# Patient Record
Sex: MALE | Race: BLACK OR AFRICAN AMERICAN | Employment: OTHER | URBAN - METROPOLITAN AREA
[De-identification: names, ages, dates, MRNs, and addresses within clinical notes are randomized per-mention and may not be internally consistent; named-entity substitution may affect disease eponyms.]

---

## 2017-01-09 PROBLEM — G62.9 NEUROPATHY: Status: ACTIVE | Noted: 2017-01-09

## 2017-07-25 PROBLEM — G45.1 TIA INVOLVING RIGHT INTERNAL CAROTID ARTERY: Status: ACTIVE | Noted: 2017-07-25

## 2018-07-28 ENCOUNTER — HOSPITAL ENCOUNTER (OUTPATIENT)
Age: 83
Setting detail: OBSERVATION
Discharge: HOME | End: 2018-07-30
Attending: EMERGENCY MEDICINE | Admitting: INTERNAL MEDICINE
Payer: MEDICARE

## 2018-07-28 ENCOUNTER — APPOINTMENT (OUTPATIENT)
Dept: GENERAL RADIOLOGY | Age: 83
End: 2018-07-28
Payer: MEDICARE

## 2018-07-28 DIAGNOSIS — R06.02 SOB (SHORTNESS OF BREATH): Primary | ICD-10-CM

## 2018-07-28 DIAGNOSIS — N17.9 AKI (ACUTE KIDNEY INJURY) (HCC): ICD-10-CM

## 2018-07-28 LAB
ALBUMIN SERPL-MCNC: 3.9 G/DL (ref 3.5–5.2)
ALP BLD-CCNC: 118 U/L (ref 40–129)
ALT SERPL-CCNC: 14 U/L (ref 0–40)
ANION GAP SERPL CALCULATED.3IONS-SCNC: 11 MMOL/L (ref 7–16)
AST SERPL-CCNC: 24 U/L (ref 0–39)
BASOPHILS ABSOLUTE: 0.03 E9/L (ref 0–0.2)
BASOPHILS RELATIVE PERCENT: 0.7 % (ref 0–2)
BILIRUB SERPL-MCNC: 0.3 MG/DL (ref 0–1.2)
BUN BLDV-MCNC: 33 MG/DL (ref 8–23)
CALCIUM SERPL-MCNC: 9.4 MG/DL (ref 8.6–10.2)
CHLORIDE BLD-SCNC: 105 MMOL/L (ref 98–107)
CO2: 22 MMOL/L (ref 22–29)
CREAT SERPL-MCNC: 1.8 MG/DL (ref 0.7–1.2)
EKG ATRIAL RATE: 227 BPM
EKG Q-T INTERVAL: 384 MS
EKG QRS DURATION: 68 MS
EKG QTC CALCULATION (BAZETT): 392 MS
EKG R AXIS: 49 DEGREES
EKG T AXIS: 28 DEGREES
EKG VENTRICULAR RATE: 63 BPM
EOSINOPHILS ABSOLUTE: 0.09 E9/L (ref 0.05–0.5)
EOSINOPHILS RELATIVE PERCENT: 2 % (ref 0–6)
GFR AFRICAN AMERICAN: 44
GFR NON-AFRICAN AMERICAN: 44 ML/MIN/1.73
GLUCOSE BLD-MCNC: 230 MG/DL (ref 74–109)
HCT VFR BLD CALC: 38.7 % (ref 37–54)
HEMOGLOBIN: 12.1 G/DL (ref 12.5–16.5)
IMMATURE GRANULOCYTES #: 0.01 E9/L
IMMATURE GRANULOCYTES %: 0.2 % (ref 0–5)
LYMPHOCYTES ABSOLUTE: 1.02 E9/L (ref 1.5–4)
LYMPHOCYTES RELATIVE PERCENT: 22.2 % (ref 20–42)
MCH RBC QN AUTO: 26.7 PG (ref 26–35)
MCHC RBC AUTO-ENTMCNC: 31.3 % (ref 32–34.5)
MCV RBC AUTO: 85.4 FL (ref 80–99.9)
METER GLUCOSE: 104 MG/DL (ref 70–110)
METER GLUCOSE: 216 MG/DL (ref 70–110)
MONOCYTES ABSOLUTE: 0.39 E9/L (ref 0.1–0.95)
MONOCYTES RELATIVE PERCENT: 8.5 % (ref 2–12)
NEUTROPHILS ABSOLUTE: 3.06 E9/L (ref 1.8–7.3)
NEUTROPHILS RELATIVE PERCENT: 66.4 % (ref 43–80)
PDW BLD-RTO: 15.6 FL (ref 11.5–15)
PLATELET # BLD: 104 E9/L (ref 130–450)
PMV BLD AUTO: 11.5 FL (ref 7–12)
POTASSIUM SERPL-SCNC: 4.8 MMOL/L (ref 3.5–5)
PRO-BNP: 872 PG/ML (ref 0–450)
RBC # BLD: 4.53 E12/L (ref 3.8–5.8)
SODIUM BLD-SCNC: 138 MMOL/L (ref 132–146)
TOTAL PROTEIN: 7.9 G/DL (ref 6.4–8.3)
TROPONIN: <0.01 NG/ML (ref 0–0.03)
WBC # BLD: 4.6 E9/L (ref 4.5–11.5)

## 2018-07-28 PROCEDURE — 36415 COLL VENOUS BLD VENIPUNCTURE: CPT

## 2018-07-28 PROCEDURE — 99285 EMERGENCY DEPT VISIT HI MDM: CPT

## 2018-07-28 PROCEDURE — 83880 ASSAY OF NATRIURETIC PEPTIDE: CPT

## 2018-07-28 PROCEDURE — 80053 COMPREHEN METABOLIC PANEL: CPT

## 2018-07-28 PROCEDURE — 84484 ASSAY OF TROPONIN QUANT: CPT

## 2018-07-28 PROCEDURE — 82962 GLUCOSE BLOOD TEST: CPT

## 2018-07-28 PROCEDURE — G0378 HOSPITAL OBSERVATION PER HR: HCPCS

## 2018-07-28 PROCEDURE — 93005 ELECTROCARDIOGRAM TRACING: CPT | Performed by: PHYSICIAN ASSISTANT

## 2018-07-28 PROCEDURE — 6370000000 HC RX 637 (ALT 250 FOR IP): Performed by: INTERNAL MEDICINE

## 2018-07-28 PROCEDURE — 71045 X-RAY EXAM CHEST 1 VIEW: CPT

## 2018-07-28 PROCEDURE — 85025 COMPLETE CBC W/AUTO DIFF WBC: CPT

## 2018-07-28 RX ORDER — NICOTINE POLACRILEX 4 MG
15 LOZENGE BUCCAL PRN
Status: DISCONTINUED | OUTPATIENT
Start: 2018-07-28 | End: 2018-07-30 | Stop reason: HOSPADM

## 2018-07-28 RX ORDER — METOPROLOL TARTRATE 50 MG/1
50 TABLET, FILM COATED ORAL 2 TIMES DAILY
Status: DISCONTINUED | OUTPATIENT
Start: 2018-07-28 | End: 2018-07-30 | Stop reason: HOSPADM

## 2018-07-28 RX ORDER — DEXTROSE MONOHYDRATE 50 MG/ML
100 INJECTION, SOLUTION INTRAVENOUS PRN
Status: DISCONTINUED | OUTPATIENT
Start: 2018-07-28 | End: 2018-07-30 | Stop reason: HOSPADM

## 2018-07-28 RX ORDER — WARFARIN SODIUM 4 MG/1
4 TABLET ORAL
Status: DISCONTINUED | OUTPATIENT
Start: 2018-07-30 | End: 2018-07-29

## 2018-07-28 RX ORDER — DEXTROSE MONOHYDRATE 25 G/50ML
12.5 INJECTION, SOLUTION INTRAVENOUS PRN
Status: DISCONTINUED | OUTPATIENT
Start: 2018-07-28 | End: 2018-07-30 | Stop reason: HOSPADM

## 2018-07-28 RX ORDER — CLOPIDOGREL BISULFATE 75 MG/1
75 TABLET ORAL DAILY
Status: DISCONTINUED | OUTPATIENT
Start: 2018-07-29 | End: 2018-07-30 | Stop reason: HOSPADM

## 2018-07-28 RX ORDER — WARFARIN SODIUM 2 MG/1
2 TABLET ORAL
Status: DISCONTINUED | OUTPATIENT
Start: 2018-07-28 | End: 2018-07-29

## 2018-07-28 RX ORDER — SIMVASTATIN 20 MG
20 TABLET ORAL NIGHTLY
Status: DISCONTINUED | OUTPATIENT
Start: 2018-07-28 | End: 2018-07-30 | Stop reason: HOSPADM

## 2018-07-28 RX ORDER — FAMOTIDINE 20 MG/1
20 TABLET, FILM COATED ORAL DAILY
Status: DISCONTINUED | OUTPATIENT
Start: 2018-07-29 | End: 2018-07-30 | Stop reason: HOSPADM

## 2018-07-28 RX ORDER — WARFARIN SODIUM 2 MG/1
TABLET ORAL
COMMUNITY
End: 2019-03-01

## 2018-07-28 RX ADMIN — METOPROLOL TARTRATE 50 MG: 50 TABLET ORAL at 22:40

## 2018-07-28 ASSESSMENT — ENCOUNTER SYMPTOMS
BACK PAIN: 0
ABDOMINAL PAIN: 0
COUGH: 0
SHORTNESS OF BREATH: 1
NAUSEA: 0
EYE REDNESS: 0
DIARRHEA: 0
SORE THROAT: 0
WHEEZING: 0
VOMITING: 0
EYE DISCHARGE: 0
SINUS PRESSURE: 0
EYE PAIN: 0

## 2018-07-28 ASSESSMENT — PAIN SCALES - GENERAL: PAINLEVEL_OUTOF10: 0

## 2018-07-28 NOTE — ED PROVIDER NOTES
rebound and no guarding. Musculoskeletal: He exhibits no edema, tenderness or deformity. Neurological: He is alert and oriented to person, place, and time. No cranial nerve deficit. Coordination normal.   Skin: Skin is warm and dry. He is not diaphoretic. Nursing note and vitals reviewed. Procedures    MDM  Number of Diagnoses or Management Options  MISAEL (acute kidney injury) ([facility]):   SOB (shortness of breath):   Diagnosis management comments: Review labs ordered and imaging. Patient has findings consistent with acute kidney injury. Based on previous results as function is worsening though he has chronic mild insufficiency. Patient is satting well vital signs are within normal limits. Though patient's exertion takes less and less effort. Patient has not had a recent cardiac evaluation will advise patient stay in hospital for further workup. 8:50 no change in exam on reevaluation. Patient has no complaints at this time    9:00 Spoke with Dr. Damir Moody. Will admit the patient.     --------------------------------------------- PAST HISTORY ---------------------------------------------  Past Medical History:  has a past medical history of A-fib ([facility] Chronic kidney disease; Colitis, ulcerative chronic ([facility] Diabetes mellitus ([facility] DM (diabetes mellitus) ([facility] Hyperlipidemia; Hyperlipidemia associated with type 2 diabetes mellitus ([facility] Hypertension; Long term (current) use of anticoagulants; Prostate enlargement; Stroke (cerebrum) ([facility] and Unspecified cerebral artery occlusion with cerebral infarction. Past Surgical History:  has a past surgical history that includes ECHO Compl W Dop Color Flow (11/26/2013); ECHO Transesophageal (11/27/2013); colostomy (1978); Cystoscopy (more than 5 yrs); TURP (6/16/14); Colonoscopy; Abdomen surgery; and Endoscopy, colon, diagnostic (12/10/15). Social History:  reports that he has quit smoking.  He has never used smokeless tobacco. He reports that he does not drink alcohol or use drugs. Family History: family history includes COPD in his sister; Coronary Art Dis in his sister; Diabetes in an other family member; Hypotension in his mother. The patients home medications have been reviewed. Allergies: Patient has no known allergies.     -------------------------------------------------- RESULTS -------------------------------------------------    LABS:  Results for orders placed or performed during the hospital encounter of 07/28/18   CBC Auto Differential   Result Value Ref Range    WBC 4.6 4.5 - 11.5 E9/L    RBC 4.53 3.80 - 5.80 E12/L    Hemoglobin 12.1 (L) 12.5 - 16.5 g/dL    Hematocrit 38.7 37.0 - 54.0 %    MCV 85.4 80.0 - 99.9 fL    MCH 26.7 26.0 - 35.0 pg    MCHC 31.3 (L) 32.0 - 34.5 %    RDW 15.6 (H) 11.5 - 15.0 fL    Platelets 444 (L) 374 - 450 E9/L    MPV 11.5 7.0 - 12.0 fL    Neutrophils % 66.4 43.0 - 80.0 %    Immature Granulocytes % 0.2 0.0 - 5.0 %    Lymphocytes % 22.2 20.0 - 42.0 %    Monocytes % 8.5 2.0 - 12.0 %    Eosinophils % 2.0 0.0 - 6.0 %    Basophils % 0.7 0.0 - 2.0 %    Neutrophils # 3.06 1.80 - 7.30 E9/L    Immature Granulocytes # 0.01 E9/L    Lymphocytes # 1.02 (L) 1.50 - 4.00 E9/L    Monocytes # 0.39 0.10 - 0.95 E9/L    Eosinophils # 0.09 0.05 - 0.50 E9/L    Basophils # 0.03 0.00 - 0.20 E9/L   Comprehensive Metabolic Panel   Result Value Ref Range    Sodium 138 132 - 146 mmol/L    Potassium 4.8 3.5 - 5.0 mmol/L    Chloride 105 98 - 107 mmol/L    CO2 22 22 - 29 mmol/L    Anion Gap 11 7 - 16 mmol/L    Glucose 230 (H) 74 - 109 mg/dL    BUN 33 (H) 8 - 23 mg/dL    CREATININE 1.8 (H) 0.7 - 1.2 mg/dL    GFR Non-African American 44 >=60 mL/min/1.73    GFR African American 44     Calcium 9.4 8.6 - 10.2 mg/dL    Total Protein 7.9 6.4 - 8.3 g/dL    Alb 3.9 3.5 - 5.2 g/dL    Total Bilirubin 0.3 0.0 - 1.2 mg/dL    Alkaline Phosphatase 118 40 - 129 U/L    ALT 14 0 - 40 U/L    AST 24 0 - 39 U/L   Brain Natriuretic Peptide   Result Value Ref Range

## 2018-07-28 NOTE — ED NOTES
Pt requesting something to eat, pt states he is a diabetic. Recheck .  Dr. Abdulaziz Jones aware     Jesse Ross, MAITE  07/28/18 0770

## 2018-07-29 LAB
ALBUMIN SERPL-MCNC: 3.6 G/DL (ref 3.5–5.2)
ALP BLD-CCNC: 111 U/L (ref 40–129)
ALT SERPL-CCNC: 11 U/L (ref 0–40)
ANION GAP SERPL CALCULATED.3IONS-SCNC: 12 MMOL/L (ref 7–16)
AST SERPL-CCNC: 24 U/L (ref 0–39)
BILIRUB SERPL-MCNC: 0.3 MG/DL (ref 0–1.2)
BUN BLDV-MCNC: 31 MG/DL (ref 8–23)
CALCIUM SERPL-MCNC: 9.3 MG/DL (ref 8.6–10.2)
CHLORIDE BLD-SCNC: 106 MMOL/L (ref 98–107)
CO2: 23 MMOL/L (ref 22–29)
CREAT SERPL-MCNC: 1.5 MG/DL (ref 0.7–1.2)
GFR AFRICAN AMERICAN: 54
GFR NON-AFRICAN AMERICAN: 54 ML/MIN/1.73
GLUCOSE BLD-MCNC: 189 MG/DL (ref 74–109)
HCT VFR BLD CALC: 35.4 % (ref 37–54)
HEMOGLOBIN: 11.5 G/DL (ref 12.5–16.5)
INR BLD: 4.2
MCH RBC QN AUTO: 26.8 PG (ref 26–35)
MCHC RBC AUTO-ENTMCNC: 32.5 % (ref 32–34.5)
MCV RBC AUTO: 82.5 FL (ref 80–99.9)
METER GLUCOSE: 153 MG/DL (ref 70–110)
METER GLUCOSE: 223 MG/DL (ref 70–110)
METER GLUCOSE: 227 MG/DL (ref 70–110)
PDW BLD-RTO: 15.4 FL (ref 11.5–15)
PLATELET # BLD: 87 E9/L (ref 130–450)
PLATELET CONFIRMATION: NORMAL
PMV BLD AUTO: 10.8 FL (ref 7–12)
POTASSIUM SERPL-SCNC: 4.5 MMOL/L (ref 3.5–5)
PROSTATE SPECIFIC ANTIGEN: 12.38 NG/ML (ref 0–4)
PROTHROMBIN TIME: 46.3 SEC (ref 9.3–12.4)
RBC # BLD: 4.29 E12/L (ref 3.8–5.8)
SODIUM BLD-SCNC: 141 MMOL/L (ref 132–146)
TOTAL PROTEIN: 7.2 G/DL (ref 6.4–8.3)
WBC # BLD: 4.4 E9/L (ref 4.5–11.5)

## 2018-07-29 PROCEDURE — 85610 PROTHROMBIN TIME: CPT

## 2018-07-29 PROCEDURE — 80053 COMPREHEN METABOLIC PANEL: CPT

## 2018-07-29 PROCEDURE — 36415 COLL VENOUS BLD VENIPUNCTURE: CPT

## 2018-07-29 PROCEDURE — 85027 COMPLETE CBC AUTOMATED: CPT

## 2018-07-29 PROCEDURE — G0378 HOSPITAL OBSERVATION PER HR: HCPCS

## 2018-07-29 PROCEDURE — 82962 GLUCOSE BLOOD TEST: CPT

## 2018-07-29 PROCEDURE — 96372 THER/PROPH/DIAG INJ SC/IM: CPT

## 2018-07-29 PROCEDURE — G0103 PSA SCREENING: HCPCS

## 2018-07-29 PROCEDURE — 6370000000 HC RX 637 (ALT 250 FOR IP): Performed by: INTERNAL MEDICINE

## 2018-07-29 RX ADMIN — FAMOTIDINE 20 MG: 20 TABLET, FILM COATED ORAL at 10:16

## 2018-07-29 RX ADMIN — INSULIN HUMAN 35 UNITS: 100 INJECTION, SOLUTION PARENTERAL at 18:08

## 2018-07-29 RX ADMIN — SIMVASTATIN 20 MG: 20 TABLET, FILM COATED ORAL at 19:59

## 2018-07-29 RX ADMIN — METOPROLOL TARTRATE 50 MG: 50 TABLET ORAL at 10:16

## 2018-07-29 RX ADMIN — CLOPIDOGREL 75 MG: 75 TABLET, FILM COATED ORAL at 10:16

## 2018-07-29 RX ADMIN — METOPROLOL TARTRATE 50 MG: 50 TABLET ORAL at 19:59

## 2018-07-29 RX ADMIN — INSULIN HUMAN 25 UNITS: 100 INJECTION, SOLUTION PARENTERAL at 07:21

## 2018-07-29 ASSESSMENT — PAIN SCALES - GENERAL
PAINLEVEL_OUTOF10: 0

## 2018-07-29 NOTE — PROGRESS NOTES
Dr. Aguilera Round,    Please note: Your patient is on a medication that requires a renal dose adjustment. Renal Function Assessment:    Date Body Weight IBW Adj. Body Weight Scr CrCl Dialysis status   7/28/2018 78.2 kg N/a N/a 1.8 32 ml/min n/a       Pharmacy has renally dose-adjusted the following medication(s):    Date Medication Original Dosing Regimen New Dosing Regimen   7/28/2018 Pepcid 20 mg PO twice daily 20 mg PO daily           These changes were made per protocol according to the [facility] Renal Function-Based Dose Adjustments Policy    *Please note this dose may need readjusted if your patient's renal function significantly improves. Please contact pharmacy with any questions regarding these changes.     Vandana Melendez RPh 7/28/2018 10:01 PM

## 2018-07-30 VITALS
HEART RATE: 70 BPM | SYSTOLIC BLOOD PRESSURE: 141 MMHG | DIASTOLIC BLOOD PRESSURE: 76 MMHG | HEIGHT: 69 IN | BODY MASS INDEX: 25.58 KG/M2 | TEMPERATURE: 97.3 F | OXYGEN SATURATION: 98 % | RESPIRATION RATE: 16 BRPM | WEIGHT: 172.7 LBS

## 2018-07-30 LAB
ANION GAP SERPL CALCULATED.3IONS-SCNC: 13 MMOL/L (ref 7–16)
BUN BLDV-MCNC: 32 MG/DL (ref 8–23)
CALCIUM SERPL-MCNC: 9.6 MG/DL (ref 8.6–10.2)
CHLORIDE BLD-SCNC: 105 MMOL/L (ref 98–107)
CO2: 26 MMOL/L (ref 22–29)
CREAT SERPL-MCNC: 1.5 MG/DL (ref 0.7–1.2)
GFR AFRICAN AMERICAN: 54
GFR NON-AFRICAN AMERICAN: 54 ML/MIN/1.73
GLUCOSE BLD-MCNC: 139 MG/DL (ref 74–109)
HCT VFR BLD CALC: 39.5 % (ref 37–54)
HEMOGLOBIN: 12.4 G/DL (ref 12.5–16.5)
MCH RBC QN AUTO: 26.3 PG (ref 26–35)
MCHC RBC AUTO-ENTMCNC: 31.4 % (ref 32–34.5)
MCV RBC AUTO: 83.9 FL (ref 80–99.9)
METER GLUCOSE: 106 MG/DL (ref 70–110)
METER GLUCOSE: 121 MG/DL (ref 70–110)
METER GLUCOSE: 135 MG/DL (ref 70–110)
PDW BLD-RTO: 15.4 FL (ref 11.5–15)
PLATELET # BLD: 107 E9/L (ref 130–450)
PMV BLD AUTO: 11.6 FL (ref 7–12)
POTASSIUM SERPL-SCNC: 4.1 MMOL/L (ref 3.5–5)
RBC # BLD: 4.71 E12/L (ref 3.8–5.8)
SODIUM BLD-SCNC: 144 MMOL/L (ref 132–146)
WBC # BLD: 5.1 E9/L (ref 4.5–11.5)

## 2018-07-30 PROCEDURE — 36415 COLL VENOUS BLD VENIPUNCTURE: CPT

## 2018-07-30 PROCEDURE — G0378 HOSPITAL OBSERVATION PER HR: HCPCS

## 2018-07-30 PROCEDURE — 85027 COMPLETE CBC AUTOMATED: CPT

## 2018-07-30 PROCEDURE — 80048 BASIC METABOLIC PNL TOTAL CA: CPT

## 2018-07-30 PROCEDURE — 82962 GLUCOSE BLOOD TEST: CPT

## 2018-07-30 PROCEDURE — 6370000000 HC RX 637 (ALT 250 FOR IP): Performed by: INTERNAL MEDICINE

## 2018-07-30 RX ADMIN — CLOPIDOGREL 75 MG: 75 TABLET, FILM COATED ORAL at 09:10

## 2018-07-30 RX ADMIN — FAMOTIDINE 20 MG: 20 TABLET, FILM COATED ORAL at 09:09

## 2018-07-30 RX ADMIN — INSULIN HUMAN 25 UNITS: 100 INJECTION, SOLUTION PARENTERAL at 09:06

## 2018-07-30 RX ADMIN — METOPROLOL TARTRATE 50 MG: 50 TABLET ORAL at 09:09

## 2018-07-30 RX ADMIN — INSULIN HUMAN 35 UNITS: 100 INJECTION, SOLUTION PARENTERAL at 18:04

## 2018-07-30 ASSESSMENT — PAIN SCALES - GENERAL: PAINLEVEL_OUTOF10: 0

## 2018-07-30 NOTE — CONSULTS
Consults           Today's Date: 7/30/2018  Patient Name: Juani Leyva  Date of admission: 7/28/2018  3:49 PM  Patient's age: 80 y.o., 1935  Admission Dx: MISAEL (acute kidney injury) ([facility] [N17.9]  Acute kidney injury ([address] [N17.9]    Reason for Consult:  atrial fibrillation,chronic, dizziness/lightheadedness  Requesting Physician: Pancho Rloand MD    CHIEF COMPLAINT: Shortness of breath, lightheadedness    History Obtained From:  patient    HISTORY OF PRESENT ILLNESS:      The patient is a 80 y.o.  male who is admitted to the hospital for weakness and lightheadedness  Mr. Ann-Marie Helms is well known to me for chronic atrial fibrillation but was last seen by me more than 5 years ago  He has been in his usual state of health with no complaints of shortness of breath chest pain or dizziness until last Thursday  He says he was working in his yard and working hard to start his weed floresita. After completing his task in the yard he noted symptoms of shortness of breath and lightheadedness  Symptoms persisted thereafter for the next 24-48 hours and therefore he presented to the hospital  On initial evaluation BUN/creatinine were elevated from his baseline, creatinine serum creatinine was 1.8 and his baseline is 1.4-1.5  Blood sugars were also elevated. Since hospitalization he has felt better, and ambulating without difficulty. No chest pain or shortness of breath  No syncope or near syncope  No paroxysmal muscle dyspnea orthopnea or leg edema  Cardiac workup including echocardiography last year was completely normal      Past Medical History:   has a past medical history of A-fib ([address] Chronic kidney disease; Colitis, ulcerative chronic ([address] Diabetes mellitus ([address] DM (diabetes mellitus) ([address] Hyperlipidemia; Hyperlipidemia associated with type 2 diabetes mellitus ([address] Hypertension;  Long term (current) use of anticoagulants; Prostate enlargement; Stroke (cerebrum) ([address] and Unspecified cerebral artery occlusion with cerebral infarction. Past Surgical History:   has a past surgical history that includes ECHO Compl W Dop Color Flow (11/26/2013); ECHO Transesophageal (11/27/2013); colostomy (1978); Cystoscopy (more than 5 yrs); TURP (6/16/14); Colonoscopy; Abdomen surgery; and Endoscopy, colon, diagnostic (12/10/15). Home Medications:    Prior to Admission medications    Medication Sig Start Date End Date Taking? Authorizing Provider   warfarin (COUMADIN) 2 MG tablet Take 4 mg Mon/Tues/Wed/Thur/Fri  Take 2 mg Sat & Sun   Yes Historical Provider, MD   famotidine (PEPCID) 20 MG tablet Take 1 tablet by mouth 2 times daily 7/26/18  Yes Guillermina Andrews MD   ONE TOUCH ULTRA TEST strip USE ONE STRIP TO CHECK GLUCOSE TWO TIMES DAILY 6/25/18  Yes Guillermina Andrews MD   simvastatin (ZOCOR) 20 MG tablet Take 1 tablet by mouth nightly 6/25/18  Yes Nicholas Beal MD   metoprolol tartrate (LOPRESSOR) 50 MG tablet TAKE 1 TABLET BY MOUTH 2 TIMES DAILY 6/4/18  Yes Guillermina Andrews MD   [facility] LANCETS MICRO THIN 33G MISC TEST TWICE A DAY 5/8/18  Yes Guillermina Andrews MD   clopidogrel (PLAVIX) 75 MG tablet TAKE 1 TABLET BY MOUTH DAILY 4/2/18  Yes Nicholas Beal MD   insulin regular (HUMULIN R) 100 UNIT/ML injection PATIENT INJECTS 25 UNITS AM , 35 UNITS BEFORE DINNER 2/27/18  Yes Nicholas Beal MD   B-D INS SYR ULTRAFINE 1CC/31G 31G X 5/16\" 1 ML MISC USE TO INJECT INSULIN TWICE A DAY 1/22/18  Yes Nicholas Beal MD   Insulin Syringe-Needle U-100 (B-D INS SYR ULTRAFINE 1CC/31G) 31G X 5/16\" 1 ML MISC USE TO INJECT INSULIN TWICE A DAY 4/24/17  Yes Guillermina Andrews MD       Allergies:  Patient has no known allergies. Social History:   reports that he has quit smoking. He has never used smokeless tobacco. He reports that he does not drink alcohol or use drugs. Family History: family history includes COPD in his sister; Coronary Art Dis in his sister; Diabetes in an other family member; Hypotension in his mother.  No h/o sudden cardiac

## 2018-07-30 NOTE — PROGRESS NOTES
Call placed to Dr. Candace Em  With patient concerns. Informed that he just admitted patient and he is to see Dr. Dyana Pereira. Orders received for BMP and CBC and told to contact Dr. Dyana Pereira and change attending.

## 2018-12-19 ENCOUNTER — HOSPITAL ENCOUNTER (OUTPATIENT)
Age: 83
Discharge: HOME | End: 2018-12-21
Payer: MEDICARE

## 2018-12-19 LAB — PROSTATE SPECIFIC ANTIGEN: 14.36 NG/ML (ref 0–4)

## 2018-12-19 PROCEDURE — 84153 ASSAY OF PSA TOTAL: CPT

## 2019-12-23 ENCOUNTER — HOSPITAL ENCOUNTER (OUTPATIENT)
Age: 84
Discharge: HOME | End: 2019-12-25
Payer: MEDICARE

## 2019-12-23 LAB — PROSTATE SPECIFIC ANTIGEN: 22.76 NG/ML (ref 0–4)

## 2019-12-23 PROCEDURE — 84153 ASSAY OF PSA TOTAL: CPT

## 2020-03-25 PROBLEM — N17.9 AKI (ACUTE KIDNEY INJURY) (HCC): Status: RESOLVED | Noted: 2018-07-28 | Resolved: 2020-03-24

## 2020-06-19 ENCOUNTER — HOSPITAL ENCOUNTER (OUTPATIENT)
Age: 85
Discharge: HOME | End: 2020-06-21
Payer: MEDICARE

## 2020-06-19 LAB — PROSTATE SPECIFIC ANTIGEN: 22.32 NG/ML (ref 0–4)

## 2020-06-19 PROCEDURE — 84153 ASSAY OF PSA TOTAL: CPT

## 2020-10-01 ENCOUNTER — HOSPITAL ENCOUNTER (OUTPATIENT)
Age: 85
Setting detail: OBSERVATION
Discharge: HOME | End: 2020-10-02
Attending: EMERGENCY MEDICINE | Admitting: INTERNAL MEDICINE
Payer: MEDICARE

## 2020-10-01 ENCOUNTER — APPOINTMENT (OUTPATIENT)
Dept: GENERAL RADIOLOGY | Age: 85
End: 2020-10-01
Payer: MEDICARE

## 2020-10-01 PROBLEM — K92.2 GI BLEEDING: Status: ACTIVE | Noted: 2020-10-01

## 2020-10-01 LAB
ABO/RH: NORMAL
ALBUMIN SERPL-MCNC: 3.9 G/DL (ref 3.5–5.2)
ALP BLD-CCNC: 134 U/L (ref 40–129)
ALT SERPL-CCNC: 10 U/L (ref 0–40)
ANION GAP SERPL CALCULATED.3IONS-SCNC: 12 MMOL/L (ref 7–16)
ANTIBODY SCREEN: NORMAL
AST SERPL-CCNC: 30 U/L (ref 0–39)
BASOPHILS ABSOLUTE: 0.02 E9/L (ref 0–0.2)
BASOPHILS RELATIVE PERCENT: 0.4 % (ref 0–2)
BILIRUB SERPL-MCNC: 0.4 MG/DL (ref 0–1.2)
BILIRUBIN DIRECT: <0.2 MG/DL (ref 0–0.3)
BILIRUBIN, INDIRECT: ABNORMAL MG/DL (ref 0–1)
BUN BLDV-MCNC: 34 MG/DL (ref 8–23)
CALCIUM SERPL-MCNC: 9.5 MG/DL (ref 8.6–10.2)
CHLORIDE BLD-SCNC: 107 MMOL/L (ref 98–107)
CO2: 20 MMOL/L (ref 22–29)
CREAT SERPL-MCNC: 1.9 MG/DL (ref 0.7–1.2)
EKG ATRIAL RATE: 110 BPM
EKG Q-T INTERVAL: 352 MS
EKG QRS DURATION: 66 MS
EKG QTC CALCULATION (BAZETT): 440 MS
EKG R AXIS: 66 DEGREES
EKG T AXIS: 58 DEGREES
EKG VENTRICULAR RATE: 94 BPM
EOSINOPHILS ABSOLUTE: 0.09 E9/L (ref 0.05–0.5)
EOSINOPHILS RELATIVE PERCENT: 2 % (ref 0–6)
GFR AFRICAN AMERICAN: 41
GFR NON-AFRICAN AMERICAN: 41 ML/MIN/1.73
GLUCOSE BLD-MCNC: 163 MG/DL (ref 74–99)
HCT VFR BLD CALC: 37.9 % (ref 37–54)
HEMOGLOBIN: 11.8 G/DL (ref 12.5–16.5)
IMMATURE GRANULOCYTES #: 0.02 E9/L
IMMATURE GRANULOCYTES %: 0.4 % (ref 0–5)
INR BLD: 3
LIPASE: 71 U/L (ref 13–60)
LYMPHOCYTES ABSOLUTE: 1.11 E9/L (ref 1.5–4)
LYMPHOCYTES RELATIVE PERCENT: 24.4 % (ref 20–42)
MCH RBC QN AUTO: 26.8 PG (ref 26–35)
MCHC RBC AUTO-ENTMCNC: 31.1 % (ref 32–34.5)
MCV RBC AUTO: 85.9 FL (ref 80–99.9)
METER GLUCOSE: 114 MG/DL (ref 74–99)
METER GLUCOSE: 117 MG/DL (ref 74–99)
METER GLUCOSE: 251 MG/DL (ref 74–99)
MONOCYTES ABSOLUTE: 0.33 E9/L (ref 0.1–0.95)
MONOCYTES RELATIVE PERCENT: 7.3 % (ref 2–12)
NEUTROPHILS ABSOLUTE: 2.98 E9/L (ref 1.8–7.3)
NEUTROPHILS RELATIVE PERCENT: 65.5 % (ref 43–80)
PDW BLD-RTO: 15.3 FL (ref 11.5–15)
PLATELET # BLD: 101 E9/L (ref 130–450)
PMV BLD AUTO: 12.1 FL (ref 7–12)
POTASSIUM REFLEX MAGNESIUM: 4.6 MMOL/L (ref 3.5–5)
PROTHROMBIN TIME: 34.5 SEC (ref 9.3–12.4)
RBC # BLD: 4.41 E12/L (ref 3.8–5.8)
SODIUM BLD-SCNC: 139 MMOL/L (ref 132–146)
TOTAL PROTEIN: 7.5 G/DL (ref 6.4–8.3)
WBC # BLD: 4.6 E9/L (ref 4.5–11.5)

## 2020-10-01 PROCEDURE — G0378 HOSPITAL OBSERVATION PER HR: HCPCS

## 2020-10-01 PROCEDURE — 85025 COMPLETE CBC W/AUTO DIFF WBC: CPT

## 2020-10-01 PROCEDURE — 86850 RBC ANTIBODY SCREEN: CPT

## 2020-10-01 PROCEDURE — 93010 ELECTROCARDIOGRAM REPORT: CPT | Performed by: INTERNAL MEDICINE

## 2020-10-01 PROCEDURE — 80076 HEPATIC FUNCTION PANEL: CPT

## 2020-10-01 PROCEDURE — C9113 INJ PANTOPRAZOLE SODIUM, VIA: HCPCS | Performed by: EMERGENCY MEDICINE

## 2020-10-01 PROCEDURE — 2580000003 HC RX 258: Performed by: INTERNAL MEDICINE

## 2020-10-01 PROCEDURE — 85610 PROTHROMBIN TIME: CPT

## 2020-10-01 PROCEDURE — 6360000002 HC RX W HCPCS: Performed by: EMERGENCY MEDICINE

## 2020-10-01 PROCEDURE — 83690 ASSAY OF LIPASE: CPT

## 2020-10-01 PROCEDURE — 99284 EMERGENCY DEPT VISIT MOD MDM: CPT

## 2020-10-01 PROCEDURE — 86901 BLOOD TYPING SEROLOGIC RH(D): CPT

## 2020-10-01 PROCEDURE — 71045 X-RAY EXAM CHEST 1 VIEW: CPT

## 2020-10-01 PROCEDURE — 6370000000 HC RX 637 (ALT 250 FOR IP): Performed by: INTERNAL MEDICINE

## 2020-10-01 PROCEDURE — 80048 BASIC METABOLIC PNL TOTAL CA: CPT

## 2020-10-01 PROCEDURE — 82962 GLUCOSE BLOOD TEST: CPT

## 2020-10-01 PROCEDURE — 86900 BLOOD TYPING SEROLOGIC ABO: CPT

## 2020-10-01 PROCEDURE — 6370000000 HC RX 637 (ALT 250 FOR IP): Performed by: STUDENT IN AN ORGANIZED HEALTH CARE EDUCATION/TRAINING PROGRAM

## 2020-10-01 PROCEDURE — 96374 THER/PROPH/DIAG INJ IV PUSH: CPT

## 2020-10-01 PROCEDURE — 93005 ELECTROCARDIOGRAM TRACING: CPT | Performed by: EMERGENCY MEDICINE

## 2020-10-01 PROCEDURE — 99285 EMERGENCY DEPT VISIT HI MDM: CPT

## 2020-10-01 RX ORDER — DEXTROSE MONOHYDRATE 50 MG/ML
100 INJECTION, SOLUTION INTRAVENOUS PRN
Status: DISCONTINUED | OUTPATIENT
Start: 2020-10-01 | End: 2020-10-02 | Stop reason: HOSPADM

## 2020-10-01 RX ORDER — PANTOPRAZOLE SODIUM 40 MG/10ML
40 INJECTION, POWDER, LYOPHILIZED, FOR SOLUTION INTRAVENOUS ONCE
Status: COMPLETED | OUTPATIENT
Start: 2020-10-01 | End: 2020-10-01

## 2020-10-01 RX ORDER — SODIUM CHLORIDE 0.9 % (FLUSH) 0.9 %
10 SYRINGE (ML) INJECTION PRN
Status: DISCONTINUED | OUTPATIENT
Start: 2020-10-01 | End: 2020-10-02 | Stop reason: HOSPADM

## 2020-10-01 RX ORDER — SODIUM CHLORIDE 9 MG/ML
INJECTION, SOLUTION INTRAVENOUS CONTINUOUS
Status: DISCONTINUED | OUTPATIENT
Start: 2020-10-01 | End: 2020-10-02 | Stop reason: HOSPADM

## 2020-10-01 RX ORDER — NICOTINE POLACRILEX 4 MG
15 LOZENGE BUCCAL PRN
Status: DISCONTINUED | OUTPATIENT
Start: 2020-10-01 | End: 2020-10-02 | Stop reason: HOSPADM

## 2020-10-01 RX ORDER — ACETAMINOPHEN 325 MG/1
650 TABLET ORAL EVERY 4 HOURS PRN
Status: DISCONTINUED | OUTPATIENT
Start: 2020-10-01 | End: 2020-10-02 | Stop reason: HOSPADM

## 2020-10-01 RX ORDER — PANTOPRAZOLE SODIUM 40 MG/1
40 TABLET, DELAYED RELEASE ORAL
Status: DISCONTINUED | OUTPATIENT
Start: 2020-10-01 | End: 2020-10-02 | Stop reason: HOSPADM

## 2020-10-01 RX ORDER — METOPROLOL TARTRATE 50 MG/1
50 TABLET, FILM COATED ORAL 2 TIMES DAILY
Status: DISCONTINUED | OUTPATIENT
Start: 2020-10-01 | End: 2020-10-02 | Stop reason: HOSPADM

## 2020-10-01 RX ORDER — CLONIDINE 0.3 MG/24H
1 PATCH, EXTENDED RELEASE TRANSDERMAL WEEKLY
Status: DISCONTINUED | OUTPATIENT
Start: 2020-10-01 | End: 2020-10-02 | Stop reason: HOSPADM

## 2020-10-01 RX ORDER — CLONIDINE HYDROCHLORIDE 0.1 MG/1
0.1 TABLET ORAL EVERY 4 HOURS PRN
Status: DISCONTINUED | OUTPATIENT
Start: 2020-10-01 | End: 2020-10-02 | Stop reason: HOSPADM

## 2020-10-01 RX ORDER — SODIUM CHLORIDE 0.9 % (FLUSH) 0.9 %
10 SYRINGE (ML) INJECTION EVERY 12 HOURS SCHEDULED
Status: DISCONTINUED | OUTPATIENT
Start: 2020-10-01 | End: 2020-10-02 | Stop reason: HOSPADM

## 2020-10-01 RX ORDER — DEXTROSE MONOHYDRATE 25 G/50ML
12.5 INJECTION, SOLUTION INTRAVENOUS PRN
Status: DISCONTINUED | OUTPATIENT
Start: 2020-10-01 | End: 2020-10-02 | Stop reason: HOSPADM

## 2020-10-01 RX ADMIN — METOPROLOL TARTRATE 50 MG: 50 TABLET, FILM COATED ORAL at 21:33

## 2020-10-01 RX ADMIN — METOPROLOL TARTRATE 50 MG: 50 TABLET, FILM COATED ORAL at 12:46

## 2020-10-01 RX ADMIN — PANTOPRAZOLE SODIUM 40 MG: 40 INJECTION, POWDER, FOR SOLUTION INTRAVENOUS at 08:02

## 2020-10-01 RX ADMIN — INSULIN LISPRO 6 UNITS: 100 INJECTION, SOLUTION INTRAVENOUS; SUBCUTANEOUS at 19:52

## 2020-10-01 RX ADMIN — SODIUM CHLORIDE, PRESERVATIVE FREE 10 ML: 5 INJECTION INTRAVENOUS at 12:48

## 2020-10-01 RX ADMIN — CLONIDINE HYDROCHLORIDE 0.1 MG: 0.1 TABLET ORAL at 17:29

## 2020-10-01 RX ADMIN — SODIUM CHLORIDE: 9 INJECTION, SOLUTION INTRAVENOUS at 17:30

## 2020-10-01 RX ADMIN — PANTOPRAZOLE SODIUM 40 MG: 40 TABLET, DELAYED RELEASE ORAL at 17:29

## 2020-10-01 NOTE — PROGRESS NOTES
Perfect serve message sent to Dr. Demarcus Garnica regarding clarification on Lovenox d/t GI Bleed and if home meds can be ordered.

## 2020-10-01 NOTE — CONSULTS
Take 1 tablet by mouth daily 6/15/20  Yes Nicholas Beal MD   metoprolol tartrate (LOPRESSOR) 50 MG tablet TAKE 1 TABLET BY MOUTH 2 TIMES DAILY 6/15/20  Yes Anjum Rowe MD   warfarin (COUMADIN) 2 MG tablet TAKE 1 TABLET BY MOUTH DAILY 4/27/20  Yes Nicholas Beal MD   simvastatin (ZOCOR) 20 MG tablet TAKE 1 TABLET BY MOUTH EVERY DAY AT NIGHT 2/10/20  Yes Nicholas Beal MD   warfarin (COUMADIN) 4 MG tablet TAKE 1 TABLET BY MOUTH DAILY AS DIRECTED BY PHYSICIAN. 2/10/20  Yes Anjum Rowe MD   Insulin Syringe-Needle U-100 31G X 5/16\" 1 ML MISC 1 each by Other route 2 times daily 6/1/20   Anjum Rowe MD   blood glucose test strips (ASCENSIA AUTODISC VI;ONE TOUCH ULTRA TEST VI) strip 1 each by In Vitro route daily As needed. 6/1/20   Anjum Rowe MD   ONE TOUCH ULTRASOFT LANCETS MISC 1 each by Does not apply route 2 times daily 6/1/20   Anjum Rowe MD   blood glucose test strips (ONE TOUCH ULTRA TEST) strip USE ONE STRIP TO CHECK GLUCOSE TWO TIMES DAILY 5/27/20   Anjum Rowe MD   famotidine (PEPCID) 20 MG tablet TAKE 1 TABLET BY MOUTH TWICE A DAY 12/6/19   Nichoals Beal MD   triamcinolone (KENALOG) 0.1 % cream APPLY TOPICALLY 2 TIMES DAILY.  12/31/18   Anjum Rowe MD   diclofenac sodium 1 % GEL Apply 2 g topically 2 times daily    Historical Provider, MD       No Known Allergies    Family History   Problem Relation Age of Onset    Hypotension Mother     COPD Sister     Coronary Art Dis Sister     Diabetes Other        Social History     Tobacco Use    Smoking status: Former Smoker    Smokeless tobacco: Never Used   Substance Use Topics    Alcohol use: No    Drug use: No         Review of Systems   General ROS: negative for - chills, fatigue or fever  Hematological and Lymphatic ROS: On coumadin and plavix  Respiratory ROS: no cough, shortness of breath, or wheezing  Cardiovascular ROS: no chest pain or dyspnea on exertion  Gastrointestinal ROS: SEE HPI  Genito-Urinary ROS: no dysuria, trouble voiding, or hematuria  Musculoskeletal ROS: negative for - joint swelling or muscle pain      PHYSICAL EXAM:    Vitals:    10/01/20 1015   BP: (!) 191/86   Pulse: 86   Resp: 16   Temp: 97.1 °F (36.2 °C)   SpO2: 98%       General Appearance:  awake, alert, oriented, in no acute distress  Skin:  Skin color, texture, turgor normal. No rashes or lesions. Head/face:  NCAT  Eyes:  No gross abnormalities. and Sclera nonicteric  Lungs:  No acute respiratory distress. Heart:  Heart regular rate and rhythm  Abdomen:  Ostomy present with melenic stool in bag. Soft, nontender, non distended. No guarding. No parastomal hernia. Extremities: Extremities warm to touch, pink, with no edema. LABS:    CBC  Recent Labs     10/01/20  0750   WBC 4.6   HGB 11.8*   HCT 37.9   *     BMP  Recent Labs     10/01/20  0750      K 4.6      CO2 20*   BUN 34*   CREATININE 1.9*   CALCIUM 9.5     Liver Function  Recent Labs     10/01/20  0750   LIPASE 71*   BILITOT 0.4   BILIDIR <0.2   AST 30   ALT 10   ALKPHOS 134*   PROT 7.5   LABALBU 3.9     No results for input(s): LACTATE in the last 72 hours. Recent Labs     10/01/20  0750   INR 3.0       RADIOLOGY    Xr Chest Portable    Result Date: 10/1/2020  EXAMINATION: ONE XRAY VIEW OF THE CHEST 10/1/2020 7:41 am COMPARISON: July 28, 2018 HISTORY: ORDERING SYSTEM PROVIDED HISTORY: CP TECHNOLOGIST PROVIDED HISTORY: Reason for exam:->CP What reading provider will be dictating this exam?->CRC Initial exam FINDINGS: The lungs are without acute focal process. There is no effusion or pneumothorax. The cardiomediastinal silhouette is without acute process. The osseous structures are without acute process. No acute process.          ASSESSMENT:  80 y.o. male with Upper GI Bleed    PLAN:    -Unlikely variceal bleed due to minor blood loss  -Hold home coumadin, warfarin  -CLD  -NPO after midnight  -EGD for 10/2  -PPI  -Carafate after EGD  -Monitor H/H, currently Hgb: 11.8    Electronically signed by Carlos Eduardo Luna DO on 10/1/20 at 2:24 PM EDT

## 2020-10-01 NOTE — ED PROVIDER NOTES
Department of Emergency Medicine   ED  Provider Note  Admit Date/RoomTime: 10/1/2020  7:17 AM  ED Room: 5404/5404-A          History of Present Illness:  10/1/20, Time: 7:23 AM EDT  Chief Complaint   Patient presents with    Other     states woke up in middle of night and mouth was full of blood rinsed mouth and states woke up and began spitting up blood again, denies any pain, +coumadin                 Piper Junior is a 80 y.o. male presenting to the ED for spitting up blood, beginning earlier this morning. The complaint has been intermittent, mild in severity, and worsened by nothing. Patient states that this morning he awoke and noted some blood in his mouth. He did not appear to have any trauma to the mouth. Patient stated that he again felt nausea and again \"spit up some blood. \"  Patient denies having any abdominal pain but does state that recently he has felt bloated after meals, PCP had concerns with diabetes related GI difficulties. Patient states that he has an ostomy but has not noted any blood, no abdominal pain. Patient denies any cough or shortness of breath, no chest pain. There is been no recent fevers. Review of Systems:   Pertinent positives and negatives are stated within HPI, all other systems reviewed and are negative.        --------------------------------------------- PAST HISTORY ---------------------------------------------  Past Medical History:  has a past medical history of A-fib ([facility] [facility] Chronic kidney disease, Chronic renal impairment, stage 3 (moderate) ([facility] [address] [facility] Colitis, ulcerative chronic ([facility] [address] Diabetes mellitus ([facility] [address] [facility] DM (diabetes mellitus) ([facility] [address] Encounter for therapeutic drug monitoring, Hyperlipidemia, Hyperlipidemia associated with type 2 diabetes mellitus ([facility] [address] Hypertension, Long term (current) use of anticoagulants, Prostate enlargement, Stroke (cerebrum) ([facility] [address] and Unspecified cerebral artery occlusion with cerebral infarction.     Past Surgical History:  has a past surgical history that includes ECHO Compl W Dop Color Flow (11/26/2013); ECHO Transesophageal (11/27/2013); colostomy (1978); Cystoscopy (more than 5 yrs); TURP (6/16/14); Colonoscopy; Abdomen surgery; and Endoscopy, colon, diagnostic (12/10/15). Social History:  reports that he has quit smoking. He has never used smokeless tobacco. He reports that he does not drink alcohol or use drugs. Family History: family history includes COPD in his sister; Coronary Art Dis in his sister; Diabetes in an other family member; Hypotension in his mother. . Unless otherwise noted, family history is non contributory    The patients home medications have been reviewed. Allergies: Patient has no known allergies. ---------------------------------------------------PHYSICAL EXAM--------------------------------------    Constitutional/General: Alert and oriented x3  Head: Normocephalic and atraumatic  Eyes: PERRL, EOMI, sclera non icteric  Mouth: Oropharynx clear, handling secretions, no trismus, no asymmetry of the posterior oropharynx or uvular edema  Neck: Supple, full ROM, no stridor, no meningeal signs  Respiratory: Lungs clear to auscultation bilaterally, no wheezes, rales, or rhonchi. Not in respiratory distress  Cardiovascular:  Regular rate. Regular rhythm. No murmurs, no aortic murmurs, no gallops, or rubs. 2+ distal pulses. Equal extremity pulses. Chest: No chest wall tenderness  GI:  Abdomen Soft, Non tender, Non distended. No rebound, guarding, or rigidity. No pulsatile masses. Stool in ostomy is positive for occult blood  Musculoskeletal: Moves all extremities x 4. Warm and well perfused, no clubbing, cyanosis, or edema. Capillary refill <3 seconds  Integument: skin warm and dry. No rashes.    Neurologic: GCS 15, no focal deficits, symmetric strength 5/5 in the upper and lower extremities bilaterally  Psychiatric: Normal Affect          -------------------------------------------------- RESULTS -------------------------------------------------  I have personally reviewed all laboratory and imaging results for this patient. Results are listed below.      LABS: (Lab results interpreted by me)  Results for orders placed or performed during the hospital encounter of 10/01/20   Protime-INR   Result Value Ref Range    Protime 34.5 (H) 9.3 - 12.4 sec    INR 3.0    Basic Metabolic Panel w/ Reflex to MG   Result Value Ref Range    Sodium 139 132 - 146 mmol/L    Potassium reflex Magnesium 4.6 3.5 - 5.0 mmol/L    Chloride 107 98 - 107 mmol/L    CO2 20 (L) 22 - 29 mmol/L    Anion Gap 12 7 - 16 mmol/L    Glucose 163 (H) 74 - 99 mg/dL    BUN 34 (H) 8 - 23 mg/dL    CREATININE 1.9 (H) 0.7 - 1.2 mg/dL    GFR Non-African American 41 >=60 mL/min/1.73    GFR African American 41     Calcium 9.5 8.6 - 10.2 mg/dL   CBC Auto Differential   Result Value Ref Range    WBC 4.6 4.5 - 11.5 E9/L    RBC 4.41 3.80 - 5.80 E12/L    Hemoglobin 11.8 (L) 12.5 - 16.5 g/dL    Hematocrit 37.9 37.0 - 54.0 %    MCV 85.9 80.0 - 99.9 fL    MCH 26.8 26.0 - 35.0 pg    MCHC 31.1 (L) 32.0 - 34.5 %    RDW 15.3 (H) 11.5 - 15.0 fL    Platelets 258 (L) 209 - 450 E9/L    MPV 12.1 (H) 7.0 - 12.0 fL    Neutrophils % 65.5 43.0 - 80.0 %    Immature Granulocytes % 0.4 0.0 - 5.0 %    Lymphocytes % 24.4 20.0 - 42.0 %    Monocytes % 7.3 2.0 - 12.0 %    Eosinophils % 2.0 0.0 - 6.0 %    Basophils % 0.4 0.0 - 2.0 %    Neutrophils Absolute 2.98 1.80 - 7.30 E9/L    Immature Granulocytes # 0.02 E9/L    Lymphocytes Absolute 1.11 (L) 1.50 - 4.00 E9/L    Monocytes Absolute 0.33 0.10 - 0.95 E9/L    Eosinophils Absolute 0.09 0.05 - 0.50 E9/L    Basophils Absolute 0.02 0.00 - 0.20 E9/L   Lipase   Result Value Ref Range    Lipase 71 (H) 13 - 60 U/L   Hepatic Function Panel   Result Value Ref Range    Total Protein 7.5 6.4 - 8.3 g/dL    Alb 3.9 3.5 - 5.2 g/dL    Alkaline Phosphatase 134 (H) 40 - 129 U/L    ALT 10 0 - 40 U/L    AST 30 0 - 39 U/L    Total Bilirubin 0.4 0.0 - 1.2 mg/dL    Bilirubin, Direct <0.2 0.0 - 0.3 mg/dL    Bilirubin, Indirect see below 0.0 - 1.0 mg/dL   POCT Glucose   Result Value Ref Range    Meter Glucose 117 (H) 74 - 99 mg/dL   EKG 12 Lead   Result Value Ref Range    Ventricular Rate 94 BPM    Atrial Rate 110 BPM    QRS Duration 66 ms    Q-T Interval 352 ms    QTc Calculation (Bazett) 440 ms    R Axis 66 degrees    T Axis 58 degrees   TYPE AND SCREEN   Result Value Ref Range    ABO/Rh O POS     Antibody Screen NEG    ,       RADIOLOGY:  Interpreted by Radiologist unless otherwise specified  XR CHEST PORTABLE   Final Result   No acute process. EKG Interpretation  Interpreted by emergency department physician, Dr. Gurmeet Romo    Date of EKG: 10/1/20  Time: 2507    Rhythm: atrial fibrillation - controlled  Rate: 94  Axis: normal  Conduction: normal  ST Segments: no acute change  T Waves: no acute change    Clinical Impression: No findings suggestive of acute ischemia or injury  Comparison to prior EKG: stable as compared to patient's most recent EKG      ------------------------- NURSING NOTES AND VITALS REVIEWED ---------------------------   The nursing notes within the ED encounter and vital signs as below have been reviewed by myself  BP (!) 191/86   Pulse 86   Temp 97.1 °F (36.2 °C) (Temporal)   Resp 16   Ht 5' 9\" (1.753 m)   Wt 157 lb 11.2 oz (71.5 kg)   SpO2 98%   BMI 23.29 kg/m²     Oxygen Saturation Interpretation: Normal    The patients available past medical records and past encounters were reviewed.         ------------------------------ ED COURSE/MEDICAL DECISION MAKING----------------------  Medications   sodium chloride flush 0.9 % injection 10 mL (10 mLs Intravenous Given 10/1/20 1248)   sodium chloride flush 0.9 % injection 10 mL (has no administration in time range)   acetaminophen (TYLENOL) tablet 650 mg (has no administration in time range)   cloNIDine (CATAPRES) 0.3 MG/24HR 1 patch (1 patch Transdermal Patch Applied 10/1/20 5991) metoprolol tartrate (LOPRESSOR) tablet 50 mg (50 mg Oral Given 10/1/20 1246)   insulin lispro (HUMALOG) injection vial 0-12 Units (0 Units Subcutaneous Not Given 10/1/20 1248)   glucose (GLUTOSE) 40 % oral gel 15 g (has no administration in time range)   dextrose 50 % IV solution (has no administration in time range)   glucagon (rDNA) injection 1 mg (has no administration in time range)   dextrose 5 % solution (has no administration in time range)   0.9 % sodium chloride infusion (has no administration in time range)   cloNIDine (CATAPRES) tablet 0.1 mg (has no administration in time range)   pantoprazole (PROTONIX) injection 40 mg (40 mg Intravenous Given 10/1/20 0802)           The cardiac monitor revealed controlled atrial fibrillation with a heart rate in the 90s as interpreted by me. The cardiac monitor was ordered secondary to the patient's chest pain and to monitor for patient for dysrhythmia. CPT [id number]           Medical Decision Making:     I, Dr. Devan Fu, am the primary provider of record    72-year-old male presenting after \"spitting up blood. \"  He had done this twice. From what he describes it appears to be more of a GI source than a hemoptysis and pulmonary source, no recent cough, but has had some GI distress and distention after eating. He looks well, slightly tachycardic but normotensive. He has no abdominal tenderness on exam.  However his stool in his ostomy bag is positive for occult blood. EKG shows no signs of ischemia or injury, unchanged from previous. Metabolic panel is within acceptable limits, no leukocytosis or anemia. Patient's INR is 3.0. Chest x-ray is unremarkable. After discussion with PCP, more comfortable with observation in the hospital as he is having active bleeding with his age and Coumadin use. General surgery will be consulted as well. Patient remained hemodynamically stable during his ED course. Re-Evaluations:     This patient's ED course included: a personal history and physicial examination, re-evaluation prior to disposition, cardiac monitoring and continuous pulse oximetry    This patient has remained hemodynamically stable and been closely monitored during their ED course. Counseling: The emergency provider has spoken with the patient and discussed todays results, in addition to providing specific details for the plan of care and counseling regarding the diagnosis and prognosis. Questions are answered at this time and they are agreeable with the plan.       --------------------------------- IMPRESSION AND DISPOSITION ---------------------------------    IMPRESSION  1. Gastrointestinal hemorrhage, unspecified gastrointestinal hemorrhage type        DISPOSITION  Disposition: Admit to med/surg floor  Patient condition is stable        NOTE: This report was transcribed using voice recognition software.  Every effort was made to ensure accuracy; however, inadvertent computerized transcription errors may be present        Fito Casillas DO  10/01/20 1534

## 2020-10-02 VITALS
HEART RATE: 71 BPM | SYSTOLIC BLOOD PRESSURE: 161 MMHG | RESPIRATION RATE: 16 BRPM | WEIGHT: 157.7 LBS | DIASTOLIC BLOOD PRESSURE: 80 MMHG | OXYGEN SATURATION: 99 % | HEIGHT: 69 IN | BODY MASS INDEX: 23.36 KG/M2 | TEMPERATURE: 95.5 F

## 2020-10-02 LAB
INR BLD: 2.6
METER GLUCOSE: 102 MG/DL (ref 74–99)
METER GLUCOSE: 138 MG/DL (ref 74–99)
PROTHROMBIN TIME: 29.8 SEC (ref 9.3–12.4)

## 2020-10-02 PROCEDURE — 36415 COLL VENOUS BLD VENIPUNCTURE: CPT

## 2020-10-02 PROCEDURE — 6370000000 HC RX 637 (ALT 250 FOR IP): Performed by: STUDENT IN AN ORGANIZED HEALTH CARE EDUCATION/TRAINING PROGRAM

## 2020-10-02 PROCEDURE — 82962 GLUCOSE BLOOD TEST: CPT

## 2020-10-02 PROCEDURE — 6370000000 HC RX 637 (ALT 250 FOR IP): Performed by: INTERNAL MEDICINE

## 2020-10-02 PROCEDURE — 2580000003 HC RX 258: Performed by: INTERNAL MEDICINE

## 2020-10-02 PROCEDURE — 85610 PROTHROMBIN TIME: CPT

## 2020-10-02 PROCEDURE — G0378 HOSPITAL OBSERVATION PER HR: HCPCS

## 2020-10-02 RX ADMIN — METOPROLOL TARTRATE 50 MG: 50 TABLET, FILM COATED ORAL at 08:59

## 2020-10-02 RX ADMIN — PANTOPRAZOLE SODIUM 40 MG: 40 TABLET, DELAYED RELEASE ORAL at 08:59

## 2020-10-02 RX ADMIN — SODIUM CHLORIDE, PRESERVATIVE FREE 10 ML: 5 INJECTION INTRAVENOUS at 08:59

## 2020-10-02 ASSESSMENT — PAIN SCALES - GENERAL: PAINLEVEL_OUTOF10: 0

## 2020-10-02 NOTE — PLAN OF CARE
Problem: Falls - Risk of:  Goal: Will remain free from falls  Description: Will remain free from falls  10/2/2020 0922 by Rocky Bragg  Outcome: Completed  10/2/2020 0914 by Rocky Bragg  Outcome: Met This Shift  Goal: Absence of physical injury  Description: Absence of physical injury  10/2/2020 0788 by Rocky Bragg  Outcome: Completed  10/2/2020 0914 by Rocky Bragg  Outcome: Met This Shift     Problem: Discharge Planning:  Goal: Discharged to appropriate level of care  Description: Discharged to appropriate level of care  10/2/2020 0922 by Rocky Bragg  Outcome: Completed  10/2/2020 0914 by Rocky Bragg  Outcome: Met This Shift     Problem:  Bowel Function - Altered:  Goal: Bowel elimination is within specified parameters  Description: Bowel elimination is within specified parameters  Outcome: Completed     Problem: Fluid Volume - Imbalance:  Goal: Absence of imbalanced fluid volume signs and symptoms  Description: Absence of imbalanced fluid volume signs and symptoms  Outcome: Completed  Goal: Will show no signs and symptoms of excessive bleeding  Description: Will show no signs and symptoms of excessive bleeding  Outcome: Completed     Problem: Nausea/Vomiting:  Goal: Absence of nausea/vomiting  Description: Absence of nausea/vomiting  Outcome: Completed  Goal: Able to drink  Description: Able to drink  10/2/2020 7804 by Rocky Bragg  Outcome: Completed  10/2/2020 0914 by Rocky Bragg  Outcome: Met This Shift  Goal: Able to eat  Description: Able to eat  10/2/2020 3249 by Rocky Bragg  Outcome: Completed  10/2/2020 0914 by Rocky Bragg  Outcome: Met This Shift  Goal: Ability to achieve adequate nutritional intake will improve  Description: Ability to achieve adequate nutritional intake will improve  Outcome: Completed

## 2020-10-02 NOTE — PROGRESS NOTES
GENERAL SURGERY  DAILY PROGRESS NOTE  10/2/2020    Chief Complaint   Patient presents with    Other     states woke up in middle of night and mouth was full of blood rinsed mouth and states woke up and began spitting up blood again, denies any pain, +coumadin        Subjective:  Pt states that he is doing okay. Denies any abdominal pain, nausea, or vomiting. He reports that he had mouth bleeding from his tooth overnight and he attributes his symptoms to that. Objective:  /70   Pulse 77   Temp 97.4 °F (36.3 °C) (Temporal)   Resp 17   Ht 5' 9\" (1.753 m)   Wt 157 lb 11.2 oz (71.5 kg)   SpO2 94%   BMI 23.29 kg/m²     GENERAL:  Laying in bed, awake, alert, cooperative, no apparent distress  HEAD: Normocephalic, atraumatic. Oral cavity with moist mucous membranes, right molar tooth and gum bleeding  EYES: No sclera icterus, pupils equal  LUNGS:  No increased work of breathing  CARDIOVASCULAR:  RR and normotensive  ABDOMEN:  Soft, non-tender, non-distended.  Ostomy with melenic stool in bag  EXTREMITIES: No edema or swelling  SKIN: Warm and dry    Assessment/Plan:  80 y.o. male with oral bleeding and slight anemia, unlikely GI bleeding    - Okay for diet  - Would recommend follow-up with [facility] outpatient  - PPI  - Carafate after EGD  - Monitor H/H, transfuse PRN per primary  - No plans for surgical intervention  - Okay to DC from surgical POV    Electronically signed by La Nielsen MD on 10/2/2020 at 6:58 AM

## 2020-10-02 NOTE — H&P
ileoscopy    TURP  6/16/14    cystoscopy retrogrades       Medications Prior to Admission:    Medications Prior to Admission: insulin regular (HUMULIN R) 100 UNIT/ML injection, PATIENT INJECTS 25 UNITS AM , 35 UNITS BEFORE DINNER  clopidogrel (PLAVIX) 75 MG tablet, Take 1 tablet by mouth daily  metoprolol tartrate (LOPRESSOR) 50 MG tablet, TAKE 1 TABLET BY MOUTH 2 TIMES DAILY  warfarin (COUMADIN) 2 MG tablet, TAKE 1 TABLET BY MOUTH DAILY  simvastatin (ZOCOR) 20 MG tablet, TAKE 1 TABLET BY MOUTH EVERY DAY AT NIGHT  warfarin (COUMADIN) 4 MG tablet, TAKE 1 TABLET BY MOUTH DAILY AS DIRECTED BY PHYSICIAN. Insulin Syringe-Needle U-100 31G X 5/16\" 1 ML MISC, 1 each by Other route 2 times daily  blood glucose test strips (ASCENSIA AUTODISC VI;ONE TOUCH ULTRA TEST VI) strip, 1 each by In Vitro route daily As needed. ONE TOUCH ULTRASOFT LANCETS MISC, 1 each by Does not apply route 2 times daily  blood glucose test strips (ONE TOUCH ULTRA TEST) strip, USE ONE STRIP TO CHECK GLUCOSE TWO TIMES DAILY  famotidine (PEPCID) 20 MG tablet, TAKE 1 TABLET BY MOUTH TWICE A DAY  triamcinolone (KENALOG) 0.1 % cream, APPLY TOPICALLY 2 TIMES DAILY. diclofenac sodium 1 % GEL, Apply 2 g topically 2 times daily    Allergies:  Patient has no known allergies. Social History:   TOBACCO:   reports that he has quit smoking.  He has never used smokeless tobacco.    Family History:       Problem Relation Age of Onset    Hypotension Mother     COPD Sister     Coronary Art Dis Sister     Diabetes Other      REVIEW OF SYSTEMS:  CONSTITUTIONAL:  negative  EYES:  negative  HEENT:  positive for  Oral bleeding  RESPIRATORY:  negative  CARDIOVASCULAR:  negative  GASTROINTESTINAL:  negative  GENITOURINARY:  negative  INTEGUMENT/BREAST:  negative  HEMATOLOGIC/LYMPHATIC:  negative  ALLERGIC/IMMUNOLOGIC:  negative  ENDOCRINE:  negative  MUSCULOSKELETAL:  negative  NEUROLOGICAL:  negative  PHYSICAL EXAM:    Vitals:  /70   Pulse 77   Temp 97.4 °F (36.3 °C) (Temporal)   Resp 17   Ht 5' 9\" (1.753 m)   Wt 157 lb 11.2 oz (71.5 kg)   SpO2 94%   BMI 23.29 kg/m²     CONSTITUTIONAL:  awake, alert, cooperative, no apparent distress, and appears stated age  EYES:  Lids and lashes normal, pupils equal, round and reactive to light, extra ocular muscles intact, sclera clear, conjunctiva normal  ENT:  Normocephalic, without obvious abnormality, atraumatic, sinuses nontender on palpation, external ears without lesions, oral pharynx with moist mucus membranes, tonsils without erythema or exudates, gums normal and good dentition. There is a  bloody spot in the back of his throat  NECK:  Supple, symmetrical, trachea midline, no adenopathy, thyroid symmetric, not enlarged and no tenderness, skin normal  HEMATOLOGIC/LYMPHATICS:  no cervical lymphadenopathy  BACK:  Symmetric, no curvature, spinous processes are non-tender on palpation, paraspinous muscles are non-tender on palpation, no costal vertebral tenderness  LUNGS:  No increased work of breathing, good air exchange, clear to auscultation bilaterally, no crackles or wheezing  CARDIOVASCULAR:  Normal apical impulse, regular rate and rhythm, normal S1 and S2, no S3 or S4, and no murmur noted  ABDOMEN:  Soft, nontender. Normal bs    MUSCULOSKELETAL:  There is no redness, warmth, or swelling of the joints. Full range of motion noted. Motor strength is 5 out of 5 all extremities bilaterally. Tone is normal.  NEUROLOGIC:  Awake, alert, oriented to name, place and time. Cranial nerves II-XII are grossly intact. Motor is 5 out of 5 bilaterally. Cerebellar finger to nose, heel to shin intact. Sensory is intact.   Babinski down going, Romberg negative, and gait is normal.  SKIN:  no bruising or bleeding    DATA:  CBC:   Lab Results   Component Value Date    WBC 4.6 10/01/2020    RBC 4.41 10/01/2020    HGB 11.8 10/01/2020    HCT 37.9 10/01/2020    MCV 85.9 10/01/2020    MCH 26.8 10/01/2020    MCHC 31.1 10/01/2020    RDW 15.3 10/01/2020     10/01/2020    MPV 12.1 10/01/2020     CMP:    Lab Results   Component Value Date     10/01/2020    K 4.6 10/01/2020     10/01/2020    CO2 20 10/01/2020    BUN 34 10/01/2020    CREATININE 1.9 10/01/2020    GFRAA 41 10/01/2020    LABGLOM 41 10/01/2020    GLUCOSE 163 10/01/2020    PROT 7.5 10/01/2020    LABALBU 3.9 10/01/2020    CALCIUM 9.5 10/01/2020    BILITOT 0.4 10/01/2020    ALKPHOS 134 10/01/2020    AST 30 10/01/2020    ALT 10 10/01/2020     LDH:  No results found for: LDH  Warfarin PT/INR:  No components found for: Destiny Rocher  Last 3 Troponin:    Lab Results   Component Value Date    TROPONINI <0.01 07/28/2018    TROPONINI <0.01 07/24/2017    TROPONINI <0.01 04/01/2016     ABG:  No results found for: PH, PCO2, PO2, HCO3, BE, THGB, TCO2, O2SAT  HgBA1c:    Lab Results   Component Value Date    LABA1C 7.2 06/01/2020     TSH:  No results found for: TSH  VITAMIN B12: No components found for: B12  FOLATE:  No results found for: FOLATE  IRON:  No results found for: IRON  Iron Saturation:  No components found for: PERCENTFE  TIBC:  No results found for: TIBC  FERRITIN:  No results found for: FERRITIN  RPR:  No results found for: RPR  LIZETTE:  No results found for: ANATITER, LIZETTE  AMYLASE:  No results found for: AMYLASE  LIPASE:    Lab Results   Component Value Date    LIPASE 71 10/01/2020       IMAGING    Xr Chest Portable    Result Date: 10/1/2020  EXAMINATION: ONE XRAY VIEW OF THE CHEST 10/1/2020 7:41 am COMPARISON: July 28, 2018 HISTORY: ORDERING SYSTEM PROVIDED HISTORY: CP TECHNOLOGIST PROVIDED HISTORY: Reason for exam:->CP What reading provider will be dictating this exam?->CRC Initial exam FINDINGS: The lungs are without acute focal process. There is no effusion or pneumothorax. The cardiomediastinal silhouette is without acute process. The osseous structures are without acute process. No acute process.      ASSESSMENT AND PLAN:    Bloody oral cavity secondary to cut in his mouth. We can discharge him and he can follow-up with dentist.  Smiley Mccurdy the Coumadin until Monday.   Appreciate surgical input

## 2020-10-02 NOTE — PROGRESS NOTES
Patient is complaining that his mouth has been bleeding all day and no one has done anything about it. States he wants his blood levels checked and a dentist. Also refusing EGD tomorrow, stating, \"just let me go so I can go to my dentist tomorrow\". Doctor notified.  Awaiting response

## 2020-10-02 NOTE — PLAN OF CARE
Problem: Falls - Risk of:  Goal: Will remain free from falls  Description: Will remain free from falls  Outcome: Met This Shift  Goal: Absence of physical injury  Description: Absence of physical injury  Outcome: Met This Shift     Problem: Discharge Planning:  Goal: Discharged to appropriate level of care  Description: Discharged to appropriate level of care  Outcome: Met This Shift     Problem: Nausea/Vomiting:  Goal: Able to drink  Description: Able to drink  Outcome: Met This Shift  Goal: Able to eat  Description: Able to eat  Outcome: Met This Shift

## 2021-01-22 ENCOUNTER — HOSPITAL ENCOUNTER (OUTPATIENT)
Age: 86
Discharge: HOME | End: 2021-01-22
Payer: MEDICARE

## 2021-01-22 LAB — PROSTATE SPECIFIC ANTIGEN: 26.51 NG/ML (ref 0–4)

## 2021-01-22 PROCEDURE — 84153 ASSAY OF PSA TOTAL: CPT

## 2021-01-22 PROCEDURE — 36415 COLL VENOUS BLD VENIPUNCTURE: CPT

## 2021-03-04 ENCOUNTER — HOSPITAL ENCOUNTER (INPATIENT)
Age: 86
LOS: 2 days | Discharge: HOME | DRG: 065 | End: 2021-03-08
Attending: EMERGENCY MEDICINE | Admitting: INTERNAL MEDICINE
Payer: MEDICARE

## 2021-03-04 ENCOUNTER — APPOINTMENT (OUTPATIENT)
Dept: GENERAL RADIOLOGY | Age: 86
DRG: 065 | End: 2021-03-04
Payer: MEDICARE

## 2021-03-04 ENCOUNTER — APPOINTMENT (OUTPATIENT)
Dept: CT IMAGING | Age: 86
DRG: 065 | End: 2021-03-04
Payer: MEDICARE

## 2021-03-04 DIAGNOSIS — R29.898 LUE WEAKNESS: Primary | ICD-10-CM

## 2021-03-04 PROBLEM — G45.9 TIA (TRANSIENT ISCHEMIC ATTACK): Status: ACTIVE | Noted: 2021-03-04

## 2021-03-04 LAB
ALBUMIN SERPL-MCNC: 3.9 G/DL (ref 3.5–5.2)
ALP BLD-CCNC: 122 U/L (ref 40–129)
ALT SERPL-CCNC: 13 U/L (ref 0–40)
ANION GAP SERPL CALCULATED.3IONS-SCNC: 8 MMOL/L (ref 7–16)
APTT: 40.2 SEC (ref 24.5–35.1)
AST SERPL-CCNC: 29 U/L (ref 0–39)
BACTERIA: NORMAL /HPF
BASOPHILS ABSOLUTE: 0.03 E9/L (ref 0–0.2)
BASOPHILS RELATIVE PERCENT: 0.5 % (ref 0–2)
BILIRUB SERPL-MCNC: 0.4 MG/DL (ref 0–1.2)
BILIRUBIN URINE: NEGATIVE
BLOOD, URINE: NEGATIVE
BUN BLDV-MCNC: 21 MG/DL (ref 8–23)
CALCIUM SERPL-MCNC: 9.8 MG/DL (ref 8.6–10.2)
CHLORIDE BLD-SCNC: 110 MMOL/L (ref 98–107)
CLARITY: CLEAR
CO2: 23 MMOL/L (ref 22–29)
COLOR: YELLOW
CREAT SERPL-MCNC: 1.5 MG/DL (ref 0.7–1.2)
EKG ATRIAL RATE: 288 BPM
EKG Q-T INTERVAL: 402 MS
EKG QRS DURATION: 68 MS
EKG QTC CALCULATION (BAZETT): 384 MS
EKG R AXIS: 67 DEGREES
EKG T AXIS: 70 DEGREES
EKG VENTRICULAR RATE: 55 BPM
EOSINOPHILS ABSOLUTE: 0.14 E9/L (ref 0.05–0.5)
EOSINOPHILS RELATIVE PERCENT: 2.4 % (ref 0–6)
GFR AFRICAN AMERICAN: 54
GFR NON-AFRICAN AMERICAN: 54 ML/MIN/1.73
GLUCOSE BLD-MCNC: 110 MG/DL (ref 74–99)
GLUCOSE URINE: NEGATIVE MG/DL
HCT VFR BLD CALC: 38.6 % (ref 37–54)
HEMOGLOBIN: 11.6 G/DL (ref 12.5–16.5)
IMMATURE GRANULOCYTES #: 0.01 E9/L
IMMATURE GRANULOCYTES %: 0.2 % (ref 0–5)
INR BLD: 2.7
KETONES, URINE: NEGATIVE MG/DL
LACTIC ACID: 1 MMOL/L (ref 0.5–2.2)
LEUKOCYTE ESTERASE, URINE: ABNORMAL
LIPASE: 40 U/L (ref 13–60)
LYMPHOCYTES ABSOLUTE: 1.33 E9/L (ref 1.5–4)
LYMPHOCYTES RELATIVE PERCENT: 23.1 % (ref 20–42)
MAGNESIUM: 1.7 MG/DL (ref 1.6–2.6)
MCH RBC QN AUTO: 25.8 PG (ref 26–35)
MCHC RBC AUTO-ENTMCNC: 30.1 % (ref 32–34.5)
MCV RBC AUTO: 86 FL (ref 80–99.9)
METER GLUCOSE: 161 MG/DL (ref 74–99)
METER GLUCOSE: 88 MG/DL (ref 74–99)
MONOCYTES ABSOLUTE: 0.41 E9/L (ref 0.1–0.95)
MONOCYTES RELATIVE PERCENT: 7.1 % (ref 2–12)
NEUTROPHILS ABSOLUTE: 3.85 E9/L (ref 1.8–7.3)
NEUTROPHILS RELATIVE PERCENT: 66.7 % (ref 43–80)
NITRITE, URINE: NEGATIVE
PDW BLD-RTO: 14.9 FL (ref 11.5–15)
PH UA: 6 (ref 5–9)
PLATELET # BLD: 146 E9/L (ref 130–450)
PMV BLD AUTO: 10.4 FL (ref 7–12)
POTASSIUM SERPL-SCNC: 4.8 MMOL/L (ref 3.5–5)
PROTEIN UA: NEGATIVE MG/DL
PROTHROMBIN TIME: 30.2 SEC (ref 9.3–12.4)
RBC # BLD: 4.49 E12/L (ref 3.8–5.8)
RBC UA: NORMAL /HPF (ref 0–2)
SODIUM BLD-SCNC: 141 MMOL/L (ref 132–146)
SPECIFIC GRAVITY UA: 1.01 (ref 1–1.03)
TOTAL PROTEIN: 8.3 G/DL (ref 6.4–8.3)
TROPONIN: <0.01 NG/ML (ref 0–0.03)
UROBILINOGEN, URINE: 0.2 E.U./DL
WBC # BLD: 5.8 E9/L (ref 4.5–11.5)
WBC UA: NORMAL /HPF (ref 0–5)

## 2021-03-04 PROCEDURE — 85025 COMPLETE CBC W/AUTO DIFF WBC: CPT

## 2021-03-04 PROCEDURE — 80053 COMPREHEN METABOLIC PANEL: CPT

## 2021-03-04 PROCEDURE — 6360000002 HC RX W HCPCS: Performed by: INTERNAL MEDICINE

## 2021-03-04 PROCEDURE — G0378 HOSPITAL OBSERVATION PER HR: HCPCS

## 2021-03-04 PROCEDURE — 93005 ELECTROCARDIOGRAM TRACING: CPT | Performed by: NURSE PRACTITIONER

## 2021-03-04 PROCEDURE — 96374 THER/PROPH/DIAG INJ IV PUSH: CPT

## 2021-03-04 PROCEDURE — 70450 CT HEAD/BRAIN W/O DYE: CPT

## 2021-03-04 PROCEDURE — 2580000003 HC RX 258: Performed by: INTERNAL MEDICINE

## 2021-03-04 PROCEDURE — 93010 ELECTROCARDIOGRAM REPORT: CPT | Performed by: INTERNAL MEDICINE

## 2021-03-04 PROCEDURE — 81001 URINALYSIS AUTO W/SCOPE: CPT

## 2021-03-04 PROCEDURE — 83605 ASSAY OF LACTIC ACID: CPT

## 2021-03-04 PROCEDURE — 71046 X-RAY EXAM CHEST 2 VIEWS: CPT

## 2021-03-04 PROCEDURE — 85730 THROMBOPLASTIN TIME PARTIAL: CPT

## 2021-03-04 PROCEDURE — 84484 ASSAY OF TROPONIN QUANT: CPT

## 2021-03-04 PROCEDURE — 93005 ELECTROCARDIOGRAM TRACING: CPT | Performed by: EMERGENCY MEDICINE

## 2021-03-04 PROCEDURE — 83735 ASSAY OF MAGNESIUM: CPT

## 2021-03-04 PROCEDURE — 99285 EMERGENCY DEPT VISIT HI MDM: CPT

## 2021-03-04 PROCEDURE — 82962 GLUCOSE BLOOD TEST: CPT

## 2021-03-04 PROCEDURE — 6370000000 HC RX 637 (ALT 250 FOR IP): Performed by: INTERNAL MEDICINE

## 2021-03-04 PROCEDURE — 85610 PROTHROMBIN TIME: CPT

## 2021-03-04 PROCEDURE — 83690 ASSAY OF LIPASE: CPT

## 2021-03-04 RX ORDER — CLOPIDOGREL BISULFATE 75 MG/1
75 TABLET ORAL DAILY
Status: DISCONTINUED | OUTPATIENT
Start: 2021-03-04 | End: 2021-03-08 | Stop reason: HOSPADM

## 2021-03-04 RX ORDER — POLYETHYLENE GLYCOL 3350 17 G/17G
17 POWDER, FOR SOLUTION ORAL DAILY PRN
Status: DISCONTINUED | OUTPATIENT
Start: 2021-03-04 | End: 2021-03-08 | Stop reason: HOSPADM

## 2021-03-04 RX ORDER — DEXTROSE MONOHYDRATE 25 G/50ML
12.5 INJECTION, SOLUTION INTRAVENOUS PRN
Status: DISCONTINUED | OUTPATIENT
Start: 2021-03-04 | End: 2021-03-08 | Stop reason: HOSPADM

## 2021-03-04 RX ORDER — METOPROLOL TARTRATE 50 MG/1
50 TABLET, FILM COATED ORAL 2 TIMES DAILY
Status: DISCONTINUED | OUTPATIENT
Start: 2021-03-04 | End: 2021-03-06

## 2021-03-04 RX ORDER — WARFARIN SODIUM 2 MG/1
2 TABLET ORAL
Status: DISCONTINUED | OUTPATIENT
Start: 2021-03-04 | End: 2021-03-08 | Stop reason: HOSPADM

## 2021-03-04 RX ORDER — DEXTROSE MONOHYDRATE 50 MG/ML
100 INJECTION, SOLUTION INTRAVENOUS PRN
Status: DISCONTINUED | OUTPATIENT
Start: 2021-03-04 | End: 2021-03-08 | Stop reason: HOSPADM

## 2021-03-04 RX ORDER — NICOTINE POLACRILEX 4 MG
15 LOZENGE BUCCAL PRN
Status: DISCONTINUED | OUTPATIENT
Start: 2021-03-04 | End: 2021-03-08 | Stop reason: HOSPADM

## 2021-03-04 RX ORDER — ACETAMINOPHEN 650 MG/1
650 SUPPOSITORY RECTAL EVERY 6 HOURS PRN
Status: DISCONTINUED | OUTPATIENT
Start: 2021-03-04 | End: 2021-03-08 | Stop reason: HOSPADM

## 2021-03-04 RX ORDER — PROMETHAZINE HYDROCHLORIDE 25 MG/1
12.5 TABLET ORAL EVERY 6 HOURS PRN
Status: DISCONTINUED | OUTPATIENT
Start: 2021-03-04 | End: 2021-03-08 | Stop reason: HOSPADM

## 2021-03-04 RX ORDER — WARFARIN SODIUM 4 MG/1
4 TABLET ORAL
Status: DISCONTINUED | OUTPATIENT
Start: 2021-03-06 | End: 2021-03-08 | Stop reason: HOSPADM

## 2021-03-04 RX ORDER — HYDRALAZINE HYDROCHLORIDE 20 MG/ML
10 INJECTION INTRAMUSCULAR; INTRAVENOUS EVERY 4 HOURS PRN
Status: DISCONTINUED | OUTPATIENT
Start: 2021-03-04 | End: 2021-03-08 | Stop reason: HOSPADM

## 2021-03-04 RX ORDER — SODIUM CHLORIDE 0.9 % (FLUSH) 0.9 %
10 SYRINGE (ML) INJECTION EVERY 12 HOURS SCHEDULED
Status: DISCONTINUED | OUTPATIENT
Start: 2021-03-04 | End: 2021-03-08 | Stop reason: HOSPADM

## 2021-03-04 RX ORDER — ACETAMINOPHEN 325 MG/1
650 TABLET ORAL EVERY 6 HOURS PRN
Status: DISCONTINUED | OUTPATIENT
Start: 2021-03-04 | End: 2021-03-08 | Stop reason: HOSPADM

## 2021-03-04 RX ORDER — ONDANSETRON 2 MG/ML
4 INJECTION INTRAMUSCULAR; INTRAVENOUS EVERY 6 HOURS PRN
Status: DISCONTINUED | OUTPATIENT
Start: 2021-03-04 | End: 2021-03-08 | Stop reason: HOSPADM

## 2021-03-04 RX ORDER — SODIUM CHLORIDE 0.9 % (FLUSH) 0.9 %
10 SYRINGE (ML) INJECTION PRN
Status: DISCONTINUED | OUTPATIENT
Start: 2021-03-04 | End: 2021-03-08 | Stop reason: HOSPADM

## 2021-03-04 RX ORDER — ATORVASTATIN CALCIUM 20 MG/1
20 TABLET, FILM COATED ORAL DAILY
Refills: 1 | Status: DISCONTINUED | OUTPATIENT
Start: 2021-03-04 | End: 2021-03-08 | Stop reason: HOSPADM

## 2021-03-04 RX ADMIN — HYDRALAZINE HYDROCHLORIDE 10 MG: 20 INJECTION INTRAMUSCULAR; INTRAVENOUS at 17:09

## 2021-03-04 RX ADMIN — WARFARIN SODIUM 2 MG: 2 TABLET ORAL at 17:09

## 2021-03-04 RX ADMIN — METOPROLOL TARTRATE 50 MG: 50 TABLET, FILM COATED ORAL at 22:16

## 2021-03-04 RX ADMIN — Medication 10 ML: at 22:16

## 2021-03-04 ASSESSMENT — PAIN SCALES - GENERAL: PAINLEVEL_OUTOF10: 0

## 2021-03-04 NOTE — PROGRESS NOTES
Patient alert and aware but forgetful. Wife at bedside assisting with admission questions. Patient oriented to room. Explained meal service and patient is going to order a meal. All questions answered.

## 2021-03-04 NOTE — ED PROVIDER NOTES
Department of Emergency Medicine   ED  Provider Note  Admit Date/RoomTime: 3/4/2021 11:45 AM  ED Room: [facility]          History of Present Illness:  3/4/21, Time: 1:12 PM EST  Chief Complaint   Patient presents with    Extremity Weakness     left arm weakness/numbness since wed, denies pain                Ivan Navarro is a 80 y.o. male presenting to the ED for left upper extremity weakness. Patient states he had a sudden onset of left upper extremity weakness and numbness. Started on Wednesday. Nothing makes it better or worse, no associated pain. He has not had this before. Does have a history of atrial fibrillation, he is on Coumadin, but wife states that he had to hold it for 3 days as he had a tooth extracted. This was about a week and a half ago. He denies any slurred speech, neck pain or stiffness, headache, cough, chest pain, shortness of breath, cough sputum, weakness in the legs, nausea, vomiting, or any other symptoms or complaints. Review of Systems:   Pertinent positives and negatives are stated within HPI, all other systems reviewed and are negative.        --------------------------------------------- PAST HISTORY ---------------------------------------------  Past Medical History:  has a past medical history of A-fib ([facility] [address] Chronic kidney disease, Chronic renal impairment, stage 3 (moderate), Colitis, ulcerative chronic ([address] Diabetes mellitus ([address] DM (diabetes mellitus) ([facility] [address] Encounter for therapeutic drug monitoring, Hyperlipidemia, Hyperlipidemia associated with type 2 diabetes mellitus ([address] Hypertension, Long term (current) use of anticoagulants, Prostate enlargement, Stroke (cerebrum) ([address] and Unspecified cerebral artery occlusion with cerebral infarction. Past Surgical History:  has a past surgical history that includes ECHO Compl W Dop Color Flow (11/26/2013); ECHO Transesophageal (11/27/2013); colostomy (1978); Cystoscopy (more than 5 yrs); TURP (6/16/14);  Colonoscopy; Abdomen surgery; and Endoscopy, colon, diagnostic (12/10/15). Social History:  reports that he has quit smoking. He has never used smokeless tobacco. He reports that he does not drink alcohol or use drugs. Family History: family history includes COPD in his sister; Coronary Art Dis in his sister; Diabetes in an other family member; Hypotension in his mother. . Unless otherwise noted, family history is non contributory    The patients home medications have been reviewed. Allergies: Patient has no known allergies. ---------------------------------------------------PHYSICAL EXAM--------------------------------------    Constitutional/General: Alert and oriented x3  Head: Normocephalic and atraumatic  Eyes: PERRL, EOMI, sclera non icteric  Mouth: Oropharynx clear, handling secretions, no trismus, no asymmetry of the posterior oropharynx or uvular edema  Neck: Supple, full ROM, no stridor, no meningeal signs  Respiratory: Lungs clear to auscultation bilaterally, no wheezes, rales, or rhonchi. Not in respiratory distress  Cardiovascular:  Regular rate. Regular rhythm. 2+ distal pulses. Equal extremity pulses. Chest: No chest wall tenderness  GI:  Abdomen Soft, Non tender, Non distended. No rebound, guarding, or rigidity. No pulsatile masses. Musculoskeletal: Moves all extremities x 4. Warm and well perfused, no clubbing, cyanosis, or edema. Capillary refill <3 seconds  Integument: skin warm and dry. No rashes. Neurologic: GCS 15, no focal deficits, symmetric strength 5/5 in the upper and lower extremities bilaterally. NIH is 2 for LUE weakness and numbness  Psychiatric: Normal Affect          -------------------------------------------------- RESULTS -------------------------------------------------  I have personally reviewed all laboratory and imaging results for this patient. Results are listed below.      LABS: (Lab results interpreted by me)  Results for orders placed or performed during the hospital encounter of 03/04/21   Troponin   Result Value Ref Range    Troponin <0.01 0.00 - 0.03 ng/mL   CBC Auto Differential   Result Value Ref Range    WBC 5.8 4.5 - 11.5 E9/L    RBC 4.49 3.80 - 5.80 E12/L    Hemoglobin 11.6 (L) 12.5 - 16.5 g/dL    Hematocrit 38.6 37.0 - 54.0 %    MCV 86.0 80.0 - 99.9 fL    MCH 25.8 (L) 26.0 - 35.0 pg    MCHC 30.1 (L) 32.0 - 34.5 %    RDW 14.9 11.5 - 15.0 fL    Platelets 393 407 - 713 E9/L    MPV 10.4 7.0 - 12.0 fL    Neutrophils % 66.7 43.0 - 80.0 %    Immature Granulocytes % 0.2 0.0 - 5.0 %    Lymphocytes % 23.1 20.0 - 42.0 %    Monocytes % 7.1 2.0 - 12.0 %    Eosinophils % 2.4 0.0 - 6.0 %    Basophils % 0.5 0.0 - 2.0 %    Neutrophils Absolute 3.85 1.80 - 7.30 E9/L    Immature Granulocytes # 0.01 E9/L    Lymphocytes Absolute 1.33 (L) 1.50 - 4.00 E9/L    Monocytes Absolute 0.41 0.10 - 0.95 E9/L    Eosinophils Absolute 0.14 0.05 - 0.50 E9/L    Basophils Absolute 0.03 0.00 - 0.20 E9/L   Comprehensive Metabolic Panel   Result Value Ref Range    Sodium 141 132 - 146 mmol/L    Potassium 4.8 3.5 - 5.0 mmol/L    Chloride 110 (H) 98 - 107 mmol/L    CO2 23 22 - 29 mmol/L    Anion Gap 8 7 - 16 mmol/L    Glucose 110 (H) 74 - 99 mg/dL    BUN 21 8 - 23 mg/dL    CREATININE 1.5 (H) 0.7 - 1.2 mg/dL    GFR Non-African American 54 >=60 mL/min/1.73    GFR African American 54     Calcium 9.8 8.6 - 10.2 mg/dL    Total Protein 8.3 6.4 - 8.3 g/dL    Albumin 3.9 3.5 - 5.2 g/dL    Total Bilirubin 0.4 0.0 - 1.2 mg/dL    Alkaline Phosphatase 122 40 - 129 U/L    ALT 13 0 - 40 U/L    AST 29 0 - 39 U/L   Magnesium   Result Value Ref Range    Magnesium 1.7 1.6 - 2.6 mg/dL   Lactic Acid, Plasma   Result Value Ref Range    Lactic Acid 1.0 0.5 - 2.2 mmol/L   Lipase   Result Value Ref Range    Lipase 40 13 - 60 U/L   Protime-INR   Result Value Ref Range    Protime 30.2 (H) 9.3 - 12.4 sec    INR 2.7    APTT   Result Value Ref Range    aPTT 40.2 (H) 24.5 - 35.1 sec   EKG 12 Lead   Result Value Ref Range Ventricular Rate 55 BPM    Atrial Rate 288 BPM    QRS Duration 68 ms    Q-T Interval 402 ms    QTc Calculation (Bazett) 384 ms    R Axis 67 degrees    T Axis 70 degrees   ,       RADIOLOGY:  Interpreted by Radiologist unless otherwise specified  CT HEAD WO CONTRAST   Final Result   No acute intracranial abnormality. There is stable atrophy with small vessel ischemic disease. Sinus disease with soft tissue density in the left maxillary sinus and   mucosal thickening in the mastoid air cells. XR CHEST (2 VW)   Final Result   No acute process. EKG Interpretation  Interpreted by emergency department physician, Dr. Trini Chandra     A fib, rate 55, no STEMI        ------------------------- NURSING NOTES AND VITALS REVIEWED ---------------------------   The nursing notes within the ED encounter and vital signs as below have been reviewed by myself  /80   Pulse 87   Temp 96.4 °F (35.8 °C) (Temporal)   Resp 16   Wt 165 lb (74.8 kg)   SpO2 96%   BMI 24.37 kg/m²     Oxygen Saturation Interpretation: Normal    The patients available past medical records and past encounters were reviewed. ------------------------------ ED COURSE/MEDICAL DECISION MAKING----------------------  Medications - No data to display        The cardiac monitor revealed a fib with a heart rate in the 50s as interpreted by me. The cardiac monitor was ordered secondary to the patient's weakness and to monitor the patient for dysrhythmia. CPT [id number]         Medical Decision Making:    Patient presents out of the TPA and thrombectomy window. Labs and imaging reviewed. Reevaluation, she is resting comfortably. Exam unchanged. Discussed with his PCP, patient will be admitted. Counseling: The emergency provider has spoken with the patient and discussed todays results, in addition to providing specific details for the plan of care and counseling regarding the diagnosis and prognosis.   Questions are answered at this time and they are agreeable with the plan.       --------------------------------- IMPRESSION AND DISPOSITION ---------------------------------    IMPRESSION  1. LUE weakness        DISPOSITION  Disposition: Admit to telemetry  Patient condition is stable        NOTE: This report was transcribed using voice recognition software.  Every effort was made to ensure accuracy; however, inadvertent computerized transcription errors may be present        Jose Antonio No MD  03/04/21 [id number]

## 2021-03-05 ENCOUNTER — APPOINTMENT (OUTPATIENT)
Dept: ULTRASOUND IMAGING | Age: 86
DRG: 065 | End: 2021-03-05
Payer: MEDICARE

## 2021-03-05 LAB
ANION GAP SERPL CALCULATED.3IONS-SCNC: 6 MMOL/L (ref 7–16)
BUN BLDV-MCNC: 21 MG/DL (ref 8–23)
CALCIUM SERPL-MCNC: 9.1 MG/DL (ref 8.6–10.2)
CHLORIDE BLD-SCNC: 109 MMOL/L (ref 98–107)
CHOLESTEROL, TOTAL: 141 MG/DL (ref 0–199)
CO2: 24 MMOL/L (ref 22–29)
CREAT SERPL-MCNC: 1.4 MG/DL (ref 0.7–1.2)
EKG ATRIAL RATE: 65 BPM
EKG Q-T INTERVAL: 364 MS
EKG QRS DURATION: 68 MS
EKG QTC CALCULATION (BAZETT): 406 MS
EKG R AXIS: 43 DEGREES
EKG T AXIS: 39 DEGREES
EKG VENTRICULAR RATE: 75 BPM
GFR AFRICAN AMERICAN: 58
GFR NON-AFRICAN AMERICAN: 58 ML/MIN/1.73
GLUCOSE BLD-MCNC: 156 MG/DL (ref 74–99)
HBA1C MFR BLD: 7.6 % (ref 4–5.6)
HDLC SERPL-MCNC: 55 MG/DL
INR BLD: 2.7
LDL CHOLESTEROL CALCULATED: 64 MG/DL (ref 0–99)
METER GLUCOSE: 119 MG/DL (ref 74–99)
METER GLUCOSE: 149 MG/DL (ref 74–99)
METER GLUCOSE: 229 MG/DL (ref 74–99)
METER GLUCOSE: 51 MG/DL (ref 74–99)
METER GLUCOSE: 78 MG/DL (ref 74–99)
POTASSIUM REFLEX MAGNESIUM: 4.2 MMOL/L (ref 3.5–5)
PROTHROMBIN TIME: 30.1 SEC (ref 9.3–12.4)
SODIUM BLD-SCNC: 139 MMOL/L (ref 132–146)
TRIGL SERPL-MCNC: 111 MG/DL (ref 0–149)
VLDLC SERPL CALC-MCNC: 22 MG/DL

## 2021-03-05 PROCEDURE — 6370000000 HC RX 637 (ALT 250 FOR IP): Performed by: INTERNAL MEDICINE

## 2021-03-05 PROCEDURE — 80048 BASIC METABOLIC PNL TOTAL CA: CPT

## 2021-03-05 PROCEDURE — 93880 EXTRACRANIAL BILAT STUDY: CPT

## 2021-03-05 PROCEDURE — 36415 COLL VENOUS BLD VENIPUNCTURE: CPT

## 2021-03-05 PROCEDURE — G0378 HOSPITAL OBSERVATION PER HR: HCPCS

## 2021-03-05 PROCEDURE — 83036 HEMOGLOBIN GLYCOSYLATED A1C: CPT

## 2021-03-05 PROCEDURE — 80061 LIPID PANEL: CPT

## 2021-03-05 PROCEDURE — 85610 PROTHROMBIN TIME: CPT

## 2021-03-05 PROCEDURE — 93010 ELECTROCARDIOGRAM REPORT: CPT | Performed by: INTERNAL MEDICINE

## 2021-03-05 PROCEDURE — 97165 OT EVAL LOW COMPLEX 30 MIN: CPT

## 2021-03-05 PROCEDURE — 2580000003 HC RX 258: Performed by: INTERNAL MEDICINE

## 2021-03-05 PROCEDURE — 99222 1ST HOSP IP/OBS MODERATE 55: CPT | Performed by: PSYCHIATRY & NEUROLOGY

## 2021-03-05 PROCEDURE — 93880 EXTRACRANIAL BILAT STUDY: CPT | Performed by: RADIOLOGY

## 2021-03-05 PROCEDURE — 82962 GLUCOSE BLOOD TEST: CPT

## 2021-03-05 RX ADMIN — WARFARIN SODIUM 2 MG: 2 TABLET ORAL at 18:16

## 2021-03-05 RX ADMIN — Medication 10 ML: at 08:37

## 2021-03-05 RX ADMIN — INSULIN HUMAN 35 UNITS: 100 INJECTION, SOLUTION PARENTERAL at 18:16

## 2021-03-05 RX ADMIN — INSULIN HUMAN 25 UNITS: 100 INJECTION, SOLUTION PARENTERAL at 11:49

## 2021-03-05 RX ADMIN — Medication 10 ML: at 21:45

## 2021-03-05 RX ADMIN — METOPROLOL TARTRATE 50 MG: 50 TABLET, FILM COATED ORAL at 21:45

## 2021-03-05 RX ADMIN — CLOPIDOGREL BISULFATE 75 MG: 75 TABLET ORAL at 08:35

## 2021-03-05 RX ADMIN — ATORVASTATIN CALCIUM 20 MG: 20 TABLET, FILM COATED ORAL at 08:35

## 2021-03-05 RX ADMIN — METOPROLOL TARTRATE 50 MG: 50 TABLET, FILM COATED ORAL at 08:35

## 2021-03-05 ASSESSMENT — PAIN SCALES - GENERAL
PAINLEVEL_OUTOF10: 0
PAINLEVEL_OUTOF10: 0

## 2021-03-05 NOTE — PROGRESS NOTES
Physical Therapy  Name: [facility]  Room: 2327/7762-E  Date: 03/05/21    PT consult received and appreciated. Met with pt in room - pt demonstrated ability to stand and ambulate without assistance or device. Balance, speed, and gait quality all normal. No pausing or unsteadiness present. Pt with no identifiable PT needs at this time will DC from service.      Liborio Mayer, PT, DPT  [id number]

## 2021-03-05 NOTE — CONSULTS
[address] [facility]  Neurology Consult    Date:  3/5/2021  Patient Name:  Chanell Gomez  YOB: 1935  MRN: [id number]     PCP:  Irene Oconnor MD   Referring:  No ref. provider found      Chief Complaint: left arm weakness    History obtained from: patient, spouse    Zeina Camarillo is a 80 y.o. 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 It is uncertain to what degree his left arm is worse than it was previously. The primary concern would be for a lacunar infarct affecting the right internal capsule potentially. Less likely could be focal cervical spine disease. Plan  · MRI/A brain  · CUS  · Continue statin, goal LDL<70  · Continue coumadin and Plavix  · Check P2Y12 for potential non-responder to plavix  · Echo  · Will follow        History of Present Illness:  Chanell Gomez is a 80 y.o. right handed male presenting for evaluation of left arm weakness. Patient noted left arm weakness and numbness since last Wednesday. No involvement of left face or leg. He does report a history of a stroke about 3-4 years ago with similar left arm symptoms. His wife states that to a degree these symptoms were present for the past few years. He was not taking ASA prior to admission. He was on Coumadin for Afib which was stopped for a tooth extraction done on 15-16 of February and resumed it a day or two later. He does take Lipitor at home, but does not recall the dose.  He does hav DM, last A1c    Review of Systems:  No diplopia, vision loss, dysarthria, dysphagia, headache, neck pain    +Freezing with walking on occasion when getting up in the morning      Medical History:   Past Medical History:   Diagnosis Date    A-fib [facility])     Chronic kidney disease     Chronic renal impairment, stage 3 (moderate) 10/10/2016    Colitis, ulcerative chronic ([facility] [address]     Diabetes mellitus ([facility] [address]     DM (diabetes mellitus) ([facility] [address] 11/25/2013    Encounter for therapeutic drug monitoring 4/5/2016    Hyperlipidemia     Hyperlipidemia associated with type 2 diabetes mellitus ([facility] [address] 1/17/2016    Hypertension     Long term (current) use of anticoagulants 4/5/2016    Prostate enlargement     Stroke (cerebrum) ([facility] [address] 2013    Unspecified cerebral artery occlusion with cerebral infarction 1973        Surgical History:   Past Surgical History:   Procedure Laterality Date    ABDOMEN SURGERY      1978    COLONOSCOPY      COLOSTOMY  1978    CYSTOSCOPY  more than 5 yrs    ECHO COMPL W DOP COLOR FLOW  11/26/2013         ECHOCARDIOGRAM TRANSESOPHAGEAL  11/27/2013         ENDOSCOPY, COLON, DIAGNOSTIC  12/10/15    ileoscopy    TURP  6/16/14    cystoscopy retrogrades        Family History:   Family History   Problem Relation Age of Onset    Hypotension Mother     COPD Sister     Coronary Art Dis Sister     Diabetes Other          Social History:  Social History     Tobacco Use    Smoking status: Former Smoker    Smokeless tobacco: Never Used   Substance Use Topics    Alcohol use: No    Drug use: No         Current Medications:      Current Facility-Administered Medications   Medication Dose Route Frequency Provider Last Rate Last Admin    insulin regular (HUMULIN R;NOVOLIN R) injection 35 Units  35 Units Subcutaneous Daily Nicholas Beal MD        insulin regular (HUMULIN R;NOVOLIN R) injection 25 Units  25 Units Subcutaneous QAM AC Nicholas Beal MD        clopidogrel (PLAVIX) tablet 75 mg  75 mg Oral Daily Wilian De Oliveira MD   75 mg at 03/05/21 0835    metoprolol tartrate (LOPRESSOR) tablet 50 mg  50 mg Oral BID Wilian De Oliveira MD   50 mg at 03/05/21 0835    atorvastatin (LIPITOR) tablet 20 mg  20 mg Oral Daily Wilian De Oliveira MD   20 mg at 03/05/21 0835    [START ON 3/6/2021] warfarin (COUMADIN) tablet 4 mg  4 mg Oral Once per day on Tue Sat Nicholas Beal MD        sodium chloride flush 0.9 % injection 10 mL  10 mL Intravenous 2 times per day Wilian De Oliveira MD   10 mL at fields full to confrontation bilaterally. III, IV, VI: Pupils equally round and reactive to light, 3 to 2 mm bilaterally. EOMs: full, no nystagmus. V. Facial sensation intact to light touch bilaterally  VII: Facial movements symmetric and strong  VIII: Hearing intact to voice  IX,X: Palate elevates symmetrically. No dysarthria  XI: Sternocleidomastoid and trapezius 5/5 bilaterally   XII: Tongue is midline    Motor     Right Left   Right Left   Deltoid 5 5  Hip Flexion 5 5   Biceps      5  4+  Knee Extension 5 5   Triceps 5 4+  Knee Flexion 5 5   Handgrip 5 4+  Ankle Dorsiflexion 5 5       Ankle Plantarflexion 5 5     Tone: Normal in all four limbs    Bulk: Normal in all four limbs with no evidence of atrophy    Sensation  · Light Touch: Intact distally in all four limbs  · Pinprick: Intact distally BL UEs  · Vibration: Diminished distally in BL LEs    Reflexes     Right Left   Biceps 2 2   Brachioradialis 2 2   Triceps 2 2   Patellar 1 1   Achilles 0 0   ankle clonus none none     Toes down going bilaterally. Coordination  Rapid alternating movements normal in bilateral upper extremities  Finger to nose testing normal on right, ataxic on left  Heel to shin testing normal bilaterally    Gait  Normal base, stride, and arm swing with casual gait. 2-3 steps to turn.        Labs  Recent labs reviewed    Imaging  CT head reviewed          Electronically signed by: Claude Benavides DO, 3/5/2021 8:58 AM

## 2021-03-05 NOTE — PROGRESS NOTES
OCCUPATIONAL THERAPY INITIAL EVALUATION      Date:3/5/2021  Patient Name: Bart Tolentino  MRN: [id number]  : 1935  Room: [address] [place] [address]-A      Evaluating [address] [place] [address], OTR/L #[id number]    AM-PAC Daily Activity Raw Score:   Recommended Adaptive Equipment: TBD     Diagnosis: LUE weakness [R29.898]  TIA (transient ischemic attack) [G45.9]     Modified [place] Scale (MRS)  Score     Description  0             No symptoms  1             No significant disability despite symptoms  2             Slight disability; able to look after own affairs  3             Moderate disability; able to ambulate without assist/ requires assist with ADLs  4             Moderate/Severe disability;requires assist to ambulate/assist with ADLs  5             Severe disability;bedridden/incontinent   6               Score:   2    Referring physician: Tyree Gutierrez MD    Pertinent Medical History: A-Fib, CKD, colitis, DM, HTN, long term use of anticoagulants, stroke ()    Precautions:  L UE coordination deficits, L UE weakness     Home Living: Pt lives with spouse in   [place] setup: tub/shower with grab bars   Equipment owned: n/a    Prior Level of Function: independent with ADLs , independent with IADLs; ambulated independently w/o AD  Driving: yes    Pain Level: Pt c/o no pain this session    Cognition: A&O: 4/4; Follows 1-2 step directions   Memory:  good    Sequencing:  good    Problem solving:  fair    Judgement/safety:  fair      Functional Assessment:   Initial Eval Status  Date: 3/5/21 Treatment Status  Date: Short Term Goals/LTG  Treatment frequency: 1-2x   Feeding Setup  Reinforced safety d/t L UE coordination deficits   Modified [place]    Grooming Supervision   Modified [place]    UB Dressing Modified [place]   -   LB Dressing Stand by Assist   Modified [place]    Bathing Stand by Assist  Modified [place]    Toileting Supervision   Modified [place]    Bed Mobility  Rolling: Independent Supine to sit: Independent   Sit to supine: Independent      Functional Transfers Independent     Functional Mobility Independent w/o AD     Balance Sitting: Independent  Standing: Independent     Activity Tolerance Good     Visual/  Perceptual Glasses: yes         Fair  Following education, pt demonstrated fair understanding of L UE exercises  Pt will indicate good understanding of L UE coordination and ROM exercises to improve independence w/ ADL's     Hand dominance: R   Strength ROM Additional Info:    RUE  5/5  WFL   good  and wfl FMC/dexterity noted during ADL tasks    Finger opp: wfl   LUE Distal: 4-/5  Proximal: 3+/5  WFL   good  and fair FMC/dexterity noted during ADL tasks    Finger opp: fair-  Finger><nose: fair-     Hearing: WFL  Sensation:  No c/o numbness or tingling   Light touch: intact UE's  Tone: WFL  Edema: none noted                   Comments: Upon arrival patient lying in bed. Pt agreeable to OT session this date. At end of session, patient lying in bed with call light and phone within reach, all lines and tubes intact. Overall patient demonstrated decreased independence and safety during completion of ADL/functional transfer/mobility tasks. Pt would benefit from 1-2 additional skilled OT to increase safety,  L UE coordination and strength and independence with completion of ADL/IADL tasks for functional independence and quality of life. Treatment: OT treatment provided this date includes:  Facilitation of bed mobility, unsupported sitting balance, functional transfers (various surfaces), standing tolerance tasks (while incorporating light functional reaching; impacting ADLs and functional activity) and functional ambulation tasks. Therapist facilitated self-care retraining: LB self-care tasks, feeding task (educated/reinforced safety d/t L UE deficits) and grooming task while educating pt on modified techniques, posture, safety and energy conservation techniques.  Also facilitated thorough education on L UE ROM exercises and coordination ex/activities to improve function - handout provided. Skilled monitoring of pts response to treatment. Eval Complexity: Low    Evaluation Time includes thorough review of current medical information, gathering information on past medical history/social history and prior level of function, completion of standardized testing/informal observation of tasks, assessment of data and education on plan of care and goals. Assessment of current deficits  Functional mobility [x]  ADLs [x] Strength [x]  Cognition []  Functional transfers  [x] IADLs [x] Safety Awareness [x]  Endurance [x]  Fine Motor Coordination [x] Balance [x] Vision/perception [] Sensation []   Gross Motor Coordination [] ROM [x] Delirium []                  Motor Control []    Plan of Care: 1-3 days/week for 1-2 weeks PRN   Instruction/training on adapted ADL techniques and AE recommendations to increase functional independence within precautions  Training on energy conservation strategies/techniques to improve independence/tolerance for self-care routine  Patient/Family education to increase follow through with safety techniques and functional independence  Recommendation of environmental modifications for increased safety with functional transfers/mobility and ADLs  Therapeutic exercise to improve motor endurance, ROM, and functional strength for ADLs/functional transfers  Therapeutic activities to facilitate/challenge dynamic balance, stand tolerance, fine motor dexterity/in-hand manipulation for increased independence with ADLs    Rehab Potential: Good for established goals    Patient / Family Goal: Not stated     Patient and/or family were instructed on diagnosis, prognosis/goals and plan of care. Demonstrated fair+ understanding. [] Malnutrition indicators have been identified and nursing has been notified to ensure a dietitian consult is ordered.        Low Evaluation completed              Time In: 13:10  Time Out: 13:32  Total Time: 22 minutes   Min Units   OT Eval Low 97165 X 1   OT Eval Medium 59599     OT Eval High [id number]     OT Re-Eval [id number]     Therapeutic Ex 63393     Therapeutic Activities 94534     ADL/Self Care 72762     Orthotic Management 20744     Neuro Re-Ed [address], OTR/L #[id number]

## 2021-03-05 NOTE — PROCEDURES
Please complete the MRI checklist so we can schedule pt for MRI once screening is reviewed and cleared, thank you

## 2021-03-05 NOTE — CARE COORDINATION
3/5/21 Transition of Care: patient is alert. He is c/o new onset weakness with feeling like he is \"clumsy:. He said he had tooth extractions and had to be off of his coumadin for 2 days. He is observation status. Echo is pending. U/s Carotids pending. MRI/MRA of head/brain pending. Patient started back on his coumadin. /97. PT/OT pending. Patient resides with his wife. He is ambulatory and independent at home. His pcp is Dr Gerhardt Lawyer and his pharmacy is [facility] outpatient pharmacy or [facility] On [address] [place] [address]. Plan is for patient to discharge back home. Will follow.  Ade Anderson RN Cm

## 2021-03-05 NOTE — H&P
Department of Internal Medicine  Dr. Skylar Pryor History and Physical      CHIEF COMPLAINT:  L ARM WEAKNESS  Reason for Admission:  CVA    HISTORY OF PRESENT ILLNESS:      The patient is a 80 y.o. male with  who presents with the above problems. HE HAS CHRONIC AFIB, ON COUMADIN, BUT RECENTLY STOPPED THE COUMADIN FOR 1-2 DAYS ON FEB 13 FOR DENTAL EXTRACTION. HE NOTICED IN THE PAST WEEK, A WEAKNESS AND CLUMSINESS IN THE LUE. HE ALSO MENTIONS THAT HE FREEZES WHEN HE STANS UP. HE HAS PRIOR H/O CVA, AND TAKES PLAVIX.      Past Medical History:        Diagnosis Date    A-fib [facility])     Chronic kidney disease     Chronic renal impairment, stage 3 (moderate) 10/10/2016    [facility], [facility]     Diabetes mellitus [facility]     DM (diabetes mellitus) ([facility] 11/25/2013    Encounter for therapeutic drug monitoring 4/5/2016    Hyperlipidemia     Hyperlipidemia associated with type 2 diabetes mellitus ([facility] 1/17/2016    Hypertension     Long term (current) use of anticoagulants 4/5/2016    Prostate enlargement     Stroke (cerebrum) ([facility] 2013    Unspecified cerebral artery occlusion with cerebral infarction 1973     Past Surgical History:        Procedure Laterality Date    ABDOMEN SURGERY      1978    COLONOSCOPY      COLOSTOMY  1978    CYSTOSCOPY  more than 5 yrs    ECHO COMPL W DOP COLOR FLOW  11/26/2013         ECHOCARDIOGRAM TRANSESOPHAGEAL  11/27/2013         ENDOSCOPY, COLON, DIAGNOSTIC  12/10/15    ileoscopy    TURP  6/16/14    cystoscopy retrogrades       Medications Prior to Admission:    Medications Prior to Admission: insulin regular (HUMULIN R) 100 UNIT/ML injection, PATIENT INJECTS 25 UNITS AM , 35 UNITS BEFORE DINNER  clopidogrel (PLAVIX) 75 MG tablet, Take 1 tablet by mouth daily  metoprolol tartrate (LOPRESSOR) 50 MG tablet, TAKE 1 TABLET BY MOUTH 2 TIMES DAILY  simvastatin (ZOCOR) 20 MG tablet, TAKE 1 TABLET BY MOUTH EVERY DAY AT NIGHT  Insulin Syringe-Needle U-100 31G X 5/16\" 1 ML MIS, 1 each by Other route 2 times daily  blood glucose test strips (ASCENSIA AUTODISC VI;ONE TOUCH ULTRA TEST VI) strip, 1 each by In Vitro route daily As needed. ONE TOUCH ULTRASOFT LANCETS MISC, 1 each by Does not apply route 2 times daily  blood glucose test strips (ONE TOUCH ULTRA TEST) strip, USE ONE STRIP TO CHECK GLUCOSE TWO TIMES DAILY  warfarin (COUMADIN) 2 MG tablet, TAKE 1 TABLET BY MOUTH DAILY  warfarin (COUMADIN) 4 MG tablet, TAKE 1 TABLET BY MOUTH DAILY AS DIRECTED BY PHYSICIAN. [DISCONTINUED] famotidine (PEPCID) 20 MG tablet, TAKE 1 TABLET BY MOUTH TWICE A DAY    Allergies:  Patient has no known allergies. Social History:   TOBACCO:   reports that he has quit smoking.  He has never used smokeless tobacco.    Family History:       Problem Relation Age of Onset    Hypotension Mother     COPD Sister     Coronary Art Dis Sister     Diabetes Other      REVIEW OF SYSTEMS:  CONSTITUTIONAL:  negative  EYES:  negative  HEENT:  negative  RESPIRATORY:  negative  CARDIOVASCULAR:  negative  GASTROINTESTINAL:  negative  GENITOURINARY:  negative  INTEGUMENT/BREAST:  negative  HEMATOLOGIC/LYMPHATIC:  negative  ALLERGIC/IMMUNOLOGIC:  negative  ENDOCRINE:  negative  MUSCULOSKELETAL:  negative  NEUROLOGICAL:  positive for coordination problems and gait problems  BEHAVIOR/PSYCH:  negative  PHYSICAL EXAM:    Vitals:  /80   Pulse 90   Temp 96.9 °F (36.1 °C) (Temporal)   Resp 16   Ht 5' 10\" (1.778 m)   Wt 164 lb 14.5 oz (74.8 kg)   SpO2 99%   BMI 23.66 kg/m²     CONSTITUTIONAL:  awake, alert, cooperative, no apparent distress, and appears stated age  EYES:  Lids and lashes normal, pupils equal, round and reactive to light, extra ocular muscles intact, sclera clear, conjunctiva normal  ENT:  Normocephalic, without obvious abnormality, atraumatic, sinuses nontender on palpation, external ears without lesions, oral pharynx with moist mucus membranes, tonsils without erythema or exudates, gums normal and good dentition. NECK:  Supple, symmetrical, trachea midline, no adenopathy, thyroid symmetric, not enlarged and no tenderness, skin normal  HEMATOLOGIC/LYMPHATICS:  no cervical lymphadenopathy  BACK:  Symmetric, no curvature, spinous processes are non-tender on palpation, paraspinous muscles are non-tender on palpation, no costal vertebral tenderness  LUNGS:  No increased work of breathing, good air exchange, clear to auscultation bilaterally, no crackles or wheezing  CARDIOVASCULAR:  IRREG  ABDOMEN:  Soft, nontender. Normal bs    MUSCULOSKELETAL:  There is no redness, warmth, or swelling of the joints. Full range of motion noted. Motor strength is 5 out of 5 all extremities bilaterally. Tone is normal.  NEUROLOGIC:  Awake, alert, oriented to name, place and time. Cranial nerves II-XII are grossly intact. Motor is 5 out of 5 bilaterally. Cerebellar finger to nose, heel to shin intact. Sensory is intact.   Babinski down going, Romberg negative, and gait is normal.  SKIN:  no bruising or bleeding    DATA:  CBC:   Lab Results   Component Value Date    WBC 5.8 03/04/2021    RBC 4.49 03/04/2021    HGB 11.6 03/04/2021    HCT 38.6 03/04/2021    MCV 86.0 03/04/2021    MCH 25.8 03/04/2021    MCHC 30.1 03/04/2021    RDW 14.9 03/04/2021     03/04/2021    MPV 10.4 03/04/2021     CMP:    Lab Results   Component Value Date     03/05/2021    K 4.2 03/05/2021     03/05/2021    CO2 24 03/05/2021    BUN 21 03/05/2021    CREATININE 1.4 03/05/2021    GFRAA 58 03/05/2021    LABGLOM 58 03/05/2021    GLUCOSE 156 03/05/2021    PROT 8.3 03/04/2021    LABALBU 3.9 03/04/2021    CALCIUM 9.1 03/05/2021    BILITOT 0.4 03/04/2021    ALKPHOS 122 03/04/2021    AST 29 03/04/2021    ALT 13 03/04/2021     LDH:  No results found for: LDH  Warfarin PT/INR:  No components found for: Lazaro Beech  Last 3 Troponin:    Lab Results   Component Value Date    TROPONINI <0.01 03/04/2021    TROPONINI <0.01 07/28/2018    TROPONINI <0.01 07/24/2017     ABG:  No results found for: PH, PCO2, PO2, HCO3, BE, THGB, TCO2, O2SAT  HgBA1c:    Lab Results   Component Value Date    LABA1C 7.6 03/05/2021     TSH:  No results found for: TSH  VITAMIN B12: No components found for: B12  FOLATE:  No results found for: FOLATE  IRON:  No results found for: IRON  Iron Saturation:  No components found for: PERCENTFE  TIBC:  No results found for: TIBC  FERRITIN:  No results found for: FERRITIN  RPR:  No results found for: RPR  LIZETTE:  No results found for: ANATITER, LIZETTE  AMYLASE:  No results found for: AMYLASE  LIPASE:    Lab Results   Component Value Date    LIPASE 40 03/04/2021       IMAGING    Xr Chest (2 Vw)    Result Date: 3/4/2021  EXAMINATION: TWO XRAY VIEWS OF THE CHEST 3/4/2021 12:36 pm COMPARISON: Comparison is dated October 1, 2020 HISTORY: ORDERING SYSTEM PROVIDED HISTORY: cp TECHNOLOGIST PROVIDED HISTORY: Reason for exam:->cp What reading provider will be dictating this exam?->CRC FINDINGS: The lungs are without acute focal process. There is no effusion or pneumothorax. The cardiomediastinal silhouette is without acute process. The osseous structures are without acute process. No acute process. Ct Head Wo Contrast    Result Date: 3/4/2021  EXAMINATION: CT OF THE HEAD WITHOUT CONTRAST  3/4/2021 12:44 pm TECHNIQUE: CT of the head was performed without the administration of intravenous contrast. Dose modulation, iterative reconstruction, and/or weight based adjustment of the mA/kV was utilized to reduce the radiation dose to as low as reasonably achievable. COMPARISON: 07/24/2017 HISTORY: ORDERING SYSTEM PROVIDED HISTORY: weakness TECHNOLOGIST PROVIDED HISTORY: Has a \"code stroke\" or \"stroke alert\" been called? ->No Reason for exam:->weakness Decision Support Exception->Emergency Medical Condition (MA) What reading provider will be dictating this exam?->CRC FINDINGS: BRAIN/VENTRICLES: There is no acute intracranial hemorrhage, mass effect or midline shift.  No abnormal extra-axial fluid collection. The gray-white differentiation is maintained without evidence of an acute infarct. There is no evidence of hydrocephalus. ORBITS: The visualized portion of the orbits demonstrate no acute abnormality. SINUSES: The visualized paranasal sinuses and mastoid air cells demonstrate no acute abnormality. SOFT TISSUES/SKULL:  No acute abnormality of the visualized skull or soft tissues. No acute intracranial abnormality. There is stable atrophy with small vessel ischemic disease. Sinus disease with soft tissue density in the left maxillary sinus and mucosal thickening in the mastoid air cells. ASSESSMENT AND PLAN:    CVA  NEURO CONSULT  STATIN/COUMADIN/PLAVIX  PT/OT  ? MRI      I can be reached though [employer] or [facility] [place] at [phone]

## 2021-03-05 NOTE — PLAN OF CARE
Problem: Falls - Risk of:  Goal: Will remain free from falls  Description: Will remain free from falls  3/5/2021 0224 by Genesis Krause RN  Outcome: Met This Shift     Problem: Falls - Risk of:  Goal: Absence of physical injury  Description: Absence of physical injury  3/5/2021 0224 by Genesis Krause RN  Outcome: Met This Shift     Problem: Safety:  Goal: Free from accidental physical injury  Description: Free from accidental physical injury  3/5/2021 0224 by Genesis Krause RN  Outcome: Met This Shift     Problem: Safety:  Goal: Free from intentional harm  Description: Free from intentional harm  3/5/2021 0224 by Genesis Krause RN  Outcome: Met This Shift

## 2021-03-06 ENCOUNTER — APPOINTMENT (OUTPATIENT)
Dept: MRI IMAGING | Age: 86
DRG: 065 | End: 2021-03-06
Payer: MEDICARE

## 2021-03-06 PROBLEM — I63.9 ACUTE CVA (CEREBROVASCULAR ACCIDENT) (HCC): Status: ACTIVE | Noted: 2021-03-06

## 2021-03-06 LAB
INR BLD: 2.5
METER GLUCOSE: 107 MG/DL (ref 74–99)
METER GLUCOSE: 148 MG/DL (ref 74–99)
METER GLUCOSE: 151 MG/DL (ref 74–99)
METER GLUCOSE: 243 MG/DL (ref 74–99)
METER GLUCOSE: 253 MG/DL (ref 74–99)
METER GLUCOSE: 53 MG/DL (ref 74–99)
METER GLUCOSE: 58 MG/DL (ref 74–99)
PROTHROMBIN TIME: 27.3 SEC (ref 9.3–12.4)

## 2021-03-06 PROCEDURE — 96376 TX/PRO/DX INJ SAME DRUG ADON: CPT

## 2021-03-06 PROCEDURE — 96375 TX/PRO/DX INJ NEW DRUG ADDON: CPT

## 2021-03-06 PROCEDURE — 6370000000 HC RX 637 (ALT 250 FOR IP): Performed by: INTERNAL MEDICINE

## 2021-03-06 PROCEDURE — 2140000000 HC CCU INTERMEDIATE R&B

## 2021-03-06 PROCEDURE — 70544 MR ANGIOGRAPHY HEAD W/O DYE: CPT

## 2021-03-06 PROCEDURE — 82962 GLUCOSE BLOOD TEST: CPT

## 2021-03-06 PROCEDURE — 36415 COLL VENOUS BLD VENIPUNCTURE: CPT

## 2021-03-06 PROCEDURE — 70551 MRI BRAIN STEM W/O DYE: CPT

## 2021-03-06 PROCEDURE — 85610 PROTHROMBIN TIME: CPT

## 2021-03-06 PROCEDURE — 2580000003 HC RX 258: Performed by: INTERNAL MEDICINE

## 2021-03-06 PROCEDURE — 99233 SBSQ HOSP IP/OBS HIGH 50: CPT | Performed by: CLINICAL NURSE SPECIALIST

## 2021-03-06 RX ADMIN — DEXTROSE MONOHYDRATE 12.5 G: 25 INJECTION, SOLUTION INTRAVENOUS at 05:32

## 2021-03-06 RX ADMIN — METOPROLOL TARTRATE 25 MG: 25 TABLET, FILM COATED ORAL at 13:21

## 2021-03-06 RX ADMIN — CLOPIDOGREL BISULFATE 75 MG: 75 TABLET ORAL at 10:16

## 2021-03-06 RX ADMIN — ATORVASTATIN CALCIUM 20 MG: 20 TABLET, FILM COATED ORAL at 10:16

## 2021-03-06 RX ADMIN — DEXTROSE MONOHYDRATE 12.5 G: 25 INJECTION, SOLUTION INTRAVENOUS at 00:46

## 2021-03-06 RX ADMIN — INSULIN HUMAN 15 UNITS: 100 INJECTION, SOLUTION PARENTERAL at 10:15

## 2021-03-06 RX ADMIN — WARFARIN SODIUM 4 MG: 4 TABLET ORAL at 19:08

## 2021-03-06 RX ADMIN — Medication 10 ML: at 11:49

## 2021-03-06 RX ADMIN — Medication 10 ML: at 22:18

## 2021-03-06 RX ADMIN — INSULIN HUMAN 20 UNITS: 100 INJECTION, SOLUTION PARENTERAL at 19:08

## 2021-03-06 NOTE — PROGRESS NOTES
Attending notified that patient's heart rate has been as low as 29. Also notified that his blood glucose has been low.

## 2021-03-06 NOTE — PROGRESS NOTES
Migdalia Block is a 80 y.o. right handed male     Patient was admitted with left arm clumsiness which he feels began last week sometime   He does report a previous stroke and his wife felt his complaints were 14 years old  He does have a hx of afib and INR was therapeutic on admission    MRI was obtained which did demonstrate a showering of acute infarctions in his posterior right frontal lobe     In reviewing the notes, EP is consulted for bradycardia in the 20s as well    He denies any changes to his exam -- no complaints     No chest pain or palpitations  No SOB  No vertigo, lightheadedness or loss of consciousness  No incontinence of bowels or bladder  No itching or bruising appreciated    ROS otherwise negative     Allergies as of 03/04/2021    (No Known Allergies)       Objective:     BP (!) 173/111   Pulse 85   Temp 96.9 °F (36.1 °C) (Temporal)   Resp 18   Ht 5' 10\" (1.778 m)   Wt 164 lb 14.5 oz (74.8 kg)   SpO2 99%   BMI 23.66 kg/m²      General appearance: alert, appears stated age and cooperative  Head: Normocephalic, without obvious abnormality, atraumatic  Extremities: no cyanosis or edema  Pulses: 2+ and symmetric  Skin: no rashes or lesions    Mental Status: Alert, oriented, thought content appropriate    Speech: clear  Language: appropriate    Cranial Nerves:  I: smell    II: visual acuity     II: visual fields Full   II: pupils RAHUL   III,VII: ptosis None   III,IV,VI: extraocular muscles  EOMI without nystagmus    V: mastication Normal   V: facial light touch sensation  Normal   V,VII: corneal reflex  Present   VII: facial muscle function - upper     VII: facial muscle function - lower Normal   VIII: hearing Normal   IX: soft palate elevation  Normal   IX,X: gag reflex    XI: trapezius strength  5/5   XI: sternocleidomastoid strength 5/5   XI: neck extension strength  5/5   XII: tongue strength  Normal     Motor:  5/5 throughout  Normal bulk and tone    Sensory:  Normal to LT and PP  Vibration normal in the ankles     Coordination:   FN, FFM and ELIECER decreased left   Gait:  Normal     DTR:   No reflexes    No Babinski  No Neumann's     Laboratory/Radiology:     CBC with Differential:    Lab Results   Component Value Date    WBC 5.8 03/04/2021    RBC 4.49 03/04/2021    HGB 11.6 03/04/2021    HCT 38.6 03/04/2021     03/04/2021    MCV 86.0 03/04/2021    MCH 25.8 03/04/2021    MCHC 30.1 03/04/2021    RDW 14.9 03/04/2021    SEGSPCT 69 11/23/2013    BANDSPCT 1 04/01/2016    LYMPHOPCT 23.1 03/04/2021    MONOPCT 7.1 03/04/2021    BASOPCT 0.5 03/04/2021    MONOSABS 0.41 03/04/2021    LYMPHSABS 1.33 03/04/2021    EOSABS 0.14 03/04/2021    BASOSABS 0.03 03/04/2021     CMP:    Lab Results   Component Value Date     03/05/2021    K 4.2 03/05/2021     03/05/2021    CO2 24 03/05/2021    BUN 21 03/05/2021    CREATININE 1.4 03/05/2021    GFRAA 58 03/05/2021    LABGLOM 58 03/05/2021    GLUCOSE 156 03/05/2021    PROT 8.3 03/04/2021    LABALBU 3.9 03/04/2021    CALCIUM 9.1 03/05/2021    BILITOT 0.4 03/04/2021    ALKPHOS 122 03/04/2021    AST 29 03/04/2021    ALT 13 03/04/2021     HgBA1c:    Lab Results   Component Value Date    LABA1C 7.6 03/05/2021     FLP:    Lab Results   Component Value Date    TRIG 111 03/05/2021    HDL 55 03/05/2021    LDLCALC 64 03/05/2021    LABVLDL 22 03/05/2021       MRI Brain and MRA Head report:   Small acute to subacute infarction in the posterior right frontal lobe. Small old infarctions in the bilateral frontal parietal lobes, the right  occipital lobe, and the bilateral cerebellar hemispheres. Moderate parenchymal volume loss. Moderate chronic microvascular disease. Unremarkable noncontrast MRA of the head. Results were sent to radiology results communication. I personally reviewed the patient's lab and imaging studies at this time.     Assessment:     Right posterior frontal lobe infarct most likely a watershed infarction -- possibly hypoperfusion from now known bradycardia   He was therepeutic on warfarin with his PAF   Still possibly a Plavix failure however unknown at this time and unlikely contributory     Plan:      Will await EP    Echo with bubble pending    Wife at bedside -- all questions answered     Marin Sinha  12:39 PM  3/6/2021
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
## 2021-03-07 LAB
INR BLD: 2.8
METER GLUCOSE: 105 MG/DL (ref 74–99)
METER GLUCOSE: 177 MG/DL (ref 74–99)
METER GLUCOSE: 187 MG/DL (ref 74–99)
METER GLUCOSE: 204 MG/DL (ref 74–99)
METER GLUCOSE: 64 MG/DL (ref 74–99)
METER GLUCOSE: 74 MG/DL (ref 74–99)
PROTHROMBIN TIME: 31.2 SEC (ref 9.3–12.4)

## 2021-03-07 PROCEDURE — 2140000000 HC CCU INTERMEDIATE R&B

## 2021-03-07 PROCEDURE — 99232 SBSQ HOSP IP/OBS MODERATE 35: CPT | Performed by: CLINICAL NURSE SPECIALIST

## 2021-03-07 PROCEDURE — 6370000000 HC RX 637 (ALT 250 FOR IP): Performed by: INTERNAL MEDICINE

## 2021-03-07 PROCEDURE — 85610 PROTHROMBIN TIME: CPT

## 2021-03-07 PROCEDURE — 82962 GLUCOSE BLOOD TEST: CPT

## 2021-03-07 PROCEDURE — 2580000003 HC RX 258: Performed by: INTERNAL MEDICINE

## 2021-03-07 PROCEDURE — 36415 COLL VENOUS BLD VENIPUNCTURE: CPT

## 2021-03-07 RX ADMIN — CLOPIDOGREL BISULFATE 75 MG: 75 TABLET ORAL at 09:54

## 2021-03-07 RX ADMIN — ATORVASTATIN CALCIUM 20 MG: 20 TABLET, FILM COATED ORAL at 09:54

## 2021-03-07 RX ADMIN — Medication 10 ML: at 09:54

## 2021-03-07 RX ADMIN — INSULIN HUMAN 15 UNITS: 100 INJECTION, SOLUTION PARENTERAL at 17:12

## 2021-03-07 RX ADMIN — METOPROLOL TARTRATE 25 MG: 25 TABLET, FILM COATED ORAL at 09:54

## 2021-03-07 RX ADMIN — WARFARIN SODIUM 2 MG: 2 TABLET ORAL at 17:12

## 2021-03-07 RX ADMIN — INSULIN HUMAN 20 UNITS: 100 INJECTION, SOLUTION PARENTERAL at 11:56

## 2021-03-07 RX ADMIN — Medication 10 ML: at 21:10

## 2021-03-07 ASSESSMENT — PAIN SCALES - GENERAL: PAINLEVEL_OUTOF10: 0

## 2021-03-07 NOTE — PLAN OF CARE
Problem: Falls - Risk of:  Goal: Will remain free from falls  Description: Will remain free from falls  Outcome: Met This Shift     Problem: Safety:  Goal: Free from accidental physical injury  Description: Free from accidental physical injury  Outcome: Met This Shift

## 2021-03-07 NOTE — PROGRESS NOTES
Dr Simeon Lange that pt would like to see what his BG is before taking his insulin--also ntfd that in his not it says to d/c metoprolol although it is ordered      Per Dr Ni Banegas give metoprolol since Dr Manish Erickson lowered the dose
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
## 2021-03-07 NOTE — PROGRESS NOTES
Kandace Liu is a 80 y.o. right handed male     Patient was admitted with left arm clumsiness which he feels began last week sometime   He does report a previous stroke and his wife felt his complaints were 14 years old  He does have a hx of afib and INR was therapeutic on admission    MRI was obtained which did demonstrate a showering of acute infarctions in his posterior right frontal lobe     In reviewing the notes, EP is consulted for bradycardia in the 20s -- meds adjusted -- eval planned for today he states    No palpitations or complaints     He denies any changes to his exam -- walking in [place] with wife     No chest pain or palpitations  No SOB  No vertigo, lightheadedness or loss of consciousness  No incontinence of bowels or bladder  No itching or bruising appreciated    ROS otherwise negative     Allergies as of 03/04/2021    (No Known Allergies)       Objective:     /73   Pulse 71   Temp 97.5 °F (36.4 °C) (Oral)   Resp 18   Ht 5' 10\" (1.778 m)   Wt 164 lb 14.5 oz (74.8 kg)   SpO2 100%   BMI 23.66 kg/m²      General appearance: alert, appears stated age and cooperative  Head: Normocephalic, without obvious abnormality, atraumatic  Extremities: no cyanosis or edema  Pulses: 2+ and symmetric  Skin: no rashes or lesions    Mental Status: Alert, oriented, thought content appropriate    Speech: clear  Language: appropriate    Cranial Nerves:  I: smell    II: visual acuity     II: visual fields Full   II: pupils RAHUL   III,VII: ptosis None   III,IV,VI: extraocular muscles  EOMI without nystagmus    V: mastication Normal   V: facial light touch sensation  Normal   V,VII: corneal reflex  Present   VII: facial muscle function - upper     VII: facial muscle function - lower Normal   VIII: hearing Normal   IX: soft palate elevation  Normal   IX,X: gag reflex    XI: trapezius strength  5/5   XI: sternocleidomastoid strength 5/5   XI: neck extension strength  5/5   XII: tongue strength  Normal Motor:  5/5 throughout  Normal bulk and tone    Sensory:  Normal to LT  Vibration normal in the ankles     Coordination:   FN, FFM and ELIECER decreased left     Gait:  Normal     DTR:   No reflexes    No Babinski  No Neumann's     Laboratory/Radiology:     CBC with Differential:    Lab Results   Component Value Date    WBC 5.8 03/04/2021    RBC 4.49 03/04/2021    HGB 11.6 03/04/2021    HCT 38.6 03/04/2021     03/04/2021    MCV 86.0 03/04/2021    MCH 25.8 03/04/2021    MCHC 30.1 03/04/2021    RDW 14.9 03/04/2021    SEGSPCT 69 11/23/2013    BANDSPCT 1 04/01/2016    LYMPHOPCT 23.1 03/04/2021    MONOPCT 7.1 03/04/2021    BASOPCT 0.5 03/04/2021    MONOSABS 0.41 03/04/2021    LYMPHSABS 1.33 03/04/2021    EOSABS 0.14 03/04/2021    BASOSABS 0.03 03/04/2021     CMP:    Lab Results   Component Value Date     03/05/2021    K 4.2 03/05/2021     03/05/2021    CO2 24 03/05/2021    BUN 21 03/05/2021    CREATININE 1.4 03/05/2021    GFRAA 58 03/05/2021    LABGLOM 58 03/05/2021    GLUCOSE 156 03/05/2021    PROT 8.3 03/04/2021    LABALBU 3.9 03/04/2021    CALCIUM 9.1 03/05/2021    BILITOT 0.4 03/04/2021    ALKPHOS 122 03/04/2021    AST 29 03/04/2021    ALT 13 03/04/2021     HgBA1c:    Lab Results   Component Value Date    LABA1C 7.6 03/05/2021     FLP:    Lab Results   Component Value Date    TRIG 111 03/05/2021    HDL 55 03/05/2021    LDLCALC 64 03/05/2021    LABVLDL 22 03/05/2021       MRI Brain and MRA Head report:   Small acute to subacute infarction in the posterior right frontal lobe. Small old infarctions in the bilateral frontal parietal lobes, the right  occipital lobe, and the bilateral cerebellar hemispheres. Moderate parenchymal volume loss. Moderate chronic microvascular disease. Unremarkable noncontrast MRA of the head. Results were sent to radiology results communication. I personally reviewed the patient's lab and imaging studies at this time.     Assessment:     Right posterior frontal lobe infarct most likely a watershed infarction -- possibly hypoperfusion from now known bradycardia   He was therepeutic on warfarin with his PAF   Still possibly a Plavix failure however unknown at this time and unlikely contributory     Plan:     await EP    Labs investigating Plavix failure pending     Echo with bubble pending    Wife walking with patient     Romie Vera  11:21 AM  3/7/2021

## 2021-03-08 VITALS
TEMPERATURE: 97.8 F | OXYGEN SATURATION: 100 % | DIASTOLIC BLOOD PRESSURE: 82 MMHG | HEART RATE: 74 BPM | HEIGHT: 70 IN | RESPIRATION RATE: 16 BRPM | SYSTOLIC BLOOD PRESSURE: 132 MMHG | WEIGHT: 164.9 LBS | BODY MASS INDEX: 23.61 KG/M2

## 2021-03-08 LAB
INR BLD: 3.2
LV EF: 63 %
LVEF MODALITY: NORMAL
METER GLUCOSE: 141 MG/DL (ref 74–99)
METER GLUCOSE: 156 MG/DL (ref 74–99)
METER GLUCOSE: 82 MG/DL (ref 74–99)
PROTHROMBIN TIME: 35.5 SEC (ref 9.3–12.4)

## 2021-03-08 PROCEDURE — 6370000000 HC RX 637 (ALT 250 FOR IP): Performed by: INTERNAL MEDICINE

## 2021-03-08 PROCEDURE — 85610 PROTHROMBIN TIME: CPT

## 2021-03-08 PROCEDURE — 82962 GLUCOSE BLOOD TEST: CPT

## 2021-03-08 PROCEDURE — 36415 COLL VENOUS BLD VENIPUNCTURE: CPT

## 2021-03-08 PROCEDURE — 2580000003 HC RX 258: Performed by: INTERNAL MEDICINE

## 2021-03-08 PROCEDURE — 93306 TTE W/DOPPLER COMPLETE: CPT

## 2021-03-08 PROCEDURE — 99232 SBSQ HOSP IP/OBS MODERATE 35: CPT | Performed by: NURSE PRACTITIONER

## 2021-03-08 RX ADMIN — Medication 10 ML: at 10:00

## 2021-03-08 RX ADMIN — METOPROLOL TARTRATE 25 MG: 25 TABLET, FILM COATED ORAL at 10:00

## 2021-03-08 RX ADMIN — METOPROLOL TARTRATE 25 MG: 25 TABLET, FILM COATED ORAL at 18:06

## 2021-03-08 RX ADMIN — WARFARIN SODIUM 2 MG: 2 TABLET ORAL at 18:06

## 2021-03-08 RX ADMIN — CLOPIDOGREL BISULFATE 75 MG: 75 TABLET ORAL at 10:00

## 2021-03-08 RX ADMIN — INSULIN HUMAN 15 UNITS: 100 INJECTION, SOLUTION PARENTERAL at 16:43

## 2021-03-08 RX ADMIN — INSULIN HUMAN 20 UNITS: 100 INJECTION, SOLUTION PARENTERAL at 07:26

## 2021-03-08 RX ADMIN — ATORVASTATIN CALCIUM 20 MG: 20 TABLET, FILM COATED ORAL at 10:00

## 2021-03-08 ASSESSMENT — PAIN SCALES - GENERAL: PAINLEVEL_OUTOF10: 0
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
## 2021-03-08 NOTE — PROGRESS NOTES
Send out department in lab notified of order, they will contact  to see if time is available today. Lab called back and said they no longer offer this specific send out. Will notify neurology. [phone] Cazares with neurology notified via perfect serve.

## 2021-03-08 NOTE — CARE COORDINATION
3/8/21 Update CM Note: Patient for possible discharge per neurology after echo results completed. He is alert. Oriented, and appropriate today. He is following commands and has no complaints. His plan is to return home with his wife who is at the bedside currently. Will await pcp for plan to discharge home.  Joshua Martin RN CM

## 2021-03-08 NOTE — PROGRESS NOTES
All discharge information reviewed with patient and patient's wife at this time including changes in medications and important follow up visits. Patient has no further questions at this time.

## 2021-03-08 NOTE — PROGRESS NOTES
Patient okay for discharge per Dr. Jenifer Mosher, perfect serve message sent to Dr. Jenelle Raymundo to update.

## 2021-03-08 NOTE — PROGRESS NOTES
Rodrigo Arnold is a 80 y.o. right handed male     Neurology is following for stroke    Patient was admitted with left arm clumsiness which he feels began last week sometime   He does report a previous stroke and his wife felt his complaints were 14 years old   He does have a hx of afib and INR was therapeutic on admission   MRI Brain demonstrated a showering of acute infarctions in his posterior right frontal lobe    Echo completed--- report to follow    Patient is currently eating--- fussed that I am coming in while he is eating    No complaints voiced  No chest pain or palpitations  No SOB  No vertigo, lightheadedness or loss of consciousness  No incontinence of bowels or bladder  No itching or bruising appreciated    ROS otherwise negative     Patient's wife is present at bedside    Patient is hypertensive otherwise vital stable    Allergies as of 03/04/2021    (No Known Allergies)       Objective:     BP (!) 143/92   Pulse 86   Temp 97.8 °F (36.6 °C) (Infrared)   Resp 16   Ht 5' 10\" (1.778 m)   Wt 164 lb 14.5 oz (74.8 kg)   SpO2 100%   BMI 23.66 kg/m²      General appearance: alert, appears stated age and cooperative  Head: Normocephalic, without obvious abnormality, atraumatic  Extremities: no cyanosis or edema  Pulses: 2+ and symmetric  Skin: no rashes or lesions    Mental Status: Alert, orientedx4, thought content appropriate; follows commands well    Speech: clear  Language: appropriate    Cranial Nerves:  I: smell    II: visual acuity     II: visual fields Full to confrontation   II: pupils PERRL   III,VII: ptosis None   III,IV,VI: extraocular muscles  EOMI without nystagmus    V: mastication Normal   V: facial light touch sensation  Normal   V,VII: corneal reflex     VII: facial muscle function - upper  normal   VII: facial muscle function - lower Normal   VIII: hearing Normal   IX: soft palate elevation  Normal   IX,X: gag reflex    XI: trapezius strength  5/5   XI: sternocleidomastoid strength 5/5   XI: neck extension strength  5/5   XII: tongue strength  Normal     Motor:  Strong bilateral   5/5 to right; LUE and LLE +4/5  Normal bulk and tone    Sensory:  Normal to LT  Vibration normal in the ankles     Coordination:   FN, FFM and ELIECER  impaired to left; normal to right  HKS normal to right; impaired to left    Gait:  Not tested    DTR:   No reflexes    No Babinski  No Neumann's     Laboratory/Radiology:     CBC with Differential:    Lab Results   Component Value Date    WBC 5.8 03/04/2021    RBC 4.49 03/04/2021    HGB 11.6 03/04/2021    HCT 38.6 03/04/2021     03/04/2021    MCV 86.0 03/04/2021    MCH 25.8 03/04/2021    MCHC 30.1 03/04/2021    RDW 14.9 03/04/2021    LYMPHOPCT 23.1 03/04/2021    MONOPCT 7.1 03/04/2021    BASOPCT 0.5 03/04/2021    MONOSABS 0.41 03/04/2021    LYMPHSABS 1.33 03/04/2021    EOSABS 0.14 03/04/2021    BASOSABS 0.03 03/04/2021     CMP:    Lab Results   Component Value Date     03/05/2021    K 4.2 03/05/2021     03/05/2021    CO2 24 03/05/2021    BUN 21 03/05/2021    CREATININE 1.4 03/05/2021    GFRAA 58 03/05/2021    LABGLOM 58 03/05/2021    GLUCOSE 156 03/05/2021    PROT 8.3 03/04/2021    LABALBU 3.9 03/04/2021    CALCIUM 9.1 03/05/2021    BILITOT 0.4 03/04/2021    ALKPHOS 122 03/04/2021    AST 29 03/04/2021    ALT 13 03/04/2021     HgBA1c:    Lab Results   Component Value Date    LABA1C 7.6 03/05/2021     FLP:    Lab Results   Component Value Date    TRIG 111 03/05/2021    HDL 55 03/05/2021    LDLCALC 64 03/05/2021    LABVLDL 22 03/05/2021     MRI BRAIN WO CONTRAST   Final Result   Small acute to subacute infarction in the posterior right frontal lobe. Small old infarctions in the bilateral frontal parietal lobes, the right   occipital lobe, and the bilateral cerebellar hemispheres. Moderate parenchymal volume loss. Moderate chronic microvascular disease. Unremarkable noncontrast MRA of the head.       Results were sent to radiology completed; report pending    History of THADDEUS Oconnor   Continue Coumadin--- therapeutic on arrival    Plan:     Continue supportive care  Await EP  Continue Coumadin, Plavix and statin  A1c 7.6, LDL 64  Labs investigating Plavix failure are no longer done here  Up with assistance  SCDs   Stroke education    Okay to discharge from neuro standpoint once medically cleared if echo is unrevealing. Neuro will sign off. Patient can follow-up in stroke clinic in 1 to 2 weeks.       Bella BARKLEY NP-C   12:08 PM  3/8/2021

## 2021-03-08 NOTE — DISCHARGE SUMMARY
Physician Discharge Summary     Patient ID:  Elpidio Prescott  [id number]  9 y.o.  1935    Admit date: 3/4/2021    Discharge date and time: 3/8/2021  Admitting Physician: Constantine Christine MD     Discharge Physician: Constantine Christine      Admission Diagnoses: LUE weakness [R29.898]  TIA (transient ischemic attack) [G45.9]  Acute CVA (cerebrovascular accident) [facility]) [I63.9]    Discharge Diagnoses:   cva  Bradycardia  Labile dm      Admission Condition: poor    Discharged Condition: good    Indication for Admission: LUE weakness [R29.898]  TIA (transient ischemic attack) [G45.9]  Acute CVA (cerebrovascular accident) [facility]) [I63.9]    Hospital Course: pt worked up for lue weakness. Pos mri. Tele showed slow hr into hr of 29. Low bs in am without sxs.      Consults: cardiology and neurology    Significant Diagnostic Studies: mri, echo,     Lab Results   Component Value Date     03/05/2021    K 4.2 03/05/2021     (H) 03/05/2021    CO2 24 03/05/2021    BUN 21 03/05/2021    CREATININE 1.4 (H) 03/05/2021    GLUCOSE 156 (H) 03/05/2021    CALCIUM 9.1 03/05/2021    PROT 8.3 03/04/2021    LABALBU 3.9 03/04/2021    BILITOT 0.4 03/04/2021    ALKPHOS 122 03/04/2021    AST 29 03/04/2021    ALT 13 03/04/2021    LABGLOM 58 03/05/2021    GFRAA 58 03/05/2021          Lab Results   Component Value Date    WBC 5.8 03/04/2021    HGB 11.6 (L) 03/04/2021    HCT 38.6 03/04/2021    MCV 86.0 03/04/2021     03/04/2021        Echo Complete    Result Date: 3/8/2021  Transthoracic Echocardiography Report (TTE)  Demographics   Patient Name          Taylor Nino Gender                 Male   Medical Record Number [id number]     Room Number            8211   Account #             [de-identified]    Procedure Date         03/08/2021   Corporate ID                       Ordering Physician   Accession Number      [id number]   Referring Physician   Date of Birth         1935   Sonographer   Age                   80 year(s)   Interpreting Physician Peri Cardona MD                                      Any Other  Procedure Type of Study   TTE procedure:Echo Complete W/Doppler & Color Flow. Procedure Date Date: 03/08/2021 Start: 08:47 AM Study Location: Portable Technical Quality: Adequate visualization Indications:CVA. Patient Status: Pending Discharge Contrast Medium: Bubble Study. Amount - 20 ml Contrast Comments: done by the rn. Height: 70 inches Weight: 164 pounds BSA: 1.92 m^2 BMI: 23.53 kg/m^2 Rhythm: Within normal limits HR: 86 bpm BP: 150/85 mmHg Allergies   - No allergy information. Findings   Left Ventricle  Normal left ventricular size and systolic function. Ejection fraction is visually estimated at 60-65%. Indeterminate diastolic function. No regional wall motion abnormalities seen. Mild left ventricular concentric hypertrophy noted. Right Ventricle  Normal right ventricular size and function. Left Atrium  The left atrium is severely dilated. BRIDGETT 48 ml/m2. The interatrial septum appears intact. Agitated saline injected for shunt evaluation. No evidence of atrial level shunt. Right Atrium  Mildly enlarged right atrium. Mitral Valve  Structurally normal mitral valve. No evidence of mitral valve stenosis. Mild mitral regurgitation. Tricuspid Valve  The tricuspid valve appears structurally normal.  Physiologic and/or trace tricuspid regurgitation. RVSP is 30 mmHg. Aortic Valve  Aortic valve leaflet tips are calcified. The aortic valve is trileaflet. No hemodynamically significant aortic stenosis is present. Mild aortic valve regurgitation. Pulmonic Valve  Pulmonic valve is structurally normal.  No evidence of pulmonic valve stenosis. No evidence of any pulmonic regurgitation. Pericardial Effusion  No evidence of pericardial effusion. Aorta  Aortic root dimension within normal limits. Miscellaneous  Normal Inferior Vena Cava diameter and respiratory variation.    Conclusions   Summary  Appears to be in atrial fibrillation with frequent PVC or aberrant  complexes. Normal left ventricular size and systolic function. Ejection fraction is visually estimated at 60-65%. Indeterminate diastolic function. No regional wall motion abnormalities seen. Mild left ventricular concentric hypertrophy noted. Normal right ventricular size and function. The left atrium is severely dilated. Agitated saline injected for shunt evaluation. No evidence of atrial level shunt. Mildly enlarged right atrium. Mild mitral regurgitation. Mild aortic valve regurgitation. Signature   ----------------------------------------------------------------  Electronically signed by Tayler Peraza MD(Interpreting  physician) on 03/08/2021 05:51 PM  ----------------------------------------------------------------  M-Mode/2D Measurements & Calculations   LV Diastolic    LV Systolic Dimension: 3.4  AV Cusp Separation: 1.4 cmLA  Dimension: 3.9  cm                          Dimension: 4.2 cmAO Root  cm              LV Volume Diastolic: 03.9   Dimension: 3.4 cm  LV FS:12.8 %    ml  LV PW           LV Volume Systolic: 49 ml  Diastolic: 1.3  LV EDV/LV EDV Index: 66.2  cm              ml/34 ml/m^2LV ESV/LV ESV   RV Diastolic Dimension: 1.8 cm  LV PW Systolic: Index: 49 UZ/30KV/ m^2  1. 6 cm          EF Calculated: 26 %         Ascending Aorta: 2.5 cm  Septum          LV Mass Index: 94 l/min*m^2 LA volume/Index: 64.1 ml  Diastolic: 1.3  LV Length: 7.9 cm           /48.15ml/m^2  cm                                          RA Area: 17.7 cm^2  Septum          LVOT: 2.1 cm  Systolic: 1.4  cm  CO: 1.48 l/min  CI: 2.08  l/m*m^2  LV Mass: 179.73  g  Doppler Measurements & Calculations   MV Peak E-Wave: 0.87 AV Peak Velocity: 1.32 LVOT Peak Velocity: 0.65 m/s  m/s                  m/s                    LVOT Mean Velocity: 0.42 m/s  MV Peak A-Wave: 0.22 AV Peak Gradient: 6.92 LVOT Peak Gradient: 1.7  m/s                  mmHg                   mmHgLVOT Mean Gradient: 0.8 mass effect or midline shift. No abnormal extra-axial fluid collection. The gray-white differentiation is maintained without evidence of an acute infarct. There is no evidence of hydrocephalus. ORBITS: The visualized portion of the orbits demonstrate no acute abnormality. SINUSES: The visualized paranasal sinuses and mastoid air cells demonstrate no acute abnormality. SOFT TISSUES/SKULL:  No acute abnormality of the visualized skull or soft tissues. No acute intracranial abnormality. There is stable atrophy with small vessel ischemic disease. Sinus disease with soft tissue density in the left maxillary sinus and mucosal thickening in the mastoid air cells. Mra Head Wo Contrast    Result Date: 3/6/2021  EXAMINATION: MRA OF THE HEAD WITHOUT CONTRAST; MRI OF THE BRAIN WITHOUT CONTRAST 3/6/2021 8:35 am TECHNIQUE: MRA of the head was performed utilizing time-of-flight imaging with MIP images. No intravenous contrast was administered.; Multiplanar multisequence MRI of the brain was performed without the administration of intravenous contrast. COMPARISON: None HISTORY: ORDERING SYSTEM PROVIDED HISTORY: left arm weakness, history of stroke TECHNOLOGIST PROVIDED HISTORY: Reason for exam:->left arm weakness, history of stroke What reading provider will be dictating this exam?->[facility] FINDINGS: MRA head: ANTERIOR CIRCULATION: No significant stenosis of the intracranial internal carotid, anterior cerebral, or middle cerebral arteries. POSTERIOR CIRCULATION: There is fetal origin of the bilateral PCAs, normal variants. No significant stenosis of the vertebral, basilar, or posterior cerebral arteries. No evidence of intracranial aneurysm. MRI brain: INTRACRANIAL STRUCTURES/VENTRICLES: There is small acute to subacute infarction in the posterior right frontal lobe. There are small old infarctions in the bilateral frontal parietal lobes, the right occipital lobe, and the bilateral cerebellar hemispheres.  There is moderate parenchymal volume loss. There is T2/FLAIR hyperintensity in the periventricular and subcortical white matter, likely related to moderate chronic microvascular disease. No mass effect or midline shift. No evidence of an acute intracranial hemorrhage. No evidence of hydrocephalus. The sellar/suprasellar regions appear unremarkable. The normal signal voids within the major intracranial vessels appear maintained. ORBITS: The visualized portion of the orbits demonstrate no acute abnormality. SINUSES: There is moderate mucosal thickening in the maxillary sinuses there there is scattered mild mucosal thickening in the remainder of the paranasal sinuses. The bilateral mastoid air cells are well aerated. BONES/SOFT TISSUES: The bone marrow signal intensity appears normal. The soft tissues demonstrate no acute abnormality. Small acute to subacute infarction in the posterior right frontal lobe. Small old infarctions in the bilateral frontal parietal lobes, the right occipital lobe, and the bilateral cerebellar hemispheres. Moderate parenchymal volume loss. Moderate chronic microvascular disease. Unremarkable noncontrast MRA of the head. Results were sent to radiology results communication. Mri Brain Wo Contrast    Result Date: 3/6/2021  EXAMINATION: MRA OF THE HEAD WITHOUT CONTRAST; MRI OF THE BRAIN WITHOUT CONTRAST 3/6/2021 8:35 am TECHNIQUE: MRA of the head was performed utilizing time-of-flight imaging with MIP images.  No intravenous contrast was administered.; Multiplanar multisequence MRI of the brain was performed without the administration of intravenous contrast. COMPARISON: None HISTORY: ORDERING SYSTEM PROVIDED HISTORY: left arm weakness, history of stroke TECHNOLOGIST PROVIDED HISTORY: Reason for exam:->left arm weakness, history of stroke What reading provider will be dictating this exam?->[facility] FINDINGS: MRA head: ANTERIOR CIRCULATION: No significant stenosis of the intracranial internal carotid, anterior cerebral, or middle cerebral arteries. POSTERIOR CIRCULATION: There is fetal origin of the bilateral PCAs, normal variants. No significant stenosis of the vertebral, basilar, or posterior cerebral arteries. No evidence of intracranial aneurysm. MRI brain: INTRACRANIAL STRUCTURES/VENTRICLES: There is small acute to subacute infarction in the posterior right frontal lobe. There are small old infarctions in the bilateral frontal parietal lobes, the right occipital lobe, and the bilateral cerebellar hemispheres. There is moderate parenchymal volume loss. There is T2/FLAIR hyperintensity in the periventricular and subcortical white matter, likely related to moderate chronic microvascular disease. No mass effect or midline shift. No evidence of an acute intracranial hemorrhage. No evidence of hydrocephalus. The sellar/suprasellar regions appear unremarkable. The normal signal voids within the major intracranial vessels appear maintained. ORBITS: The visualized portion of the orbits demonstrate no acute abnormality. SINUSES: There is moderate mucosal thickening in the maxillary sinuses there there is scattered mild mucosal thickening in the remainder of the paranasal sinuses. The bilateral mastoid air cells are well aerated. BONES/SOFT TISSUES: The bone marrow signal intensity appears normal. The soft tissues demonstrate no acute abnormality. Small acute to subacute infarction in the posterior right frontal lobe. Small old infarctions in the bilateral frontal parietal lobes, the right occipital lobe, and the bilateral cerebellar hemispheres. Moderate parenchymal volume loss. Moderate chronic microvascular disease. Unremarkable noncontrast MRA of the head. Results were sent to radiology results communication.      Us Carotid Artery Bilateral    Result Date: 3/5/2021  Patient MRN:  [id number] : 1935 Age: 80 years Gender: Male Order Date:  3/5/2021 2:10 PM EXAM: US CAROTID ARTERY BILATERAL NUMBER OF IMAGES:  48 INDICATION:  R ICA stenosis R ICA stenosis What reading provider will be dictating this exam?->[facility] COMPARISON: None FINDINGS: Velocities are within normal limits ICA\CCA ratios are  within normal limits The right vertebral artery doppler images demonstrate  antegrade flow. The left vertebral artery doppler images demonstrate  antegrade flow. Grey scale images demonstrate mild plaque identified in the right and left carotid arteries. Atherosclerotic disease. No hemodynamically significant stenosis is identified Estimated stenosis by NASCET criteria in the proximal right carotid artery is between 0% and 49%. Estimated stenosis by NASCET criteria in the proximal left carotid artery is between 0% and 49%. Outstanding Order Results     No orders found from 2/3/2021 to 3/5/2021.           Treatments: therapies: PT, OT and lopressor, insulin doses decreased    Discharge Exam:  /82   Pulse 74   Temp 97.8 °F (36.6 °C) (Infrared)   Resp 16   Ht 5' 10\" (1.778 m)   Wt 164 lb 14.5 oz (74.8 kg)   SpO2 100%   BMI 23.66 kg/m²     General Appearance:    Alert, cooperative, no distress, appears stated age   Head:    Normocephalic, without obvious abnormality, atraumatic   Eyes:    PERRL, conjunctiva/corneas clear, EOM's intact, fundi     benign, both eyes        Ears:    Normal TM's and external ear canals, both ears   Nose:   Nares normal, septum midline, mucosa normal, no drainage    or sinus tenderness   Throat:   Lips, mucosa, and tongue normal; teeth and gums normal   Neck:   Supple, symmetrical, trachea midline, no adenopathy;        thyroid:  No enlargement/tenderness/nodules; no carotid    bruit or JVD   Back:     Symmetric, no curvature, ROM normal, no CVA tenderness   Lungs:     Clear to auscultation bilaterally, respirations unlabored   Chest wall:    No tenderness or deformity   Heart:    Regular rate and rhythm, S1 and S2 normal, no murmur, rub   or gallop   Abdomen:     Soft, non-tender, bowel sounds active all four quadrants,     no masses, no organomegaly   Genitalia:    Normal male without lesion, discharge or tenderness   Rectal:    Normal tone, normal prostate, no masses or tenderness;    guaiac negative stool   Extremities:   Extremities normal, atraumatic, no cyanosis or edema   Pulses:   2+ and symmetric all extremities   Skin:   Skin color, texture, turgor normal, no rashes or lesions   Lymph nodes:   Cervical, supraclavicular, and axillary nodes normal   Neurologic:   CNII-XII intact. Normal strength, sensation and reflexes       throughout       Disposition: home    Patient Instructions:      Medication List      CHANGE how you take these medications    * insulin regular 100 UNIT/ML injection  Commonly known as: HumuLIN R  Inject 20 Units into the skin every morning (before breakfast)  Start taking on: March 9, 2021  What changed:   · how much to take  · how to take this  · when to take this  · additional instructions     * insulin regular 100 UNIT/ML injection  Commonly known as: HUMULIN R;NOVOLIN R  Inject 15 Units into the skin Daily with supper  Start taking on: March 9, 2021  What changed: You were already taking a medication with the same name, and this prescription was added. Make sure you understand how and when to take each. metoprolol tartrate 25 MG tablet  Commonly known as: LOPRESSOR  Take 1 tablet by mouth 2 times daily (with meals)  Start taking on: March 9, 2021  What changed:   · medication strength  · how much to take  · how to take this  · when to take this  · additional instructions         * This list has 2 medication(s) that are the same as other medications prescribed for you. Read the directions carefully, and ask your doctor or other care provider to review them with you.             CONTINUE taking these medications    clopidogrel 75 MG tablet  Commonly known as: PLAVIX  Take 1 tablet by mouth daily     Insulin Syringe-Needle U-100 31G X 5/16\" 1 ML patient, discussing with patient and coordinating with nurses and staff    Signed:  Tyree Gutierrez  3/8/2021  6:18 PM

## 2021-03-08 NOTE — CONSULTS
Today's Date: 3/8/2021  Patient Name: Michelle Arrington  Date of admission: 3/4/2021 11:45 AM  Patient's age: 80 y.o., 1935  Admission Dx: LUE weakness [R29.898]  TIA (transient ischemic attack) [G45.9]  Acute CVA (cerebrovascular accident) ([address] [I63.9]    Reason for Consult: Bradycardia  Requesting Physician: Elle Smith MD    CHIEF COMPLAINT: Numbness of the left arm    History Obtained From:  patient    HISTORY OF PRESENT ILLNESS:      The patient is a 80 y.o.  male who is admitted to the hospital for numbness in the left arm. The patient has had symptoms of numbness as well as weakness in the left arm for at least 2 years but symptoms worsened before hospitalization which brought him here. After hospitalization he was noted to be in atrial fibrillation with occasionally a slow ventricular response and therefore I was asked to see him. The patient denies any symptoms related to bradycardia. No syncope or near syncope  He has had no other symptoms i.e. chest discomfort, symptoms of paroxysmal nocturnal dyspnea, orthopnea or leg edema  No complaints of palpitations or sensation of rapid heartbeat    He is known to me and was last seen during his hospitalization in 2018. He has had a history of atrial flutter for which he underwent RF ablation by me more than 10 years ago. Subsequently he developed persistent atrial fibrillation  for which he was placed on dofetilide therapy which had to be discontinued due to worsening renal insufficiency  He has been in permanent atrial fibrillation now for more than 4 years.   As mentioned above he has had no recent cardiac symptoms  Past Medical History:   has a past medical history of A-fib ([address] Chronic kidney disease, Chronic renal impairment, stage 3 (moderate), Colitis, ulcerative chronic ([address] Diabetes mellitus ([address] DM (diabetes mellitus) ([address] Encounter for therapeutic drug monitoring, Hyperlipidemia, Hyperlipidemia associated with type 2 diabetes mellitus ([facility] Hypertension, Long term (current) use of anticoagulants, Prostate enlargement, Stroke (cerebrum) ([facility] and Unspecified cerebral artery occlusion with cerebral infarction. Past Surgical History:   has a past surgical history that includes ECHO Compl W Dop Color Flow (11/26/2013); ECHO Transesophageal (11/27/2013); colostomy (1978); Cystoscopy (more than 5 yrs); TURP (6/16/14); Colonoscopy; Abdomen surgery; and Endoscopy, colon, diagnostic (12/10/15). Home Medications:    Prior to Admission medications    Medication Sig Start Date End Date Taking? Authorizing Provider   insulin regular (HUMULIN R) 100 UNIT/ML injection PATIENT INJECTS 25 UNITS AM , 35 UNITS BEFORE DINNER 6/15/20  Yes Giuliano Armas MD   clopidogrel (PLAVIX) 75 MG tablet Take 1 tablet by mouth daily 6/15/20  Yes Nicholas Beal MD   metoprolol tartrate (LOPRESSOR) 50 MG tablet TAKE 1 TABLET BY MOUTH 2 TIMES DAILY 6/15/20  Yes Nicholas Beal MD   simvastatin (ZOCOR) 20 MG tablet TAKE 1 TABLET BY MOUTH EVERY DAY AT NIGHT 2/10/20  Yes Giuliano Armas MD   Insulin Syringe-Needle U-100 31G X 5/16\" 1 ML MISC 1 each by Other route 2 times daily 6/1/20   Giuliano Armas MD   blood glucose test strips (ASCENSIA AUTODISC VI;ONE TOUCH ULTRA TEST VI) strip 1 each by In Vitro route daily As needed. 6/1/20   Giuliano Armas MD   ONE TOUCH ULTRASOFT LANCETS MISC 1 each by Does not apply route 2 times daily 6/1/20   Giuliano Armas MD   blood glucose test strips (ONE TOUCH ULTRA TEST) strip USE ONE STRIP TO CHECK GLUCOSE TWO TIMES DAILY 5/27/20   Giuliano Armas MD   warfarin (COUMADIN) 2 MG tablet TAKE 1 TABLET BY MOUTH DAILY 4/27/20   Giuliano Armas MD   warfarin (COUMADIN) 4 MG tablet TAKE 1 TABLET BY MOUTH DAILY AS DIRECTED BY PHYSICIAN. 2/10/20   Giuliano Armas MD       Allergies:  Patient has no known allergies. Social History:   reports that he has quit smoking.  He has never used smokeless tobacco. He reports that he does of accessory muscles  · Resp Auscultation: Good respiratory effort. No for increased work of breathing. On auscultation: clear to auscultation bilaterally  Cardiovascular:  · The apical impulse is not displaced  · Heart tones are normal. irregular S1 and S2.  · Jugular venous pulsation Normal  · The carotid upstroke is normal in amplitude and contour without delay or bruit  · Peripheral pulses are symmetrical and full  Abdomen:  · No masses or tenderness  · Bowel sounds present  Extremities:  ·  No Cyanosis or Clubbing  ·  Lower extremity edema: No  · Skin: Warm and dry  Neurological:  · Alert and oriented. · Moves all extremities well  · No abnormalities of mood, affect, memory, mentation, or behavior are noted    DATA:    Diagnostics:    EKG: atrial fibrillation, rate 60-70/min at rest,   ECHO: reviewed. Final report awaited. However on my review  Normal LV function  Severely enlarged atria  No major valvular heart disease  Ejection fraction: 55%  Stress Test: not obtained. Cardiac Angiography: not obtained. MRA/MRI head  Impression   Small acute to subacute infarction in the posterior right frontal lobe.       Small old infarctions in the bilateral frontal parietal lobes, the right   occipital lobe, and the bilateral cerebellar hemispheres.       Moderate parenchymal volume loss.       Moderate chronic microvascular disease.       Unremarkable noncontrast MRA of the head.       Results were sent to radiology results communication. Labs:   CBC: No results for input(s): WBC, HGB, HCT, PLT in the last 72 hours. BMP: No results for input(s): NA, K, CO2, BUN, CREATININE, LABGLOM, GLUCOSE in the last 72 hours. BNP: No results for input(s): BNP in the last 72 hours. PT/INR:   Recent Labs     03/07/21  0740 03/08/21  0659   PROTIME 31.2* 35.5*   INR 2.8 3.2     APTT:No results for input(s): APTT in the last 72 hours.   CARDIAC ENZYMES:No results for input(s): CKTOTAL, CKMB, CKMBINDEX, TROPONINI in the last 72 hours. FASTING LIPID PANEL:  Lab Results   Component Value Date    HDL 55 03/05/2021    LDLCALC 64 03/05/2021    TRIG 111 03/05/2021     LIVER PROFILE:No results for input(s): AST, ALT, LABALBU in the last 72 hours. IMPRESSION:        Patient Active Problem List   Diagnosis    CVA (cerebral vascular accident) (HCC)--multiple events in the past    DM (diabetes mellitus) ([facility]    A-fib ---chronic    [facility]    Essential hypertension    Hyperlipidemia associated with type 2 diabetes mellitus ([facility]    Primary osteoarthritis involving multiple joints    Long term current use of anticoagulant therapy    Chronic renal impairment, stage 3 (moderate)   Patient well known to me for a history of atrial flutter, post ablation for atrial flutter more than 10 years ago  Recurrent atrial fibrillation thereafter  Initially controlled with oral dofetilide therapy  However I discontinued dofetilide due to renal insufficiency  Now chronic/permanent atrial fibrillation    This hospitalization was precipitated by new symptoms of weakness and numbness in his left upper extremity. MRA and MRI of the head noted  Echo results reviewed    Currently permanent atrial fibrillation with a controlled ventricular rate at rest  Heart rate increases with activity. The patient has been on 50 mg of metoprolol tartrate twice a day    RECOMMENDATIONS:    Reduce metoprolol tartrate to 25 mg twice a day with meals  Continue systemic anticoagulation  No indication for pacemaker implant at present  Okay for discharge home to be followed up in my office as outpatient if needed      Discussed with patient and spouse and Nurse.     Min Correia MD,FACC

## 2021-04-08 ENCOUNTER — OFFICE VISIT (OUTPATIENT)
Dept: NEUROLOGY | Age: 86
End: 2021-04-08
Payer: MEDICARE

## 2021-04-08 VITALS
TEMPERATURE: 97.2 F | BODY MASS INDEX: 25.62 KG/M2 | HEART RATE: 94 BPM | OXYGEN SATURATION: 99 % | WEIGHT: 173 LBS | HEIGHT: 69 IN | DIASTOLIC BLOOD PRESSURE: 93 MMHG | SYSTOLIC BLOOD PRESSURE: 154 MMHG

## 2021-04-08 DIAGNOSIS — I48.91 ATRIAL FIBRILLATION, UNSPECIFIED TYPE (HCC): ICD-10-CM

## 2021-04-08 DIAGNOSIS — I63.9 ACUTE CVA (CEREBROVASCULAR ACCIDENT) (HCC): Primary | ICD-10-CM

## 2021-04-08 PROCEDURE — 99214 OFFICE O/P EST MOD 30 MIN: CPT | Performed by: NURSE PRACTITIONER

## 2021-04-08 NOTE — PROGRESS NOTES
[address] [place] [address]. Adalgisa Garay M.D., F.A.C.P. Jeremiah Alvarez, DNP, APRN, CNS  J Luis Wilks. Mina Lu, MSN, APRN-FNP-C  Vikash Elkins MSN, APRN, FNP-C  Chip Dar CUELLO PA-C  [address] MSN, APRN, FNP-C  [address], [address]  Phone: [phone]  Fax: [phone]       Merari Hankins is a 80 y.o. right handed male     Patient follows up today for recent stroke admission. Patient presents with his wife provides little to no additional information. Patient is a good historian. Past Medical History:     Past Medical History:   Diagnosis Date    A-fib [facility])     Chronic kidney disease     Chronic renal impairment, stage 3 (moderate) 10/10/2016    Colitis, ulcerative chronic (HCC)     Diabetes mellitus [facility]     DM (diabetes mellitus) ([facility] 11/25/2013    Encounter for therapeutic drug monitoring 4/5/2016    Hyperlipidemia     Hyperlipidemia associated with type 2 diabetes mellitus ([facility] 1/17/2016    Hypertension     Long term (current) use of anticoagulants 4/5/2016    Prostate enlargement     Stroke (cerebrum) ([facility] 2013    Unspecified cerebral artery occlusion with cerebral infarction 1973       Past Surgical History:       Past Surgical History:   Procedure Laterality Date    ABDOMEN SURGERY      1978    COLONOSCOPY      COLOSTOMY  1978    CYSTOSCOPY  more than 5 yrs    ECHO COMPL W DOP COLOR FLOW  11/26/2013         ECHOCARDIOGRAM TRANSESOPHAGEAL  11/27/2013         ENDOSCOPY, COLON, DIAGNOSTIC  12/10/15    ileoscopy    TURP  6/16/14    cystoscopy retrogrades       Allergies:       Patient has no known allergies. Medications:     Prior to Admission medications    Medication Sig Start Date End Date Taking?  Authorizing Provider   metoprolol tartrate (LOPRESSOR) 25 MG tablet Take 1 tablet by mouth 2 times daily (with meals) 3/9/21  Yes Christine Garcia MD   insulin regular (HUMULIN R) 100 UNIT/ML injection Inject 20 Units into the skin every morning (before breakfast) 3/9/21  Yes Chico Polo MD   insulin regular (HUMULIN R;NOVOLIN R) 100 UNIT/ML injection Inject 15 Units into the skin Daily with supper 3/9/21  Yes Chico Polo MD   clopidogrel (PLAVIX) 75 MG tablet Take 1 tablet by mouth daily 6/15/20  Yes Chico Polo MD   Insulin Syringe-Needle U-100 31G X 5/16\" 1 ML MISC 1 each by Other route 2 times daily 6/1/20  Yes Nicholas Beal MD   blood glucose test strips (ASCENSIA AUTODISC VI;ONE TOUCH ULTRA TEST VI) strip 1 each by In Vitro route daily As needed. 6/1/20  Yes Chico Polo MD   ONE TOUCH ULTRASOFT LANCETS MISC 1 each by Does not apply route 2 times daily 6/1/20  Yes Nicholas Beal MD   blood glucose test strips (ONE TOUCH ULTRA TEST) strip USE ONE STRIP TO CHECK GLUCOSE TWO TIMES DAILY 5/27/20  Yes Chico Polo MD   warfarin (COUMADIN) 2 MG tablet TAKE 1 TABLET BY MOUTH DAILY 4/27/20  Yes Nicholas Beal MD   simvastatin (ZOCOR) 20 MG tablet TAKE 1 TABLET BY MOUTH EVERY DAY AT NIGHT 2/10/20  Yes Nicholas Beal MD   warfarin (COUMADIN) 4 MG tablet TAKE 1 TABLET BY MOUTH DAILY AS DIRECTED BY PHYSICIAN. 2/10/20  Yes Chico Polo MD       Social History:       Patient reports that he has quit smoking; unable to state how long ago. He has never used smokeless tobacco. He reports that he does not drink alcohol or use drugs. Patient is  and has no children; 3 stepchildren.     Review of Systems:     (+) Left hand numbness and weakness with clumsiness  No chest pain or palpitations  No SOB  No vertigo, lightheadedness or loss of consciousness  No falls, tripping or stumbling  No incontinence of bowels or bladder  No itching or bruising appreciated  No numbness, tingling or focal arm/leg weakness    ROS is otherwise negative    Family History:     Family History   Problem Relation Age of Onset    Hypotension Mother     COPD Sister     Coronary Art Dis Sister     Diabetes Other         History of Present Illness:     Patient was admitted for left arm clumsiness on 3/4 which he believes began about a week prior to arrival.  Patient had a history of A. fib and INR was therapeutic on admission. MRI brain demonstrated showing of acute infarcts in his posterior right frontal lobe. Complete echo was completed which was consistent with A. fib and PVCs; no A. fib. Carotid ultrasound showed atherosclerotic disease; 0 to 49% stenosis. Patient was discharged home on 3/8. Since discharge patient has done well and feels his left hand clumsiness has not worsened, weakness has improved and numbness is intermittent. Patient did not require PT/OT on discharge. Patient remains on Coumadin, Plavix and statin. Patient takes Coumadin  2 mg Tuesday and Saturday and 4 mg on M, W, Th, F and  Sunday. Patient follows with cardiology and metoprolol was decreased down to 25 mg. Patient follows up with Dr. Jazmin Adler every month who manages his chronic conditions. Patient's last A1c 7.6, LDL 65. Patient denies any new stroke symptoms, headache, dizziness, speech or swallowing difficulty. Patient also suffers from hypertension, hyperlipidemia, diabetes mellitus, prostate enlargement, chronic renal impairment stage III, and prior stroke in 1973 and 2013. Patient's surgery history includes TURP, colonoscopy, abdominal surgery 1978, colostomy and LARRY. Patient reports he sleeps 4 to 5 hours a night. Patient reports 3 meals a day and 2-3 bottles of water daily. Patient denies caffeine use. Patient reports a mild stress level. Patient goes for walks every other day.     Objective:       BP (!) 154/93 (Site: Right Upper Arm)   Pulse 94   Temp 97.2 °F (36.2 °C)   Ht 5' 9\" (1.753 m)   Wt 173 lb (78.5 kg)   SpO2 99%   BMI 25.55 kg/m²     General appearance: alert, appears stated age, cooperative and in no distress  Head: normocephalic, without obvious abnormality, atraumatic  Eyes: conjunctivae/corneas clear; no drainage  Neck: supple, symmetrical, trachea midline   Back: symmetric, no curvature.  ROM normal.   Lungs: clear to auscultation bilaterally  Heart: regular rate and rhythm  Abdomen: soft, non-tender; bowel sounds normal  Extremities: normal, atraumatic, no cyanosis or edema  Pulses: 2+ and symmetric  Skin:  color, texture, turgor normal--no rashes or lesions      Mental Status: alert and oriented x 4, follows all commands well    Appropriate attention/concentration  Intact fundus of knowledge  Memories intact    Speech: no dysarthria  Language: no aphasias---reading, writing, repetition, and object identification intact    Cranial Nerves:  I: smell intact   II: visual acuity     II: visual fields Full to finger counting   II: pupils PERRL   III,VII: ptosis None   III,IV,VI: extraocular muscles  EOMI without nystagmus   V: mastication Normal   V: facial light touch sensation  Normal   V,VII: corneal reflex     VII: facial muscle function - upper  Normal   VII: facial muscle function - lower Normal   VIII: hearing Normal   IX: soft palate elevation  Normal   IX,X: gag reflex    XI: trapezius strength  5/5   XI: sternocleidomastoid strength 5/5   XI: neck extension strength  5/5   XII: tongue strength  Normal     Motor:  Bilateral strong and   5/5 to right;  LUE and LLE +4/5  Normal bulk and tone  No drift   No abnormal movements    Sensory:  LT and PP normal  Vibration normal    Coordination:   FN, FFM and ELIECER  impaired to left; normal to right  HKS normal to right; impaired to left    Gait:  Normal  Walks well on toes, heels, and tandem    DTR:   Right Brachioradialis reflex 1+  Left Brachioradialis reflex 1+  Right Biceps reflex 1+  Left Biceps reflex 1+  Right Triceps reflex 1+  Left Triceps reflex 1+  Right Quadriceps reflex 0  Left Quadriceps reflex 0  Right Achilles reflex 0  Left Achilles reflex 0    No Neumann's    No other pathological reflexes    Laboratory/Radiology:  ry/Radiology:     CBC:   Lab Results   Component Value Date    WBC 5.8 03/04/2021    RBC 4.49 03/04/2021    HGB 11.6 03/04/2021    HCT 38.6 03/04/2021    MCV 86.0 03/04/2021    MCH 25.8 03/04/2021    MCHC 30.1 03/04/2021    RDW 14.9 03/04/2021     03/04/2021    MPV 10.4 03/04/2021     CMP:    Lab Results   Component Value Date     03/05/2021    K 4.2 03/05/2021     03/05/2021    CO2 24 03/05/2021    BUN 21 03/05/2021    CREATININE 1.4 03/05/2021    GFRAA 58 03/05/2021    LABGLOM 58 03/05/2021    GLUCOSE 156 03/05/2021    PROT 8.3 03/04/2021    LABALBU 3.9 03/04/2021    CALCIUM 9.1 03/05/2021    BILITOT 0.4 03/04/2021    ALKPHOS 122 03/04/2021    AST 29 03/04/2021    ALT 13 03/04/2021     U/A:    Lab Results   Component Value Date    COLORU Yellow 03/04/2021    PROTEINU Negative 03/04/2021    PHUR 6.0 03/04/2021    WBCUA 1-3 03/04/2021    RBCUA NONE 03/04/2021    BACTERIA NONE SEEN 03/04/2021    CLARITYU Clear 03/04/2021    SPECGRAV 1.015 03/04/2021    LEUKOCYTESUR TRACE 03/04/2021    UROBILINOGEN 0.2 03/04/2021    BILIRUBINUR Negative 03/04/2021    BLOODU Negative 03/04/2021    GLUCOSEU Negative 03/04/2021     HgBA1c:    Lab Results   Component Value Date    LABA1C 7.6 03/05/2021     FLP:    Lab Results   Component Value Date    TRIG 111 03/05/2021    HDL 55 03/05/2021    LDLCALC 64 03/05/2021    LABVLDL 22 03/05/2021      CT head without contrast   No acute intracranial abnormality.       There is stable atrophy with small vessel ischemic disease.       Sinus disease with soft tissue density in the left maxillary sinus and   mucosal thickening in the mastoid air cells.      MRI BRAIN WO CONTRAST   Final Result   Small acute to subacute infarction in the posterior right frontal lobe.       Small old infarctions in the bilateral frontal parietal lobes, the right   occipital lobe, and the bilateral cerebellar hemispheres.       Moderate parenchymal volume loss.       Moderate chronic microvascular disease.       Unremarkable noncontrast MRA of the head.       Results were sent to radiology results communication.           MRA HEAD WO CONTRAST   Final Result   Small acute to subacute infarction in the posterior right frontal lobe.       Small old infarctions in the bilateral frontal parietal lobes, the right   occipital lobe, and the bilateral cerebellar hemispheres.       Moderate parenchymal volume loss.       Moderate chronic microvascular disease.       Unremarkable noncontrast MRA of the head.       Results were sent to radiology results communication.         Complete echo 3/8/2021:  Appears to be in atrial fibrillation with frequent PVC or aberrant complexes. Normal left ventricular size and systolic function. Ejection fraction is visually estimated at 60-65%. Indeterminate diastolic function. No regional wall motion abnormalities seen. Mild left ventricular concentric hypertrophy noted. Normal right ventricular size and function. The left atrium is severely dilated. Agitated saline injected for shunt evaluation. No evidence of atrial level shunt. Mildly enlarged right atrium. Mild mitral regurgitation. Mild aortic valve regurgitation. All labs and images were personally reviewed at the time of this visit    Assessment:     Right posterior frontal lobe infarct most likely a watershed infarction              From possible hypoperfusion from known bradycardia              He was therepeutic on warfarin with his PAF   During admission: A1c 7.6, LDL 64              Possibly a Plavix failure however unlikely contributory              Echo with bubble consistent with A. fib with frequent PVCs. He continues to follow with cardiology.     History of A.  Fib              Continue Coumadin--- therapeutic on    Cardiology follow-up per their recommendations    Plan:     Continue Coumadin, Plavix and statin  Stroke factor modifications  Call with any issues  Return office in 3 months        Hawa Arellano, FILIPPO NPEliseC  1:23 PM  4/8/2021    I spent 35 minutes with this patient obtaining the HPI and discussing the exam with greater than 50% of the time providing counseling and education on medications and other treatment plans. All questions were answered prior to leaving my office.

## 2021-07-20 ENCOUNTER — OFFICE VISIT (OUTPATIENT)
Dept: NEUROLOGY | Age: 86
End: 2021-07-20
Payer: MEDICARE

## 2021-07-20 VITALS
OXYGEN SATURATION: 100 % | TEMPERATURE: 97.9 F | SYSTOLIC BLOOD PRESSURE: 146 MMHG | BODY MASS INDEX: 23.11 KG/M2 | DIASTOLIC BLOOD PRESSURE: 88 MMHG | HEART RATE: 67 BPM | WEIGHT: 156 LBS | HEIGHT: 69 IN

## 2021-07-20 DIAGNOSIS — I63.9 ACUTE CVA (CEREBROVASCULAR ACCIDENT) (HCC): ICD-10-CM

## 2021-07-20 DIAGNOSIS — I48.91 ATRIAL FIBRILLATION, UNSPECIFIED TYPE (HCC): Primary | ICD-10-CM

## 2021-07-20 PROCEDURE — 99213 OFFICE O/P EST LOW 20 MIN: CPT | Performed by: NURSE PRACTITIONER

## 2021-07-20 NOTE — PROGRESS NOTES
[address] [facility]. Yared Nolasco M.D., F.A.CAlbinP. Jessica Villalobos, KETURAH, APRN, CNS  Marissa Almanza. Uriel Stevens, MSN, APRN-FNP-C  Edda Patterson MSN, APRN, FNP-C  SERG Akbar MSN, APRN, FNP-C  [address], [facility]  [place], [address] [address]  Phone: [phone]  Fax: [phone]       Mraija Gray is a 80 y.o. right handed male     Patient follows up today for recent stroke admission. Patient presents with his wife provides little to no additional information. Patient is a good historian. Patient was admitted for left arm clumsiness on 3/4 which he believes began about a week prior to arrival.  Patient had a history of A. fib and INR was therapeutic on admission. MRI brain demonstrated showing of acute infarcts in his posterior right frontal lobe. Complete echo was completed which was consistent with A. fib and PVCs; no A. fib. Carotid ultrasound showed atherosclerotic disease; 0 to 49% stenosis. Patient was discharged home on 3/8. Since discharge patient has done well and feels his left hand clumsiness has not worsened, weakness has improved and numbness is intermittent. Patient did not require PT/OT on discharge. Patient remains on Coumadin, Plavix and statin. Patient takes Coumadin  2 mg Tuesday and Saturday and 4 mg on M, W, Th, F and Sunday. Patient follows with cardiology. Patient went to PCP yesterday and had blood work completed; metoprolol was previously decreased down to 25 mg now back up to 50 mg as of yesterday. Patient follows up with Dr. Amanda Walton regularly who manages his chronic conditions. Patient's last A1c 7.6, LDL 65. Patient denies any new stroke symptoms, headache, dizziness, speech or swallowing difficulty. Patient also suffers from hypertension, hyperlipidemia, diabetes mellitus, prostate enlargement, chronic renal impairment stage III, and prior stroke in 1973 and 2013.   Patient's surgery history includes TURP, colonoscopy, abdominal surgery 1978, colostomy and LARRY. Patient reports he sleeps 4 to 5 hours a night. Patient reports 3 meals a day and 2-3 bottles of water daily. Patient denies caffeine use. Patient reports a mild stress level. Patient goes for walks every other day. Patient reports that he has quit smoking; unable to state how long ago. He has never used smokeless tobacco. He reports that he does not drink alcohol or use drugs. Patient is  and has no children; 3 stepchildren. Objective:       BP (!) 146/88 (Site: Right Upper Arm)   Pulse 67   Temp 97.9 °F (36.6 °C)   Ht 5' 9\" (1.753 m)   Wt 156 lb (70.8 kg)   SpO2 100%   BMI 23.04 kg/m²     General appearance: alert, appears stated age, cooperative and in no distress  Head: normocephalic, without obvious abnormality, atraumatic  Eyes: conjunctivae/corneas clear; no drainage  Neck:  supple, symmetrical, trachea midline   Back: symmetric, no curvature.  ROM normal.   Lungs: clear to auscultation bilaterally  Heart: regular rate and rhythm  Abdomen: soft, non-tender; bowel sounds normal  Extremities: normal, atraumatic, no cyanosis or edema  Pulses: 2+ and symmetric  Skin:  color, texture, turgor normal--no rashes or lesions      Mental Status: alert and oriented x 4, follows all commands well    Appropriate attention/concentration  Intact fundus of knowledge  Memories intact    Speech: no dysarthria  Language: no aphasias---reading, writing, repetition, and object identification intact    Cranial Nerves:  I: smell intact   II: visual acuity     II: visual fields Full to finger counting   II: pupils PERRL   III,VII: ptosis None   III,IV,VI: extraocular muscles  EOMI without nystagmus   V: mastication Normal   V: facial light touch sensation  Normal   V,VII: corneal reflex     VII: facial muscle function - upper  Normal   VII: facial muscle function - lower Normal   VIII: hearing Normal   IX: soft palate elevation  Normal   IX,X: gag reflex    XI: trapezius strength  5/5   XI: sternocleidomastoid strength 5/5   XI: neck extension strength  5/5   XII: tongue strength  Normal     Motor:  Bilateral strong and   5/5 to right;  LUE and LLE +4/5  Normal bulk and tone  No drift   No abnormal movements    Sensory:  LT and PP normal  Vibration normal    Coordination:   FN normal to right; mildly dysmetric to left  FFM and ELIECER intact  HKS normal to right; impaired to left    Gait:  Normal  Walks well on toes, heels, and tandem    DTR:   Right Brachioradialis reflex 1+  Left Brachioradialis reflex 1+  Right Biceps reflex 1+  Left Biceps reflex 1+  Right Triceps reflex 1+  Left Triceps reflex 1+  Right Quadriceps reflex 0  Left Quadriceps reflex 0  Right Achilles reflex 0  Left Achilles reflex 0    No Neumann's    No other pathological reflexes    Laboratory/Radiology:  ry/Radiology:     CBC:   Lab Results   Component Value Date    WBC 5.8 03/04/2021    RBC 4.49 03/04/2021    HGB 11.6 03/04/2021    HCT 38.6 03/04/2021    MCV 86.0 03/04/2021    MCH 25.8 03/04/2021    MCHC 30.1 03/04/2021    RDW 14.9 03/04/2021     03/04/2021    MPV 10.4 03/04/2021     CMP:    Lab Results   Component Value Date     03/05/2021    K 4.2 03/05/2021     03/05/2021    CO2 24 03/05/2021    BUN 21 03/05/2021    CREATININE 1.4 03/05/2021    GFRAA 58 03/05/2021    LABGLOM 58 03/05/2021    GLUCOSE 156 03/05/2021    PROT 8.3 03/04/2021    LABALBU 3.9 03/04/2021    CALCIUM 9.1 03/05/2021    BILITOT 0.4 03/04/2021    ALKPHOS 122 03/04/2021    AST 29 03/04/2021    ALT 13 03/04/2021     U/A:    Lab Results   Component Value Date    COLORU Yellow 03/04/2021    PROTEINU Negative 03/04/2021    PHUR 6.0 03/04/2021    WBCUA 1-3 03/04/2021    RBCUA NONE 03/04/2021    BACTERIA NONE SEEN 03/04/2021    CLARITYU Clear 03/04/2021    SPECGRAV 1.015 03/04/2021    LEUKOCYTESUR TRACE 03/04/2021    UROBILINOGEN 0.2 03/04/2021    BILIRUBINUR Negative 03/04/2021    BLOODU Negative 03/04/2021    GLUCOSEU Negative 03/04/2021     HgBA1c:    Lab Results   Component Value Date    LABA1C 7.6 03/05/2021     FLP:    Lab Results   Component Value Date    TRIG 111 03/05/2021    HDL 55 03/05/2021    LDLCALC 64 03/05/2021    LABVLDL 22 03/05/2021      CT head without contrast   No acute intracranial abnormality.       There is stable atrophy with small vessel ischemic disease.       Sinus disease with soft tissue density in the left maxillary sinus and   mucosal thickening in the mastoid air cells.      MRI BRAIN WO CONTRAST   Final Result   Small acute to subacute infarction in the posterior right frontal lobe.       Small old infarctions in the bilateral frontal parietal lobes, the right   occipital lobe, and the bilateral cerebellar hemispheres.       Moderate parenchymal volume loss.       Moderate chronic microvascular disease.       Unremarkable noncontrast MRA of the head.       Results were sent to radiology results communication.           MRA HEAD WO CONTRAST   Final Result   Small acute to subacute infarction in the posterior right frontal lobe.       Small old infarctions in the bilateral frontal parietal lobes, the right   occipital lobe, and the bilateral cerebellar hemispheres.       Moderate parenchymal volume loss.       Moderate chronic microvascular disease.       Unremarkable noncontrast MRA of the head.       Results were sent to radiology results communication.         Complete echo 3/8/2021:  Appears to be in atrial fibrillation with frequent PVC or aberrant complexes. Normal left ventricular size and systolic function. Ejection fraction is visually estimated at 60-65%. Indeterminate diastolic function. No regional wall motion abnormalities seen. Mild left ventricular concentric hypertrophy noted. Normal right ventricular size and function. The left atrium is severely dilated. Agitated saline injected for shunt evaluation. No evidence of atrial level shunt.   Mildly enlarged right atrium. Mild mitral regurgitation. Mild aortic valve regurgitation. All labs and images were personally reviewed at the time of this visit    Assessment:     Acute/subacute right posterior frontal lobe infarct most likely a watershed infarction              From possible hypoperfusion from known bradycardia   MRA head was unrevealing and carotid ultrasound showed 0 to 49% stenosis bilaterally              He was therepeutic on warfarin for his PAF   During admission: A1c 7.6, LDL 64              Possibly a Plavix failure however unlikely contributory              Echo with bubble consistent with A. fib with frequent PVCs. He continues to follow with cardiology.     History of A. Fib              Continue Coumadin---  currently managed by patient's PCP   Cardiology follow-up per their recommendations    Plan:     Obtain most recent labs from patient's PCP  Continue Coumadin, Plavix and statin  Stroke factor modifications  Call with any issues  Return office in 6 months   If stable then can see as needed      FILIPPO Chen - RONNY-LAZARA  12:37 PM  7/20/2021    I spent 25 minutes with this patient obtaining the HPI and discussing the exam with greater than 50% of the time providing counseling and education on medications and other treatment plans. All questions were answered prior to leaving my office.

## 2021-07-20 NOTE — PATIENT INSTRUCTIONS
and trans fat. · Decide with your doctor whether you will also take medicines to help lower your risk. For example, you and your doctor may decide you will take a medicine that prevents blood clots. What are the symptoms of a stroke? Symptoms of a stroke happen quickly. A stroke may cause:  · Sudden numbness, tingling, weakness, or loss of movement in your face, arm, or leg, especially on only one side of your body. · Sudden vision changes. · Sudden trouble speaking. · Sudden confusion or trouble understanding simple statements. · Sudden problems with walking or balance. · A sudden, severe headache that is different from past headaches. FAST is a simple way to remember the main symptoms of stroke. Recognizing these symptoms helps you know when to call for medical help. FAST stands for:  · F ace drooping. · A rm weakness. · S peech difficulty. · T 911  savanna to call . It's important to call for medical help if you have stroke symptoms. Quick treatment may save your life. And it may reduce the damage in your brain so that you have fewer problems after the stroke. Follow-up care is a key part of your treatment and safety. Be sure to make and go to all appointments, and call your doctor if you are having problems. It's also a good idea to know your test results and keep a list of the medicines you take. Where can you learn more? Go to [email or web address].[email or web address].[email or web address] org and sign in to your [facility] account. Enter G757 in the [facility] box to learn more about \"Learning About How to Prevent a Stroke. \"     If you do not have an account, please click on the \"Sign Up Now\" link. Current as of: November 4, 2020               Content Version: 12.9  © 4824-2747 Healthwise, Incorporated. Care instructions adapted under license by [facility] ([facility]).  If you have questions about a medical condition or this instruction, always ask your healthcare professional. [address] any warranty or liability for your use of this information.

## 2021-10-05 ENCOUNTER — HOSPITAL ENCOUNTER (OUTPATIENT)
Dept: GENERAL RADIOLOGY | Age: 86
Discharge: HOME | End: 2021-10-07
Payer: MEDICARE

## 2021-10-05 ENCOUNTER — HOSPITAL ENCOUNTER (OUTPATIENT)
Age: 86
Discharge: HOME | End: 2021-10-07
Payer: MEDICARE

## 2021-10-05 DIAGNOSIS — R06.02 SHORTNESS OF BREATH: ICD-10-CM

## 2021-10-05 PROCEDURE — 71046 X-RAY EXAM CHEST 2 VIEWS: CPT

## 2021-11-07 ENCOUNTER — APPOINTMENT (OUTPATIENT)
Dept: GENERAL RADIOLOGY | Age: 86
End: 2021-11-07
Payer: MEDICARE

## 2021-11-07 ENCOUNTER — HOSPITAL ENCOUNTER (OUTPATIENT)
Age: 86
Setting detail: OBSERVATION
Discharge: HOME | End: 2021-11-09
Attending: STUDENT IN AN ORGANIZED HEALTH CARE EDUCATION/TRAINING PROGRAM | Admitting: INTERNAL MEDICINE
Payer: MEDICARE

## 2021-11-07 DIAGNOSIS — R07.9 CHEST PAIN, UNSPECIFIED TYPE: Primary | ICD-10-CM

## 2021-11-07 DIAGNOSIS — I48.91 ATRIAL FIBRILLATION, UNSPECIFIED TYPE (HCC): ICD-10-CM

## 2021-11-07 LAB
ANION GAP SERPL CALCULATED.3IONS-SCNC: 13 MMOL/L (ref 7–16)
BUN BLDV-MCNC: 31 MG/DL (ref 6–23)
CALCIUM SERPL-MCNC: 9.2 MG/DL (ref 8.6–10.2)
CHLORIDE BLD-SCNC: 110 MMOL/L (ref 98–107)
CO2: 17 MMOL/L (ref 22–29)
CREAT SERPL-MCNC: 1.9 MG/DL (ref 0.7–1.2)
GFR AFRICAN AMERICAN: 41
GFR NON-AFRICAN AMERICAN: 41 ML/MIN/1.73
GLUCOSE BLD-MCNC: 118 MG/DL (ref 74–99)
HCT VFR BLD CALC: 33.7 % (ref 37–54)
HEMOGLOBIN: 10.7 G/DL (ref 12.5–16.5)
INR BLD: 2.8
MCH RBC QN AUTO: 26.9 PG (ref 26–35)
MCHC RBC AUTO-ENTMCNC: 31.8 % (ref 32–34.5)
MCV RBC AUTO: 84.7 FL (ref 80–99.9)
METER GLUCOSE: 172 MG/DL (ref 74–99)
PDW BLD-RTO: 15.7 FL (ref 11.5–15)
PLATELET # BLD: 97 E9/L (ref 130–450)
PLATELET CONFIRMATION: NORMAL
PMV BLD AUTO: 11.9 FL (ref 7–12)
POTASSIUM SERPL-SCNC: 4.4 MMOL/L (ref 3.5–5)
PROTHROMBIN TIME: 30.8 SEC (ref 9.3–12.4)
RBC # BLD: 3.98 E12/L (ref 3.8–5.8)
SODIUM BLD-SCNC: 140 MMOL/L (ref 132–146)
TROPONIN, HIGH SENSITIVITY: 23 NG/L (ref 0–11)
TROPONIN, HIGH SENSITIVITY: 24 NG/L (ref 0–11)
WBC # BLD: 5.8 E9/L (ref 4.5–11.5)

## 2021-11-07 PROCEDURE — 84484 ASSAY OF TROPONIN QUANT: CPT

## 2021-11-07 PROCEDURE — 93005 ELECTROCARDIOGRAM TRACING: CPT | Performed by: PHYSICIAN ASSISTANT

## 2021-11-07 PROCEDURE — 82962 GLUCOSE BLOOD TEST: CPT

## 2021-11-07 PROCEDURE — 36415 COLL VENOUS BLD VENIPUNCTURE: CPT

## 2021-11-07 PROCEDURE — 99283 EMERGENCY DEPT VISIT LOW MDM: CPT

## 2021-11-07 PROCEDURE — 85610 PROTHROMBIN TIME: CPT

## 2021-11-07 PROCEDURE — 80048 BASIC METABOLIC PNL TOTAL CA: CPT

## 2021-11-07 PROCEDURE — 85027 COMPLETE CBC AUTOMATED: CPT

## 2021-11-07 PROCEDURE — 2580000003 HC RX 258: Performed by: INTERNAL MEDICINE

## 2021-11-07 PROCEDURE — G0378 HOSPITAL OBSERVATION PER HR: HCPCS

## 2021-11-07 PROCEDURE — 6370000000 HC RX 637 (ALT 250 FOR IP): Performed by: INTERNAL MEDICINE

## 2021-11-07 PROCEDURE — 71046 X-RAY EXAM CHEST 2 VIEWS: CPT

## 2021-11-07 RX ORDER — WARFARIN SODIUM 4 MG/1
4 TABLET ORAL
Status: DISCONTINUED | OUTPATIENT
Start: 2021-11-09 | End: 2021-11-08

## 2021-11-07 RX ORDER — SODIUM CHLORIDE 9 MG/ML
25 INJECTION, SOLUTION INTRAVENOUS PRN
Status: DISCONTINUED | OUTPATIENT
Start: 2021-11-07 | End: 2021-11-09 | Stop reason: HOSPADM

## 2021-11-07 RX ORDER — DEXTROSE MONOHYDRATE 50 MG/ML
100 INJECTION, SOLUTION INTRAVENOUS PRN
Status: DISCONTINUED | OUTPATIENT
Start: 2021-11-07 | End: 2021-11-09 | Stop reason: HOSPADM

## 2021-11-07 RX ORDER — ACETAMINOPHEN 325 MG/1
650 TABLET ORAL EVERY 6 HOURS PRN
Status: DISCONTINUED | OUTPATIENT
Start: 2021-11-07 | End: 2021-11-09 | Stop reason: HOSPADM

## 2021-11-07 RX ORDER — ATORVASTATIN CALCIUM 10 MG/1
10 TABLET, FILM COATED ORAL DAILY
Status: DISCONTINUED | OUTPATIENT
Start: 2021-11-07 | End: 2021-11-09 | Stop reason: HOSPADM

## 2021-11-07 RX ORDER — POLYETHYLENE GLYCOL 3350 17 G/17G
17 POWDER, FOR SOLUTION ORAL DAILY PRN
Status: DISCONTINUED | OUTPATIENT
Start: 2021-11-07 | End: 2021-11-09 | Stop reason: HOSPADM

## 2021-11-07 RX ORDER — SODIUM CHLORIDE 0.9 % (FLUSH) 0.9 %
5-40 SYRINGE (ML) INJECTION EVERY 12 HOURS SCHEDULED
Status: DISCONTINUED | OUTPATIENT
Start: 2021-11-07 | End: 2021-11-09 | Stop reason: HOSPADM

## 2021-11-07 RX ORDER — WARFARIN SODIUM 2 MG/1
2 TABLET ORAL
Status: DISCONTINUED | OUTPATIENT
Start: 2021-11-08 | End: 2021-11-08

## 2021-11-07 RX ORDER — NICOTINE POLACRILEX 4 MG
15 LOZENGE BUCCAL PRN
Status: DISCONTINUED | OUTPATIENT
Start: 2021-11-07 | End: 2021-11-09 | Stop reason: HOSPADM

## 2021-11-07 RX ORDER — ACETAMINOPHEN 650 MG/1
650 SUPPOSITORY RECTAL EVERY 6 HOURS PRN
Status: DISCONTINUED | OUTPATIENT
Start: 2021-11-07 | End: 2021-11-09 | Stop reason: HOSPADM

## 2021-11-07 RX ORDER — ONDANSETRON 4 MG/1
4 TABLET, ORALLY DISINTEGRATING ORAL EVERY 8 HOURS PRN
Status: DISCONTINUED | OUTPATIENT
Start: 2021-11-07 | End: 2021-11-09 | Stop reason: HOSPADM

## 2021-11-07 RX ORDER — SODIUM CHLORIDE 0.9 % (FLUSH) 0.9 %
5-40 SYRINGE (ML) INJECTION PRN
Status: DISCONTINUED | OUTPATIENT
Start: 2021-11-07 | End: 2021-11-09 | Stop reason: HOSPADM

## 2021-11-07 RX ORDER — CLOPIDOGREL BISULFATE 75 MG/1
75 TABLET ORAL DAILY
Status: DISCONTINUED | OUTPATIENT
Start: 2021-11-07 | End: 2021-11-09 | Stop reason: HOSPADM

## 2021-11-07 RX ORDER — DEXTROSE MONOHYDRATE 25 G/50ML
12.5 INJECTION, SOLUTION INTRAVENOUS PRN
Status: DISCONTINUED | OUTPATIENT
Start: 2021-11-07 | End: 2021-11-09 | Stop reason: HOSPADM

## 2021-11-07 RX ORDER — ONDANSETRON 2 MG/ML
4 INJECTION INTRAMUSCULAR; INTRAVENOUS EVERY 6 HOURS PRN
Status: DISCONTINUED | OUTPATIENT
Start: 2021-11-07 | End: 2021-11-09 | Stop reason: HOSPADM

## 2021-11-07 RX ADMIN — Medication 10 ML: at 20:36

## 2021-11-07 RX ADMIN — INSULIN LISPRO 1 UNITS: 100 INJECTION, SOLUTION INTRAVENOUS; SUBCUTANEOUS at 20:37

## 2021-11-07 RX ADMIN — METOPROLOL TARTRATE 12.5 MG: 25 TABLET, FILM COATED ORAL at 20:36

## 2021-11-07 ASSESSMENT — PAIN DESCRIPTION - ORIENTATION: ORIENTATION: RIGHT;LEFT;UPPER

## 2021-11-07 ASSESSMENT — PAIN DESCRIPTION - LOCATION: LOCATION: CHEST

## 2021-11-07 ASSESSMENT — PAIN SCALES - GENERAL
PAINLEVEL_OUTOF10: 0
PAINLEVEL_OUTOF10: 9

## 2021-11-07 ASSESSMENT — PAIN DESCRIPTION - DESCRIPTORS: DESCRIPTORS: DISCOMFORT

## 2021-11-07 ASSESSMENT — PAIN DESCRIPTION - PAIN TYPE: TYPE: ACUTE PAIN

## 2021-11-07 ASSESSMENT — PAIN DESCRIPTION - FREQUENCY: FREQUENCY: INTERMITTENT

## 2021-11-07 ASSESSMENT — PAIN DESCRIPTION - ONSET: ONSET: ON-GOING

## 2021-11-07 NOTE — ED NOTES
Checked in Waiting room, bathroom, and outside, but patient is unable to be found will check once more in awhile.      Xuan Rothman RN  11/07/21 3954

## 2021-11-07 NOTE — ED NOTES
Called patient multiple times to room. Unable to locate.  Will check again     Mesfin Calvo  11/07/21 8158

## 2021-11-07 NOTE — ED NOTES
Consultation - [facility]  Silvestre Amos 59 y o  female MRN: [id number]  Unit/Bed#: [facility] 68 2 -01 Encounter: [id number]        ASSESSMENT/PLAN:   71-year-old female with acute onset of abdominal pain, nausea and vomiting 2 days ago found to have a ill-defined pancreatic lesion on CT    1  Abdominal pain  2  Nausea and vomiting   3  Abnormal CT findings    -she had acute onset of nausea, vomiting and abdominal pain 2 days ago  She denies any sick contacts or new foods  She continues to have pain and vomiting today although she notes the pain medication is helping    -her lipase was normal   -she was found to have an ill-defined 2 7 x 2 2 cm lesion within the head of the pancreas on CT abdomen with contrast   -continue supportive care with IVF and pain control as well as antiemetics as per the primary care team   -Recommend MRI with contrast to further evaluate the pancreatic lesion seen on CT    -Her symptoms may be secondary to the CT findings, or may be an acute gastroenteritis  Another possibility is gastritis or PUD, though she does not have any signs of overt GI bleeding or melena  Consider an EGD if her symptoms do not resolve and MRI does not reveal a cause    -Ok to continue clear liquid diet as tolerated  Inpatient consult to gastroenterology  Consult performed by: Florinda Dia  Consult ordered by: Slick Mcintyre          Reason for Consult / Principal Problem: Abdominal pain    HPI: Silvestre Amos is a 59y o  year old female presents to the hospital for acute onset of abdominal pain, nausea vomiting 2 days ago  She states that prior to this she was in her usual state of health and was not having any symptoms  She states the pain is primarily across her upper abdomen, worse in the left upper quadrant and radiates around to her back  The she states she has not had any p o  Intake for 2 days  She has continued to have vomiting today    She denies any hematemesis Department of Emergency Medicine  FIRST PROVIDER TRIAGE NOTE             Independent MLP         11/7/21  12:41 PM EST    Date of Encounter: 11/7/21   MRN: [id number]      HPI: Nan Ball is a 80 y.o. male who presents to the ED for Shortness of Breath (weak, nausea, diarrhea, chestpains by shoulder)  . Sob, cp, diarrhea    ROS: Negative for fever, vomiting or urinary complaints. PE: Gen Appearance/Constitutional: alert  CV: regular rate     Initial Plan of Care: All treatment areas with department are currently occupied. Plan to order/Initiate the following while awaiting opening in ED: labs, EKG and imaging studies. Labs:  No results found for this visit on 11/07/21. Imaging: All Radiology results interpreted by Radiologist unless otherwise noted.   XR CHEST (2 VW)    (Results Pending)     ED Course    Medications - No data to display     -------------------------  Patient brought to treatment area for further evaluation  Electronically signed by PARUL David   DD: 11/7/21       Silvestre Turcios [place]  11/07/21 4968 or coffee-ground emesis  She denies any other GI complaints including fevers, chills, diarrhea, change in bowel habits, constipation, difficulty swallowing, early satiety, change in appetite, unintended weight loss or jaundice  She has been on a keto diet with the intent to lose weight  She has a history of back pain and migraines, she does take NSAIDs as needed but feels this is often on a daily basis  Review of Systems: as per HPI  Review of Systems   Constitutional: Negative for activity change, appetite change, chills, fatigue, fever and unexpected weight change  HENT: Negative for mouth sores, sore throat and trouble swallowing  Respiratory: Negative for shortness of breath  Cardiovascular: Negative for chest pain  Gastrointestinal: Positive for abdominal pain, nausea and vomiting  Negative for abdominal distention, blood in stool, constipation and diarrhea  Skin: Negative for color change, pallor, rash and wound  Neurological: Negative for tremors and syncope  All other systems reviewed and are negative        Historical Information   Past Medical History:   Diagnosis Date    Arthritis     GERD (gastroesophageal reflux disease)     Headache, migraine     Low back pain      Past Surgical History:   Procedure Laterality Date     SECTION      EYE SURGERY       Social History   History   Alcohol Use No     History   Drug Use No     History   Smoking Status    Never Smoker   Smokeless Tobacco    Never Used     Family History   Problem Relation Age of Onset    Hypertension Mother     Diabetes Father        Meds/Allergies     Prescriptions Prior to Admission   Medication    ibuprofen (MOTRIN) 600 mg tablet    naproxen sodium (ANAPROX) 550 mg tablet    rizatriptan (MAXALT) 10 MG tablet     Current Facility-Administered Medications   Medication Dose Route Frequency    acetaminophen (TYLENOL) tablet 650 mg  650 mg Oral Q4H PRN    enoxaparin (LOVENOX) subcutaneous injection 40 mg  40 mg Subcutaneous Daily    metoclopramide (REGLAN) injection 10 mg  10 mg Intravenous Q6H PRN    morphine injection 2 mg  2 mg Intravenous Q4H PRN    pantoprazole (PROTONIX) injection 40 mg  40 mg Intravenous Q12H [address]    sodium chloride 0 9 % infusion  125 mL/hr Intravenous Continuous       Allergies   Allergen Reactions    Erythromycin        Objective     Blood pressure 134/76, pulse 80, temperature 98 3 °F (36 8 °C), temperature source Tympanic, resp  rate 16, weight 76 7 kg (169 lb), SpO2 94 %  Intake/Output Summary (Last 24 hours) at 11/26/18 1527  Last data filed at 11/26/18 1057   Gross per 24 hour   Intake             1000 ml   Output                0 ml   Net             1000 ml       PHYSICAL EXAM     Physical Exam   Constitutional: She is oriented to person, place, and time  She appears well-developed and well-nourished  No distress  Appears tired, uncomfortable   HENT:   Head: Normocephalic and atraumatic  Eyes: Right eye exhibits no discharge  Left eye exhibits no discharge  No scleral icterus  Neck: Neck supple  No tracheal deviation present  Cardiovascular: Normal rate, regular rhythm, normal heart sounds and intact distal pulses  Exam reveals no gallop and no friction rub  No murmur heard  Pulmonary/Chest: Effort normal and breath sounds normal  No respiratory distress  She has no wheezes  She has no rales  She exhibits no tenderness  Abdominal: Soft  Bowel sounds are normal  She exhibits no distension and no mass  There is tenderness (LUQ only)  There is no rebound and no guarding  Neurological: She is alert and oriented to person, place, and time  Skin: Skin is warm and dry  Psychiatric: She has a normal mood and affect         Lab Results:   CBC: Lab Results   Component Value Date    WBC 13 65 (H) 11/26/2018    HGB 13 2 11/26/2018    HCT 41 6 11/26/2018    MCV 94 11/26/2018     11/26/2018    MCH 29 8 11/26/2018    MCHC 31 7 11/26/2018    RDW 13 5 11/26/2018    MPV 9 1 11/26/2018    NRBC 0 11/26/2018   ,   CMP: Lab Results   Component Value Date    K 3 6 11/26/2018     11/26/2018    CO2 26 11/26/2018    BUN 15 11/26/2018    CREATININE 0 69 11/26/2018    CALCIUM 9 1 11/26/2018    AST 27 11/26/2018    ALT 64 11/26/2018    ALKPHOS 72 11/26/2018    EGFR 92 11/26/2018   ,   Lipase:   Lab Results   Component Value Date    LIPASE 339 11/26/2018   ,  PT/INR:   Lab Results   Component Value Date    INR 1 00 11/26/2018   ,   Imaging Studies: I have personally reviewed pertinent reports  CT ABDOMEN AND PELVIS WITH IV CONTRAST     INDICATION:   abd pain      COMPARISON:  None      TECHNIQUE:  CT examination of the abdomen and pelvis was performed  Axial, sagittal, and coronal 2D reformatted images were created from the source data and submitted for interpretation      Radiation dose length product (DLP) for this visit:  473 mGy-cm   This examination, like all CT scans performed in the [facility], was performed utilizing techniques to minimize radiation dose exposure, including the use of iterative   reconstruction and automated exposure control      IV Contrast:  100 mL of iohexol (OMNIPAQUE)  Enteric Contrast:  Enteric contrast was not administered      FINDINGS:     ABDOMEN     LOWER CHEST:  No clinically significant abnormality identified in the visualized lower chest      LIVER/BILIARY TREE:  Unremarkable      GALLBLADDER:  No calcified gallstones  No pericholecystic inflammatory change      SPLEEN:  Unremarkable      PANCREAS:  There is suggestion of an ill-defined 2 7 x 2 2 cm lesion within the head of the pancreas  Given normal pancreatic enzymes, consider MRI follow-up to exclude a mass lesion      ADRENAL GLANDS:  Unremarkable      KIDNEYS/URETERS:  Unremarkable   No hydronephrosis      STOMACH AND BOWEL:  Unremarkable      APPENDIX:  No findings to suggest appendicitis      ABDOMINOPELVIC CAVITY:  No ascites or free intraperitoneal air  No lymphadenopathy      VESSELS:  Unremarkable for patient's age      PELVIS     REPRODUCTIVE ORGANS:  Leiomyomatous uterus is identified      URINARY BLADDER:  Unremarkable      ABDOMINAL WALL/INGUINAL REGIONS:  Unremarkable      OSSEOUS STRUCTURES:  No acute fracture or destructive osseous lesion      IMPRESSION:     Questionable ill-defined lesion within the pancreatic head  Given presence of normal pancreatic enzymes, consider MRI follow-up to exclude a mass lesion              Patient was seen and examined by Dr aNtaliia Lorenz  All ashraf medical decisions were made by Dr Nataliia Lorenz  Thank you for allowing us to participate in the care of this present patient  We will follow-up with you closely

## 2021-11-07 NOTE — ED NOTES
Bed:  HA  Expected date:   Expected time:   Means of arrival:   Comments:  triage     Jeaneth Lugo RN  11/07/21 8319

## 2021-11-07 NOTE — ED PROVIDER NOTES
HPI  59-year-old male, history of A. fib anticoagulated with warfarin, CKD, diabetes, HTN, HLD. Presented to the ER on foot for feelings of chest discomfort located in the upper left chest/shoulder. Patient states these episodes have been present for several months now. He does not recall any possible precipitating factors prior to these episodes beginning. He notes the discomfort begins in his left upper chest and radiates to bilateral shoulders. The discomfort lasts for approximately 2 to 3 minutes. It is induced with exertion and worsened by exertion. Rest relieves the pain. Denies any numbness, tingling of the extremities, denies any shortness of breath, lightheadedness, dizziness, changes in vision.  --------------------------------------------- PAST HISTORY ---------------------------------------------  Past Medical History:  has a past medical history of A-fib ([address] Chronic kidney disease, Chronic renal impairment, stage 3 (moderate) ([address] Colitis, ulcerative chronic ([address] Diabetes mellitus ([address] DM (diabetes mellitus) ([address] Encounter for therapeutic drug monitoring, Hyperlipidemia, Hyperlipidemia associated with type 2 diabetes mellitus ([address] Hypertension, Long term (current) use of anticoagulants, Prostate enlargement, Stroke (cerebrum) ([address] and Unspecified cerebral artery occlusion with cerebral infarction. Past Surgical History:  has a past surgical history that includes ECHO Compl W Dop Color Flow (11/26/2013); ECHO Transesophageal (11/27/2013); colostomy (1978); Cystoscopy (more than 5 yrs); TURP (6/16/14); Colonoscopy; Abdomen surgery; and Endoscopy, colon, diagnostic (12/10/15). Social History:  reports that he has quit smoking. He has never used smokeless tobacco. He reports that he does not drink alcohol and does not use drugs.     Family History: family history includes COPD in his sister; Coronary Art Dis in his sister; Diabetes in an other family member; Hypotension in his mother. The patients home medications have been reviewed. Allergies: Patient has no known allergies. -------------------------------------------------- RESULTS -------------------------------------------------    Lab  Results for orders placed or performed during the hospital encounter of 21/82/01   Basic metabolic panel   Result Value Ref Range    Sodium 140 132 - 146 mmol/L    Potassium 4.4 3.5 - 5.0 mmol/L    Chloride 110 (H) 98 - 107 mmol/L    CO2 17 (L) 22 - 29 mmol/L    Anion Gap 13 7 - 16 mmol/L    Glucose 118 (H) 74 - 99 mg/dL    BUN 31 (H) 6 - 23 mg/dL    CREATININE 1.9 (H) 0.7 - 1.2 mg/dL    GFR Non-African American 41 >=60 mL/min/1.73    GFR African American 41     Calcium 9.2 8.6 - 10.2 mg/dL   CBC   Result Value Ref Range    WBC 5.8 4.5 - 11.5 E9/L    RBC 3.98 3.80 - 5.80 E12/L    Hemoglobin 10.7 (L) 12.5 - 16.5 g/dL    Hematocrit 33.7 (L) 37.0 - 54.0 %    MCV 84.7 80.0 - 99.9 fL    MCH 26.9 26.0 - 35.0 pg    MCHC 31.8 (L) 32.0 - 34.5 %    RDW 15.7 (H) 11.5 - 15.0 fL    Platelets 97 (L) 226 - 450 E9/L    MPV 11.9 7.0 - 12.0 fL   Troponin I   Result Value Ref Range    Troponin, High Sensitivity 24 (H) 0 - 11 ng/L   Platelet Confirmation   Result Value Ref Range    Platelet Confirmation CONFIRMED    Troponin   Result Value Ref Range    Troponin, High Sensitivity 23 (H) 0 - 11 ng/L   PROTIME-INR   Result Value Ref Range    Protime 30.8 (H) 9.3 - 12.4 sec    INR 2.8        Radiology  XR CHEST (2 VW)   Final Result   No acute process. EKG:  This EKG is signed and interpreted by emergency room physician. Rate: 80  Rhythm: Atrial fibrillation  Interpretation: no acute changes  Comparison: changes compared to previous EKG      ------------------------- NURSING NOTES AND VITALS REVIEWED ---------------------------  Date / Time Roomed:  11/7/2021  2:37 PM  ED Bed Assignment:  Margaret Michel    The nursing notes within the ED encounter and vital signs as below have been reviewed.    Patient Vitals for the past 24 hrs:   BP Temp Temp src Pulse Resp SpO2 Weight   11/07/21 1240 -- 98.1 °F (36.7 °C) Oral -- -- -- --   11/07/21 1238 103/69 -- -- 70 18 100 % 150 lb (68 kg)   11/07/21 1220 -- -- -- 84 -- 95 % --       Physical Exam  Constitutional:       Appearance: He is well-developed. HENT:      Head: Normocephalic. Right Ear: External ear normal.      Left Ear: External ear normal.   Eyes:      Conjunctiva/sclera: Conjunctivae normal.      Pupils: Pupils are equal, round, and reactive to light. Cardiovascular:      Rate and Rhythm: Normal rate and regular rhythm. Heart sounds: Normal heart sounds. No murmur heard. Pulmonary:      Effort: Pulmonary effort is normal. No respiratory distress. Breath sounds: Normal breath sounds. No wheezing or rales. Abdominal:      General: There is no distension. Palpations: Abdomen is soft. Tenderness: There is no abdominal tenderness. Musculoskeletal:         General: Normal range of motion. Cervical back: Normal range of motion. Skin:     General: Skin is warm. Findings: No erythema. Neurological:      Mental Status: He is alert and oriented to person, place, and time. Psychiatric:         Behavior: Behavior normal.       ------------------------------------------ PROGRESS NOTES ------------------------------------------  MDM  63-year-old male with several cardiac risk factors. Presenting for typical anginal-like symptoms including chest discomfort, feelings of fluttering is in the left upper chest radiating to the bilateral shoulders. Symptoms beginning with exertion and lasting approximately 2 to 3 minutes and disappearing with rest.  Troponin cycled and returned 24, 23. Based on history and exam findings, symptoms was likely consistent with cardiac ischemia. Plan to admit patient to telemetry for further management evaluation. Re-evaluation(s):  Time: 1400.   Patients symptoms are improving  Repeat physical examination is not changed    Time: 2671. Patients symptoms show no change  Repeat physical examination is not changed    I have spoken with the patient and discussed todays results, in addition to providing specific details for the plan of care and counseling regarding the diagnosis and prognosis. Their questions are answered at this time and they are agreeable with the plan.      --------------------------------- ADDITIONAL PROVIDER NOTES ---------------------------------  Consultations:  Spoke with Dr. Diana Mcfarland,  They will admit this patient. This patient's ED course included: a personal history and physicial examination, re-evaluation prior to disposition and multiple bedside re-evaluations    This patient has remained hemodynamically stable and improved during their ED course. Clinical Impression  1. Chest pain, unspecified type    2. Atrial fibrillation, unspecified type ([facility] [address]          Disposition  Patient's disposition: Admit to telemetry  Patient's condition is good.         Trung Arriola MD  Resident  11/07/21 6439

## 2021-11-07 NOTE — ED PROVIDER NOTES
Department of Emergency Medicine   ED  Provider Note  Admit Date/RoomTime: 11/7/2021  2:37 PM  ED Room: [place] [facility]          History of Present Illness:  11/7/21, Time: 3:50 PM EST  Chief Complaint   Patient presents with    Shortness of Breath     weak, nausea, diarrhea, chestpains by shoulder     Yessy Watts is a 80 y.o. male presenting to the ED for Shortness of breath. Patient endorses shortness of breath with exertion over the past several weeks. Nothing makes it better or worse besides rest.  He also endorses a \"funny feeling\" across his chest.  Is largely unable to describe this but states his pain is otherwise from his left shoulder his right shoulder. Is only with exertion. Denies any associated vomiting with it but is nauseous. Resolves within about a minute of resting. Of note, the patient states that will precipitate him to come in is that he became quite short of breath this morning when just getting ready and getting out of bed. Denies any history of stents in the past.  He is on Coumadin for atrial fibrillation does not have any other risk factors for superimposed PE. Has been otherwise compliant with his Coumadin. Symptoms are moderate in intensity. Review of Systems:  Review of systems obtained and negative unless stated otherwise above in the HPI.    --------------------------------------------- PAST HISTORY ---------------------------------------------  Past Medical History:  has a past medical history of A-fib ([facility] Chronic kidney disease, Chronic renal impairment, stage 3 (moderate) ([facility] [address] Colitis, ulcerative chronic ([facility] [address] Diabetes mellitus ([facility] [address] DM (diabetes mellitus) ([facility] [address] Encounter for therapeutic drug monitoring, Hyperlipidemia, Hyperlipidemia associated with type 2 diabetes mellitus ([facility] [address] Hypertension, Long term (current) use of anticoagulants, Prostate enlargement, Stroke (cerebrum) ([facility] [address] and Unspecified cerebral artery occlusion with cerebral infarction.     Past Surgical History:  has a past surgical history that includes ECHO Compl W Dop Color Flow (11/26/2013); ECHO Transesophageal (11/27/2013); colostomy (1978); Cystoscopy (more than 5 yrs); TURP (6/16/14); Colonoscopy; Abdomen surgery; and Endoscopy, colon, diagnostic (12/10/15). Social History:  reports that he has quit smoking. He has never used smokeless tobacco. He reports that he does not drink alcohol and does not use drugs. Family History: family history includes COPD in his sister; Coronary Art Dis in his sister; Diabetes in an other family member; Hypotension in his mother. . Unless otherwise noted, family history is non contributory    The patients home medications have been reviewed. Allergies: Patient has no known allergies. I have reviewed the past medical history, past surgical history, social history, and family history    ---------------------------------------------------PHYSICAL EXAM--------------------------------------    Constitutional: Appears in no distress  Head: Normocephalic, atraumatic  Eyes: Non-icteric slcera, no conjunctival injection  ENT: Moist mucous membranes,  Neck: Trachea midline, no JVD  Respiratory: Nonlabored respirations. Lungs clear to auscultation bilaterally, no wheezes, rales, or rhonchi. Cardiovascular: Irregularly irregular rhythm, no S3 or S4 no rub or murmur  Gastrointestinal: Abdomen Soft, Non tender, Non distended. No rebound tenderness, guarding, or rigidity. Extremities: No lower extremity edema  Genitourinary: No CVA tenderness, no suprapubic tenderness  Musculoskeletal: Moves all extremities, no deformity  Skin: Pink, warm, dry without rash. Neurologic: Alert, symmetric facies, no aphasia    -------------------------------------------------- RESULTS -------------------------------------------------  I have personally reviewed all laboratory and imaging results for this patient. Results are listed below.      LABS: (Lab results interpreted by me)  Results for orders placed or performed during the hospital encounter of 21/28/67   Basic metabolic panel   Result Value Ref Range    Sodium 140 132 - 146 mmol/L    Potassium 4.4 3.5 - 5.0 mmol/L    Chloride 110 (H) 98 - 107 mmol/L    CO2 17 (L) 22 - 29 mmol/L    Anion Gap 13 7 - 16 mmol/L    Glucose 118 (H) 74 - 99 mg/dL    BUN 31 (H) 6 - 23 mg/dL    CREATININE 1.9 (H) 0.7 - 1.2 mg/dL    GFR Non-African American 41 >=60 mL/min/1.73    GFR African American 41     Calcium 9.2 8.6 - 10.2 mg/dL   CBC   Result Value Ref Range    WBC 5.8 4.5 - 11.5 E9/L    RBC 3.98 3.80 - 5.80 E12/L    Hemoglobin 10.7 (L) 12.5 - 16.5 g/dL    Hematocrit 33.7 (L) 37.0 - 54.0 %    MCV 84.7 80.0 - 99.9 fL    MCH 26.9 26.0 - 35.0 pg    MCHC 31.8 (L) 32.0 - 34.5 %    RDW 15.7 (H) 11.5 - 15.0 fL    Platelets 97 (L) 607 - 450 E9/L    MPV 11.9 7.0 - 12.0 fL   Troponin I   Result Value Ref Range    Troponin, High Sensitivity 24 (H) 0 - 11 ng/L   Platelet Confirmation   Result Value Ref Range    Platelet Confirmation CONFIRMED    Troponin   Result Value Ref Range    Troponin, High Sensitivity 23 (H) 0 - 11 ng/L   PROTIME-INR   Result Value Ref Range    Protime 30.8 (H) 9.3 - 12.4 sec    INR 2.8    ,       RADIOLOGY:  Interpreted by Radiologist unless otherwise specified  XR CHEST (2 VW)   Final Result   No acute process. ------------------------- NURSING NOTES AND VITALS REVIEWED ---------------------------   The nursing notes within the ED encounter and vital signs as below have been reviewed by myself  /69   Pulse 70   Temp 98.1 °F (36.7 °C) (Oral)   Resp 18   Wt 150 lb (68 kg)   SpO2 100%   BMI 22.15 kg/m²     Oxygen Saturation Interpretation: Normal    The patients available past medical records and past encounters were reviewed. ------------------------------ ED COURSE/MEDICAL DECISION MAKING----------------------  Medications - No data to display     Re-Evaluations:        This patient's ED course included:a personal history and physicial examination, re-evaluation prior to disposition and cardiac monitoring    This patient has remained hemodynamically stable during their ED course. Consultations:  Internal medicine    Medical Decision Making:   Patient presents emergency department with shortness of breath. Vital signs are reviewed and interpreted by myself as within normal acceptable limits. History and physical examination findings consistent more than likely with possible ACS or atypical chest pain. I am concerned given the patient symptoms exertion improved with rest he would benefit from a stress test as an inpatient. Date of EKG: November 7, 2021  Time: 12:47 PM    Rhythm: Irregularly irregular  Rate: 80 beats per  Axis: Normal  Conduction: Normal  ST Segments: Normal  T Waves: Normal    Clinical Impression: Atrial fibrillation normal rate no ischemia or infarct  Comparison to prior EKG: None    Labs: Reviewed remarkable for elevated BUN/creatinine at the patient's normal baseline. Troponin is elevated at 20 4 repeat troponin is 23 CBC is reviewed and unremarkable for any acute concerning abnormality but chronic baseline normocytic anemia on chart review. INR is therapeutic. Will admit the patient for stress testing as he is having symptomatology with exertion. Counseling: The emergency provider has spoken with the patient and discussed todays results, in addition to providing specific details for the plan of care and counseling regarding the diagnosis and prognosis. Questions are answered at this time and they are agreeable with the plan.       --------------------------------- IMPRESSION AND DISPOSITION ---------------------------------    IMPRESSION  1. Chest pain, unspecified type    2.  Atrial fibrillation, unspecified type ([facility] [address]        DISPOSITION  Disposition: Admit to telemetry  Patient condition is stable    Dr. Rob WOODRUFF Medicine, am the primary provider of record    Estephania Carcamo DO  Emergency Medicine    NOTE: This report was transcribed using voice recognition software.  Every effort was made to ensure accuracy; however, inadvertent computerized transcription errors may be present         David Jason DO  11/07/21 2199

## 2021-11-08 ENCOUNTER — APPOINTMENT (OUTPATIENT)
Dept: NON INVASIVE DIAGNOSTICS | Age: 86
End: 2021-11-08
Payer: MEDICARE

## 2021-11-08 ENCOUNTER — APPOINTMENT (OUTPATIENT)
Dept: NUCLEAR MEDICINE | Age: 86
End: 2021-11-08
Payer: MEDICARE

## 2021-11-08 LAB
ANION GAP SERPL CALCULATED.3IONS-SCNC: 12 MMOL/L (ref 7–16)
BUN BLDV-MCNC: 29 MG/DL (ref 6–23)
CALCIUM SERPL-MCNC: 9.1 MG/DL (ref 8.6–10.2)
CHLORIDE BLD-SCNC: 114 MMOL/L (ref 98–107)
CO2: 14 MMOL/L (ref 22–29)
CREAT SERPL-MCNC: 1.7 MG/DL (ref 0.7–1.2)
GFR AFRICAN AMERICAN: 46
GFR NON-AFRICAN AMERICAN: 46 ML/MIN/1.73
GLUCOSE BLD-MCNC: 93 MG/DL (ref 74–99)
INR BLD: 2.9
LV EF: 75 %
LVEF MODALITY: NORMAL
METER GLUCOSE: 102 MG/DL (ref 74–99)
METER GLUCOSE: 185 MG/DL (ref 74–99)
POTASSIUM SERPL-SCNC: 4.3 MMOL/L (ref 3.5–5)
PROTHROMBIN TIME: 31.9 SEC (ref 9.3–12.4)
SODIUM BLD-SCNC: 140 MMOL/L (ref 132–146)

## 2021-11-08 PROCEDURE — APPSS60 APP SPLIT SHARED TIME 46-60 MINUTES: Performed by: PHYSICIAN ASSISTANT

## 2021-11-08 PROCEDURE — 85610 PROTHROMBIN TIME: CPT

## 2021-11-08 PROCEDURE — 80048 BASIC METABOLIC PNL TOTAL CA: CPT

## 2021-11-08 PROCEDURE — 6370000000 HC RX 637 (ALT 250 FOR IP): Performed by: PHYSICIAN ASSISTANT

## 2021-11-08 PROCEDURE — A9500 TC99M SESTAMIBI: HCPCS | Performed by: RADIOLOGY

## 2021-11-08 PROCEDURE — 99204 OFFICE O/P NEW MOD 45 MIN: CPT | Performed by: INTERNAL MEDICINE

## 2021-11-08 PROCEDURE — 6360000002 HC RX W HCPCS: Performed by: PHYSICIAN ASSISTANT

## 2021-11-08 PROCEDURE — 2580000003 HC RX 258: Performed by: INTERNAL MEDICINE

## 2021-11-08 PROCEDURE — G0378 HOSPITAL OBSERVATION PER HR: HCPCS

## 2021-11-08 PROCEDURE — 78452 HT MUSCLE IMAGE SPECT MULT: CPT | Performed by: INTERNAL MEDICINE

## 2021-11-08 PROCEDURE — 93018 CV STRESS TEST I&R ONLY: CPT | Performed by: INTERNAL MEDICINE

## 2021-11-08 PROCEDURE — 3430000000 HC RX DIAGNOSTIC RADIOPHARMACEUTICAL: Performed by: RADIOLOGY

## 2021-11-08 PROCEDURE — 82962 GLUCOSE BLOOD TEST: CPT

## 2021-11-08 PROCEDURE — 6370000000 HC RX 637 (ALT 250 FOR IP): Performed by: INTERNAL MEDICINE

## 2021-11-08 PROCEDURE — 78452 HT MUSCLE IMAGE SPECT MULT: CPT

## 2021-11-08 PROCEDURE — 93017 CV STRESS TEST TRACING ONLY: CPT

## 2021-11-08 PROCEDURE — 36415 COLL VENOUS BLD VENIPUNCTURE: CPT

## 2021-11-08 PROCEDURE — 93016 CV STRESS TEST SUPVJ ONLY: CPT | Performed by: INTERNAL MEDICINE

## 2021-11-08 RX ORDER — WARFARIN SODIUM 2 MG/1
2 TABLET ORAL
Status: COMPLETED | OUTPATIENT
Start: 2021-11-08 | End: 2021-11-08

## 2021-11-08 RX ORDER — FAMOTIDINE 20 MG/1
20 TABLET, FILM COATED ORAL 2 TIMES DAILY
COMMUNITY
End: 2022-06-14

## 2021-11-08 RX ORDER — ISOSORBIDE MONONITRATE 30 MG/1
30 TABLET, EXTENDED RELEASE ORAL DAILY
Status: DISCONTINUED | OUTPATIENT
Start: 2021-11-08 | End: 2021-11-09 | Stop reason: HOSPADM

## 2021-11-08 RX ORDER — WARFARIN SODIUM 2 MG/1
2 TABLET ORAL
Status: DISCONTINUED | OUTPATIENT
Start: 2021-11-10 | End: 2021-11-09 | Stop reason: HOSPADM

## 2021-11-08 RX ORDER — WARFARIN SODIUM 4 MG/1
4 TABLET ORAL
Status: DISCONTINUED | OUTPATIENT
Start: 2021-11-09 | End: 2021-11-09 | Stop reason: HOSPADM

## 2021-11-08 RX ADMIN — INSULIN LISPRO 1 UNITS: 100 INJECTION, SOLUTION INTRAVENOUS; SUBCUTANEOUS at 21:00

## 2021-11-08 RX ADMIN — Medication 35 MILLICURIE: at 15:28

## 2021-11-08 RX ADMIN — Medication 10 ML: at 08:04

## 2021-11-08 RX ADMIN — Medication 10.4 MILLICURIE: at 14:07

## 2021-11-08 RX ADMIN — Medication 10 ML: at 21:00

## 2021-11-08 RX ADMIN — ATORVASTATIN CALCIUM 10 MG: 10 TABLET, FILM COATED ORAL at 16:38

## 2021-11-08 RX ADMIN — METOPROLOL TARTRATE 12.5 MG: 25 TABLET, FILM COATED ORAL at 16:37

## 2021-11-08 RX ADMIN — ISOSORBIDE MONONITRATE 30 MG: 30 TABLET ORAL at 16:37

## 2021-11-08 RX ADMIN — METOPROLOL TARTRATE 12.5 MG: 25 TABLET, FILM COATED ORAL at 08:03

## 2021-11-08 RX ADMIN — WARFARIN SODIUM 2 MG: 2 TABLET ORAL at 21:43

## 2021-11-08 RX ADMIN — CLOPIDOGREL BISULFATE 75 MG: 75 TABLET ORAL at 08:01

## 2021-11-08 RX ADMIN — REGADENOSON 0.4 MG: 0.08 INJECTION, SOLUTION INTRAVENOUS at 14:59

## 2021-11-08 ASSESSMENT — PAIN SCALES - GENERAL
PAINLEVEL_OUTOF10: 0

## 2021-11-08 NOTE — H&P
Department of Internal Medicine  Dr. Poncho Casillas History and Physical      CHIEF COMPLAINT:  CP    Reason for Admission:  CP    HISTORY OF PRESENT ILLNESS:      The patient is a 80 y.o. male with  who presents with the above problems. He has a history of permanent atrial fibrillation. He is a diabetic. He tells me that for the past couple weeks he has had some chest discomfort, worse when he exerts himself. He also gets short of breath when this discomfort comes on. But it only lasts a couple of minutes he does not get nauseated. It does not radiate but it goes across the whole chest.  He does not get sweaty.     Past Medical History:        Diagnosis Date    A-fib [facility])     Chronic kidney disease     Chronic renal impairment, stage 3 (moderate) ([facility] [address] 10/10/2016    Colitis, ulcerative chronic ([address]     Diabetes mellitus ([address]     DM (diabetes mellitus) ([facility] [address] 11/25/2013    Encounter for therapeutic drug monitoring 4/5/2016    Hyperlipidemia     Hyperlipidemia associated with type 2 diabetes mellitus ([address] 1/17/2016    Hypertension     Long term (current) use of anticoagulants 4/5/2016    Prostate enlargement     Stroke (cerebrum) ([address] 2013    Unspecified cerebral artery occlusion with cerebral infarction 1973     Past Surgical History:        Procedure Laterality Date    ABDOMEN SURGERY      1978    COLONOSCOPY      COLOSTOMY  1978    CYSTOSCOPY  more than 5 yrs    ECHO COMPL W DOP COLOR FLOW  11/26/2013         ECHOCARDIOGRAM TRANSESOPHAGEAL  11/27/2013         ENDOSCOPY, COLON, DIAGNOSTIC  12/10/15    ileoscopy    TURP  6/16/14    cystoscopy retrogrades       Medications Prior to Admission:    Medications Prior to Admission: insulin 70-30 (HUMULIN 70/30) (70-30) 100 UNIT per ML injection vial, Inject into the skin 2 times daily 20 units in the AM and 15 units in the evening  famotidine (PEPCID) 20 MG tablet, Take 20 mg by mouth 2 times daily  metoprolol tartrate (LOPRESSOR) 25 MG tablet, Take 1 tablet by mouth 2 times daily (with meals) (Patient taking differently: Take 12.5 mg by mouth 2 times daily (with meals) )  [DISCONTINUED] insulin regular (HUMULIN R) 100 UNIT/ML injection, Inject 20 Units into the skin every morning (before breakfast)  [DISCONTINUED] insulin regular (HUMULIN R;NOVOLIN R) 100 UNIT/ML injection, Inject 15 Units into the skin Daily with supper  clopidogrel (PLAVIX) 75 MG tablet, Take 1 tablet by mouth daily  Insulin Syringe-Needle U-100 31G X 5/16\" 1 ML MISC, 1 each by Other route 2 times daily  blood glucose test strips (ASCENSIA AUTODISC VI;ONE TOUCH ULTRA TEST VI) strip, 1 each by In Vitro route daily As needed. ONE TOUCH ULTRASOFT LANCETS MISC, 1 each by Does not apply route 2 times daily  blood glucose test strips (ONE TOUCH ULTRA TEST) strip, USE ONE STRIP TO CHECK GLUCOSE TWO TIMES DAILY  warfarin (COUMADIN) 2 MG tablet, TAKE 1 TABLET BY MOUTH DAILY  simvastatin (ZOCOR) 20 MG tablet, TAKE 1 TABLET BY MOUTH EVERY DAY AT NIGHT  warfarin (COUMADIN) 4 MG tablet, TAKE 1 TABLET BY MOUTH DAILY AS DIRECTED BY PHYSICIAN. Allergies:  Patient has no known allergies. Social History:   TOBACCO:   reports that he has quit smoking.  He has never used smokeless tobacco.    Family History:       Problem Relation Age of Onset    Hypotension Mother     COPD Sister     Coronary Art Dis Sister     Diabetes Other      REVIEW OF SYSTEMS:  CONSTITUTIONAL:  positive for  fatigue, malaise and anorexia  EYES:  negative  HEENT:  negative  RESPIRATORY:  positive for  dyspnea and chest pain  CARDIOVASCULAR:  positive for  chest pain, dyspnea  GASTROINTESTINAL:  negative  GENITOURINARY:  negative  INTEGUMENT/BREAST:  negative  HEMATOLOGIC/LYMPHATIC:  negative  ALLERGIC/IMMUNOLOGIC:  negative  ENDOCRINE:  negative  PHYSICAL EXAM:    Vitals:  /67   Pulse 78   Temp 97 °F (36.1 °C) (Temporal)   Resp 16   Ht 5' 9\" (1.753 m)   Wt 150 lb (68 kg)   SpO2 99%   BMI 22.15 kg/m² CONSTITUTIONAL:  awake, alert, cooperative, no apparent distress, and appears stated age  EYES:  Lids and lashes normal, pupils equal, round and reactive to light, extra ocular muscles intact, sclera clear, conjunctiva normal  ENT:  Normocephalic, without obvious abnormality, atraumatic, sinuses nontender on palpation, external ears without lesions, oral pharynx with moist mucus membranes, tonsils without erythema or exudates, gums normal and good dentition.   NECK:  Supple, symmetrical, trachea midline, no adenopathy, thyroid symmetric, not enlarged and no tenderness, skin normal  HEMATOLOGIC/LYMPHATICS:  no cervical lymphadenopathy  BACK:  Symmetric, no curvature, spinous processes are non-tender on palpation, paraspinous muscles are non-tender on palpation, no costal vertebral tenderness  LUNGS:  No increased work of breathing, good air exchange, clear to auscultation bilaterally, no crackles or wheezing  CARDIOVASCULAR:  Irregularly irregular  Neuro: No focal motor deficits  Skin: No bruising  EXT: No clubbing edema or cyanosis  DATA:  CBC:   Lab Results   Component Value Date    WBC 5.8 11/07/2021    RBC 3.98 11/07/2021    HGB 10.7 11/07/2021    HCT 33.7 11/07/2021    MCV 84.7 11/07/2021    MCH 26.9 11/07/2021    MCHC 31.8 11/07/2021    RDW 15.7 11/07/2021    PLT 97 11/07/2021    MPV 11.9 11/07/2021     CMP:    Lab Results   Component Value Date     11/08/2021    K 4.3 11/08/2021    K 4.2 03/05/2021     11/08/2021    CO2 14 11/08/2021    BUN 29 11/08/2021    CREATININE 1.7 11/08/2021    GFRAA 46 11/08/2021    LABGLOM 46 11/08/2021    GLUCOSE 93 11/08/2021    PROT 8.3 03/04/2021    LABALBU 3.9 03/04/2021    CALCIUM 9.1 11/08/2021    BILITOT 0.4 03/04/2021    ALKPHOS 122 03/04/2021    AST 29 03/04/2021    ALT 13 03/04/2021     LDH:  No results found for: LDH  Warfarin PT/INR:  No components found for: PTPATWAR, PTINRWAR  Last 3 Troponin:    Lab Results   Component Value Date    TROPONINI <0.01 03/04/2021    TROPONINI <0.01 07/28/2018    TROPONINI <0.01 07/24/2017     ABG:  No results found for: PH, PCO2, PO2, HCO3, BE, THGB, TCO2, O2SAT  HgBA1c:    Lab Results   Component Value Date    LABA1C 7.6 03/05/2021     TSH:  No results found for: TSH  VITAMIN B12: No components found for: B12  FOLATE:  No results found for: FOLATE  IRON:  No results found for: IRON  Iron Saturation:  No components found for: PERCENTFE  TIBC:  No results found for: TIBC  FERRITIN:  No results found for: FERRITIN  RPR:  No results found for: RPR  LIZETTE:  No results found for: ANATITER, LIZETTE  AMYLASE:  No results found for: AMYLASE  LIPASE:    Lab Results   Component Value Date    LIPASE 40 03/04/2021       IMAGING    XR CHEST (2 VW)    Result Date: 11/7/2021  EXAMINATION: TWO XRAY VIEWS OF THE CHEST 11/7/2021 2:24 pm COMPARISON: None. HISTORY: ORDERING SYSTEM PROVIDED HISTORY: cough TECHNOLOGIST PROVIDED HISTORY: Reason for exam:->cough What reading provider will be dictating this exam?->CRC FINDINGS: The lungs are without acute focal process. There is no effusion or pneumothorax. The cardiomediastinal silhouette is without acute process. The osseous structures are without acute process. No acute process.      ASSESSMENT AND PLAN:    Chest pain  Permanent atrial fibrillation  DM-IR with good A1c control  CRI  Elevated troponin    I can be reached though [employer] or [facility] at [phone]

## 2021-11-08 NOTE — PATIENT CARE CONFERENCE
P Quality Flow/Interdisciplinary Rounds Progress Note        Quality Flow Rounds held on November 8, 2021    Disciplines Attending:  Bedside Nurse, ,  and Nursing Unit 3 Lisa Suh was admitted on 11/7/2021  2:37 PM    Anticipated Discharge Date:  Expected Discharge Date: 11/09/21    Disposition:    Clark Score:  Clark Scale Score: 23    Readmission Risk              Risk of Unplanned Readmission:  0           Discussed patient goal for the day, patient clinical progression, and barriers to discharge.   The following Goal(s) of the Day/Commitment(s) have been identified:  Labs - Report Results      Lesly Goff RN  November 8, 2021

## 2021-11-08 NOTE — PROCEDURES
Lexiscan stress report    Indication: Chest pain. They were infused with Lexiscan 0.4 mg bolus followed by Cardiolite bolus. Resting ECG: Atrial fibrillation, 75 bpm.  Normal axis. Nonspecific ST-T waves. Stress ECG: No ischemic changes. Assessment:  Nondiagnostic stress ECG for ischemia. Perfusion imaging pending.

## 2021-11-08 NOTE — CONSULTS
Inpatient [address] [place] [address] Cardiology Consultation      Reason for Consult: Chest pain    Consulting Physician: Dr. Janina Boykin    Requesting Physician: Dr. Mic Blanca    Date of Consultation: 11/8/2021    HISTORY OF PRESENT ILLNESS:   Patient is an 80year old AAM who is not previously known to [address] [place] [address] Cardiology. Patient is being seen in consultation this hospital admission by Dr. Janina Boykin for evaluation and recommendations regarding chest discomfort. Patient has a known past medical history of hypertension, insulin requiring diabetes mellitus type II, hyperlipidemia, permanent atrial fibrillation on chronic Coumadin therapy status-post RF ablation in 2000, former tobacco abuse, former alcohol abuse, mild valvular heart disease, right MCA stroke in 2013 and TIA in 2017 with no residual deficits, severe gastritis, anemia of chronic disease, benign prostatic hypertrophy s/p TURP and chronic kidney disease. Patient presented to [facility] on November 7, 2021 with complaints of chest discomfort and shortness of breath. Patient states since spring 2021, he has been noticing episodes of chest discomfort and shortness of breath with exertion. These episodes occur at least once per day. He has become concerned as a result, presenting to the ED yesterday for further evaluation. He states yesterday's episode occurred around 10:30 AM while he was walking in a store. He noticed sudden onset shortness of breath, as well as chest discomfort. The chest discomfort was across his entire anterior chest, and he described it as a pressure-like sensation. He additionally noted of nausea, and mild dizziness with the episode. The episode lasted a few minutes in total duration, and resolved after he sat down to rest.  He denies any complaints of emesis, diaphoresis, palpitations, near-syncope or syncope. He denies paroxysmal nocturnal dyspnea, orthopnea or peripheral edema.   He denies any recurrence of the chest discomfort or shortness of breath as noted above. He is currently asymptomatic from a cardiac perspective. He does admit to the chest discomfort and shortness of breath being related to exertion. He admits to medication compliance. Family and social history as noted below. Labs and diagnostic testing as noted below. Please note: past medical records were reviewed per electronic medical record (EMR) - see detailed reports under Past Medical/ Surgical History. PAST MEDICAL HISTORY:    1. April 22, 2002. A 2-D echocardiogram (as interpreted by Dr. Alyse Alford) was a technically adequate study. There was a normal size left ventricle with an overall left ventricular ejection fraction of 50%. No segmental wall motion abnormalities were noted. No valvular disease was noted, except for aortic sclerosis in the presence of trace aortic regurgitation. Trace mitral and tricuspid regurgitation were noted. Mild enlargement of the left atrium and right atrium were noted. No pericardial effusion  2. December 4, 2007. A 2-D echocardiogram (as interpreted by Dr. Alyse Alford). The left ventricular systolic function was low normal.  There was moderate mitral regurgitation. There was mild tricuspid regurgitation. There was moderate aortic valve sclerosis. No hemodynamically significant valvular aortic stenosis. There was mild aortic regurgitation. The pericardium appeared normal.  3. Hypertension. 4. Diabetes mellitus type II, insulin requiring. 5. Hyperlipidemia, on statin therapy. 6. Permanent atrial fibrillation. 7. March 28, 2000. A 4-catheter electrophysiology study (Dr. Alyse Alford). Normal cardiac intervals. Normal arterial ventricular and ventriculoatrial conduction. No evidence of accessory pathway,concealed or otherwise. There was easily inducible arterial ventricular jerri re-entrant tachycardia. Atrial flutter was also induced with typical morphology.   Negative flutter waves in II, III, and aVF with upright flutter waves in V1.  8. February 28, regurgitation. Mild aortic valve regurgitation. PAST SURGICAL HISTORY:    Past Surgical History:   Procedure Laterality Date    ABDOMEN SURGERY      1978    COLONOSCOPY      COLOSTOMY  1978    CYSTOSCOPY  more than 5 yrs    ECHO COMPL W DOP COLOR FLOW  11/26/2013         ECHOCARDIOGRAM TRANSESOPHAGEAL  11/27/2013         ENDOSCOPY, COLON, DIAGNOSTIC  12/10/15    ileoscopy    TURP  6/16/14    cystoscopy retrogrades       HOME MEDICATIONS:  Prior to Admission medications    Medication Sig Start Date End Date Taking? Authorizing Provider   insulin 70-30 (HUMULIN 70/30) (70-30) 100 UNIT per ML injection vial Inject into the skin 2 times daily 20 units in the AM and 15 units in the evening   Yes Historical Provider, MD   famotidine (PEPCID) 20 MG tablet Take 20 mg by mouth 2 times daily   Yes Historical Provider, MD   metoprolol tartrate (LOPRESSOR) 25 MG tablet Take 1 tablet by mouth 2 times daily (with meals)  Patient taking differently: Take 12.5 mg by mouth 2 times daily (with meals)  3/9/21   Lanette Kennedy MD   clopidogrel (PLAVIX) 75 MG tablet Take 1 tablet by mouth daily 6/15/20   Mason Parish MD   Insulin Syringe-Needle U-100 31G X 5/16\" 1 ML MISC 1 each by Other route 2 times daily 6/1/20   Mason Parish MD   blood glucose test strips (ASCENSIA AUTODISC VI;ONE TOUCH ULTRA TEST VI) strip 1 each by In Vitro route daily As needed. 6/1/20   Mason Parish MD   ONE TOUCH ULTRASOFT LANCETS MISC 1 each by Does not apply route 2 times daily 6/1/20   Mason Parish MD   blood glucose test strips (ONE TOUCH ULTRA TEST) strip USE ONE STRIP TO CHECK GLUCOSE TWO TIMES DAILY 5/27/20   Mason Parish MD   warfarin (COUMADIN) 2 MG tablet TAKE 1 TABLET BY MOUTH DAILY 4/27/20   Mason Parish MD   simvastatin (ZOCOR) 20 MG tablet TAKE 1 TABLET BY MOUTH EVERY DAY AT NIGHT 2/10/20   Mason Parish MD   warfarin (COUMADIN) 4 MG tablet TAKE 1 TABLET BY MOUTH DAILY AS DIRECTED BY PHYSICIAN.  2/10/20   Mason Parish MD CURRENT MEDICATIONS:      Current Facility-Administered Medications:     clopidogrel (PLAVIX) tablet 75 mg, 75 mg, Oral, Daily, Felipa Rainey MD    metoprolol tartrate (LOPRESSOR) tablet 12.5 mg, 12.5 mg, Oral, BID ZAK, Nicholas Beal MD, 12.5 mg at 11/07/21 2036    atorvastatin (LIPITOR) tablet 10 mg, 10 mg, Oral, Daily, Felipa Rainey MD    warfarin (COUMADIN) tablet 2 mg, 2 mg, Oral, Once per day on Sun Mon Wed Fri Sat, Felipa Rainey MD    [START ON 11/9/2021] warfarin (COUMADIN) tablet 4 mg, 4 mg, Oral, Once per day on Tue Thu, Nicholas Beal MD    sodium chloride flush 0.9 % injection 5-40 mL, 5-40 mL, IntraVENous, 2 times per day, Felipa Rainey MD, 10 mL at 11/07/21 2036    sodium chloride flush 0.9 % injection 5-40 mL, 5-40 mL, IntraVENous, PRN, [facility] CEL[facility] Beal MD    0.9 % sodium chloride infusion, 25 mL, IntraVENous, PRN, Felipa Rainey MD    ondansetron (ZOFRAN-ODT) disintegrating tablet 4 mg, 4 mg, Oral, Q8H PRN **OR** ondansetron (ZOFRAN) injection 4 mg, 4 mg, IntraVENous, Q6H PRN, Felipa Rainey MD    polyethylene glycol (GLYCOLAX) packet 17 g, 17 g, Oral, Daily PRN, Felipa Rainey MD    acetaminophen (TYLENOL) tablet 650 mg, 650 mg, Oral, Q6H PRN **OR** acetaminophen (TYLENOL) suppository 650 mg, 650 mg, Rectal, Q6H PRN, Felipa Rainey MD    insulin lispro (HUMALOG) injection vial 0-6 Units, 0-6 Units, SubCUTAneous, TID ZAK, Nicholas Beal MD    insulin lispro (HUMALOG) injection vial 0-3 Units, 0-3 Units, SubCUTAneous, Nightly, Felipa Rainey MD, 1 Units at 11/07/21 2037    glucose (GLUTOSE) 40 % oral gel 15 g, 15 g, Oral, PRN, Vasquez Beal MD    dextrose 50 % IV solution, 12.5 g, IntraVENous, PRN, Felipa Rainey MD    glucagon (rDNA) injection 1 mg, 1 mg, IntraMUSCular, PRN, Vasquez Beal MD    dextrose 5 % solution, 100 mL/hr, IntraVENous, PRN, Felipa Rainey MD    influenza A&B vaccine (FLUAD QUADRIVALENT) injection 0.5 mL, 0.5 mL, IntraMUSCular, Prior to discharge, Lesly See Kati Zuleta MD      ALLERGIES:  Patient has no known allergies. SOCIAL HISTORY:   Patient was a former smoker in the [employer], socially. Former alcohol abuse. No current alcohol or tobacco abuse. Denies illicit drug use. FAMILY HISTORY:   Denies family history of CV disease. REVIEW OF SYSTEMS:     · Constitutional: Denies fevers, chills, night sweats, and generalized fatigue. Denies significant weight loss or weight gain. · HEENT: Denies headaches, nose bleeds, rhinorrhea, sore throat. Denies blurred vision. Denies dysphagia, odynophagia. · Musculoskeletal: Denies falls, pain to BLE with ambulation. Denies muscle weakness. · Neurological: +dizziness. Denies numbness and tingling. Denies focal neurological deficits. · Cardiovascular: +chest pressure/heaviness. Denies palpitations, diaphoresis. Denies near syncope, syncope. Denies PND, orthopnea, peripheral edema. · Respiratory: +shortness of breath with exertion. Denies SOB at rest. Denies cough, hemoptysis. · Gastrointestinal: +nausea. Denies abdominal pain,vomiting, diarrhea and constipation, black/bloody, and tarry stools. · Genitourinary: Denies dysuria and hematuria. · Hematologic: Denies excessive bruising or bleeding. · Endocrine: Denies excessive thirst. Denies intolerance to hot and cold. PHYSICAL EXAM:   BP (!) 140/78   Pulse 70   Temp 97 °F (36.1 °C) (Temporal)   Resp 16   Ht 5' 9\" (1.753 m)   Wt 150 lb (68 kg)   SpO2 99%   BMI 22.15 kg/m²   CONST:  Well developed, well nourished AAM who appears stated age. Awake, alert, cooperative, no apparent distress. HEENT:   Head- Normocephalic, atraumatic. Eyes- Conjunctivae pink, anicteric. Neck-  No stridor, trachea midline, no apparent jugular venous distention. CHEST: Chest symmetrical and non-tender to palpation. No accessory muscle use or intercostal retractions. RESPIRATORY: Lung sounds - clear throughout fields. CARDIOVASCULAR:     No noted carotid bruit.   Heart Ausculation- Irregularly irregular rhythm with normal rate, no apparent murmur. PV: No lower extremity edema. Pedal pulses palpable, no clubbing or cyanosis. ABDOMEN: Soft, non-tender to light palpation. Bowel sounds present. MS: Good muscle strength and tone. No atrophy or abnormal movements. SKIN: Warm and dry. NEURO / PSYCH: Oriented to person, place and time. Speech clear and appropriate. Follows all commands. Pleasant affect. DATA:    Telemetry: Atrial fibrillation with HR in the 70s    Diagnostic:  All diagnostic testing and lab work thus far this admission reviewed in detail. CXR 11/7/2021:  Impression:  No acute process. No intake or output data in the 24 hours ending 11/08/21 0747    Labs:   CBC:   Recent Labs     11/07/21  1252   WBC 5.8   HGB 10.7*   HCT 33.7*   PLT 97*     BMP:   Recent Labs     11/07/21  1252 11/08/21  0617    140   K 4.4 4.3   CO2 17* 14*   BUN 31* 29*   CREATININE 1.9* 1.7*   LABGLOM 41 46   CALCIUM 9.2 9.1     HgA1c:   Lab Results   Component Value Date    LABA1C 7.6 (H) 03/05/2021     PT/INR:   Recent Labs     11/07/21  1612 11/08/21  0617   PROTIME 30.8* 31.9*   INR 2.8 2.9     FASTING LIPID PANEL:  Lab Results   Component Value Date    CHOL 141 03/05/2021    HDL 55 03/05/2021    LDLCALC 64 03/05/2021    TRIG 111 03/05/2021      Ref Range & Units 11/07/21 1252   Troponin, High Sensitivity 0 - 11 ng/L 24 High       Ref Range & Units 11/07/21 1524    Troponin, High Sensitivity 0 - 11 ng/L 23 High         ASSESSMENT:  1. Chest pain, exertional.  With associated shortness of breath and nausea. Likely stable angina. hS-troponin 24 --> 23. Pattern flat and not consistent with ACS -- most likely in setting of CKD and anemia. No acute ST-T wave changes. Currently chest pain free. 2. Permanent atrial fibrillation, on chronic Coumadin therapy. ? RVR with exertion, as his beta-blocker has been decreased multiple times over the past year due to bradycardia. Status post RF ablation in 2000. Prior Tikosyn use, discontinued in 2009 due to to renal insufficiency. Follows with Dr. Elo Ryan. Afib with HR in the 60s-70s at rest today. 3. Mild AI, mild MR, mild LVH, LVEF 60-65% on TTE March 2021. 4. Hypertension, uncontrolled at times. 5. Hyperlipidemia, on statin therapy. 6. Insulin requiring diabetes mellitus type II.  7. Chronic kidney disease, currently at baseline. 8. Anemia of chronic disease. 9. History of right MCA CVA in 2013 / TIA in 2017, with no residual deficits. 10. Thrombocytopenia. 6. Former alcohol abuse. 15. Former tobacco abuse. RECOMMENDATIONS:  1. Lexiscan stress test in the setting of chest discomfort and shortness of breath on exertion to rule out ischemic heart disease. 2. Recent echocardiogram reviewed. Does not appear hypervolemic on exam.  No need for repeat at this time. 3. Continue statin, beta-blocker therapy. Continue Plavix therapy. Not on ASA to avoid triple therapy. 4. Will hold Coumadin for possible need for cardiac catheterization pending results of stress testing. 5. Daily INR. 6. Add Imdur 30 mg daily. 7. Continue other cardiac medications the same. The above case and recommendations have been discussed and made in collaboration with Dr. David Spencer. Further recommendations to follow as per him. Mavis Wright, [address] [place] [address] Cardiology    Electronically signed by Zain Berg PA-C on 11/8/2021 at 7:47 AM     Patient chart reviewed with Mavis Wright PA-C. Patient seen and examined independently in the presence of his wife. Assessment and plan were made in collaboration with Mavis Wright PA-C.    80-year-old -American male who is seen in consultation due to dyspnea on exertion.   He has history of hypertension, diabetes, hyperlipidemia, permanent atrial fibrillation has been on Coumadin, status post ablation and off Tikosyn due to renal insufficiency, chronic kidney disease, anemia, bradycardia, status post CVA and TIA. Patient is followed up by Dr. Deonte Rangel. He has been complaining of dyspnea on exertion over the past several months. He denies chest discomfort. His physical exam is pertinent for:  Elderly male in no acute distress. No carotid bruit and no jugular venous distention. Irregular heart but not tachycardic with no murmur, rub or gallop. Clear lungs. No lower extremity edema with decreased pedal pulses. EKG revealed atrial fibrillation with controlled ventricular response at 80 bpm with early transition and probable left ventricular hypertrophy. High sensitivity troponin XX 4 and 23. Hematocrit 33.7, hemoglobin 10.7, platelets 97, creatinine 1.7 and INR 2.9.    Echocardiogram done in March of this year:  Mild mitral and aortic regurgitation. Fraction 60 to 65%. [place] done today:  No evidence of ischemia. Absence of wall motion abnormalities. Ejection fraction 75%. Assessment:  - Dyspnea on exertion. Could be due to chronic anemia and diastolic dysfunction due to atrial fibrillation and/or atrial fibrillation with rapid ventricular response on exertion. Troponin pattern flat and not consistent with ACS -- most likely in setting of CKD and anemia. No acute ST-T wave changes. - Permanent atrial fibrillation, on chronic Coumadin therapy. ? RVR with exertion, as his beta-blocker has been decreased multiple times over the past year due to bradycardia. Status post RF ablation in 2000. Prior Tikosyn use, discontinued in 2009 due to to renal insufficiency. Follows with Dr. Deonte Rangel. Afib with HR in the 60s-70s at rest today. - Mild AI, mild MR, mild LVH, LVEF 60-65% on TTE March 2021.  - Hypertension, uncontrolled at times. - Hyperlipidemia, on statin therapy.   - Insulin requiring diabetes mellitus type II.  - Chronic kidney disease, currently at baseline.  - Anemia of chronic disease.  - History of right MCA CVA in 2013 / TIA in 2017, with no residual deficits. - Thrombocytopenia. Plan:  -May resume Coumadin.  -Imdur 30 mg daily.  -Continue other cardiac medications. -May discharge home from the cardiac standpoint of view.  -Consider 48 hours Holter as outpatient to assess the patient average heart rate and ventricular response.  -May follow-up with Dr. Ty Flowers. As outpatient. Thank you for allowing me to share in the care of patient.   Kayli Whitley MD

## 2021-11-08 NOTE — CARE COORDINATION
Transition of care: Cardio consulted. Met with pt in room. Pt stated he lives alone in a 1 story home. He is  to Alee but they live at separate residences. [employer]lee provides good support per pt. Independent with ADLs and drives. Pt does own colostomy care and orders his own supplies. Unsure of name of dme supplier. DME- cane, standard walker and a glucometer. Discharge plan is to return home when medically ready. Voiced no needs at present. PCP is Dr Samantha Garnica. Tofil and pharmacy is [facility] order and [facility] on [address].  Sw/cm will follow

## 2021-11-08 NOTE — PROGRESS NOTES
Patient takes care of colostomy himself and has his own supplies. Dr. Rob Boyd consulted via perfect serve for chest pain.

## 2021-11-09 VITALS
WEIGHT: 150 LBS | OXYGEN SATURATION: 100 % | TEMPERATURE: 97.6 F | BODY MASS INDEX: 22.22 KG/M2 | HEIGHT: 69 IN | HEART RATE: 96 BPM | SYSTOLIC BLOOD PRESSURE: 120 MMHG | DIASTOLIC BLOOD PRESSURE: 70 MMHG | RESPIRATION RATE: 16 BRPM

## 2021-11-09 LAB
ANION GAP SERPL CALCULATED.3IONS-SCNC: 13 MMOL/L (ref 7–16)
BUN BLDV-MCNC: 33 MG/DL (ref 6–23)
CALCIUM SERPL-MCNC: 9.4 MG/DL (ref 8.6–10.2)
CHLORIDE BLD-SCNC: 110 MMOL/L (ref 98–107)
CO2: 18 MMOL/L (ref 22–29)
CREAT SERPL-MCNC: 1.9 MG/DL (ref 0.7–1.2)
GFR AFRICAN AMERICAN: 41
GFR NON-AFRICAN AMERICAN: 41 ML/MIN/1.73
GLUCOSE BLD-MCNC: 160 MG/DL (ref 74–99)
INR BLD: 2.9
POTASSIUM SERPL-SCNC: 4.6 MMOL/L (ref 3.5–5)
PROTHROMBIN TIME: 31.3 SEC (ref 9.3–12.4)
SODIUM BLD-SCNC: 141 MMOL/L (ref 132–146)

## 2021-11-09 PROCEDURE — 36415 COLL VENOUS BLD VENIPUNCTURE: CPT

## 2021-11-09 PROCEDURE — 80048 BASIC METABOLIC PNL TOTAL CA: CPT

## 2021-11-09 PROCEDURE — 85610 PROTHROMBIN TIME: CPT

## 2021-11-09 PROCEDURE — G0378 HOSPITAL OBSERVATION PER HR: HCPCS

## 2021-11-09 RX ORDER — ISOSORBIDE MONONITRATE 30 MG/1
30 TABLET, EXTENDED RELEASE ORAL DAILY
Qty: 30 TABLET | Refills: 3 | Status: SHIPPED | OUTPATIENT
Start: 2021-11-09 | End: 2022-06-14

## 2021-11-09 NOTE — DISCHARGE SUMMARY
Physician Discharge Summary     Patient ID:  Cem Boss  [id number]  80 y.o.  1935    Admit date: 11/7/2021    Discharge date and time: 11/9/2021  Admitting Physician: Briana Cazares MD     Discharge Physician: Briana Cazares MD      Admission Diagnoses: Chest pain [R07.9]  Atrial fibrillation, unspecified type [facility]) [I48.91]  Chest pain, unspecified type [R07.9]    Discharge Diagnoses:   ATYPICAL CP  ANEMIA  DM-IR    Admission Condition: poor    Discharged Condition: good    Indication for Admission: Chest pain [R07.9]  Atrial fibrillation, unspecified type ([facility] [address] [I48.91]  Chest pain, unspecified type [R07.9]    Hospital Course: PT EVALUATED BY CARDIOL    Consults: cardiology    Significant Diagnostic Studies: STRESS TEST NEG    Lab Results   Component Value Date     11/08/2021    K 4.3 11/08/2021     (H) 11/08/2021    CO2 14 (L) 11/08/2021    BUN 29 (H) 11/08/2021    CREATININE 1.7 (H) 11/08/2021    GLUCOSE 93 11/08/2021    CALCIUM 9.1 11/08/2021    PROT 8.3 03/04/2021    LABALBU 3.9 03/04/2021    BILITOT 0.4 03/04/2021    ALKPHOS 122 03/04/2021    AST 29 03/04/2021    ALT 13 03/04/2021    LABGLOM 46 11/08/2021    GFRAA 46 11/08/2021          Lab Results   Component Value Date    WBC 5.8 11/07/2021    HGB 10.7 (L) 11/07/2021    HCT 33.7 (L) 11/07/2021    MCV 84.7 11/07/2021    PLT 97 (L) 11/07/2021        XR CHEST (2 VW)    Result Date: 11/7/2021  EXAMINATION: TWO XRAY VIEWS OF THE CHEST 11/7/2021 2:24 pm COMPARISON: None. HISTORY: ORDERING SYSTEM PROVIDED HISTORY: cough TECHNOLOGIST PROVIDED HISTORY: Reason for exam:->cough What reading provider will be dictating this exam?->CRC FINDINGS: The lungs are without acute focal process. There is no effusion or pneumothorax. The cardiomediastinal silhouette is without acute process. The osseous structures are without acute process. No acute process.      NM Cardiac Stress Test Nuclear Imaging    Result Date: 11/8/2021  Indication:  Chest pain, shortness of breath Clinical History:   Patient has unknownof coronary artery disease. IMAGING: Myocardial perfusion imaging was performed at rest 30-35 minutes following the intravenous injection of 10.4 mCi of (Tc-Sestamibi) followed by 10 ml of Normal Saline. At peak exercise, the patient was injected intravenously with 33mCi of (Tc-Sestamibi) followed by 10 ml of Normal Saline. Gated post-stress tomographic imaging was performed 20-25 minutes after stress. FINDINGS: The overall quality of the study was good. Left ventricular cavity size was noted to be normal. Rotational analog analysis demonstrated inferior diaphragmatic attenuation artifact. The gated SPECT stress imaging in the short, vertical long, and horizontal long axis demonstrated A very milddefect was present in the basilar and proximal inferior wall(s) that was small to mediumsized by quantification. The resting images demonstrate no changes. Gated SPECT left ventricular ejection fraction was calculated to be 75%, with normal Myocardial wall motion. Mild inferior attenuation artifact. Mild proximal fixed inferior defect with no wall motion abnormalities and normal LV systolic function consistent with attenuation artifact. No reversible defect. No ischemia. Normal LV systolic function. No wall motion abnormalities. Low risk myocardial perfusion scan.        Outstanding Order Results     Date and Time Order Name Status Description    11/7/2021 12:47 PM EKG 12 Lead Preliminary           Treatments: IMDUR    Discharge Exam:  SEE PN    Disposition: home    Patient Instructions:      Medication List      START taking these medications    influenza A&B vaccine 0.5 ML Prsy injection  Commonly known as: FLUAD QUADRIVALENT  Inject 0.5 mLs into the muscle once for 1 dose     isosorbide mononitrate 30 MG extended release tablet  Commonly known as: IMDUR  Take 1 tablet by mouth daily        CONTINUE taking these medications    clopidogrel 75 MG tablet  Commonly known as: PLAVIX  Take 1 tablet by mouth daily     famotidine 20 MG tablet  Commonly known as: PEPCID     HumuLIN 70/30 (70-30) 100 UNIT per ML injection vial  Generic drug: insulin 70-30     Insulin Syringe-Needle U-100 31G X 5/16\" 1 ML Misc  1 each by Other route 2 times daily     metoprolol tartrate 25 MG tablet  Commonly known as: LOPRESSOR  Take 0.5 tablets by mouth 2 times daily (with meals)     * ONE TOUCH ULTRA TEST strip  Generic drug: blood glucose test strips  USE ONE STRIP TO CHECK GLUCOSE TWO TIMES DAILY     * blood glucose test strips strip  Commonly known as: ASCENSIA AUTODISC VI;ONE TOUCH ULTRA TEST VI  1 each by In Vitro route daily As needed. ONE TOUCH ULTRASOFT LANCETS Misc  1 each by Does not apply route 2 times daily     simvastatin 20 MG tablet  Commonly known as: ZOCOR  TAKE 1 TABLET BY MOUTH EVERY DAY AT NIGHT     * warfarin 4 MG tablet  Commonly known as: COUMADIN  Take as directed. If you are unsure how to take this medication, talk to your nurse or doctor. Original instructions: TAKE 1 TABLET BY MOUTH DAILY AS DIRECTED BY PHYSICIAN. * warfarin 2 MG tablet  Commonly known as: COUMADIN  Take as directed. If you are unsure how to take this medication, talk to your nurse or doctor. Original instructions: TAKE 1 TABLET BY MOUTH DAILY         * This list has 4 medication(s) that are the same as other medications prescribed for you. Read the directions carefully, and ask your doctor or other care provider to review them with you.                Where to Get Your Medications      These medications were sent to [address] [place] [address] [phone] - F [phone]  [address] [facility] [address]    Phone: [phone]   · influenza A&B vaccine 0.5 ML Prsy injection  · isosorbide mononitrate 30 MG extended release tablet  · metoprolol tartrate 25 MG tablet        Activity: activity as tolerated  Diet: {diet: ADULT DIET; Regular; 5 carb choices (75 gm/meal)  Wound Care: none needed    Follow-up with DR in 1 week  WILL CONSIDER [facility]EMIA W/U/EGD/ REFERRAL TO [facility] FOR EPO.   Greater than 35 minutes spent on discharge preparations, examining patient, discussing with patient and coordinating with nurses and staff    Signed:  Emma Parks MD  11/9/2021  5:38 AM
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
## 2021-11-09 NOTE — PROGRESS NOTES
Monitor dcd cleaned and placed in slot in nurses station. patient wanted to go home right away this am.he stated he will get flu shot in drs office and will take all his meds when he gets home. discharge instuctions and meds reviewed with patient and wife.

## 2021-11-10 ENCOUNTER — HOSPITAL ENCOUNTER (OUTPATIENT)
Dept: CT IMAGING | Age: 86
Discharge: HOME | End: 2021-11-12
Payer: MEDICARE

## 2021-11-10 DIAGNOSIS — I63.9 CEREBRAL INFARCTION, UNSPECIFIED MECHANISM (HCC): ICD-10-CM

## 2021-11-10 LAB
EKG ATRIAL RATE: 394 BPM
EKG Q-T INTERVAL: 354 MS
EKG QRS DURATION: 64 MS
EKG QTC CALCULATION (BAZETT): 408 MS
EKG R AXIS: 74 DEGREES
EKG T AXIS: 68 DEGREES
EKG VENTRICULAR RATE: 80 BPM

## 2021-11-10 PROCEDURE — 93010 ELECTROCARDIOGRAM REPORT: CPT | Performed by: INTERNAL MEDICINE

## 2021-11-10 PROCEDURE — 70450 CT HEAD/BRAIN W/O DYE: CPT

## 2021-11-12 ENCOUNTER — TELEPHONE (OUTPATIENT)
Dept: CARDIOLOGY CLINIC | Age: 86
End: 2021-11-12

## 2021-11-15 NOTE — TELEPHONE ENCOUNTER
Follow-up in 6 to 12 months. Call for earlier visit if symptoms. If symptoms are significant enough please go to the emergency room.

## 2021-12-17 ENCOUNTER — HOSPITAL ENCOUNTER (OUTPATIENT)
Dept: INFUSION THERAPY | Age: 86
Discharge: HOME | End: 2021-12-17
Payer: MEDICARE

## 2021-12-17 ENCOUNTER — OFFICE VISIT (OUTPATIENT)
Dept: ONCOLOGY | Age: 86
End: 2021-12-17
Payer: MEDICARE

## 2021-12-17 VITALS
OXYGEN SATURATION: 100 % | DIASTOLIC BLOOD PRESSURE: 63 MMHG | TEMPERATURE: 97.3 F | HEART RATE: 79 BPM | HEIGHT: 69 IN | BODY MASS INDEX: 22.81 KG/M2 | WEIGHT: 154 LBS | RESPIRATION RATE: 16 BRPM | SYSTOLIC BLOOD PRESSURE: 122 MMHG

## 2021-12-17 DIAGNOSIS — D64.9 ANEMIA, UNSPECIFIED TYPE: Primary | ICD-10-CM

## 2021-12-17 DIAGNOSIS — R53.82 CHRONIC FATIGUE, UNSPECIFIED: ICD-10-CM

## 2021-12-17 DIAGNOSIS — D64.9 ANEMIA, UNSPECIFIED TYPE: ICD-10-CM

## 2021-12-17 LAB
BASOPHILS ABSOLUTE: 0.05 E9/L (ref 0–0.2)
BASOPHILS RELATIVE PERCENT: 0.7 % (ref 0–2)
EOSINOPHILS ABSOLUTE: 0.18 E9/L (ref 0.05–0.5)
EOSINOPHILS RELATIVE PERCENT: 2.7 % (ref 0–6)
FERRITIN: 499 NG/ML
FOLATE: 16.9 NG/ML (ref 4.8–24.2)
HCT VFR BLD CALC: 39 % (ref 37–54)
HEMOGLOBIN: 12 G/DL (ref 12.5–16.5)
IMMATURE GRANULOCYTES #: 0.02 E9/L
IMMATURE GRANULOCYTES %: 0.3 % (ref 0–5)
IMMATURE RETIC FRACT: 8.6 % (ref 2.3–13.4)
IRON SATURATION: 45 % (ref 20–55)
IRON: 132 MCG/DL (ref 59–158)
LYMPHOCYTES ABSOLUTE: 1.62 E9/L (ref 1.5–4)
LYMPHOCYTES RELATIVE PERCENT: 23.9 % (ref 20–42)
MCH RBC QN AUTO: 26.9 PG (ref 26–35)
MCHC RBC AUTO-ENTMCNC: 30.8 % (ref 32–34.5)
MCV RBC AUTO: 87.4 FL (ref 80–99.9)
MONOCYTES ABSOLUTE: 0.47 E9/L (ref 0.1–0.95)
MONOCYTES RELATIVE PERCENT: 6.9 % (ref 2–12)
NEUTROPHILS ABSOLUTE: 4.43 E9/L (ref 1.8–7.3)
NEUTROPHILS RELATIVE PERCENT: 65.5 % (ref 43–80)
PDW BLD-RTO: 15.5 FL (ref 11.5–15)
PLATELET # BLD: 130 E9/L (ref 130–450)
PMV BLD AUTO: 11.6 FL (ref 7–12)
RBC # BLD: 4.46 E12/L (ref 3.8–5.8)
RETIC HGB EQUIVALENT: 30.2 PG (ref 28.2–36.6)
RETICULOCYTE ABSOLUTE COUNT: 0.07 E12/L
RETICULOCYTE COUNT PCT: 1.5 % (ref 0.4–1.9)
TOTAL IRON BINDING CAPACITY: 296 MCG/DL (ref 250–450)
VITAMIN B-12: 573 PG/ML (ref 211–946)
WBC # BLD: 6.8 E9/L (ref 4.5–11.5)

## 2021-12-17 PROCEDURE — 82274 ASSAY TEST FOR BLOOD FECAL: CPT | Performed by: INTERNAL MEDICINE

## 2021-12-17 PROCEDURE — 99214 OFFICE O/P EST MOD 30 MIN: CPT | Performed by: INTERNAL MEDICINE

## 2021-12-17 PROCEDURE — 36415 COLL VENOUS BLD VENIPUNCTURE: CPT

## 2021-12-17 PROCEDURE — 99205 OFFICE O/P NEW HI 60 MIN: CPT | Performed by: INTERNAL MEDICINE
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
## 2021-12-17 NOTE — PROGRESS NOTES
Alee Shade  1935 80 y.o. Referring Physician: Dr Heidi Singh    PCP: Juventino Oswald MD    Vitals:    12/17/21 1429   BP: 122/63   Pulse: 79   Resp: 16   Temp: 97.3 °F (36.3 °C)   SpO2: 100%        Wt Readings from Last 3 Encounters:   12/17/21 154 lb (69.9 kg)   11/07/21 150 lb (68 kg)   07/20/21 156 lb (70.8 kg)        Body mass index is 22.74 kg/m². Chief Complaint:   Chief Complaint   Patient presents with    New Patient                 Current Outpatient Medications:     metoprolol tartrate (LOPRESSOR) 25 MG tablet, Take 0.5 tablets by mouth 2 times daily (with meals), Disp: 60 tablet, Rfl: 3    isosorbide mononitrate (IMDUR) 30 MG extended release tablet, Take 1 tablet by mouth daily, Disp: 30 tablet, Rfl: 3    insulin 70-30 (HUMULIN 70/30) (70-30) 100 UNIT per ML injection vial, Inject into the skin 2 times daily 20 units in the AM and 15 units in the evening, Disp: , Rfl:     famotidine (PEPCID) 20 MG tablet, Take 20 mg by mouth 2 times daily, Disp: , Rfl:     clopidogrel (PLAVIX) 75 MG tablet, Take 1 tablet by mouth daily, Disp: 90 tablet, Rfl: 1    Insulin Syringe-Needle U-100 31G X 5/16\" 1 ML MISC, 1 each by Other route 2 times daily, Disp: 200 each, Rfl: 1    blood glucose test strips (ASCENSIA AUTODISC VI;ONE TOUCH ULTRA TEST VI) strip, 1 each by In Vitro route daily As needed. , Disp: 100 each, Rfl: 0    ONE TOUCH ULTRASOFT LANCETS MISC, 1 each by Does not apply route 2 times daily, Disp: 100 each, Rfl: 0    blood glucose test strips (ONE TOUCH ULTRA TEST) strip, USE ONE STRIP TO CHECK GLUCOSE TWO TIMES DAILY, Disp: 200 strip, Rfl: 11    warfarin (COUMADIN) 2 MG tablet, TAKE 1 TABLET BY MOUTH DAILY, Disp: 90 tablet, Rfl: 1    simvastatin (ZOCOR) 20 MG tablet, TAKE 1 TABLET BY MOUTH EVERY DAY AT NIGHT, Disp: 90 tablet, Rfl: 1    warfarin (COUMADIN) 4 MG tablet, TAKE 1 TABLET BY MOUTH DAILY AS DIRECTED BY PHYSICIAN., Disp: 90 tablet, Rfl: 1       Past Medical History: Diagnosis Date    [facility]-fib [facility])     Chronic kidney disease     [facility] 10/10/2016    Colitis, ulcerative chronic ([facility]     Diabetes mellitus ([facility]     DM (diabetes mellitus) ([facility] 11/25/2013    Encounter for therapeutic drug monitoring 4/5/2016    Hyperlipidemia     Hyperlipidemia associated with type 2 diabetes mellitus ([facility] 1/17/2016    Hypertension     Long term (current) use of anticoagulants 4/5/2016    Prostate enlargement     Stroke (cerebrum) ([facility] 2013    Unspecified cerebral artery occlusion with cerebral infarction 1973       Past Surgical History:   Procedure Laterality Date    ABDOMEN SURGERY      1978    COLONOSCOPY      COLOSTOMY  1978    CYSTOSCOPY  more than 5 yrs    ECHO COMPL W DOP COLOR FLOW  11/26/2013         ECHOCARDIOGRAM TRANSESOPHAGEAL  11/27/2013         ENDOSCOPY, COLON, DIAGNOSTIC  12/10/15    ileoscopy    TURP  6/16/14    cystoscopy retrogrades       Family History   Problem Relation Age of Onset    Hypotension Mother     COPD Sister     Coronary Art Dis Sister     Diabetes Other        Social History     Socioeconomic History    Marital status:      Spouse name: Not on file    Number of children: Not on file    Years of education: Not on file    Highest education level: Not on file   Occupational History    Not on file   Tobacco Use    Smoking status: Former Smoker    Smokeless tobacco: Never Used   Substance and Sexual Activity    Alcohol use: No    Drug use: No    Sexual activity: Not on file   Other Topics Concern    Not on file   Social History Narrative    Not on file     Social Determinants of Health     Financial Resource Strain:     Difficulty of Paying Living Expenses: Not on file   Food Insecurity:     Worried About [address] in the Last Year: Not on file    Yeny of Food in the Last Year: Not on file   Transportation Needs:     Lack of Transportation (Medical):  Not on file    Lack of Transportation (Non-Medical): Not on file   Physical Activity:     Days of Exercise per Week: Not on file    Minutes of Exercise per Session: Not on file   Stress:     Feeling of Stress : Not on file   Social Connections:     Frequency of Communication with Friends and Family: Not on file    Frequency of Social Gatherings with Friends and Family: Not on file    Attends Religion Services: Not on file    Active Member of [address] [place] [address] or Organizations: Not on file    Attends Club or Organization Meetings: Not on file    Marital Status: Not on file   Intimate Partner Violence:     Fear of Current or Ex-Partner: Not on file    Emotionally Abused: Not on file    Physically Abused: Not on file    Sexually Abused: Not on file   Housing Stability:     Unable to Pay for Housing in the Last Year: Not on file    Number of [place] in the Last Year: Not on file    Unstable Housing in the Last Year: Not on file           Occupation: Retired-[employer]  Retired:  YES: Patient is retired from [employer]. REVIEW OF SYSTEMS: <<For Level 5, 10 or more systems>>     Pacemaker/Defibulator/ICD:  No    Mediport: No           FALLS RISK SCREENING ASSESSMENT    Instructions:  Assess the patient and [place] the appropriate indicators that are present for fall risk identification. Total the numbers circled and assign a fall risk score from Table 2.  Reassess patient at a minimum every 12 weeks or with status change. Assessment   Date  12/17/2021     1. Mental Ability: confusion/cognitively impaired No - 0       2. Elimination Issues: incontinence, frequency No - 0       3. Ambulatory: use of assistive devices (walker, cane, off-loading devices), attached to equipment (IV pole, oxygen) Yes - 2     4. Sensory Limitations: dizziness, vertigo, impaired vision Yes - 3       5. Age 72 years or greater - 1       10.   Medication: diuretics, strong analgesics, hypnotics, sedatives, antihypertensive agents   No - 0   7.  Falls:  recent history of falls within the last 3 months (not to include slipping or tripping)   No - 0   TOTAL 6    If score of 4 or greater was education given? Yes       TABLE 2   Risk Score Risk Level Plan of Care   0-3 Little or  No Risk 1. Provide assistance as indicated for ambulation activities  2. Reorient confused/cognitively impaired patient  3. Call-light/bell within patient's reach  4. Chair/bed in low position, stretcher/bed with siderails up except when performing patient care activities  5. Educate patient/family/caregiver on falls prevention  6.  Reassess in 12 weeks or with any noted change in patient condition which places them at a risk for a fall   4-6 Moderate Risk 1. Provide assistance as indicated for ambulation activities  2. Reorient confused/cognitively impaired patient  3. Call-light/bell within patient's reach  4. Chair/bed in low position, stretcher/bed with siderails up except when performing patient care activities  5. Educate patient/family/caregiver on falls prevention  6. Falls risk precaution (Yellow sticker Level II) placed on patient chart   7 or   Higher High Risk 1. Place patient in easily observable treatment room  2. Patient attended at all times by family member or staff  3. Provide assistance as indicated for ambulation activities  4. Reorient confused/cognitively impaired patient  5. Call-light/bell within patient's reach  6. Chair/bed in low position, stretcher/bed with siderails up except when performing patient care activities  7. Educate patient/family/caregiver on falls prevention  8.   Falls risk precaution (Yellow sticker Level III) placed on patient chart                     Jeremias Dean RN

## 2021-12-18 LAB — DAT POLYSPECIFIC: NORMAL

## 2021-12-20 LAB
ALBUMIN SERPL-MCNC: 3.8 G/DL (ref 3.5–4.7)
ALPHA-1-GLOBULIN: 0.3 G/DL (ref 0.2–0.4)
ALPHA-2-GLOBULIN: 0.9 G/DL (ref 0.5–1)
ANTI-NUCLEAR ANTIBODY (ANA): NEGATIVE
BETA GLOBULIN: 1.1 G/DL (ref 0.8–1.3)
ELECTROPHORESIS: ABNORMAL
GAMMA GLOBULIN: 2.1 G/DL (ref 0.7–1.6)
HAV IGM SER IA-ACNC: NORMAL
HEPATITIS B CORE IGM ANTIBODY: NORMAL
HEPATITIS B SURFACE ANTIGEN INTERPRETATION: NORMAL
HEPATITIS C ANTIBODY INTERPRETATION: NORMAL
HIV-1 AND HIV-2 ANTIBODIES: NORMAL
PATHOLOGIST REVIEW: NORMAL
TOTAL PROTEIN: 8.2 G/DL (ref 6.4–8.3)

## 2021-12-21 LAB — ERYTHROPOIETIN: 8 MU/ML (ref 4–27)

## 2021-12-22 LAB
COPPER: 151.1 UG/DL (ref 70–140)
Lab: NORMAL
REPORT: NORMAL
THIS TEST SENT TO: NORMAL
ZINC: 92.2 UG/DL (ref 60–120)

## 2022-01-18 ENCOUNTER — OFFICE VISIT (OUTPATIENT)
Dept: NEUROLOGY | Age: 87
End: 2022-01-18
Payer: MEDICARE

## 2022-01-18 VITALS
HEIGHT: 69 IN | OXYGEN SATURATION: 100 % | DIASTOLIC BLOOD PRESSURE: 73 MMHG | BODY MASS INDEX: 22.81 KG/M2 | HEART RATE: 77 BPM | WEIGHT: 154 LBS | RESPIRATION RATE: 20 BRPM | TEMPERATURE: 96.8 F | SYSTOLIC BLOOD PRESSURE: 115 MMHG

## 2022-01-18 DIAGNOSIS — I63.9 ACUTE CVA (CEREBROVASCULAR ACCIDENT) (HCC): Primary | ICD-10-CM

## 2022-01-18 DIAGNOSIS — I48.91 ATRIAL FIBRILLATION, UNSPECIFIED TYPE (HCC): ICD-10-CM

## 2022-01-18 PROCEDURE — 99213 OFFICE O/P EST LOW 20 MIN: CPT | Performed by: NURSE PRACTITIONER

## 2022-01-18 RX ORDER — LIDOCAINE AND PRILOCAINE 25; 25 MG/G; MG/G
CREAM TOPICAL
COMMUNITY
Start: 2021-12-27 | End: 2022-06-14

## 2022-01-18 RX ORDER — FERROUS SULFATE TAB EC 324 MG (65 MG FE EQUIVALENT) 324 (65 FE) MG
TABLET DELAYED RESPONSE ORAL
COMMUNITY
Start: 2022-01-11 | End: 2022-06-14

## 2022-01-18 RX ORDER — GABAPENTIN 100 MG/1
CAPSULE ORAL
COMMUNITY
Start: 2021-12-27 | End: 2022-06-14

## 2022-01-18 RX ORDER — KETOCONAZOLE 20 MG/G
CREAM TOPICAL
COMMUNITY
Start: 2020-12-07 | End: 2022-06-14

## 2022-01-18 RX ORDER — INSULIN HUMAN 100 [IU]/ML
INJECTION, SOLUTION PARENTERAL
COMMUNITY
Start: 2021-11-17 | End: 2022-06-14

## 2022-01-18 NOTE — PATIENT INSTRUCTIONS
Patient Education        Learning About FAST: Stroke Warning Signs  What is FAST? FAST is a simple way to remember the main symptoms of stroke. Recognizing these symptoms helps you know when to call for medical help. FAST stands for:  · F ace drooping. · A rm weakness. · S peech difficulty. · T savanna to call 911. Other stroke symptoms can include sudden confusion, a severe headache, and problems with vision or balance. What happens when you have a stroke? A stroke occurs when a blood vessel to the brain bursts or is blocked by a blood clot. Within minutes, the nerve cells in that part of the brain die. As a result, the part of the body controlled by those cells cannot work properly. The effects of a stroke may range from mild to severe. They may get better, or they may last the rest of your life. A stroke can affect vision, speech, behavior, thought processes, and your ability to move. It can cause symptoms that may include:  · Sudden numbness, tingling, weakness, or loss of movement in your face, arm, or leg, especially on only one side of your body. · Sudden vision changes. · Sudden trouble speaking. · Sudden confusion or trouble understanding simple statements. · Sudden problems with walking or balance. · A sudden, severe headache that is different from past headaches. Why is it important to get help FAST? Quick treatment may save your life. And it may reduce the damage in your brain so that you have fewer problems after the stroke. When you know stroke symptoms, you will know when it's important to call for medical help. Where can you learn more? Go to https://[email or web address]. org and sign in to your [employer] account. Enter U777 in the [employer] box to learn more about \"Learning About FAST: Stroke Warning Signs. \"     If you do not have an account, please click on the \"Sign Up Now\" link.   Current as of: July 6, 2021               Content Version: 13.1  © 6980-0239 Healthwise, Incorporated. Care instructions adapted under license by [facility] ([facility]). If you have questions about a medical condition or this instruction, always ask your healthcare professional. [address] any warranty or liability for your use of this information.

## 2022-01-18 NOTE — PROGRESS NOTES
[address] [facility]. Tyler Moscoso M.D., F.A.C.P. Ajay Khan, KETURAH, APRN, CNS  [facility]. Xiao Dee, MSN, APRN-FNP-C  Rosy Moulton MSN, APRN, FNP-C  Marisela CUELLO PA-C  [address] MSN, APRN, FNP-C  [address] [facility]  [place], [address] [address]  Phone: [phone]  Fax: [phone]       Niall Davalos is a 80 y.o. right handed male     Patient follows up today for recent stroke admission. Patient presents with his wife provides little to no additional information. Patient is a good historian. Patient was admitted for left arm clumsiness on 3/4 which he believes began about a week prior to arrival.  Patient had a history of A. fib and INR was therapeutic on admission. MRI brain demonstrated showing of acute infarcts in his posterior right frontal lobe. Complete echo was completed which was consistent with A. fib and PVCs; no A. fib. Carotid ultrasound showed atherosclerotic disease; 0 to 49% stenosis. Patient was discharged home on 3/8. Since discharge patient has done well and feels his left hand clumsiness has not worsened, weakness has improved and numbness is intermittent. Patient did not require PT/OT on discharge. Patient remains on Coumadin, Plavix and statin. Patient takes Coumadin  2 mg Tuesday and Saturday and 4 mg on M, W, Th, F and Sunday. Patient follows with cardiology. Patient went to PCP yesterday and had blood work completed; metoprolol was previously decreased down to 25 mg now back up to 50 mg as of yesterday. Patient follows up with Dr. Verena Cuello regularly who manages his chronic conditions. Patient's last A1c 7.6, LDL 65. Patient denies any new stroke symptoms, headache, dizziness, speech or swallowing difficulty. Since his last visit he has been hospitalized with chest pain and shortness of breath. At present time patient states the mornings are harder but by evening the symptoms have improved.   Patient also found to be anemic and is undergoing tests with specialist to evaluate. Patient has had no recurrent neurologic symptoms. Patient says his left arm is sometimes clumsy and he has headaches every now and then they last less than an hour each. Neither of these are an issue for him at present time. Patient also suffers from hypertension, hyperlipidemia, diabetes mellitus, prostate enlargement, chronic renal impairment stage III, and prior stroke in  and . Patient's surgery history includes TURP, colonoscopy, abdominal surgery , colostomy and LARRY. Patient reports he sleeps 4 to 5 hours a night. Patient reports 3 meals a day and 2-3 bottles of water daily. Patient denies caffeine use. Patient reports a mild stress level. Patient goes for walks every other day. Patient reports that he has quit smoking; unable to state how long ago. He has never used smokeless tobacco. He reports that he does not drink alcohol or use drugs. Patient is  and has no children; 3 stepchildren. Objective:       /73   Pulse 77   Temp 96.8 °F (36 °C)   Resp 20   Ht 5' 9\" (1.753 m)   Wt 154 lb (69.9 kg)   SpO2 100%   BMI 22.74 kg/m²     General appearance: alert, appears stated age, cooperative and in no distress  Head: normocephalic, without obvious abnormality, atraumatic  Eyes: conjunctivae/corneas clear; no drainage  Neck:  supple, symmetrical, trachea midline   Back: symmetric, no curvature.  ROM normal.   Lungs: clear to auscultation bilaterally  Heart: regular rate and rhythm  Abdomen: soft, non-tender; bowel sounds normal  Extremities: normal, atraumatic, no cyanosis or edema  Pulses: 2+ and symmetric  Skin:  color, texture, turgor normal--no rashes or lesions      Mental Status: alert and oriented x 4, follows all commands well; more conversant today than normal    Able to state age, season, county, address,   Able to state current president's name and provide 5 past presidents    Appropriate attention/concentration  Intact fundus of knowledge  Memories intact    Speech: no dysarthria  Language: no aphasias---reading, writing, repetition, and object identification intact    Cranial Nerves:  I: smell intact   II: visual acuity     II: visual fields Full to finger counting   II: pupils PERRL   III,VII: ptosis None   III,IV,VI: extraocular muscles  EOMI without nystagmus   V: mastication Normal   V: facial light touch sensation  Normal   V,VII: corneal reflex     VII: facial muscle function - upper  Normal   VII: facial muscle function - lower Normal   VIII: hearing Normal   IX: soft palate elevation  Normal   IX,X: gag reflex    XI: trapezius strength  5/5   XI: sternocleidomastoid strength 5/5   XI: neck extension strength  5/5   XII: tongue strength  Normal     Motor:  Bilateral strong and   5/5 to right;  LUE and LLE +4/5  Normal bulk and tone  No drift   No abnormal movements    Sensory:  LT and PP normal  Vibration normal    Coordination:   FN normal to right; dysmetric to left  FFM and ELIECER intact  HKS normal     Gait:  Normal  Walks well on toes, heels, and tandem    DTR:   Right Brachioradialis reflex 1+  Left Brachioradialis reflex 1+  Right Biceps reflex 1+  Left Biceps reflex 1+  Right Triceps reflex 1+  Left Triceps reflex 1+  Right Quadriceps reflex 0  Left Quadriceps reflex 0  Right Achilles reflex 0  Left Achilles reflex 0    No Neumann's    No other pathological reflexes    Laboratory/Radiology:  ry/Radiology:     CBC:   Lab Results   Component Value Date    WBC 6.8 12/17/2021    RBC 4.46 12/17/2021    HGB 12.0 12/17/2021    HCT 39.0 12/17/2021    MCV 87.4 12/17/2021    MCH 26.9 12/17/2021    MCHC 30.8 12/17/2021    RDW 15.5 12/17/2021     12/17/2021    MPV 11.6 12/17/2021     CMP:    Lab Results   Component Value Date     11/09/2021    K 4.6 11/09/2021    K 4.2 03/05/2021     11/09/2021    CO2 18 11/09/2021    BUN 33 11/09/2021    CREATININE 1.9 11/09/2021    GFRAA 41 11/09/2021    LABGLOM 41 11/09/2021    GLUCOSE 160 11/09/2021    PROT 8.2 12/17/2021    LABALBU 3.8 12/17/2021    CALCIUM 9.4 11/09/2021    BILITOT 0.4 03/04/2021    ALKPHOS 122 03/04/2021    AST 29 03/04/2021    ALT 13 03/04/2021     U/A:    Lab Results   Component Value Date    COLORU Yellow 03/04/2021    PROTEINU Negative 03/04/2021    PHUR 6.0 03/04/2021    WBCUA 1-3 03/04/2021    RBCUA NONE 03/04/2021    BACTERIA NONE SEEN 03/04/2021    CLARITYU Clear 03/04/2021    SPECGRAV 1.015 03/04/2021    LEUKOCYTESUR TRACE 03/04/2021    UROBILINOGEN 0.2 03/04/2021    BILIRUBINUR Negative 03/04/2021    BLOODU Negative 03/04/2021    GLUCOSEU Negative 03/04/2021     HgBA1c:    Lab Results   Component Value Date    LABA1C 7.6 03/05/2021     FLP:    Lab Results   Component Value Date    TRIG 111 03/05/2021    HDL 55 03/05/2021    LDLCALC 64 03/05/2021    LABVLDL 22 03/05/2021      CT head without contrast   No acute intracranial abnormality.       There is stable atrophy with small vessel ischemic disease.       Sinus disease with soft tissue density in the left maxillary sinus and   mucosal thickening in the mastoid air cells.      MRI BRAIN WO CONTRAST   Final Result   Small acute to subacute infarction in the posterior right frontal lobe.       Small old infarctions in the bilateral frontal parietal lobes, the right   occipital lobe, and the bilateral cerebellar hemispheres.       Moderate parenchymal volume loss.       Moderate chronic microvascular disease.       Unremarkable noncontrast MRA of the head.       Results were sent to radiology results communication.           MRA HEAD WO CONTRAST   Final Result   Small acute to subacute infarction in the posterior right frontal lobe.       Small old infarctions in the bilateral frontal parietal lobes, the right   occipital lobe, and the bilateral cerebellar hemispheres.       Moderate parenchymal volume loss.       Moderate chronic microvascular disease.     Unremarkable noncontrast MRA of the head.       Results were sent to radiology results communication.         Complete echo 3/8/2021:  Appears to be in atrial fibrillation with frequent PVC or aberrant complexes. Normal left ventricular size and systolic function. Ejection fraction is visually estimated at 60-65%. Indeterminate diastolic function. No regional wall motion abnormalities seen. Mild left ventricular concentric hypertrophy noted. Normal right ventricular size and function. The left atrium is severely dilated. Agitated saline injected for shunt evaluation. No evidence of atrial level shunt. Mildly enlarged right atrium. Mild mitral regurgitation. Mild aortic valve regurgitation. All labs and images were personally reviewed at the time of this visit    Assessment:     Acute/subacute right posterior frontal lobe infarct most likely a watershed infarction              From possible hypoperfusion from known bradycardia   MRA head was unrevealing and carotid ultrasound showed 0 to 49% stenosis bilaterally              He was therepeutic on warfarin for his PAF   During admission: A1c 7.6, LDL 64              Possibly a Plavix failure however unlikely contributory              Echo with bubble consistent with A. fib with frequent PVCs. He continues to follow with cardiology. Stroke risk factors reviewed today during visit. All questions and concerns addressed.     History of A. Fib              Continue Coumadin---  currently managed by patient's PCP   Cardiology follow-up per their recommendations    Plan:     Continue Coumadin, Plavix and statin  Stroke factor modifications  Call with any issues  Return office FILIPPO Concepcion  11:29 AM  1/18/2022    I spent 25 minutes with this patient obtaining the HPI and discussing the exam with greater than 50% of the time providing counseling and education on medications and other treatment plans.  All questions were answered prior to leaving my office.

## 2022-01-21 ENCOUNTER — OFFICE VISIT (OUTPATIENT)
Dept: ONCOLOGY | Age: 87
End: 2022-01-21
Payer: MEDICARE

## 2022-01-21 ENCOUNTER — HOSPITAL ENCOUNTER (OUTPATIENT)
Dept: INFUSION THERAPY | Age: 87
Discharge: HOME | End: 2022-01-21

## 2022-01-21 VITALS
SYSTOLIC BLOOD PRESSURE: 129 MMHG | TEMPERATURE: 97.2 F | BODY MASS INDEX: 22.51 KG/M2 | DIASTOLIC BLOOD PRESSURE: 76 MMHG | OXYGEN SATURATION: 98 % | HEIGHT: 69 IN | RESPIRATION RATE: 18 BRPM | HEART RATE: 62 BPM | WEIGHT: 152 LBS

## 2022-01-21 DIAGNOSIS — D64.9 ANEMIA, UNSPECIFIED TYPE: Primary | ICD-10-CM

## 2022-01-21 DIAGNOSIS — D49.0 IPMN (INTRADUCTAL PAPILLARY MUCINOUS NEOPLASM): Primary | ICD-10-CM

## 2022-01-21 DIAGNOSIS — D64.9 ANEMIA, UNSPECIFIED TYPE: ICD-10-CM

## 2022-01-21 LAB
CONTROL: PRESENT
HEMOCCULT STL QL: NEGATIVE

## 2022-01-21 PROCEDURE — 99214 OFFICE O/P EST MOD 30 MIN: CPT | Performed by: INTERNAL MEDICINE

## 2022-01-21 PROCEDURE — 99213 OFFICE O/P EST LOW 20 MIN: CPT
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
## 2022-01-28 ENCOUNTER — OFFICE VISIT (OUTPATIENT)
Dept: HEMATOLOGY | Age: 87
End: 2022-01-28
Payer: MEDICARE

## 2022-01-28 VITALS
TEMPERATURE: 97.3 F | BODY MASS INDEX: 22.66 KG/M2 | WEIGHT: 153 LBS | HEIGHT: 69 IN | SYSTOLIC BLOOD PRESSURE: 119 MMHG | HEART RATE: 88 BPM | RESPIRATION RATE: 18 BRPM | DIASTOLIC BLOOD PRESSURE: 55 MMHG

## 2022-01-28 DIAGNOSIS — D49.0 IPMN (INTRADUCTAL PAPILLARY MUCINOUS NEOPLASM): Primary | ICD-10-CM

## 2022-01-28 PROCEDURE — 99204 OFFICE O/P NEW MOD 45 MIN: CPT | Performed by: TRANSPLANT SURGERY

## 2022-01-28 NOTE — PROGRESS NOTES
Hepatobiliary and Pancreatic Surgery Attending History and Physical    Patient's Name/Date of Birth: Christen Mercedes /1935 (31 y.o.)    Date: January 28, 2022     CC: pancreatic body cysts    HPI:  Patient is a very pleasant 80year old male recently has had multiple ailments. He is having shortness of breath and dizziness that is worse in the morning and can last for up to 30 minutes or even hours. 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 He also has a pouch for UC. He recently had a FIT test that was negative. He also has new mid thoracic back pain and his blood sugars are between 150-160. 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         Past Medical History:   Diagnosis Date    A-fib [facility])     Chronic kidney disease     Chronic renal impairment, stage 3 (moderate) ([facility] 10/10/2016    Colitis, ulcerative chronic ([address]     Diabetes mellitus ([address]     DM (diabetes mellitus) ([facility] [address] 11/25/2013    Encounter for therapeutic drug monitoring 4/5/2016    Hyperlipidemia     Hyperlipidemia associated with type 2 diabetes mellitus ([address] 1/17/2016    Hypertension     Long term (current) use of anticoagulants 4/5/2016    Prostate enlargement     Stroke (cerebrum) ([facility] [address] 2013    Unspecified cerebral artery occlusion with cerebral infarction 1973       Past Surgical History:   Procedure Laterality Date    ABDOMEN SURGERY      1978    COLONOSCOPY      COLOSTOMY  1978    CYSTOSCOPY  more than 5 yrs    ECHO COMPL W DOP COLOR FLOW  11/26/2013         ECHOCARDIOGRAM TRANSESOPHAGEAL  11/27/2013         ENDOSCOPY, COLON, DIAGNOSTIC  12/10/15    ileoscopy    TURP  6/16/14    cystoscopy retrogrades       Current Outpatient Medications   Medication Sig Dispense Refill    ferrous sulfate 324 (65 Fe) MG EC tablet take 1 tablet by mouth once daily      gabapentin (NEURONTIN) 100 MG capsule take 1 capsule by mouth every 12 hours      HUMULIN R 100 UNIT/ML injection       lidocaine-prilocaine (EMLA) 2.5-2.5 % cream apply at bedtime for NERVE PAIN      ketoconazole (NIZORAL) 2 % cream Apply topically      metoprolol tartrate (LOPRESSOR) 25 MG tablet Take 0.5 tablets by mouth 2 times daily (with meals) 60 tablet 3    isosorbide mononitrate (IMDUR) 30 MG extended release tablet Take 1 tablet by mouth daily 30 tablet 3    insulin 70-30 (HUMULIN 70/30) (70-30) 100 UNIT per ML injection vial Inject into the skin 2 times daily 20 units in the AM and 15 units in the evening      famotidine (PEPCID) 20 MG tablet Take 20 mg by mouth 2 times daily      clopidogrel (PLAVIX) 75 MG tablet Take 1 tablet by mouth daily 90 tablet 1    Insulin Syringe-Needle U-100 31G X 5/16\" 1 ML MISC 1 each by Other route 2 times daily 200 each 1    blood glucose test strips (ASCENSIA AUTODISC VI;ONE TOUCH ULTRA TEST VI) strip 1 each by In Vitro route daily As needed. 100 each 0    ONE TOUCH ULTRASOFT LANCETS MISC 1 each by Does not apply route 2 times daily 100 each 0    blood glucose test strips (ONE TOUCH ULTRA TEST) strip USE ONE STRIP TO CHECK GLUCOSE TWO TIMES DAILY 200 strip 11    warfarin (COUMADIN) 2 MG tablet TAKE 1 TABLET BY MOUTH DAILY 90 tablet 1    simvastatin (ZOCOR) 20 MG tablet TAKE 1 TABLET BY MOUTH EVERY DAY AT NIGHT 90 tablet 1    warfarin (COUMADIN) 4 MG tablet TAKE 1 TABLET BY MOUTH DAILY AS DIRECTED BY PHYSICIAN. 90 tablet 1     No current facility-administered medications for this visit.        No Known Allergies    Family History   Problem Relation Age of Onset    Hypotension Mother     COPD Sister     Coronary Art Dis Sister     Diabetes Other        Social History     Socioeconomic History    Marital status:      Spouse name: Not on file    Number of children: Not on file    Years of education: Not on file    Highest education level: Not on file   Occupational History    Not on file   Tobacco Use    Smoking status: Former Smoker    Smokeless tobacco: Never Used   Vaping Use    Vaping Use: Never used   Substance and Sexual Activity    Alcohol use: No    Drug use: No    Sexual activity: Not on file   Other Topics Concern    Not on file   Social History Narrative    Not on file     Social Determinants of Health     Financial Resource Strain:     Difficulty of Paying Living Expenses: Not on file   Food Insecurity:     Worried About Running Out of Food in the Last Year: Not on file    Yeny of Food in the Last Year: Not on file   Transportation Needs:     Lack of Transportation (Medical): Not on file    Lack of Transportation (Non-Medical): Not on file   Physical Activity:     Days of Exercise per Week: Not on file    Minutes of Exercise per Session: Not on file   Stress:     Feeling of Stress : Not on file   Social Connections:     Frequency of Communication with Friends and Family: Not on file    Frequency of Social Gatherings with Friends and Family: Not on file    Attends Scientology Services: Not on file    Active Member of Clubs or Organizations: Not on file    Attends Club or Organization Meetings: Not on file    Marital Status: Not on file   Intimate Partner Violence:     Fear of Current or Ex-Partner: Not on file    Emotionally Abused: Not on file    Physically Abused: Not on file    Sexually Abused: Not on file   Housing Stability:     Unable to Pay for Housing in the Last Year: Not on file    Number of [place] in the Last Year: Not on file    Unstable Housing in the Last Year: Not on file       ROS:   Review of Systems   Constitutional: Positive for fatigue. Negative for chills, diaphoresis and fever. HENT: Negative for congestion, ear discharge, ear pain, hearing loss, nosebleeds and tinnitus. Eyes: Negative for photophobia, pain and discharge. Respiratory: Negative for shortness of breath. Cardiovascular: Negative for palpitations and leg swelling.    Gastrointestinal: Negative for abdominal pain, blood in stool, constipation, diarrhea, nausea and vomiting. Endocrine: Negative for polydipsia. Genitourinary: Negative for frequency, hematuria and urgency. Musculoskeletal: Positive for arthralgias and gait problem. Negative for back pain and neck pain. Skin: Negative for rash. Allergic/Immunologic: Negative for environmental allergies. Neurological: Positive for weakness and light-headedness. Negative for tremors and seizures. Psychiatric/Behavioral: Negative for hallucinations and suicidal ideas. The patient is not nervous/anxious. Physical Exam:  BP (!) 119/55   Pulse 88   Temp 97.3 °F (36.3 °C) (Temporal)   Resp 18   Ht 5' 9\" (1.753 m)   Wt 153 lb (69.4 kg)   BMI 22.59 kg/m²     PSYCH: mood and affect normal, alert and oriented x 3: No apparent distress, comfortable  EYES: Sclera white, pupils equal round and reactive to light  ENMT:  Hearing normal, trachea midline, ears externally intact  LYMPH: no obvious lympadenopathy in neck. RESP: Respiratory effort was normal with no retractions or use of accessory muscles. CV:  No pedal edema  GI/ Abdomen: Soft, nondistended, nontender, no guarding, no peritoneal signs  MSK: no clubbing/ no cyanosis       Assessment/Plan:  Multiple BD-IPMN's with pancreatic duct dilation  - I reviewed their images prior to our office visit and we also reviewed them together today from 2016 where these were present.  - given his new onset thoracic back pain and elevated blood sugars will plan for a MRI to evaluate growth and worrisome features    45 Minutes of which greater than 50% was spent counseling or coordinating his care. Thank you for the consultation allowing me to take part in [facility]. [facility] care. Please send a copy of my note to Dr. Eula Álvarez. Emigdio West M.D.  1/28/2022  12:59 PM

## 2022-01-30 ASSESSMENT — ENCOUNTER SYMPTOMS
SHORTNESS OF BREATH: 0
VOMITING: 0
EYE PAIN: 0
BLOOD IN STOOL: 0
ABDOMINAL PAIN: 0
PHOTOPHOBIA: 0
CONSTIPATION: 0
BACK PAIN: 0
NAUSEA: 0
EYE DISCHARGE: 0
DIARRHEA: 0

## 2022-01-31 ENCOUNTER — TELEPHONE (OUTPATIENT)
Dept: HEMATOLOGY | Age: 87
End: 2022-01-31

## 2022-01-31 DIAGNOSIS — D49.0 IPMN (INTRADUCTAL PAPILLARY MUCINOUS NEOPLASM): Primary | ICD-10-CM

## 2022-01-31 NOTE — TELEPHONE ENCOUNTER
I called [facility] and spoke to nurse reviewer Mary Whiteside and he gave me Rosaline Braswell for cpt code 37013 with [facility] [place]#[id number] approval dates 1/31/22-3/31/22. I called and scheduled patient for  His MRI on 2/18/22 at [facility] arrival time 8:00am.  I called Gemini James wife and gave her the MRI appt information, instructions of NPO for 8 hours prior to MRI, no metal or jewelry. I did let her know he needs labs done prior to MRI and she agreed to bring him on 2/16/22 for labs. I made patient a follow up on 2/25/22 at 1:15pm at [facility] clinic at [facility]. She verbalized understanding and confirmed these appts.     Electronically signed by Rah Wilkins RN on 1/31/2022 at 2:41 PM

## 2022-02-16 ENCOUNTER — HOSPITAL ENCOUNTER (OUTPATIENT)
Age: 87
Discharge: HOME | End: 2022-02-16
Payer: MEDICARE

## 2022-02-16 DIAGNOSIS — D49.0 IPMN (INTRADUCTAL PAPILLARY MUCINOUS NEOPLASM): ICD-10-CM

## 2022-02-16 DIAGNOSIS — D64.9 ANEMIA, UNSPECIFIED TYPE: ICD-10-CM

## 2022-02-16 LAB
ANION GAP SERPL CALCULATED.3IONS-SCNC: 15 MMOL/L (ref 7–16)
ANISOCYTOSIS: ABNORMAL
BASOPHILS ABSOLUTE: 0 E9/L (ref 0–0.2)
BASOPHILS RELATIVE PERCENT: 0.5 % (ref 0–2)
BUN BLDV-MCNC: 42 MG/DL (ref 6–23)
CALCIUM SERPL-MCNC: 9.9 MG/DL (ref 8.6–10.2)
CHLORIDE BLD-SCNC: 113 MMOL/L (ref 98–107)
CO2: 16 MMOL/L (ref 22–29)
CREAT SERPL-MCNC: 2.2 MG/DL (ref 0.7–1.2)
EOSINOPHILS ABSOLUTE: 0.1 E9/L (ref 0.05–0.5)
EOSINOPHILS RELATIVE PERCENT: 1.7 % (ref 0–6)
GFR AFRICAN AMERICAN: 34
GFR NON-AFRICAN AMERICAN: 34 ML/MIN/1.73
GLUCOSE BLD-MCNC: 90 MG/DL (ref 74–99)
HCT VFR BLD CALC: 34.1 % (ref 37–54)
HEMOGLOBIN: 10.3 G/DL (ref 12.5–16.5)
LYMPHOCYTES ABSOLUTE: 0.62 E9/L (ref 1.5–4)
LYMPHOCYTES RELATIVE PERCENT: 11.3 % (ref 20–42)
MCH RBC QN AUTO: 26.9 PG (ref 26–35)
MCHC RBC AUTO-ENTMCNC: 30.2 % (ref 32–34.5)
MCV RBC AUTO: 89 FL (ref 80–99.9)
METAMYELOCYTES RELATIVE PERCENT: 0.9 % (ref 0–1)
MONOCYTES ABSOLUTE: 0.22 E9/L (ref 0.1–0.95)
MONOCYTES RELATIVE PERCENT: 4.4 % (ref 2–12)
NEUTROPHILS ABSOLUTE: 4.65 E9/L (ref 1.8–7.3)
NEUTROPHILS RELATIVE PERCENT: 81.7 % (ref 43–80)
OVALOCYTES: ABNORMAL
PDW BLD-RTO: 15.1 FL (ref 11.5–15)
PLATELET # BLD: 88 E9/L (ref 130–450)
PLATELET CONFIRMATION: NORMAL
PMV BLD AUTO: 10.7 FL (ref 7–12)
POIKILOCYTES: ABNORMAL
POLYCHROMASIA: ABNORMAL
POTASSIUM SERPL-SCNC: 4.3 MMOL/L (ref 3.5–5)
RBC # BLD: 3.83 E12/L (ref 3.8–5.8)
SODIUM BLD-SCNC: 144 MMOL/L (ref 132–146)
TARGET CELLS: ABNORMAL
WBC # BLD: 5.6 E9/L (ref 4.5–11.5)

## 2022-02-16 PROCEDURE — 88184 FLOWCYTOMETRY/ TC 1 MARKER: CPT

## 2022-02-16 PROCEDURE — 80048 BASIC METABOLIC PNL TOTAL CA: CPT

## 2022-02-16 PROCEDURE — 88185 FLOWCYTOMETRY/TC ADD-ON: CPT

## 2022-02-16 PROCEDURE — 85025 COMPLETE CBC W/AUTO DIFF WBC: CPT

## 2022-02-16 PROCEDURE — 36415 COLL VENOUS BLD VENIPUNCTURE: CPT

## 2022-02-18 ENCOUNTER — HOSPITAL ENCOUNTER (OUTPATIENT)
Dept: MRI IMAGING | Age: 87
Discharge: HOME | End: 2022-02-20
Payer: MEDICARE

## 2022-02-18 DIAGNOSIS — D49.0 IPMN (INTRADUCTAL PAPILLARY MUCINOUS NEOPLASM): ICD-10-CM

## 2022-02-18 LAB
Lab: NORMAL
REPORT: NORMAL
THIS TEST SENT TO: NORMAL

## 2022-02-18 PROCEDURE — 6360000004 HC RX CONTRAST MEDICATION: Performed by: RADIOLOGY

## 2022-02-18 PROCEDURE — A9579 GAD-BASE MR CONTRAST NOS,1ML: HCPCS | Performed by: RADIOLOGY

## 2022-02-18 PROCEDURE — 74183 MRI ABD W/O CNTR FLWD CNTR: CPT

## 2022-02-18 RX ADMIN — GADOTERIDOL 14 ML: 279.3 INJECTION, SOLUTION INTRAVENOUS at 09:06

## 2022-02-25 ENCOUNTER — OFFICE VISIT (OUTPATIENT)
Dept: HEMATOLOGY | Age: 87
End: 2022-02-25
Payer: MEDICARE

## 2022-02-25 VITALS
TEMPERATURE: 97.2 F | BODY MASS INDEX: 22.22 KG/M2 | WEIGHT: 150 LBS | SYSTOLIC BLOOD PRESSURE: 115 MMHG | HEIGHT: 69 IN | HEART RATE: 77 BPM | RESPIRATION RATE: 16 BRPM | DIASTOLIC BLOOD PRESSURE: 71 MMHG

## 2022-02-25 DIAGNOSIS — K86.89 DILATION OF PANCREATIC DUCT: Primary | ICD-10-CM

## 2022-02-25 DIAGNOSIS — D49.0 IPMN (INTRADUCTAL PAPILLARY MUCINOUS NEOPLASM): ICD-10-CM

## 2022-02-25 PROCEDURE — 99212 OFFICE O/P EST SF 10 MIN: CPT | Performed by: TRANSPLANT SURGERY

## 2022-02-25 PROCEDURE — 99213 OFFICE O/P EST LOW 20 MIN: CPT | Performed by: TRANSPLANT SURGERY

## 2022-02-25 ASSESSMENT — ENCOUNTER SYMPTOMS
DIARRHEA: 0
ABDOMINAL PAIN: 0
BLOOD IN STOOL: 0
SHORTNESS OF BREATH: 0
PHOTOPHOBIA: 0
EYE DISCHARGE: 0
BACK PAIN: 0
EYE PAIN: 0
VOMITING: 0
NAUSEA: 0
CONSTIPATION: 0

## 2022-02-25 NOTE — PROGRESS NOTES
Hepatobiliary and Pancreatic Surgery Attending History and Physical    Patient's Name/Date of Birth: Daysi Number /1935 (39 y.o.)    Date: February 25, 2022     CC: pancreatic body cysts    HPI:  Patient is a very pleasant 80year old male recently has had multiple ailments. He is having shortness of breath and dizziness that is worse in the morning and can last for up to 30 minutes or even hours. 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 He also has a pouch for UC. He recently had a FIT test that was negative. He also has new mid thoracic back pain and his blood sugars are between 150-160. 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 He recently underwent a MRI and is seeing me in follow up.         Past Medical History:   Diagnosis Date    A-fib [facility])     Chronic kidney disease     Chronic renal impairment, stage 3 (moderate) ([facility] [facility] 10/10/2016    Colitis, ulcerative chronic ([facility] [address] [facility]     Diabetes mellitus ([facility] [facility]     DM (diabetes mellitus) ([facility] [address] 11/25/2013    Encounter for therapeutic drug monitoring 4/5/2016    Hyperlipidemia     Hyperlipidemia associated with type 2 diabetes mellitus ([address] 1/17/2016    Hypertension     Long term (current) use of anticoagulants 4/5/2016    Prostate enlargement     Stroke (cerebrum) ([facility] [address] 2013    Unspecified cerebral artery occlusion with cerebral infarction 1973       Past Surgical History:   Procedure Laterality Date    ABDOMEN SURGERY      1978    COLONOSCOPY      COLOSTOMY  1978    CYSTOSCOPY  more than 5 yrs    ECHO COMPL W DOP COLOR FLOW  11/26/2013         ECHOCARDIOGRAM TRANSESOPHAGEAL  11/27/2013         ENDOSCOPY, COLON, DIAGNOSTIC  12/10/15    ileoscopy    TURP  6/16/14    cystoscopy retrogrades       Current Outpatient Medications   Medication Sig Dispense Refill    ferrous sulfate 324 (65 Fe) MG EC tablet take 1 tablet by mouth once daily      gabapentin (NEURONTIN) 100 MG capsule take 1 capsule by mouth every 12 hours (Patient not taking: Reported on 1/28/2022)      HUMULIN R 100 UNIT/ML injection  (Patient not taking: Reported on 1/28/2022)      lidocaine-prilocaine (EMLA) 2.5-2.5 % cream apply at bedtime for NERVE PAIN      ketoconazole (NIZORAL) 2 % cream Apply topically (Patient not taking: Reported on 1/28/2022)      metoprolol tartrate (LOPRESSOR) 25 MG tablet Take 0.5 tablets by mouth 2 times daily (with meals) 60 tablet 3    isosorbide mononitrate (IMDUR) 30 MG extended release tablet Take 1 tablet by mouth daily 30 tablet 3    insulin 70-30 (HUMULIN 70/30) (70-30) 100 UNIT per ML injection vial Inject into the skin 2 times daily 20 units in the AM and 15 units in the evening      famotidine (PEPCID) 20 MG tablet Take 20 mg by mouth 2 times daily (Patient not taking: Reported on 1/28/2022)      clopidogrel (PLAVIX) 75 MG tablet Take 1 tablet by mouth daily 90 tablet 1    Insulin Syringe-Needle U-100 31G X 5/16\" 1 ML MISC 1 each by Other route 2 times daily 200 each 1    blood glucose test strips (ASCENSIA AUTODISC VI;ONE TOUCH ULTRA TEST VI) strip 1 each by In Vitro route daily As needed. 100 each 0    ONE TOUCH ULTRASOFT LANCETS MISC 1 each by Does not apply route 2 times daily 100 each 0    blood glucose test strips (ONE TOUCH ULTRA TEST) strip USE ONE STRIP TO CHECK GLUCOSE TWO TIMES DAILY 200 strip 11    warfarin (COUMADIN) 2 MG tablet TAKE 1 TABLET BY MOUTH DAILY 90 tablet 1    simvastatin (ZOCOR) 20 MG tablet TAKE 1 TABLET BY MOUTH EVERY DAY AT NIGHT 90 tablet 1    warfarin (COUMADIN) 4 MG tablet TAKE 1 TABLET BY MOUTH DAILY AS DIRECTED BY PHYSICIAN. 90 tablet 1     No current facility-administered medications for this visit.        No Known Allergies    Family History   Problem Relation Age of Onset    Hypotension Mother    [facility] COPD Sister     Coronary Art Dis Sister     Diabetes Other        Social History     Socioeconomic History    Marital status:      Spouse name: Not on file    Number of children: Not on file    Years of education: Not on file    Highest education level: Not on file   Occupational History    Not on file   Tobacco Use    Smoking status: Former Smoker    Smokeless tobacco: Never Used   Vaping Use    Vaping Use: Never used   Substance and Sexual Activity    Alcohol use: No    Drug use: No    Sexual activity: Not on file   Other Topics Concern    Not on file   Social History Narrative    Not on file     Social Determinants of Health     Financial Resource Strain:     Difficulty of Paying Living Expenses: Not on file   Food Insecurity:     Worried About [address] in the Last Year: Not on file    [address] in the Last Year: Not on file   Transportation Needs:     Lack of Transportation (Medical): Not on file    Lack of Transportation (Non-Medical): Not on file   Physical Activity:     Days of Exercise per Week: Not on file    Minutes of Exercise per Session: Not on file   Stress:     Feeling of Stress : Not on file   Social Connections:     Frequency of Communication with Friends and Family: Not on file    Frequency of Social Gatherings with Friends and Family: Not on file    Attends Zoroastrian Services: Not on file    Active Member of Clubs or Organizations: Not on file    Attends Club or Organization Meetings: Not on file    Marital Status: Not on file   Intimate Partner Violence:     Fear of Current or Ex-Partner: Not on file    Emotionally Abused: Not on file    Physically Abused: Not on file    Sexually Abused: Not on file   Housing Stability:     Unable to Pay for Housing in the Last Year: Not on file    Number of [place] in the Last Year: Not on file    Unstable Housing in the Last Year: Not on file       ROS:   Review of Systems   Constitutional: Positive for fatigue. Negative for chills, diaphoresis and fever. HENT: Negative for congestion, ear discharge, ear pain, hearing loss, nosebleeds and tinnitus.     Eyes: Negative for photophobia, pain and discharge. Respiratory: Negative for shortness of breath. Cardiovascular: Negative for palpitations and leg swelling. Gastrointestinal: Negative for abdominal pain, blood in stool, constipation, diarrhea, nausea and vomiting. Endocrine: Negative for polydipsia. Genitourinary: Negative for frequency, hematuria and urgency. Musculoskeletal: Positive for arthralgias and gait problem. Negative for back pain and neck pain. Skin: Negative for rash. Allergic/Immunologic: Negative for environmental allergies. Neurological: Positive for weakness and light-headedness. Negative for tremors and seizures. Psychiatric/Behavioral: Negative for hallucinations and suicidal ideas. The patient is not nervous/anxious. Physical Exam:  /71   Pulse 77   Temp 97.2 °F (36.2 °C) (Temporal)   Resp 16   Ht 5' 9\" (1.753 m)   Wt 150 lb (68 kg)   BMI 22.15 kg/m²     PSYCH: mood and affect normal, alert and oriented x 3: No apparent distress, comfortable  EYES: Sclera white, pupils equal round and reactive to light  ENMT:  Hearing normal, trachea midline, ears externally intact  LYMPH: no obvious lympadenopathy in neck. RESP: Respiratory effort was normal with no retractions or use of accessory muscles.   CV:  No pedal edema  GI/ Abdomen: Soft, nondistended, nontender, no guarding, no peritoneal signs  MSK: no clubbing/ no cyanosis       Assessment/Plan:  Multiple BD-IPMN's with most concerning one at 1.5cm in the mid-pancreatic body causing pancreatic duct dilation   - I reviewed their images prior to our office visit and we also reviewed them together today   - we discussed that he has multiple cysts, however the most concerning one is his mid-body pancreatic cyst causing pancreatic duct dilation  - may need creon pending EUS results as he is noticing more liquid in his ostomy.  - EUS with Dr. Chris Urena - (open access due to travel issues)  - hold coumadin 5 days before EUS    20 Minutes of which greater than 50% was spent counseling or coordinating his care. Thank you for the consultation allowing me to take part in Mr. [facility] care. Please send a copy of my note to Dr. Silvia Kimble. Emigdio Stewart M.D.  2/25/2022  12:57 PM

## 2022-03-01 ENCOUNTER — TELEPHONE (OUTPATIENT)
Dept: SURGERY | Age: 87
End: 2022-03-01

## 2022-03-01 NOTE — TELEPHONE ENCOUNTER
Per the order of Dr. Lynsey Ocampo, patient has been scheduled for EUS with biopsy on 3.11.2022 at [address] [facility]. Patients contact provided with procedure information over the phone and informed that written information will be mailed to patients home as well. Patient to follow up with Dr. Marah Brasher office for results. .  Patient instructed to please contact our office with any questions. Procedure scheduled through [facility]. Dr. Lynsey Ocampo to enter orders.     Electronically signed by Isis Robbins on 3/1/22 at 3:55 PM EST

## 2022-03-03 ENCOUNTER — TELEPHONE (OUTPATIENT)
Dept: HEMATOLOGY | Age: 87
End: 2022-03-03

## 2022-03-03 NOTE — TELEPHONE ENCOUNTER
I called patients wife and made patient his EUS follow up appt for 3/17/22 at 2:45pm at [facility] clinic at [facility]. She confirmed this appt.     Electronically signed by Ramos Bautista RN on 3/3/2022 at 9:05 AM

## 2022-03-11 ENCOUNTER — HOSPITAL ENCOUNTER (OUTPATIENT)
Age: 87
Setting detail: OUTPATIENT SURGERY
Discharge: HOME | End: 2022-03-11
Attending: SURGERY | Admitting: SURGERY
Payer: MEDICARE

## 2022-03-11 ENCOUNTER — ANESTHESIA (OUTPATIENT)
Dept: ENDOSCOPY | Age: 87
End: 2022-03-11
Payer: MEDICARE

## 2022-03-11 ENCOUNTER — ANESTHESIA EVENT (OUTPATIENT)
Dept: ENDOSCOPY | Age: 87
End: 2022-03-11
Payer: MEDICARE

## 2022-03-11 VITALS
RESPIRATION RATE: 15 BRPM | OXYGEN SATURATION: 100 % | TEMPERATURE: 97 F | SYSTOLIC BLOOD PRESSURE: 116 MMHG | HEART RATE: 69 BPM | DIASTOLIC BLOOD PRESSURE: 76 MMHG | BODY MASS INDEX: 22.22 KG/M2 | WEIGHT: 150 LBS | HEIGHT: 69 IN

## 2022-03-11 VITALS
SYSTOLIC BLOOD PRESSURE: 123 MMHG | DIASTOLIC BLOOD PRESSURE: 78 MMHG | RESPIRATION RATE: 26 BRPM | OXYGEN SATURATION: 100 %

## 2022-03-11 DIAGNOSIS — Z01.812 PRE-OPERATIVE LABORATORY EXAMINATION: Primary | ICD-10-CM

## 2022-03-11 LAB — METER GLUCOSE: 147 MG/DL (ref 74–99)

## 2022-03-11 PROCEDURE — 2500000003 HC RX 250 WO HCPCS: Performed by: NURSE ANESTHETIST, CERTIFIED REGISTERED

## 2022-03-11 PROCEDURE — 43259 EGD US EXAM DUODENUM/JEJUNUM: CPT | Performed by: SURGERY

## 2022-03-11 PROCEDURE — 3609018500 HC EGD US SCOPE W/ADJACENT STRUCTURES: Performed by: SURGERY

## 2022-03-11 PROCEDURE — 3700000000 HC ANESTHESIA ATTENDED CARE: Performed by: SURGERY

## 2022-03-11 PROCEDURE — 82962 GLUCOSE BLOOD TEST: CPT

## 2022-03-11 PROCEDURE — 7100000011 HC PHASE II RECOVERY - ADDTL 15 MIN: Performed by: SURGERY

## 2022-03-11 PROCEDURE — 6360000002 HC RX W HCPCS: Performed by: NURSE ANESTHETIST, CERTIFIED REGISTERED

## 2022-03-11 PROCEDURE — 7100000010 HC PHASE II RECOVERY - FIRST 15 MIN: Performed by: SURGERY

## 2022-03-11 PROCEDURE — 2709999900 HC NON-CHARGEABLE SUPPLY: Performed by: SURGERY

## 2022-03-11 RX ORDER — LIDOCAINE HYDROCHLORIDE 20 MG/ML
INJECTION, SOLUTION INFILTRATION; PERINEURAL PRN
Status: DISCONTINUED | OUTPATIENT
Start: 2022-03-11 | End: 2022-03-11 | Stop reason: SDUPTHER

## 2022-03-11 RX ORDER — PROPOFOL 10 MG/ML
INJECTION, EMULSION INTRAVENOUS PRN
Status: DISCONTINUED | OUTPATIENT
Start: 2022-03-11 | End: 2022-03-11 | Stop reason: SDUPTHER

## 2022-03-11 RX ORDER — SODIUM CHLORIDE 0.9 % (FLUSH) 0.9 %
5-40 SYRINGE (ML) INJECTION EVERY 12 HOURS SCHEDULED
Status: DISCONTINUED | OUTPATIENT
Start: 2022-03-11 | End: 2022-03-11 | Stop reason: HOSPADM

## 2022-03-11 RX ORDER — SODIUM CHLORIDE 9 MG/ML
25 INJECTION, SOLUTION INTRAVENOUS PRN
Status: DISCONTINUED | OUTPATIENT
Start: 2022-03-11 | End: 2022-03-11 | Stop reason: HOSPADM

## 2022-03-11 RX ORDER — SODIUM CHLORIDE 0.9 % (FLUSH) 0.9 %
5-40 SYRINGE (ML) INJECTION PRN
Status: DISCONTINUED | OUTPATIENT
Start: 2022-03-11 | End: 2022-03-11 | Stop reason: HOSPADM

## 2022-03-11 RX ORDER — ONDANSETRON 2 MG/ML
INJECTION INTRAMUSCULAR; INTRAVENOUS PRN
Status: DISCONTINUED | OUTPATIENT
Start: 2022-03-11 | End: 2022-03-11 | Stop reason: SDUPTHER

## 2022-03-11 RX ADMIN — LIDOCAINE HYDROCHLORIDE 50 MG: 20 INJECTION, SOLUTION INFILTRATION; PERINEURAL at 09:23

## 2022-03-11 RX ADMIN — PROPOFOL 75 MG: 10 INJECTION, EMULSION INTRAVENOUS at 09:23

## 2022-03-11 RX ADMIN — ONDANSETRON HYDROCHLORIDE 4 MG: 2 SOLUTION INTRAMUSCULAR; INTRAVENOUS at 09:23

## 2022-03-11 ASSESSMENT — PULMONARY FUNCTION TESTS
PIF_VALUE: 1
PIF_VALUE: 0
PIF_VALUE: 1

## 2022-03-11 ASSESSMENT — PAIN - FUNCTIONAL ASSESSMENT: PAIN_FUNCTIONAL_ASSESSMENT: 0-10

## 2022-03-11 ASSESSMENT — PAIN SCALES - GENERAL
PAINLEVEL_OUTOF10: 0
PAINLEVEL_OUTOF10: 0
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
## 2022-03-11 NOTE — PROGRESS NOTES
Pt brought to Secondary recovery from ENDO. Bed in lowest position and locked, side rails up, and call light given to pt.

## 2022-03-11 NOTE — OP NOTE
Endoscopic Ultrasound Procedure Note    Date of Procedure: 3/11/2022    Pre-procedure Diagnosis: Pancreatic cyst    Post-procedure Diagnosis: Pancreatic cyst    Physician: Jose Luque MD    Assistant: None    Estimated Blood Loss: None    Anesthesia: LMAC     Complications: None    Indications and History:  The patient is a 80 y.o. male. The risks, benefits, complications, treatment options and expected outcomes were discussed with the patient. The possibilities of reaction to medication, pulmonary aspiration, perforation of the gastrointestinal tract, bleeding requiring transfusion or operation, respiratory failure requiring placement on a ventilator and failure to diagnose a condition were discussed with the patient who freely signed the consent. Description of Procedure: The patient was taken to the endoscopy suite, identified as Sammy Mallory and the procedure verified as Endoscopic Ultrasound (EUS). A Time Out was held and the above information confirmed. The patient was positioned in the left lateral position with an oral bite block and anesthesia was provided for sedation and comfort. The  echoendoscope was passed to the second portion of the duodenum. EGD/EUS findings:   Esophagus: normal   Stomach: normal   Duodenum: normal   Pancreas: Multiple cystic lesions throughout the pancreas. The largest 1 measures 18 mm in the pancreatic tail. There are also cysts lying along and parallel to the pancreatic duct in the pancreatic body, the pancreatic neck, the pancreatic head. No solid masses throughout the pancreas. There is no associated dilation of the main pancreatic duct which measures about 3 mm throughout. Bile Duct: normal   Gallbladder: normal      Specimens:  1. None    The Patient was taken to the Endoscopy Recovery area in satisfactory condition.       Electronically signed by Jose Luque MD on 3/11/2022 at 9:39 AM
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
## 2022-03-11 NOTE — ANESTHESIA POSTPROCEDURE EVALUATION
Department of Anesthesiology  Postprocedure Note    Patient: Earline Fallon  MRN: [id number]  YOB: 1935  Date of evaluation: 3/11/2022  Time:  11:23 AM     Procedure Summary     Date: 03/11/22 Room / Location: [facility] 03 / [facility]    Anesthesia Start: 4887 Anesthesia Stop: 6851    Procedure: EGD ESOPHAGOGASTRODUODENOSCOPY ULTRASOUND (N/A ) Diagnosis: (DILATED PANCREATIC DUCT)    Surgeons: Gonzalez Aguirre MD Responsible Provider: Cheyenne Vega MD    Anesthesia Type: MAC ASA Status: 3          Anesthesia Type: MAC    Radha Phase I: Radha Score: 10    Radha Phase II: Radha Score: 10    Last vitals: Reviewed and per EMR flowsheets.        Anesthesia Post Evaluation    Patient location during evaluation: PACU  Patient participation: complete - patient participated  Level of consciousness: awake  Pain score: 0  Airway patency: patent  Nausea & Vomiting: no nausea  Complications: no  Cardiovascular status: blood pressure returned to baseline  Respiratory status: acceptable  Hydration status: euvolemic

## 2022-03-14 ENCOUNTER — HOSPITAL ENCOUNTER (OUTPATIENT)
Age: 87
Discharge: HOME | End: 2022-03-14
Payer: MEDICARE

## 2022-03-14 LAB — PROSTATE SPECIFIC ANTIGEN: 32.75 NG/ML (ref 0–4)

## 2022-03-14 PROCEDURE — 84153 ASSAY OF PSA TOTAL: CPT

## 2022-03-14 PROCEDURE — 36415 COLL VENOUS BLD VENIPUNCTURE: CPT

## 2022-03-17 ENCOUNTER — OFFICE VISIT (OUTPATIENT)
Dept: HEMATOLOGY | Age: 87
End: 2022-03-17
Payer: MEDICARE

## 2022-03-17 VITALS
SYSTOLIC BLOOD PRESSURE: 122 MMHG | DIASTOLIC BLOOD PRESSURE: 63 MMHG | BODY MASS INDEX: 23.4 KG/M2 | TEMPERATURE: 97.5 F | HEIGHT: 69 IN | WEIGHT: 158 LBS | HEART RATE: 74 BPM | OXYGEN SATURATION: 99 % | RESPIRATION RATE: 16 BRPM

## 2022-03-17 DIAGNOSIS — D49.0 IPMN (INTRADUCTAL PAPILLARY MUCINOUS NEOPLASM): Primary | ICD-10-CM

## 2022-03-17 PROCEDURE — 99213 OFFICE O/P EST LOW 20 MIN: CPT | Performed by: TRANSPLANT SURGERY

## 2022-03-17 PROCEDURE — 99212 OFFICE O/P EST SF 10 MIN: CPT | Performed by: TRANSPLANT SURGERY

## 2022-03-17 NOTE — PROGRESS NOTES
Hepatobiliary and Pancreatic Surgery Attending History and Physical    Patient's Name/Date of Birth: Kiel Luther /1935 (24 y.o.)    Date: March 17, 2022     CC: pancreatic body cysts    HPI:  Patient is a very pleasant 80year old male who has a pouch for UC. He recently had a FIT test that was negative. He also has new mid thoracic back pain and his blood sugars are between 150-160. 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 He recently underwent a MRI and is seeing me in follow up where the cysts were much bigger. He states that his stools are more formed in his ileostomy. He is eating normal.  He underwent an EUS with Dr. [employer] 3/22 and is seeing me in follow up. His pancreatic EUS showed the following:  Multiple cystic lesions throughout the pancreas. The largest 1 measures 18 mm in the pancreatic tail. There are also cysts lying along and parallel to the pancreatic duct in the pancreatic body, the pancreatic neck, the pancreatic head. No solid masses throughout the pancreas. There is no associated dilation of the main pancreatic duct which measures about 3 mm throughout. Physical Exam:  /63   Pulse 74   Temp 97.5 °F (36.4 °C) (Temporal)   Resp 16   Ht 5' 9\" (1.753 m)   Wt 158 lb (71.7 kg)   SpO2 99%   BMI 23.33 kg/m²     PSYCH: mood and affect normal, alert and oriented x 3: No apparent distress, comfortable  EYES: Sclera white, pupils equal round and reactive to light  ENMT:  Hearing normal, trachea midline, ears externally intact  LYMPH: no obvious lympadenopathy in neck. RESP: Respiratory effort was normal with no retractions or use of accessory muscles.   CV:  No pedal edema  GI/ Abdomen: Soft, nondistended, nontender, no guarding, no peritoneal signs  MSK: no clubbing/ no cyanosis       Assessment/Plan:  Multiple BD-IPMN's with most concerning one at 1.8cm in the mid-pancreatic body  - we discussed his EUS results and need for long term follow up  - will plan for a MRI in 1 year  - follow up with Dr. Aguilar President for his anemia  - will see him back after his MRI in 1 year for comparison    20 Minutes of which greater than 50% was spent counseling or coordinating his care. Thank you for the consultation allowing me to take part in [facility]. [facility] care. Please send a copy of my note to Dr. Esther Albright. Emigdio Caballero M.D.  3/17/2022  3:21 PM

## 2022-03-21 ENCOUNTER — TELEPHONE (OUTPATIENT)
Dept: ONCOLOGY | Age: 87
End: 2022-03-21

## 2022-03-21 NOTE — TELEPHONE ENCOUNTER
Patient is scheduled for 04/26/22, 4 month follow up appointment with Dr. Arlyn Mejia. Patient called and cancelled this appointment, stating he doesn't need to come to our office. Patient stated he is not going to reschedule, doesn't see why he has to come back. Encouraged patient to please call our office to reschedule if he changes his mind.

## 2022-06-13 ENCOUNTER — APPOINTMENT (OUTPATIENT)
Dept: GENERAL RADIOLOGY | Age: 87
End: 2022-06-13
Payer: MEDICARE

## 2022-06-13 LAB
ALBUMIN SERPL-MCNC: 3.9 G/DL (ref 3.5–5.2)
ALP BLD-CCNC: 111 U/L (ref 40–129)
ALT SERPL-CCNC: 13 U/L (ref 0–40)
ANION GAP SERPL CALCULATED.3IONS-SCNC: 10 MMOL/L (ref 7–16)
APTT: 35.5 SEC (ref 24.5–35.1)
AST SERPL-CCNC: 33 U/L (ref 0–39)
BASOPHILS ABSOLUTE: 0.02 E9/L (ref 0–0.2)
BASOPHILS RELATIVE PERCENT: 0.5 % (ref 0–2)
BILIRUB SERPL-MCNC: 0.3 MG/DL (ref 0–1.2)
BUN BLDV-MCNC: 27 MG/DL (ref 6–23)
CALCIUM SERPL-MCNC: 9 MG/DL (ref 8.6–10.2)
CHLORIDE BLD-SCNC: 106 MMOL/L (ref 98–107)
CO2: 23 MMOL/L (ref 22–29)
CREAT SERPL-MCNC: 1.7 MG/DL (ref 0.7–1.2)
EOSINOPHILS ABSOLUTE: 0.08 E9/L (ref 0.05–0.5)
EOSINOPHILS RELATIVE PERCENT: 1.9 % (ref 0–6)
GFR AFRICAN AMERICAN: 46
GFR NON-AFRICAN AMERICAN: 46 ML/MIN/1.73
GLUCOSE BLD-MCNC: 127 MG/DL (ref 74–99)
HCT VFR BLD CALC: 34.4 % (ref 37–54)
HEMOGLOBIN: 10.4 G/DL (ref 12.5–16.5)
IMMATURE GRANULOCYTES #: 0.01 E9/L
IMMATURE GRANULOCYTES %: 0.2 % (ref 0–5)
INR BLD: 1.6
LYMPHOCYTES ABSOLUTE: 1.04 E9/L (ref 1.5–4)
LYMPHOCYTES RELATIVE PERCENT: 24.3 % (ref 20–42)
MCH RBC QN AUTO: 26.1 PG (ref 26–35)
MCHC RBC AUTO-ENTMCNC: 30.2 % (ref 32–34.5)
MCV RBC AUTO: 86.2 FL (ref 80–99.9)
MONOCYTES ABSOLUTE: 0.44 E9/L (ref 0.1–0.95)
MONOCYTES RELATIVE PERCENT: 10.3 % (ref 2–12)
NEUTROPHILS ABSOLUTE: 2.69 E9/L (ref 1.8–7.3)
NEUTROPHILS RELATIVE PERCENT: 62.8 % (ref 43–80)
PDW BLD-RTO: 14.8 FL (ref 11.5–15)
PLATELET # BLD: 91 E9/L (ref 130–450)
PLATELET CONFIRMATION: NORMAL
PMV BLD AUTO: 10.8 FL (ref 7–12)
POTASSIUM SERPL-SCNC: 4.3 MMOL/L (ref 3.5–5)
PRO-BNP: 1962 PG/ML (ref 0–450)
PROTHROMBIN TIME: 17.5 SEC (ref 9.3–12.4)
RBC # BLD: 3.99 E12/L (ref 3.8–5.8)
SARS-COV-2, NAAT: NOT DETECTED
SODIUM BLD-SCNC: 139 MMOL/L (ref 132–146)
TOTAL PROTEIN: 7.4 G/DL (ref 6.4–8.3)
TROPONIN, HIGH SENSITIVITY: 23 NG/L (ref 0–11)
WBC # BLD: 4.3 E9/L (ref 4.5–11.5)

## 2022-06-13 PROCEDURE — 96374 THER/PROPH/DIAG INJ IV PUSH: CPT

## 2022-06-13 PROCEDURE — 83880 ASSAY OF NATRIURETIC PEPTIDE: CPT

## 2022-06-13 PROCEDURE — 85610 PROTHROMBIN TIME: CPT

## 2022-06-13 PROCEDURE — 87635 SARS-COV-2 COVID-19 AMP PRB: CPT

## 2022-06-13 PROCEDURE — 80053 COMPREHEN METABOLIC PANEL: CPT

## 2022-06-13 PROCEDURE — 85025 COMPLETE CBC W/AUTO DIFF WBC: CPT

## 2022-06-13 PROCEDURE — 93005 ELECTROCARDIOGRAM TRACING: CPT | Performed by: PHYSICIAN ASSISTANT

## 2022-06-13 PROCEDURE — 85730 THROMBOPLASTIN TIME PARTIAL: CPT

## 2022-06-13 PROCEDURE — 71046 X-RAY EXAM CHEST 2 VIEWS: CPT

## 2022-06-13 PROCEDURE — 99285 EMERGENCY DEPT VISIT HI MDM: CPT

## 2022-06-13 PROCEDURE — 84484 ASSAY OF TROPONIN QUANT: CPT

## 2022-06-13 NOTE — ED NOTES
FIRST PROVIDER CONTACT ASSESSMENT NOTE           Department of Emergency Medicine                 First Provider Note            22  5:23 PM EDT    Date of Encounter: No admission date for patient encounter. Patient Name: Marija Gray  : 1935  MRN: [id number]    Chief Complaint: Shortness of Breath (SOB on ACEVEDO. Hx of Afib and currently on Coumadin)      History of Present Illness:   Marija Gray is a 80 y.o. male who presents to the ED for shortness of breath. Denies chest pain. Focused Physical Exam:  VS:    ED Triage Vitals   BP Temp Temp src Heart Rate Resp SpO2 Height Weight   22 1721 22 1616 -- 22 1616 22 1721 22 1616 -- 22 1721   (!) 146/78 98.5 °F (36.9 °C)  91 18 98 %  158 lb (71.7 kg)        Physical Ex: Constitutional: Alert and non-toxic. Medical History:  has a past medical history of A-fib ([address] Chronic kidney disease, Chronic renal impairment, stage 3 (moderate) ([address] Colitis, ulcerative chronic ([address] Diabetes mellitus ([address] DM (diabetes mellitus) ([address] Encounter for therapeutic drug monitoring, Hyperlipidemia, Hyperlipidemia associated with type 2 diabetes mellitus ([address] Hypertension, Long term (current) use of anticoagulants, Prostate enlargement, Stroke (cerebrum) ([address] and Unspecified cerebral artery occlusion with cerebral infarction. Surgical History:  has a past surgical history that includes ECHO Compl W Dop Color Flow (2013); ECHO Transesophageal (2013); colostomy (); Cystoscopy (more than 5 yrs); TURP (14); Colonoscopy; Abdomen surgery; Endoscopy, colon, diagnostic (12/10/15); and Upper gastrointestinal endoscopy (N/A, 3/11/2022). Social History:  reports that he has quit smoking. He has never used smokeless tobacco. He reports that he does not drink alcohol and does not use drugs.   Family History: family history includes COPD in his sister; Coronary Art Dis in his sister; Diabetes in an other family

## 2022-06-14 ENCOUNTER — HOSPITAL ENCOUNTER (EMERGENCY)
Age: 87
Discharge: HOME | End: 2022-06-14
Attending: EMERGENCY MEDICINE
Payer: MEDICARE

## 2022-06-14 ENCOUNTER — APPOINTMENT (OUTPATIENT)
Dept: CT IMAGING | Age: 87
End: 2022-06-14
Payer: MEDICARE

## 2022-06-14 VITALS
SYSTOLIC BLOOD PRESSURE: 139 MMHG | HEART RATE: 87 BPM | OXYGEN SATURATION: 99 % | BODY MASS INDEX: 23.33 KG/M2 | TEMPERATURE: 98.1 F | WEIGHT: 158 LBS | RESPIRATION RATE: 18 BRPM | DIASTOLIC BLOOD PRESSURE: 75 MMHG

## 2022-06-14 DIAGNOSIS — R06.02 SHORTNESS OF BREATH: Primary | ICD-10-CM

## 2022-06-14 LAB
EKG ATRIAL RATE: 241 BPM
EKG Q-T INTERVAL: 362 MS
EKG QRS DURATION: 70 MS
EKG QTC CALCULATION (BAZETT): 412 MS
EKG R AXIS: 73 DEGREES
EKG T AXIS: 71 DEGREES
EKG VENTRICULAR RATE: 78 BPM
METER GLUCOSE: 130 MG/DL (ref 74–99)
TROPONIN, HIGH SENSITIVITY: 26 NG/L (ref 0–11)

## 2022-06-14 PROCEDURE — 6360000002 HC RX W HCPCS: Performed by: INTERNAL MEDICINE

## 2022-06-14 PROCEDURE — 82962 GLUCOSE BLOOD TEST: CPT

## 2022-06-14 PROCEDURE — 6360000004 HC RX CONTRAST MEDICATION: Performed by: STUDENT IN AN ORGANIZED HEALTH CARE EDUCATION/TRAINING PROGRAM

## 2022-06-14 PROCEDURE — 71275 CT ANGIOGRAPHY CHEST: CPT

## 2022-06-14 PROCEDURE — 2580000003 HC RX 258: Performed by: STUDENT IN AN ORGANIZED HEALTH CARE EDUCATION/TRAINING PROGRAM

## 2022-06-14 PROCEDURE — 84484 ASSAY OF TROPONIN QUANT: CPT

## 2022-06-14 RX ORDER — SIMVASTATIN 20 MG
20 TABLET ORAL NIGHTLY
COMMUNITY

## 2022-06-14 RX ORDER — DIPHENOXYLATE HYDROCHLORIDE AND ATROPINE SULFATE 2.5; .025 MG/1; MG/1
1 TABLET ORAL 2 TIMES DAILY
COMMUNITY

## 2022-06-14 RX ORDER — WARFARIN SODIUM 4 MG/1
4 TABLET ORAL
COMMUNITY

## 2022-06-14 RX ORDER — SODIUM CHLORIDE 0.9 % (FLUSH) 0.9 %
10 SYRINGE (ML) INJECTION ONCE
Status: COMPLETED | OUTPATIENT
Start: 2022-06-14 | End: 2022-06-14

## 2022-06-14 RX ORDER — WARFARIN SODIUM 2 MG/1
2 TABLET ORAL
COMMUNITY
End: 2022-06-14

## 2022-06-14 RX ORDER — WARFARIN SODIUM 2 MG/1
2 TABLET ORAL
COMMUNITY

## 2022-06-14 RX ORDER — SODIUM BICARBONATE 650 MG/1
650 TABLET ORAL 2 TIMES DAILY
COMMUNITY

## 2022-06-14 RX ORDER — FUROSEMIDE 10 MG/ML
40 INJECTION INTRAMUSCULAR; INTRAVENOUS ONCE
Status: COMPLETED | OUTPATIENT
Start: 2022-06-14 | End: 2022-06-14

## 2022-06-14 RX ADMIN — IOPAMIDOL 60 ML: 755 INJECTION, SOLUTION INTRAVENOUS at 11:34

## 2022-06-14 RX ADMIN — FUROSEMIDE 40 MG: 10 INJECTION, SOLUTION INTRAMUSCULAR; INTRAVENOUS at 12:22

## 2022-06-14 RX ADMIN — Medication 10 ML: at 11:34

## 2022-06-14 NOTE — ED NOTES
Patient sitting in waiting area, vitals retaken, patient family member updated on room status and another warm blanket provided to patient no s/sx of distress, zero complaints of pain or discomfort at this time. Will continue to monitor.      Jayesh Perez LPN  36/18/07 [id number]

## 2022-06-14 NOTE — ED NOTES
Patient and wife brought back into triage to be updated on labs and imaging results. Patient does have elevated proBNP has been complaining of worsening shortness of breath. Patient is on Coumadin, he is not on any diuretics, I did make him aware that we are still waiting up bed assignment, apology provided for long wait there are encouraged to continue to stay. Patient at this time is not hypoxic, pulse ox 98% on room air. Patient agreeable to wait and apology once again provided for very long wait.      Carlos Suh, FILIPPO - CNP  06/14/22 0015

## 2022-06-14 NOTE — ED PROVIDER NOTES
ATTENDING PROVIDER ATTESTATION:     Christoph Choi presented to the emergency department for evaluation of Shortness of Breath (SOB on ACEVEDO. Hx of Afib and currently on Coumadin)   and was initially evaluated by the Medical Resident. See Original ED Note for H&P and ED course above. I have reviewed and discussed the case, including pertinent history (medical, surgical, family and social) and exam findings with the Medical Resident assigned to Christoph Choi. I have personally performed and/or participated in the history, exam, medical decision making, and procedures and agree with all pertinent clinical information and any additional changes or corrections are noted below in my assessment and plan. I have discussed this patient in detail with the resident, and provided the instruction and education,       I have reviewed my findings and recommendations with the assigned Medical Resident, Christoph Choi and members of family present at the time of disposition. I have performed a history and physical examination of this patient and directly supervised the resident caring for this patient    I directly supervised any procedures performed by the resident and was present for the procedure including all critical portions of the procedure          Department of Emergency Medicine   ED  Provider Note  Admit Date/RoomTime: 6/14/2022  9:43 AM  ED Room: 23/23          History of Present Illness:  6/14/22, Time: 12:11 PM EDT  Chief Complaint   Patient presents with    Shortness of Breath     SOB on ACEVEDO. Hx of Afib and currently on Coumadin                Christoph Choi is a 80 y.o. male presenting to the ED for shortness of breath, beginning yesterday. The complaint has been intermittent, moderate in severity, and worsened by activity. Patient reports that he thinks he overdid it on Sunday. He reports that he was very busy and he went to the hardware store, went to pay bills, and did a bunch of activity.   He says that \"his age Moody Lexy thinks he did too much. He never had chest pain. He said he just felt short of breath. He reports this is happened before. He denies fever or chills. He denies abdominal pain. No cough. He denies orthopnea. No leg swelling. He has a history of A. fib. He also reports he is retaining some fluid in the past.  He is anticoagulated with warfarin. He denies any chest pain. No exertional chest pain. He said he did not feel short of breath while he was doing his activity but felt tired afterwards. Review of Systems:   A complete review of systems was performed and pertinent positives and negatives are stated within HPI, all other systems reviewed and are negative.        --------------------------------------------- PAST HISTORY ---------------------------------------------  Past Medical History:  has a past medical history of A-fib ([facility] [address] Chronic kidney disease, Chronic renal impairment, stage 3 (moderate) ([address] Colitis, ulcerative chronic ([facility] [address] Diabetes mellitus ([address] DM (diabetes mellitus) ([facility] [address] Encounter for therapeutic drug monitoring, Hyperlipidemia, Hyperlipidemia associated with type 2 diabetes mellitus ([facility] [address] Hypertension, Long term (current) use of anticoagulants, Prostate enlargement, Stroke (cerebrum) ([address] and Unspecified cerebral artery occlusion with cerebral infarction. Past Surgical History:  has a past surgical history that includes ECHO Compl W Dop Color Flow (11/26/2013); ECHO Transesophageal (11/27/2013); colostomy (1978); Cystoscopy (more than 5 yrs); TURP (6/16/14); Colonoscopy; Abdomen surgery; Endoscopy, colon, diagnostic (12/10/15); and Upper gastrointestinal endoscopy (N/A, 3/11/2022). Social History:  reports that he has quit smoking. He has never used smokeless tobacco. He reports that he does not drink alcohol and does not use drugs.     Family History: family history includes COPD in his sister; Coronary Art Dis in his sister; Diabetes in an other family member; Hypotension in his mother. . Unless otherwise noted, family history is non contributory    The patients home medications have been reviewed. Allergies: Patient has no known allergies. I have reviewed the past medical history, past surgical history, social history, and family history    ---------------------------------------------------PHYSICAL EXAM--------------------------------------    Constitutional/General: Alert and oriented x3  Head: Normocephalic and atraumatic  Eyes: PERRL, EOMI, sclera non icteric  ENT: Oropharynx clear, handling secretions  Neck: Supple, full ROM, no stridor, no meningeal signs  Respiratory: Lungs with slightly decreased breath sounds bilaterally, otherwise clear. Not in respiratory distress  Cardiovascular:  Irregularly irregular. No murmurs, no gallops, no rubs. 2+ distal pulses. Equal extremity pulses. Gastrointestinal:  Abdomen Soft, Non tender, Non distended. No rebound, guarding, or rigidity. No pulsatile masses. Musculoskeletal: Moves all extremities x 4. Warm and well perfused, no clubbing, no cyanosis, no edema. Capillary refill <3 seconds  Skin: skin warm and dry. No rashes. Neurologic: GCS 15, no focal deficits, symmetric strength 5/5 in the upper and lower extremities bilaterally  Psychiatric: Normal Affect          -------------------------------------------------- RESULTS -------------------------------------------------  I have personally reviewed all laboratory and imaging results for this patient. Results are listed below.      LABS: (Lab results interpreted by me)  Results for orders placed or performed during the hospital encounter of 06/14/22   COVID-19, Rapid    Specimen: Nasopharyngeal Swab   Result Value Ref Range    SARS-CoV-2, NAAT Not Detected Not Detected   CBC with Auto Differential   Result Value Ref Range    WBC 4.3 (L) 4.5 - 11.5 E9/L    RBC 3.99 3.80 - 5.80 E12/L    Hemoglobin 10.4 (L) 12.5 - 16.5 g/dL    Hematocrit 34.4 (L) 37.0 - 54.0 % MCV 86.2 80.0 - 99.9 fL    MCH 26.1 26.0 - 35.0 pg    MCHC 30.2 (L) 32.0 - 34.5 %    RDW 14.8 11.5 - 15.0 fL    Platelets 91 (L) 183 - 450 E9/L    MPV 10.8 7.0 - 12.0 fL    Neutrophils % 62.8 43.0 - 80.0 %    Immature Granulocytes % 0.2 0.0 - 5.0 %    Lymphocytes % 24.3 20.0 - 42.0 %    Monocytes % 10.3 2.0 - 12.0 %    Eosinophils % 1.9 0.0 - 6.0 %    Basophils % 0.5 0.0 - 2.0 %    Neutrophils Absolute 2.69 1.80 - 7.30 E9/L    Immature Granulocytes # 0.01 E9/L    Lymphocytes Absolute 1.04 (L) 1.50 - 4.00 E9/L    Monocytes Absolute 0.44 0.10 - 0.95 E9/L    Eosinophils Absolute 0.08 0.05 - 0.50 E9/L    Basophils Absolute 0.02 0.00 - 0.20 E9/L   Comprehensive Metabolic Panel   Result Value Ref Range    Sodium 139 132 - 146 mmol/L    Potassium 4.3 3.5 - 5.0 mmol/L    Chloride 106 98 - 107 mmol/L    CO2 23 22 - 29 mmol/L    Anion Gap 10 7 - 16 mmol/L    Glucose 127 (H) 74 - 99 mg/dL    BUN 27 (H) 6 - 23 mg/dL    CREATININE 1.7 (H) 0.7 - 1.2 mg/dL    GFR Non-African American 46 >=60 mL/min/1.73    GFR African American 46     Calcium 9.0 8.6 - 10.2 mg/dL    Total Protein 7.4 6.4 - 8.3 g/dL    Albumin 3.9 3.5 - 5.2 g/dL    Total Bilirubin 0.3 0.0 - 1.2 mg/dL    Alkaline Phosphatase 111 40 - 129 U/L    ALT 13 0 - 40 U/L    AST 33 0 - 39 U/L   Brain Natriuretic Peptide   Result Value Ref Range    Pro-BNP 1,962 (H) 0 - 450 pg/mL   Troponin   Result Value Ref Range    Troponin, High Sensitivity 23 (H) 0 - 11 ng/L   Protime-INR   Result Value Ref Range    Protime 17.5 (H) 9.3 - 12.4 sec    INR 1.6    APTT   Result Value Ref Range    aPTT 35.5 (H) 24.5 - 35.1 sec   Platelet Confirmation   Result Value Ref Range    Platelet Confirmation CONFIRMED    Troponin   Result Value Ref Range    Troponin, High Sensitivity 26 (H) 0 - 11 ng/L   POCT Glucose   Result Value Ref Range    Meter Glucose 130 (H) 74 - 99 mg/dL   EKG 12 Lead   Result Value Ref Range    Ventricular Rate 78 BPM    Atrial Rate 241 BPM    QRS Duration 70 ms    Q-T Interval 362 ms    QTc Calculation (Bazett) 412 ms    R Axis 73 degrees    T Axis 71 degrees   ,       RADIOLOGY:  Interpreted by Radiologist unless otherwise specified  CTA PULMONARY W CONTRAST   Final Result   No evidence of pulmonary embolism or acute pulmonary abnormality. RECOMMENDATIONS:   Unavailable         XR CHEST (2 VW)   Final Result   No acute cardiopulmonary pathology. Stable chest series. EKG Interpretation  Interpreted by emergency department physician, Dr. Melchor Segal     Date of EK22  Time: 1736    Rhythm: atrial fibrillation - controlled  Rate: normal  Axis: normal  Conduction: normal  ST Segments: no acute change  T Waves: no acute change    Clinical Impression: atrial fibrillation with rapid ventricular response  Comparison to prior EKG: stable as compared to patient's most recent EKG      ------------------------- NURSING NOTES AND VITALS REVIEWED ---------------------------   The nursing notes within the ED encounter and vital signs as below have been reviewed by myself  BP (!) 141/76   Pulse 90   Temp 98.2 °F (36.8 °C) (Oral)   Resp 20   Wt 158 lb (71.7 kg)   SpO2 99%   BMI 23.33 kg/m²     Oxygen Saturation Interpretation: Normal    The patients available past medical records and past encounters were reviewed. ------------------------------ ED COURSE/MEDICAL DECISION MAKING----------------------  Medications   furosemide (LASIX) injection 40 mg (has no administration in time range)   sodium chloride flush 0.9 % injection 10 mL (10 mLs IntraVENous Given 22 1134)   iopamidol (ISOVUE-370) 76 % injection 60 mL (60 mLs IntraVENous Given 22 1134)           The cardiac monitor revealed atrial fibrillation with a heart rate in the 90s as interpreted by me. The cardiac monitor was ordered secondary to the patient's shortness of breath and to monitor the patient for dysrhythmia.    CPT [id number]       I, Dr. Melchor Segal, am the primary provider of record    Medical Decision Making:   Patient reports that every single symptom has resolved. He feels 100% better. He is not short of breath at all. He has ambulated here without any difficulty. He denies chest pain and says he never had chest pain. Troponin with nonsignificant delta. Does have slightly elevated BNP but may be secondary to his renal disease although may have some component of heart failure although his lungs are essentially clear. Will give 1 dose of lasix. Not hypoxemic, not tachypneic, breathing normally. Eating and drinking here. He says he feels fine. CTA negative for PE. No chest pain. No indication for admission. Chronic afib as well. Oxygen Saturation Interpretation: 99 % on RA. Re-Evaluations:  ED Course as of 06/14/22 1220   Tue Jun 14, 2022   9450 EKG 12 Lead [MM]      ED Course User Index  [MM] Marcello Swartz MD       Improving, he feels completely fine      This patient's ED course included: a personal history and physicial examination, re-evaluation prior to disposition, multiple bedside re-evaluations, IV medications, cardiac monitoring, continuous pulse oximetry and complex medical decision making and emergency management    This patient has remained hemodynamically stable during their ED course. Counseling: The emergency provider has spoken with the patient and discussed todays results, in addition to providing specific details for the plan of care and counseling regarding the diagnosis and prognosis. Questions are answered at this time and they are agreeable with the plan.       --------------------------------- IMPRESSION AND DISPOSITION ---------------------------------    IMPRESSION  1. Shortness of breath        DISPOSITION  Disposition: Discharge to home  Patient condition is good        NOTE: This report was transcribed using voice recognition software.  Every effort was made to ensure accuracy; however, inadvertent computerized transcription errors may be present       Karo Leija MD  06/14/22 3526

## 2022-06-14 NOTE — ED PROVIDER NOTES
Chief Complaint   Patient presents with    Shortness of Breath     SOB on ACEVEDO. Hx of Afib and currently on Coumadin       HPI  Be Andrews is a 80 y.o. male with a PMHx significant for A. fib (on Coumadin), HTN, HLD, diabetes who presents with shortness of breath. He was shopping at [facility] [employer] yesterday with subsequent shortness of breath that did not improve with rest.  He then went to the ED after an hour of having shortness of breath. He denies any other symptoms including fever, cough/colds, chest pain, palpitations, orthopnea, leg edema. He reports that while he was waiting for a bed at the ER, his symptoms have resolved upon initial interview. No other complaints. Tobacco Hx:   reports that he has quit smoking. He has never used smokeless tobacco.    Alcohol Hx:   reports no history of alcohol use. Illicit Drug Hx:   reports no history of drug use. The history is provided by the patient. Last Tetanus (if applicable): n/a    Review of Systems:   A complete review of systems was performed and pertinent positives and negatives are stated within the HPI, all other systems reviewed and are negative.     Physical Exam:  GEN: Cooperative, well-developed, well-nourished adult in NAD; pt appears stated age  Head: Normocephalic and atraumatic; no tenderness to palpation anywhere  Eyes: PERRL, EOMI, conjunctiva clear, cornea without gross abnormality, no visual deficits noted b/l  Ears: External ear normal-appearing and non-tender b/l; no hearing deficit noted  Nose: Nares patent and free of debris; septum midline; mucosa appears normal; no drainage noted  Throat: Oral mucosa moist and pink with no lesions noted; dentition wnl; no posterior pharyngeal erythema or edema; tonsils are not swollen and there is no exudate noted; no post-nasal drip  Neck: Supple and symmetrical with midline trachea; no cervical or supraclavicular lymphadenopathy noted; thyroid not palpated, but no tenderness or masses noted in the region; normal ROM with no meningeal signs  Lungs: LCTAB with no wheezes, rhonchi or rales noted; no respiratory distress, breathing unlabored   CV: RRR, no murmurs, gallops or rubs noted on auscultation, normal S1/S2; UE and LE distal pulses intact b/l +2/4 with no cyanosis noted; no LE edema noted b/l; capillary refill < 2 seconds  ABD: Soft, non-tender, non-distended, no organomegaly, hernias or masses noted  /Rectal: no suprapubic tenderness; remainder of exam deferred  MSK: UEs and LEs without notable trauma, ROM restriction or tenderness to palpation b/l; normal strength throughout  Skin: Skin warm and dry without notable rash, erythema, bruising or lesion; normal turgor  Neuro: A&O x3; CN II-XII appear grossly intact; no focal deficits appreciated; pt thoughtful in discussion and able to answer all questions without issue  Psych: normal attention with no obvious AVH, normal mood, normal affect, no SI/HI; patient with appropriate judgement    ---------------------------------- PAST HISTORY ---------------------------------------------  Past Medical History:  has a past medical history of A-fib ([facility] [address] Chronic kidney disease, Chronic renal impairment, stage 3 (moderate) ([address] Colitis, ulcerative chronic ([address] Diabetes mellitus ([address] DM (diabetes mellitus) ([address] Encounter for therapeutic drug monitoring, Hyperlipidemia, Hyperlipidemia associated with type 2 diabetes mellitus ([address] Hypertension, Long term (current) use of anticoagulants, Prostate enlargement, Stroke (cerebrum) ([address] and Unspecified cerebral artery occlusion with cerebral infarction. Past Surgical History:  has a past surgical history that includes ECHO Compl W Dop Color Flow (11/26/2013); ECHO Transesophageal (11/27/2013); colostomy (1978); Cystoscopy (more than 5 yrs); TURP (6/16/14); Colonoscopy; Abdomen surgery; Endoscopy, colon, diagnostic (12/10/15); and Upper gastrointestinal endoscopy (N/A, 3/11/2022).     Social History:  reports that he has quit smoking. He has never used smokeless tobacco. He reports that he does not drink alcohol and does not use drugs. Family History: family history includes COPD in his sister; Coronary Art Dis in his sister; Diabetes in an other family member; Hypotension in his mother. Home Meds: Not in a hospital admission. The patients home medications have been reviewed. Allergies: Patient has no known allergies. ------------------------- NURSING NOTES AND VITALS REVIEWED ---------------------------  Date / Time Roomed:  6/14/2022  9:43 AM  ED Bed Assignment:  23/23    The nursing notes within the ED encounter and vital signs as below have been reviewed. BP (!) 141/76   Pulse 90   Temp 98.2 °F (36.8 °C) (Oral)   Resp 20   Wt 158 lb (71.7 kg)   SpO2 99%   BMI 23.33 kg/m²   -------------------------------------------------- RESULTS / INTERVENTIONS -------------------------------------------------  All laboratory and radiology tests have been reviewed by this physician.     LABS:  Results for orders placed or performed during the hospital encounter of 06/14/22   COVID-19, Rapid    Specimen: Nasopharyngeal Swab   Result Value Ref Range    SARS-CoV-2, NAAT Not Detected Not Detected   CBC with Auto Differential   Result Value Ref Range    WBC 4.3 (L) 4.5 - 11.5 E9/L    RBC 3.99 3.80 - 5.80 E12/L    Hemoglobin 10.4 (L) 12.5 - 16.5 g/dL    Hematocrit 34.4 (L) 37.0 - 54.0 %    MCV 86.2 80.0 - 99.9 fL    MCH 26.1 26.0 - 35.0 pg    MCHC 30.2 (L) 32.0 - 34.5 %    RDW 14.8 11.5 - 15.0 fL    Platelets 91 (L) 097 - 450 E9/L    MPV 10.8 7.0 - 12.0 fL    Neutrophils % 62.8 43.0 - 80.0 %    Immature Granulocytes % 0.2 0.0 - 5.0 %    Lymphocytes % 24.3 20.0 - 42.0 %    Monocytes % 10.3 2.0 - 12.0 %    Eosinophils % 1.9 0.0 - 6.0 %    Basophils % 0.5 0.0 - 2.0 %    Neutrophils Absolute 2.69 1.80 - 7.30 E9/L    Immature Granulocytes # 0.01 E9/L    Lymphocytes Absolute 1.04 (L) 1.50 - 4.00 E9/L    Monocytes Absolute 0. 44 0.10 - 0.95 E9/L    Eosinophils Absolute 0.08 0.05 - 0.50 E9/L    Basophils Absolute 0.02 0.00 - 0.20 E9/L   Comprehensive Metabolic Panel   Result Value Ref Range    Sodium 139 132 - 146 mmol/L    Potassium 4.3 3.5 - 5.0 mmol/L    Chloride 106 98 - 107 mmol/L    CO2 23 22 - 29 mmol/L    Anion Gap 10 7 - 16 mmol/L    Glucose 127 (H) 74 - 99 mg/dL    BUN 27 (H) 6 - 23 mg/dL    CREATININE 1.7 (H) 0.7 - 1.2 mg/dL    GFR Non-African American 46 >=60 mL/min/1.73    GFR African American 46     Calcium 9.0 8.6 - 10.2 mg/dL    Total Protein 7.4 6.4 - 8.3 g/dL    Albumin 3.9 3.5 - 5.2 g/dL    Total Bilirubin 0.3 0.0 - 1.2 mg/dL    Alkaline Phosphatase 111 40 - 129 U/L    ALT 13 0 - 40 U/L    AST 33 0 - 39 U/L   Brain Natriuretic Peptide   Result Value Ref Range    Pro-BNP 1,962 (H) 0 - 450 pg/mL   Troponin   Result Value Ref Range    Troponin, High Sensitivity 23 (H) 0 - 11 ng/L   Protime-INR   Result Value Ref Range    Protime 17.5 (H) 9.3 - 12.4 sec    INR 1.6    APTT   Result Value Ref Range    aPTT 35.5 (H) 24.5 - 35.1 sec   Platelet Confirmation   Result Value Ref Range    Platelet Confirmation CONFIRMED    Troponin   Result Value Ref Range    Troponin, High Sensitivity 26 (H) 0 - 11 ng/L   POCT Glucose   Result Value Ref Range    Meter Glucose 130 (H) 74 - 99 mg/dL   EKG 12 Lead   Result Value Ref Range    Ventricular Rate 78 BPM    Atrial Rate 241 BPM    QRS Duration 70 ms    Q-T Interval 362 ms    QTc Calculation (Bazett) 412 ms    R Axis 73 degrees    T Axis 71 degrees       RADIOLOGY: Interpreted by Radiologist unless otherwise noted. CTA PULMONARY W CONTRAST   Final Result   No evidence of pulmonary embolism or acute pulmonary abnormality. RECOMMENDATIONS:   Unavailable         XR CHEST (2 VW)   Final Result   No acute cardiopulmonary pathology. Stable chest series. EKG: As interpreted by this ER physician.   Rate: 78  Rhythm: atrial fibrillation  Interpretation: afib, rate controlled    Oxygen Saturation Interpretation: 99% - normal    Meds Given:  Medications   sodium chloride flush 0.9 % injection 10 mL (10 mLs IntraVENous Given 6/14/22 1134)   iopamidol (ISOVUE-370) 76 % injection 60 mL (60 mLs IntraVENous Given 6/14/22 1134)   furosemide (LASIX) injection 40 mg (40 mg IntraVENous Given 6/14/22 1222)       Procedures:  No procedures performed. --------------------------------- PROGRESS NOTES / ADDITIONAL PROVIDER NOTES ---------------------------------  Consultations:  As outlined below. ED Course:  ED Course as of 06/14/22 1225 Tue Jun 14, 2022   0951 EKG 12 Lead [MM]   4234 CTA chest negative for PE. We will give 1 dose of Lasix 40 mg IV for elevated proBNP. [RD]      ED Course User Index  [MM] Melissa Sinha MD  [RD] Gilmer Bloom MD       12:25 PM: All results were discussed with the patient and I have provided specific details regarding the plan of care, diagnosis and associated prognosis. The patient tolerated the visit well and, at the time of discharge, was without objective evidence of hemodynamic instability or an acute process requiring hospitalization and inpatient management. The patient was seen by myself and the assigned attending physician, Raisa Travis MD, who agreed with my assessment and plan as laid out herein. The importance of follow-up was discussed at the end of the visit and I recommended that the patient be seen by their primary care physician in 2-3 days. The patient verbalized their understanding and agreement with the plan as presented and stated their intention to follow up. Reasons to return to the ER or seek immediate evaluation by a medical provider were discussed at length and all questions were answered to the highest degree of accuracy possible based on the current information available. At this time the patient is stable and safe for discharge.     MDM:  Patient presented with a PMHx significant for A. fib (on Coumadin), HTN, HLD, diabetes, CKD who presents with shortness of breath. He was shopping at [facility] yesterday with subsequent shortness of breath that did not improve with rest.  He then went to the ED after an hour of having shortness of breath. On arrival, O2 saturation was normal and he was slightly hypertensive. EKG showed atrial fibrillation rate controlled. He had slightly elevated proBNP of 1962, troponin of 23>26 which is at his baseline, creatinine of 1.7 which is at his baseline. Chest x-ray showed no acute process. Patient notes his symptoms resolved spontaneously while waiting for bed in the ED. CTA chest negative for PE. We will give Lasix 40 mg IV x1 for elevated proBNP and discharge patient. Given the lack of findings necessitating further emergent intervention or hospitalization, the decision was made to discharge the patient with recommendations for follow up and symptomatic care. Reasons to return to the ER were discussed and all questions were answered. The patient was stable at the time of their disposition. New Prescriptions    No medications on file       Diagnosis:  1. Shortness of breath        Disposition:  Patient's disposition: Discharge to home. Patient's condition is stable. This patient was seen, examined and treated with Christiano Arroyo MD. All pertinent aspects of the patient's care were discussed with the attending physician.        Sully Manning MD  Resident  06/14/22 [facility]

## 2022-08-04 ENCOUNTER — HOSPITAL ENCOUNTER (EMERGENCY)
Age: 87
Discharge: HOME | End: 2022-08-04
Attending: EMERGENCY MEDICINE
Payer: MEDICARE

## 2022-08-04 ENCOUNTER — APPOINTMENT (OUTPATIENT)
Dept: GENERAL RADIOLOGY | Age: 87
End: 2022-08-04
Payer: MEDICARE

## 2022-08-04 VITALS
HEART RATE: 80 BPM | BODY MASS INDEX: 23.33 KG/M2 | DIASTOLIC BLOOD PRESSURE: 81 MMHG | WEIGHT: 158 LBS | OXYGEN SATURATION: 98 % | SYSTOLIC BLOOD PRESSURE: 160 MMHG | RESPIRATION RATE: 18 BRPM | TEMPERATURE: 97.8 F

## 2022-08-04 DIAGNOSIS — E11.9 TYPE 2 DIABETES MELLITUS TREATED WITH INSULIN (HCC): ICD-10-CM

## 2022-08-04 DIAGNOSIS — E16.2 HYPOGLYCEMIA: Primary | ICD-10-CM

## 2022-08-04 DIAGNOSIS — Z79.4 TYPE 2 DIABETES MELLITUS TREATED WITH INSULIN (HCC): ICD-10-CM

## 2022-08-04 LAB
ANION GAP SERPL CALCULATED.3IONS-SCNC: 12 MMOL/L (ref 7–16)
ANISOCYTOSIS: ABNORMAL
BASOPHILS ABSOLUTE: 0 E9/L (ref 0–0.2)
BASOPHILS RELATIVE PERCENT: 0.3 % (ref 0–2)
BUN BLDV-MCNC: 25 MG/DL (ref 6–23)
CALCIUM SERPL-MCNC: 9.4 MG/DL (ref 8.6–10.2)
CHLORIDE BLD-SCNC: 105 MMOL/L (ref 98–107)
CHP ED QC CHECK: NORMAL
CHP ED QC CHECK: NORMAL
CO2: 24 MMOL/L (ref 22–29)
CREAT SERPL-MCNC: 1.6 MG/DL (ref 0.7–1.2)
EKG ATRIAL RATE: 394 BPM
EKG Q-T INTERVAL: 382 MS
EKG QRS DURATION: 68 MS
EKG QTC CALCULATION (BAZETT): 415 MS
EKG R AXIS: 65 DEGREES
EKG T AXIS: 66 DEGREES
EKG VENTRICULAR RATE: 71 BPM
EOSINOPHILS ABSOLUTE: 0.1 E9/L (ref 0.05–0.5)
EOSINOPHILS RELATIVE PERCENT: 2.6 % (ref 0–6)
GFR AFRICAN AMERICAN: 50
GFR NON-AFRICAN AMERICAN: 50 ML/MIN/1.73
GLUCOSE BLD-MCNC: 132 MG/DL (ref 74–99)
GLUCOSE BLD-MCNC: 136 MG/DL
GLUCOSE BLD-MCNC: 73 MG/DL
HCT VFR BLD CALC: 34.1 % (ref 37–54)
HEMOGLOBIN: 10.6 G/DL (ref 12.5–16.5)
INR BLD: 2.3
LYMPHOCYTES ABSOLUTE: 0.51 E9/L (ref 1.5–4)
LYMPHOCYTES RELATIVE PERCENT: 13.1 % (ref 20–42)
MCH RBC QN AUTO: 26.6 PG (ref 26–35)
MCHC RBC AUTO-ENTMCNC: 31.1 % (ref 32–34.5)
MCV RBC AUTO: 85.7 FL (ref 80–99.9)
METAMYELOCYTES RELATIVE PERCENT: 1.7 % (ref 0–1)
METER GLUCOSE: 120 MG/DL (ref 74–99)
METER GLUCOSE: 136 MG/DL (ref 74–99)
METER GLUCOSE: 73 MG/DL (ref 74–99)
MONOCYTES ABSOLUTE: 0.31 E9/L (ref 0.1–0.95)
MONOCYTES RELATIVE PERCENT: 7.8 % (ref 2–12)
NEUTROPHILS ABSOLUTE: 3 E9/L (ref 1.8–7.3)
NEUTROPHILS RELATIVE PERCENT: 74.8 % (ref 43–80)
PDW BLD-RTO: 14.5 FL (ref 11.5–15)
PLATELET # BLD: 81 E9/L (ref 130–450)
PLATELET CONFIRMATION: NORMAL
PMV BLD AUTO: 10.6 FL (ref 7–12)
POTASSIUM SERPL-SCNC: 4.1 MMOL/L (ref 3.5–5)
PRO-BNP: 1541 PG/ML (ref 0–450)
PROTHROMBIN TIME: 24.5 SEC (ref 9.3–12.4)
RBC # BLD: 3.98 E12/L (ref 3.8–5.8)
SODIUM BLD-SCNC: 141 MMOL/L (ref 132–146)
TROPONIN, HIGH SENSITIVITY: 24 NG/L (ref 0–11)
WBC # BLD: 3.9 E9/L (ref 4.5–11.5)

## 2022-08-04 PROCEDURE — 85025 COMPLETE CBC W/AUTO DIFF WBC: CPT

## 2022-08-04 PROCEDURE — 80048 BASIC METABOLIC PNL TOTAL CA: CPT

## 2022-08-04 PROCEDURE — 93005 ELECTROCARDIOGRAM TRACING: CPT | Performed by: PHYSICIAN ASSISTANT

## 2022-08-04 PROCEDURE — 99285 EMERGENCY DEPT VISIT HI MDM: CPT

## 2022-08-04 PROCEDURE — 82962 GLUCOSE BLOOD TEST: CPT

## 2022-08-04 PROCEDURE — 84484 ASSAY OF TROPONIN QUANT: CPT

## 2022-08-04 PROCEDURE — 71046 X-RAY EXAM CHEST 2 VIEWS: CPT

## 2022-08-04 PROCEDURE — 85610 PROTHROMBIN TIME: CPT

## 2022-08-04 PROCEDURE — 83880 ASSAY OF NATRIURETIC PEPTIDE: CPT

## 2022-08-04 ASSESSMENT — PAIN - FUNCTIONAL ASSESSMENT: PAIN_FUNCTIONAL_ASSESSMENT: NONE - DENIES PAIN

## 2022-08-04 NOTE — ED PROVIDER NOTES
ATTENDING PROVIDER ATTESTATION:     Tex Conde presented to the emergency department for evaluation of Hypoglycemia (Bgl 70 when he woke up, drank oj went up to 71 and is now back down to 79!!)   and was initially evaluated by the Medical Resident. See Original ED Note for H&P and ED course above. I have reviewed and discussed the case, including pertinent history (medical, surgical, family and social) and exam findings with the Medical Resident assigned to Tex Conde. I have personally performed and/or participated in the history, exam, medical decision making, and procedures and agree with all pertinent clinical information and any additional changes or corrections are noted below in my assessment and plan. I have discussed this patient in detail with the resident, and provided the instruction and education,       I have reviewed my findings and recommendations with the assigned Medical Resident, Tex Conde and members of family present at the time of disposition. I have performed a history and physical examination of this patient and directly supervised the resident caring for this patient      History of Present Illness:    Presents to the ED for low blood sugar, beginning prior to arrival.  The complaint has been intermittent, severe in severity, and worsened by nothing. Patient reports his blood sugar was low today and he did not feel well. It was 60. He drank some sugar and it came out. He says all he had was cornflakes for breakfast but took his normal amount of insulin. He denies other complaints. No fevers or chills. No chest pain or shortness of breath. No abdominal pain. No nausea or vomiting. No back pain or syncope.     Review of Systems:   A complete review of systems was performed and pertinent positives and negatives are stated within HPI, all other systems reviewed and are negative.    --------------------------------------------- PAST HISTORY rigidity. No pulsatile masses. Musculoskeletal: Moves all extremities x 4. Warm and well perfused,  no clubbing, no cyanosis, no edema. Palpable peripheral pulses  Integument: skin warm and dry. No rashes. Neurologic: GCS 15, no focal deficits  Psychiatric: Normal Affect      I directly supervised any procedures performed by the resident and was present for the procedure including all critical portions of the procedure           I, Dr. Daren Ramirez, am the primary provider of record    My Medical Decision Making:         Hypoglycemia, resolved. Bgl remained appropriate in the ED without requiring IV dextrose  No chest pain or sob        1. Hypoglycemia    2.  Type 2 diabetes mellitus treated with insulin [facility])           Moriah Bryson MD  08/05/22 1110

## 2022-08-04 NOTE — ED NOTES
[facility]  08/04/22 [id number]

## 2022-08-04 NOTE — ED NOTES
FIRST PROVIDER CONTACT ASSESSMENT NOTE       Department of Emergency Medicine                 First Provider Note            22  12:56 PM EDT    Date of Encounter: No admission date for patient encounter. Patient Name: Osiel Fabian  : 1935  MRN: [id number]    Chief Complaint: Hypoglycemia (Bgl 70 when he woke up, drank oj went up to 70 and is now back down to 70! ! )      History of Present Illness:   Osiel Fabian is a 80 y.o. male who presents to the ED for hypoglycemia, persistent after eating. Reports some SOB as well. Denies chest pain. Focused Physical Exam:  VS:    ED Triage Vitals [22 1245]   BP Temp Temp src Heart Rate Resp SpO2 Height Weight   -- 97.8 °F (36.6 °C) -- 80 -- 98 % -- --        Physical Ex: Constitutional: Alert and non-toxic. Medical History:  has a past medical history of A-fib ([address] Chronic kidney disease, Chronic renal impairment, stage 3 (moderate) ([address] Colitis, ulcerative chronic ([address] Diabetes mellitus ([address] DM (diabetes mellitus) ([address] Encounter for therapeutic drug monitoring, Hyperlipidemia, Hyperlipidemia associated with type 2 diabetes mellitus ([address] Hypertension, Long term (current) use of anticoagulants, Prostate enlargement, Stroke (cerebrum) ([address] and Unspecified cerebral artery occlusion with cerebral infarction. Surgical History:  has a past surgical history that includes ECHO Compl W Dop Color Flow (2013); ECHO Transesophageal (2013); colostomy (); Cystoscopy (more than 5 yrs); TURP (14); Colonoscopy; Abdomen surgery; Endoscopy, colon, diagnostic (12/10/15); and Upper gastrointestinal endoscopy (N/A, 3/11/2022). Social History:  reports that he has quit smoking. He has never used smokeless tobacco. He reports that he does not drink alcohol and does not use drugs.   Family History: family history includes COPD in his sister; Coronary Art Dis in his sister; Diabetes in an other family member; Hypotension in his mother. Allergies: Patient has no known allergies.      Initial Plan of Care: Initiate Treatment-Testing, Proceed toTreatment Area When Bed Available for ED Attending/MLP to Continue Care      ---END OF FIRST PROVIDER CONTACT ASSESSMENT NOTE---  Electronically signed by PARUL Weiss   DD: 8/4/22       PARUL Weiss  08/04/22 [id number]

## 2022-08-04 NOTE — ED NOTES
Attempted repeat Trop x2 without success. Patient refusing any more tries.      [facility]  08/04/22 [id number]

## 2022-08-04 NOTE — ED PROVIDER NOTES
Elizabeth Rg is a 80 y.o. male    Chief Complaint   Patient presents with    Hypoglycemia     Bgl 70 when he woke up, drank oj went up to 71 and is now back down to 70!! HPI   Elizabeth Rg is a 80 y.o. male presenting to the ED for Hypoglycemia (Bgl 70 when he woke up, drank oj went up to 71 and is now back down to 70! ! )    History comes primarily from the patient. Patient presents today stating that last night before he went to bed his blood sugar was 70. He drank a glass of orange juice and noted to be 170 prior to falling asleep. Again this morning, his blood sugar was back down to 70. The patient takes 20 units of Humulin in the morning and 50 in the afternoon. Patient did note some lightheadedness when his blood sugar was low. He is complaining of no other symptoms. He is not complaining of chest pain, shortness of breath, changes in vision, abdominal pain, urinary symptoms. He has had ostomy bag and states that it has been functioning normally. He has had some chronic diarrhea and was recently placed on Lomotil. Review of Systems   Constitutional:  Negative for appetite change, fatigue and fever. HENT:  Negative for congestion, rhinorrhea and trouble swallowing. Eyes:  Negative for redness and itching. Respiratory:  Negative for chest tightness, shortness of breath and wheezing. Cardiovascular:  Negative for chest pain, palpitations and leg swelling. Gastrointestinal:  Negative for abdominal distention, abdominal pain, constipation, diarrhea, nausea and vomiting. Genitourinary:  Negative for decreased urine volume, difficulty urinating and frequency. Musculoskeletal:  Negative for arthralgias, back pain and myalgias. Neurological:  Negative for dizziness, syncope, weakness, light-headedness, numbness and headaches. Psychiatric/Behavioral:  Negative for agitation, behavioral problems, confusion and decreased concentration. The patient is not nervous/anxious.     All other systems reviewed and are negative. Physical Exam  Vitals reviewed. Constitutional:       General: He is not in acute distress. Appearance: Normal appearance. He is not ill-appearing. HENT:      Head: Normocephalic and atraumatic. Right Ear: External ear normal.      Left Ear: External ear normal.      Nose: Nose normal. No rhinorrhea. Mouth/Throat:      Mouth: Mucous membranes are moist.      Pharynx: Oropharynx is clear. Eyes:      Extraocular Movements: Extraocular movements intact. Conjunctiva/sclera: Conjunctivae normal.      Pupils: Pupils are equal, round, and reactive to light. Cardiovascular:      Rate and Rhythm: Normal rate and regular rhythm. Heart sounds: Normal heart sounds. No murmur heard. Pulmonary:      Effort: Pulmonary effort is normal. No respiratory distress. Breath sounds: Normal breath sounds. Abdominal:      General: Abdomen is flat. There is no distension. Tenderness: There is no abdominal tenderness. Musculoskeletal:         General: No swelling or tenderness. Normal range of motion. Cervical back: Normal range of motion. No rigidity or tenderness. Skin:     General: Skin is warm and dry. Findings: No rash. Neurological:      General: No focal deficit present. Mental Status: He is alert and oriented to person, place, and time. Cranial Nerves: No cranial nerve deficit. Motor: No weakness. Psychiatric:         Mood and Affect: Mood normal.         Behavior: Behavior normal.        Procedures     MDM     Amount and/or Complexity of Data Reviewed  Decide to obtain previous medical records or to obtain history from someone other than the patient: yes       Patient presented to the Emergency Department for Hypoglycemia (Bgl 70 when he woke up, drank oj went up to 71 and is now back down to 70!!)    Patient was worked up. His lowest BGL here was 73, though patient given food which improved the BGL.  The patient was not complaining of any symptoms during his stay or while he was at home. Patient seemed very worried that his BGL had been so low, despite the lack of symptoms. He was reassured and we discussed the need to talk with his PCP about his insulin dosage going forward. Patient is comfortable going home at this time. EKG: This EKG is signed and interpreted by me. Rate: 71  Rhythm: irregularly irregular  Axis: normal  Interpretation: no acute changes  Comparison: changes compared to previous EKG      ED Course as of 08/06/22 0655   Thu Aug 04, 2022   1812 Attempted repeat Trop x2 without success. Patient refusing any more tries. [KS]   4524 Discussed patient's work-up with him. I discussed the patient's troponin and the reason we had to get a second 1. Patient declines at this time and would like to be discharged home. Patient is aware of the risks. [KS]      ED Course User Index  [KS] Afia Justin MD       --------------------------------------------- PAST HISTORY ---------------------------------------------  Past Medical History:  has a past medical history of A-fib [address] Chronic kidney disease, Chronic renal impairment, stage 3 (moderate) ([address] Colitis, ulcerative chronic ([address] Diabetes mellitus ([address] DM (diabetes mellitus) ([address] Encounter for therapeutic drug monitoring, Hyperlipidemia, Hyperlipidemia associated with type 2 diabetes mellitus ([address] Hypertension, Long term (current) use of anticoagulants, Prostate enlargement, Stroke (cerebrum) ([address] and Unspecified cerebral artery occlusion with cerebral infarction. Past Surgical History:  has a past surgical history that includes ECHO Compl W Dop Color Flow (11/26/2013); ECHO Transesophageal (11/27/2013); colostomy (1978); Cystoscopy (more than 5 yrs); TURP (6/16/14); Colonoscopy; Abdomen surgery; Endoscopy, colon, diagnostic (12/10/15); and Upper gastrointestinal endoscopy (N/A, 3/11/2022).     Social History:  reports that he has quit smoking. He has never used smokeless tobacco. He reports that he does not drink alcohol and does not use drugs. Family History: family history includes COPD in his sister; Coronary Art Dis in his sister; Diabetes in an other family member; Hypotension in his mother. The patients home medications have been reviewed. Allergies: Patient has no known allergies.     -------------------------------------------------- RESULTS -------------------------------------------------  Labs:  Results for orders placed or performed during the hospital encounter of 08/04/22   CBC with Auto Differential   Result Value Ref Range    WBC 3.9 (L) 4.5 - 11.5 E9/L    RBC 3.98 3.80 - 5.80 E12/L    Hemoglobin 10.6 (L) 12.5 - 16.5 g/dL    Hematocrit 34.1 (L) 37.0 - 54.0 %    MCV 85.7 80.0 - 99.9 fL    MCH 26.6 26.0 - 35.0 pg    MCHC 31.1 (L) 32.0 - 34.5 %    RDW 14.5 11.5 - 15.0 fL    Platelets 81 (L) 926 - 450 E9/L    MPV 10.6 7.0 - 12.0 fL    Neutrophils % 74.8 43.0 - 80.0 %    Lymphocytes % 13.1 (L) 20.0 - 42.0 %    Monocytes % 7.8 2.0 - 12.0 %    Eosinophils % 2.6 0.0 - 6.0 %    Basophils % 0.3 0.0 - 2.0 %    Neutrophils Absolute 3.00 1.80 - 7.30 E9/L    Lymphocytes Absolute 0.51 (L) 1.50 - 4.00 E9/L    Monocytes Absolute 0.31 0.10 - 0.95 E9/L    Eosinophils Absolute 0.10 0.05 - 0.50 E9/L    Basophils Absolute 0.00 0.00 - 0.20 E9/L    Metamyelocytes Relative 1.7 (H) 0.0 - 1.0 %    Anisocytosis 1+    Basic Metabolic Panel   Result Value Ref Range    Sodium 141 132 - 146 mmol/L    Potassium 4.1 3.5 - 5.0 mmol/L    Chloride 105 98 - 107 mmol/L    CO2 24 22 - 29 mmol/L    Anion Gap 12 7 - 16 mmol/L    Glucose 132 (H) 74 - 99 mg/dL    BUN 25 (H) 6 - 23 mg/dL    Creatinine 1.6 (H) 0.7 - 1.2 mg/dL    GFR Non-African American 50 >=60 mL/min/1.73    GFR African American 50     Calcium 9.4 8.6 - 10.2 mg/dL   Troponin   Result Value Ref Range    Troponin, High Sensitivity 24 (H) 0 - 11 ng/L   Brain Natriuretic Peptide   Result Value Ref Range    Pro-BNP 1,541 (H) 0 - 450 pg/mL   Protime-INR   Result Value Ref Range    Protime 24.5 (H) 9.3 - 12.4 sec    INR 2.3    Platelet Confirmation   Result Value Ref Range    Platelet Confirmation CONFIRMED    POCT Glucose   Result Value Ref Range    Glucose 136 mg/dL    QC OK? ok    POCT Glucose   Result Value Ref Range    Meter Glucose 136 (H) 74 - 99 mg/dL   POCT Glucose   Result Value Ref Range    Glucose 73 mg/dL    QC OK? y    POCT Glucose   Result Value Ref Range    Meter Glucose 73 (L) 74 - 99 mg/dL   POCT Glucose   Result Value Ref Range    Meter Glucose 120 (H) 74 - 99 mg/dL   EKG 12 Lead   Result Value Ref Range    Ventricular Rate 71 BPM    Atrial Rate 394 BPM    QRS Duration 68 ms    Q-T Interval 382 ms    QTc Calculation (Bazett) 415 ms    R Axis 65 degrees    T Axis 66 degrees       Radiology:  XR CHEST (2 VW)   Final Result   No interval change             ------------------------- NURSING NOTES AND VITALS REVIEWED ---------------------------  Date / Time Roomed:  8/4/2022  2:51 PM  ED Bed Assignment:  21/75    The nursing notes within the ED encounter and vital signs as below have been reviewed. BP (!) 160/81   Pulse 80   Temp 97.8 °F (36.6 °C)   Resp 18   Wt 158 lb (71.7 kg)   SpO2 98%   BMI 23.33 kg/m²   Oxygen Saturation Interpretation: Normal      ------------------------------------------ PROGRESS NOTES ------------------------------------------  6:55 AM EDT  I have spoken with the patient and discussed todays results, in addition to providing specific details for the plan of care and counseling regarding the diagnosis and prognosis. Their questions are answered at this time and they are agreeable with the plan. I discussed at length with them reasons for immediate return here for re evaluation. They will followup with their primary care physician by calling their office tomorrow.       --------------------------------- ADDITIONAL PROVIDER NOTES ---------------------------------  At this time the patient is without objective evidence of an acute process requiring hospitalization or inpatient management. They have remained hemodynamically stable throughout their entire ED visit and are stable for discharge with outpatient follow-up. The plan has been discussed in detail and they are aware of the specific conditions for emergent return, as well as the importance of follow-up. Discharge Medication List as of 8/4/2022  6:43 PM          Diagnosis:  1. Hypoglycemia    2. Type 2 diabetes mellitus [facility] with [facility]        Disposition:  Patient's disposition: Discharge to home  Patient's condition is stable. Strict return precautions were discussed including but not limited too new or worsening symtpoms. They verbalized understanding and were agreeable with the plan. All questions were answered and patient was discharged.        Walter Valenzuela MD  Resident  08/06/22 8765

## 2022-08-06 ASSESSMENT — ENCOUNTER SYMPTOMS
EYE REDNESS: 0
CONSTIPATION: 0
VOMITING: 0
SHORTNESS OF BREATH: 0
ABDOMINAL DISTENTION: 0
TROUBLE SWALLOWING: 0
RHINORRHEA: 0
DIARRHEA: 0
BACK PAIN: 0
CHEST TIGHTNESS: 0
EYE ITCHING: 0
NAUSEA: 0
ABDOMINAL PAIN: 0
WHEEZING: 0

## 2022-08-13 ENCOUNTER — HOSPITAL ENCOUNTER (EMERGENCY)
Age: 87
Discharge: HOME | End: 2022-08-13
Attending: EMERGENCY MEDICINE
Payer: MEDICARE

## 2022-08-13 VITALS
RESPIRATION RATE: 18 BRPM | HEART RATE: 89 BPM | TEMPERATURE: 97.6 F | SYSTOLIC BLOOD PRESSURE: 170 MMHG | DIASTOLIC BLOOD PRESSURE: 90 MMHG | OXYGEN SATURATION: 98 %

## 2022-08-13 DIAGNOSIS — Z86.39 HISTORY OF HYPOGLYCEMIA: Primary | ICD-10-CM

## 2022-08-13 LAB
ANION GAP SERPL CALCULATED.3IONS-SCNC: 12 MMOL/L (ref 7–16)
BACTERIA: ABNORMAL /HPF
BILIRUBIN URINE: NEGATIVE
BLOOD, URINE: ABNORMAL
BUN BLDV-MCNC: 23 MG/DL (ref 6–23)
CALCIUM SERPL-MCNC: 9.5 MG/DL (ref 8.6–10.2)
CHLORIDE BLD-SCNC: 104 MMOL/L (ref 98–107)
CLARITY: ABNORMAL
CO2: 24 MMOL/L (ref 22–29)
COLOR: YELLOW
CREAT SERPL-MCNC: 1.6 MG/DL (ref 0.7–1.2)
GFR AFRICAN AMERICAN: 50
GFR NON-AFRICAN AMERICAN: 50 ML/MIN/1.73
GLUCOSE BLD-MCNC: 135 MG/DL (ref 74–99)
GLUCOSE URINE: NEGATIVE MG/DL
HCT VFR BLD CALC: 36.4 % (ref 37–54)
HEMOGLOBIN: 11.2 G/DL (ref 12.5–16.5)
INR BLD: 1.9
KETONES, URINE: NEGATIVE MG/DL
LEUKOCYTE ESTERASE, URINE: NEGATIVE
MCH RBC QN AUTO: 26.7 PG (ref 26–35)
MCHC RBC AUTO-ENTMCNC: 30.8 % (ref 32–34.5)
MCV RBC AUTO: 86.7 FL (ref 80–99.9)
METER GLUCOSE: 128 MG/DL (ref 74–99)
METER GLUCOSE: 144 MG/DL (ref 74–99)
METER GLUCOSE: 148 MG/DL (ref 74–99)
NITRITE, URINE: NEGATIVE
PDW BLD-RTO: 14.8 FL (ref 11.5–15)
PH UA: 5.5 (ref 5–9)
PLATELET # BLD: 90 E9/L (ref 130–450)
PLATELET CONFIRMATION: NORMAL
PMV BLD AUTO: 11.7 FL (ref 7–12)
POTASSIUM SERPL-SCNC: 3.9 MMOL/L (ref 3.5–5)
PROTEIN UA: 100 MG/DL
PROTHROMBIN TIME: 21.2 SEC (ref 9.3–12.4)
RBC # BLD: 4.2 E12/L (ref 3.8–5.8)
RBC UA: >20 /HPF (ref 0–2)
SODIUM BLD-SCNC: 140 MMOL/L (ref 132–146)
SPECIFIC GRAVITY UA: 1.02 (ref 1–1.03)
TROPONIN, HIGH SENSITIVITY: 21 NG/L (ref 0–11)
TROPONIN, HIGH SENSITIVITY: 23 NG/L (ref 0–11)
UROBILINOGEN, URINE: 0.2 E.U./DL
WBC # BLD: 5.1 E9/L (ref 4.5–11.5)
WBC UA: ABNORMAL /HPF (ref 0–5)

## 2022-08-13 PROCEDURE — 87088 URINE BACTERIA CULTURE: CPT

## 2022-08-13 PROCEDURE — 99284 EMERGENCY DEPT VISIT MOD MDM: CPT

## 2022-08-13 PROCEDURE — 85027 COMPLETE CBC AUTOMATED: CPT

## 2022-08-13 PROCEDURE — 84484 ASSAY OF TROPONIN QUANT: CPT

## 2022-08-13 PROCEDURE — 93005 ELECTROCARDIOGRAM TRACING: CPT | Performed by: PHYSICIAN ASSISTANT

## 2022-08-13 PROCEDURE — 81001 URINALYSIS AUTO W/SCOPE: CPT

## 2022-08-13 PROCEDURE — 80048 BASIC METABOLIC PNL TOTAL CA: CPT

## 2022-08-13 PROCEDURE — 85610 PROTHROMBIN TIME: CPT

## 2022-08-13 NOTE — Clinical Note
Aleah Roche was seen and treated in our emergency department on 8/13/2022. He may return to work on 08/13/2022. If you have any questions or concerns, please don't hesitate to call.       Josias Claudio PA-C

## 2022-08-14 NOTE — ED NOTES
HPI:  8/14/22, Time: 7:45 AM EDT         Keenan Ward is a 80 y.o. male presenting to the ED for blood sugar fluctuate, beginning days ago. The complaint has been intermittent, moderate in severity, and worsened by nothing. Patient presenting because blood sugars been fluctuating over the last several days. Patient recently saw his family doctor had his insulin adjusted. Patient reports he is taking 14 units now in the morning and then 12 units in the evening. Patient reporting his blood sugar earlier in the day was in the 60s. Patient drank some prune juice and reports symptoms improved. Patient reporting no active chest pain or shortness of breath. Patient is on anticoagulation for history of A. fib. Patient reporting no leg pain or swelling he reports chronic diarrhea. Patient does have a history of ulcerative colitis. Patient reporting no urinary symptoms or fever or productive cough. ROS:   Pertinent positives and negatives are stated within HPI, all other systems reviewed and are negative.  --------------------------------------------- PAST HISTORY ---------------------------------------------  Past Medical History:  has a past medical history of A-fib ([address] Chronic kidney disease, Chronic renal impairment, stage 3 (moderate) ([address] Colitis, ulcerative chronic ([address] Diabetes mellitus ([address] DM (diabetes mellitus) ([address] Encounter for therapeutic drug monitoring, Hyperlipidemia, Hyperlipidemia associated with type 2 diabetes mellitus ([address] Hypertension, Long term (current) use of anticoagulants, Prostate enlargement, Stroke (cerebrum) ([address] and Unspecified cerebral artery occlusion with cerebral infarction. Past Surgical History:  has a past surgical history that includes ECHO Compl W Dop Color Flow (11/26/2013); ECHO Transesophageal (11/27/2013); colostomy (1978); Cystoscopy (more than 5 yrs); TURP (6/16/14); Colonoscopy; Abdomen surgery;  Endoscopy, colon, diagnostic (12/10/15); and Upper gastrointestinal endoscopy (N/A, 3/11/2022). Social History:  reports that he has quit smoking. He has never used smokeless tobacco. He reports that he does not drink alcohol and does not use drugs. Family History: family history includes COPD in his sister; Coronary Art Dis in his sister; Diabetes in an other family member; Hypotension in his mother. The patients home medications have been reviewed. Allergies: Patient has no known allergies. ---------------------------------------------------PHYSICAL EXAM--------------------------------------    Constitutional/General: Alert and oriented x3, well appearing, non toxic in NAD  Head: Normocephalic and atraumatic  Eyes: PERRL, EOMI  Mouth: Oropharynx clear, handling secretions, no trismus  Neck: Supple, full ROM, non tender to palpation in the midline, no stridor, no crepitus, no meningeal signs  Pulmonary: Lungs clear to auscultation bilaterally, no wheezes, rales, or rhonchi. Not in respiratory distress  Cardiovascular:  Regular rate. Irregular rhythm no murmurs, gallops, or rubs. 2+ distal pulses  Chest: no chest wall tenderness  Abdomen: Soft. Non tender. Non distended. +BS. No rebound, guarding, or rigidity. No pulsatile masses appreciated. Musculoskeletal: Moves all extremities x 4. Warm and well perfused, no clubbing, cyanosis, or edema. Capillary refill <3 seconds  Skin: warm and dry. No rashes. Neurologic: GCS 15, CN 2-12 grossly intact, no focal deficits, symmetric strength 5/5 in the upper and lower extremities bilaterally  Psych: Normal Affect    -------------------------------------------------- RESULTS -------------------------------------------------  I have personally reviewed all laboratory and imaging results for this patient. Results are listed below.      LABS:  Results for orders placed or performed during the hospital encounter of 08/13/22   Urinalysis   Result Value Ref Range    Color, UA Yellow Straw/Yellow    Clarity, UA SL CLOUDY Clear    Glucose, Ur Negative Negative mg/dL    Bilirubin Urine Negative Negative    Ketones, Urine Negative Negative mg/dL    Specific Gravity, UA 1.020 1.005 - 1.030    Blood, Urine LARGE (A) Negative    pH, UA 5.5 5.0 - 9.0    Protein,  (A) Negative mg/dL    Urobilinogen, Urine 0.2 <2.0 E.U./dL    Nitrite, Urine Negative Negative    Leukocyte Esterase, Urine Negative Negative   Basic metabolic panel   Result Value Ref Range    Sodium 140 132 - 146 mmol/L    Potassium 3.9 3.5 - 5.0 mmol/L    Chloride 104 98 - 107 mmol/L    CO2 24 22 - 29 mmol/L    Anion Gap 12 7 - 16 mmol/L    Glucose 135 (H) 74 - 99 mg/dL    BUN 23 6 - 23 mg/dL    Creatinine 1.6 (H) 0.7 - 1.2 mg/dL    GFR Non-African American 50 >=60 mL/min/1.73    GFR African American 50     Calcium 9.5 8.6 - 10.2 mg/dL   CBC   Result Value Ref Range    WBC 5.1 4.5 - 11.5 E9/L    RBC 4.20 3.80 - 5.80 E12/L    Hemoglobin 11.2 (L) 12.5 - 16.5 g/dL    Hematocrit 36.4 (L) 37.0 - 54.0 %    MCV 86.7 80.0 - 99.9 fL    MCH 26.7 26.0 - 35.0 pg    MCHC 30.8 (L) 32.0 - 34.5 %    RDW 14.8 11.5 - 15.0 fL    Platelets 90 (L) 461 - 450 E9/L    MPV 11.7 7.0 - 12.0 fL   Troponin   Result Value Ref Range    Troponin, High Sensitivity 21 (H) 0 - 11 ng/L   Microscopic Urinalysis   Result Value Ref Range    WBC, UA 0-1 0 - 5 /HPF    RBC, UA >20 0 - 2 /HPF    Bacteria, UA RARE (A) None Seen /HPF   Platelet Confirmation   Result Value Ref Range    Platelet Confirmation CONFIRMED    Troponin   Result Value Ref Range    Troponin, High Sensitivity 23 (H) 0 - 11 ng/L   Protime-INR   Result Value Ref Range    Protime 21.2 (H) 9.3 - 12.4 sec    INR 1.9    POCT Glucose   Result Value Ref Range    Meter Glucose 144 (H) 74 - 99 mg/dL   POCT Glucose   Result Value Ref Range    Meter Glucose 148 (H) 74 - 99 mg/dL   POCT Glucose   Result Value Ref Range    Meter Glucose 128 (H) 74 - 99 mg/dL   EKG 12 Lead   Result Value Ref Range    Ventricular Rate 81 BPM    Atrial Rate 93 BPM QRS Duration 68 ms    Q-T Interval 366 ms    QTc Calculation (Bazett) 425 ms    R Axis 63 degrees    T Axis 59 degrees       RADIOLOGY:  Interpreted by Radiologist.  No orders to display       EKG: This EKG is signed and interpreted by me. Rate: 81  Rhythm: Atrial fibrillation  Interpretation: non-specific EKG  Comparison: stable as compared to patient's most recent EKG        ------------------------- NURSING NOTES AND VITALS REVIEWED ---------------------------   The nursing notes within the ED encounter and vital signs as below have been reviewed by myself. BP (!) 170/90   Pulse 89   Temp 97.6 °F (36.4 °C) (Oral)   Resp 18   SpO2 98%   Oxygen Saturation Interpretation: Normal    The patients available past medical records and past encounters were reviewed. ------------------------------ ED COURSE/MEDICAL DECISION MAKING----------------------  Medications - No data to display          Medical Decision Making:   Patient presenting here because of fluctuating blood sugars at home. Patient was just recently seen here last week for the same complaint. Patient had seen his family doctor and had his insulin adjusted recently. Patient reports during the day his blood sugar dropped in the 60s. Patient having no symptoms of abdominal pain or vomiting. Patient reporting no active chest pain. Patient labs noted reviewed as well as EKG. Patient recheck blood sugar within normal limits. Patient made aware of need for follow-up and need to return if symptoms worsen or persist     Re-Evaluations:             Re-evaluation. Patients symptoms show no change  Patient made aware of findings and plan. Patient to follow-up with his PCP is to return if symptoms worsen or persist.    Consultations:                 Critical Care: This patient's ED course included: a personal history and physicial eaxmination    This patient has been closely monitored during their ED course. Counseling:    The emergency provider has spoken with the patient and discussed todays results, in addition to providing specific details for the plan of care and counseling regarding the diagnosis and prognosis. Questions are answered at this time and they are agreeable with the plan.       --------------------------------- IMPRESSION AND DISPOSITION ---------------------------------    IMPRESSION  1. History of hypoglycemia        DISPOSITION  Disposition: Discharge to home  Patient condition is stable        NOTE: This report was transcribed using voice recognition software.  Every effort was made to ensure accuracy; however, inadvertent computerized transcription errors may be present         Kitty Ward MD  08/14/22 [id number]

## 2022-08-14 NOTE — ED PROVIDER NOTES
HPI:  8/13/22, Time: 8:30 PM EDT         Osiel Fabian is a 80 y.o. male presenting to the ED for elevated blood sugar, beginning days ago. The complaint has been persistent, moderate in severity, and worsened by nothing. Patient presenting here because of issues with his blood sugar. Patient ports he was seen here over a week ago because of hypoglycemia. Patient was seen by his primary care physician and had his insulin adjusted on Monday to 14 units in the morning and 12 units in the afternoon he had taken higher doses prior to that. Patient reporting no vomiting no diarrhea he reports today in the afternoon his blood sugar was in the 60s. Patient did drink prune juice. Patient is due for his insulin at around 4 5 in the afternoon. He supposed to take 12 units he did not take his medication yet. Patient reporting no active chest pain reports no active abdominal pain. Patient reports chronic diarrhea he does have a history of A. fib history of ulcerative colitis. He reports no hematemesis reports no productive cough or fever. ROS:   Pertinent positives and negatives are stated within HPI, all other systems reviewed and are negative.  --------------------------------------------- PAST HISTORY ---------------------------------------------  Past Medical History:  has a past medical history of A-fib ([address] Chronic kidney disease, Chronic renal impairment, stage 3 (moderate) ([address] Colitis, ulcerative chronic ([address] Diabetes mellitus ([address] DM (diabetes mellitus) ([address] Encounter for therapeutic drug monitoring, Hyperlipidemia, Hyperlipidemia associated with type 2 diabetes mellitus ([address] Hypertension, Long term (current) use of anticoagulants, Prostate enlargement, Stroke (cerebrum) ([address] and Unspecified cerebral artery occlusion with cerebral infarction.     Past Surgical History:  has a past surgical history that includes ECHO Compl W Dop Color Flow (11/26/2013); ECHO Transesophageal (11/27/2013); colostomy (1978); Cystoscopy (more than 5 yrs); TURP (6/16/14); Colonoscopy; Abdomen surgery; Endoscopy, colon, diagnostic (12/10/15); and Upper gastrointestinal endoscopy (N/A, 3/11/2022). Social History:  reports that he has quit smoking. He has never used smokeless tobacco. He reports that he does not drink alcohol and does not use drugs. Family History: family history includes COPD in his sister; Coronary Art Dis in his sister; Diabetes in an other family member; Hypotension in his mother. The patients home medications have been reviewed. Allergies: Patient has no known allergies. ---------------------------------------------------PHYSICAL EXAM--------------------------------------    Constitutional/General: Alert and oriented x3, well appearing, non toxic in NAD  Head: Normocephalic and atraumatic  Eyes: PERRL, EOMI  Mouth: Oropharynx clear, handling secretions, no trismus  Neck: Supple, full ROM, non tender to palpation in the midline, no stridor, no crepitus, no meningeal signs  Pulmonary: Lungs clear to auscultation bilaterally, no wheezes, rales, or rhonchi. Not in respiratory distress  Cardiovascular:  Regular rate. Irregular. No murmurs, gallops, or rubs. 2+ distal pulses  Chest: no chest wall tenderness  Abdomen: Soft. Non tender. Non distended. +BS. No rebound, guarding, or rigidity. No pulsatile masses appreciated. Musculoskeletal: Moves all extremities x 4. Warm and well perfused, no clubbing, cyanosis, or edema. Capillary refill <3 seconds  Skin: warm and dry. No rashes. Neurologic: GCS 15, CN 2-12 grossly intact, no focal deficits, symmetric strength 5/5 in the upper and lower extremities bilaterally  Psych: Normal Affect    -------------------------------------------------- RESULTS -------------------------------------------------  I have personally reviewed all laboratory and imaging results for this patient. Results are listed below.      LABS:  Results for orders placed or performed during the hospital encounter of 08/13/22   Urinalysis   Result Value Ref Range    Color, UA Yellow Straw/Yellow    Clarity, UA SL CLOUDY Clear    Glucose, Ur Negative Negative mg/dL    Bilirubin Urine Negative Negative    Ketones, Urine Negative Negative mg/dL    Specific Gravity, UA 1.020 1.005 - 1.030    Blood, Urine LARGE (A) Negative    pH, UA 5.5 5.0 - 9.0    Protein,  (A) Negative mg/dL    Urobilinogen, Urine 0.2 <2.0 E.U./dL    Nitrite, Urine Negative Negative    Leukocyte Esterase, Urine Negative Negative   Basic metabolic panel   Result Value Ref Range    Sodium 140 132 - 146 mmol/L    Potassium 3.9 3.5 - 5.0 mmol/L    Chloride 104 98 - 107 mmol/L    CO2 24 22 - 29 mmol/L    Anion Gap 12 7 - 16 mmol/L    Glucose 135 (H) 74 - 99 mg/dL    BUN 23 6 - 23 mg/dL    Creatinine 1.6 (H) 0.7 - 1.2 mg/dL    GFR Non-African American 50 >=60 mL/min/1.73    GFR African American 50     Calcium 9.5 8.6 - 10.2 mg/dL   CBC   Result Value Ref Range    WBC 5.1 4.5 - 11.5 E9/L    RBC 4.20 3.80 - 5.80 E12/L    Hemoglobin 11.2 (L) 12.5 - 16.5 g/dL    Hematocrit 36.4 (L) 37.0 - 54.0 %    MCV 86.7 80.0 - 99.9 fL    MCH 26.7 26.0 - 35.0 pg    MCHC 30.8 (L) 32.0 - 34.5 %    RDW 14.8 11.5 - 15.0 fL    Platelets 90 (L) 208 - 450 E9/L    MPV 11.7 7.0 - 12.0 fL   Troponin   Result Value Ref Range    Troponin, High Sensitivity 21 (H) 0 - 11 ng/L   Microscopic Urinalysis   Result Value Ref Range    WBC, UA 0-1 0 - 5 /HPF    RBC, UA >20 0 - 2 /HPF    Bacteria, UA RARE (A) None Seen /HPF   Platelet Confirmation   Result Value Ref Range    Platelet Confirmation CONFIRMED    Troponin   Result Value Ref Range    Troponin, High Sensitivity 23 (H) 0 - 11 ng/L   Protime-INR   Result Value Ref Range    Protime 21.2 (H) 9.3 - 12.4 sec    INR 1.9    POCT Glucose   Result Value Ref Range    Meter Glucose 144 (H) 74 - 99 mg/dL   POCT Glucose   Result Value Ref Range    Meter Glucose 148 (H) 74 - 99 mg/dL   POCT Glucose   Result Value Ref Range    Meter Glucose 128 (H) 74 - 99 mg/dL   EKG 12 Lead   Result Value Ref Range    Ventricular Rate 81 BPM    Atrial Rate 93 BPM    QRS Duration 68 ms    Q-T Interval 366 ms    QTc Calculation (Bazett) 425 ms    R Axis 63 degrees    T Axis 59 degrees       RADIOLOGY:  Interpreted by Radiologist.  No orders to display         EKG: This EKG is signed and interpreted by me. Rate: 81  Rhythm: Atrial fibrillation  Interpretation: non-specific EKG  Comparison: stable as compared to patient's most recent EKG      ------------------------- NURSING NOTES AND VITALS REVIEWED ---------------------------   The nursing notes within the ED encounter and vital signs as below have been reviewed by myself. BP (!) 170/90   Pulse 89   Temp 97.6 °F (36.4 °C) (Oral)   Resp 18   SpO2 98%   Oxygen Saturation Interpretation: Normal    The patients available past medical records and past encounters were reviewed. ------------------------------ ED COURSE/MEDICAL DECISION MAKING----------------------  Medications - No data to display          Medical Decision Making:   Patient here because of fluctuating blood sugars. Patient reports he was seen here days ago for his blood sugar being low. Patient was just recently seen by his family doctor on Monday had his insulin adjusted. Patient reportedly taking 14 units in the morning and then 12 units in the afternoon. Patient reports that his blood sugar was in the 60s today. Patient did have some prune juice. Patient has no active physical complaints at present time. Patient does have a history of diarrhea with ulcerative colitis. Patient is on Coumadin he has a history of atrial fibrillation. Patient neurologically intact here. Patient in no distress nontoxic-appearing blood sugars noted here in the emergency room. Patient not hypoglycemic or hyperglycemic.   Patient in no distress here he is made aware he would need to follow-up with his family doctor on Monday he is to return if his symptoms worsen or persist.  Patient go home and eat and recheck sugar. Patient to return anytime for any further problems     Re-Evaluations:             Patient reevaluated several times here in the emergency department. Patient in no acute distress. Blood sugars noted and reviewed. Patient made aware of need for follow-up and need to return if symptoms worsen or persist.  Patient comfortable with plan      Consultations:                   Critical Care: This patient's ED course included: a personal history and physicial eaxmination    This patient has been closely monitored during their ED course. Counseling: The emergency provider has spoken with the patient and discussed todays results, in addition to providing specific details for the plan of care and counseling regarding the diagnosis and prognosis. Questions are answered at this time and they are agreeable with the plan.       --------------------------------- IMPRESSION AND DISPOSITION ---------------------------------    IMPRESSION  1. History of hypoglycemia        DISPOSITION  Disposition: Discharge to home  Patient condition is stable        NOTE: This report was transcribed using voice recognition software.  Every effort was made to ensure accuracy; however, inadvertent computerized transcription errors may be present

## 2022-08-15 LAB
EKG ATRIAL RATE: 93 BPM
EKG Q-T INTERVAL: 366 MS
EKG QRS DURATION: 68 MS
EKG QTC CALCULATION (BAZETT): 425 MS
EKG R AXIS: 63 DEGREES
EKG T AXIS: 59 DEGREES
EKG VENTRICULAR RATE: 81 BPM
URINE CULTURE, ROUTINE: NORMAL

## 2022-08-30 ENCOUNTER — TELEPHONE (OUTPATIENT)
Dept: ORTHOPEDIC SURGERY | Age: 87
End: 2022-08-30

## 2022-08-30 NOTE — TELEPHONE ENCOUNTER
8/30/2022 11:01 am Patient [phone] phoned the office / Message left on voice mail ext [phone]: I am returning your call. 8/30/2022 2:29 pm Follow up phone call / Justin dinh/ and informed patient: Staff from [employer] did not call him.

## 2022-09-09 ENCOUNTER — APPOINTMENT (OUTPATIENT)
Dept: GENERAL RADIOLOGY | Age: 87
End: 2022-09-09
Payer: MEDICARE

## 2022-09-09 ENCOUNTER — HOSPITAL ENCOUNTER (EMERGENCY)
Age: 87
Discharge: HOME | End: 2022-09-09
Attending: EMERGENCY MEDICINE
Payer: MEDICARE

## 2022-09-09 VITALS
SYSTOLIC BLOOD PRESSURE: 188 MMHG | TEMPERATURE: 98.1 F | DIASTOLIC BLOOD PRESSURE: 98 MMHG | HEART RATE: 62 BPM | RESPIRATION RATE: 20 BRPM | OXYGEN SATURATION: 99 %

## 2022-09-09 DIAGNOSIS — R00.2 PALPITATIONS: Primary | ICD-10-CM

## 2022-09-09 LAB
ALBUMIN SERPL-MCNC: 3.9 G/DL (ref 3.5–5.2)
ALP BLD-CCNC: 104 U/L (ref 40–129)
ALT SERPL-CCNC: 12 U/L (ref 0–40)
ANION GAP SERPL CALCULATED.3IONS-SCNC: 11 MMOL/L (ref 7–16)
AST SERPL-CCNC: 31 U/L (ref 0–39)
BASOPHILS ABSOLUTE: 0.03 E9/L (ref 0–0.2)
BASOPHILS RELATIVE PERCENT: 0.8 % (ref 0–2)
BILIRUB SERPL-MCNC: 0.4 MG/DL (ref 0–1.2)
BUN BLDV-MCNC: 29 MG/DL (ref 6–23)
CALCIUM SERPL-MCNC: 9.6 MG/DL (ref 8.6–10.2)
CHLORIDE BLD-SCNC: 107 MMOL/L (ref 98–107)
CO2: 23 MMOL/L (ref 22–29)
CREAT SERPL-MCNC: 1.9 MG/DL (ref 0.7–1.2)
EKG ATRIAL RATE: 85 BPM
EKG Q-T INTERVAL: 374 MS
EKG QRS DURATION: 68 MS
EKG QTC CALCULATION (BAZETT): 412 MS
EKG R AXIS: 45 DEGREES
EKG T AXIS: 45 DEGREES
EKG VENTRICULAR RATE: 73 BPM
EOSINOPHILS ABSOLUTE: 0.09 E9/L (ref 0.05–0.5)
EOSINOPHILS RELATIVE PERCENT: 2.3 % (ref 0–6)
GFR AFRICAN AMERICAN: 41
GFR NON-AFRICAN AMERICAN: 41 ML/MIN/1.73
GLUCOSE BLD-MCNC: 233 MG/DL (ref 74–99)
HCT VFR BLD CALC: 37.5 % (ref 37–54)
HEMOGLOBIN: 11.7 G/DL (ref 12.5–16.5)
IMMATURE GRANULOCYTES #: 0.01 E9/L
IMMATURE GRANULOCYTES %: 0.3 % (ref 0–5)
INR BLD: 2.3
LYMPHOCYTES ABSOLUTE: 0.86 E9/L (ref 1.5–4)
LYMPHOCYTES RELATIVE PERCENT: 21.5 % (ref 20–42)
MCH RBC QN AUTO: 26.4 PG (ref 26–35)
MCHC RBC AUTO-ENTMCNC: 31.2 % (ref 32–34.5)
MCV RBC AUTO: 84.7 FL (ref 80–99.9)
MONOCYTES ABSOLUTE: 0.27 E9/L (ref 0.1–0.95)
MONOCYTES RELATIVE PERCENT: 6.8 % (ref 2–12)
NEUTROPHILS ABSOLUTE: 2.74 E9/L (ref 1.8–7.3)
NEUTROPHILS RELATIVE PERCENT: 68.3 % (ref 43–80)
PDW BLD-RTO: 15 FL (ref 11.5–15)
PLATELET # BLD: 99 E9/L (ref 130–450)
PLATELET CONFIRMATION: NORMAL
PMV BLD AUTO: 10.9 FL (ref 7–12)
POTASSIUM REFLEX MAGNESIUM: 4.6 MMOL/L (ref 3.5–5)
PRO-BNP: 1212 PG/ML (ref 0–450)
PROTHROMBIN TIME: 25.2 SEC (ref 9.3–12.4)
RBC # BLD: 4.43 E12/L (ref 3.8–5.8)
SODIUM BLD-SCNC: 141 MMOL/L (ref 132–146)
TOTAL PROTEIN: 7.5 G/DL (ref 6.4–8.3)
TROPONIN, HIGH SENSITIVITY: 23 NG/L (ref 0–11)
TROPONIN, HIGH SENSITIVITY: 25 NG/L (ref 0–11)
WBC # BLD: 4 E9/L (ref 4.5–11.5)

## 2022-09-09 PROCEDURE — 93005 ELECTROCARDIOGRAM TRACING: CPT | Performed by: STUDENT IN AN ORGANIZED HEALTH CARE EDUCATION/TRAINING PROGRAM

## 2022-09-09 PROCEDURE — 85610 PROTHROMBIN TIME: CPT

## 2022-09-09 PROCEDURE — 2580000003 HC RX 258: Performed by: STUDENT IN AN ORGANIZED HEALTH CARE EDUCATION/TRAINING PROGRAM

## 2022-09-09 PROCEDURE — 84484 ASSAY OF TROPONIN QUANT: CPT

## 2022-09-09 PROCEDURE — 99285 EMERGENCY DEPT VISIT HI MDM: CPT

## 2022-09-09 PROCEDURE — 83880 ASSAY OF NATRIURETIC PEPTIDE: CPT

## 2022-09-09 PROCEDURE — 80053 COMPREHEN METABOLIC PANEL: CPT

## 2022-09-09 PROCEDURE — 71045 X-RAY EXAM CHEST 1 VIEW: CPT

## 2022-09-09 PROCEDURE — 85025 COMPLETE CBC W/AUTO DIFF WBC: CPT

## 2022-09-09 RX ORDER — 0.9 % SODIUM CHLORIDE 0.9 %
1000 INTRAVENOUS SOLUTION INTRAVENOUS ONCE
Status: COMPLETED | OUTPATIENT
Start: 2022-09-09 | End: 2022-09-09

## 2022-09-09 RX ADMIN — SODIUM CHLORIDE 1000 ML: 9 INJECTION, SOLUTION INTRAVENOUS at 10:36

## 2022-09-09 ASSESSMENT — ENCOUNTER SYMPTOMS
EYE DISCHARGE: 0
CONSTIPATION: 0
CHEST TIGHTNESS: 0
ABDOMINAL DISTENTION: 0
DIARRHEA: 0
SINUS PAIN: 0
SINUS PRESSURE: 0
VOMITING: 0
ABDOMINAL PAIN: 0
BACK PAIN: 0
ANAL BLEEDING: 0
RHINORRHEA: 0
COUGH: 0
NAUSEA: 0
SHORTNESS OF BREATH: 0

## 2022-09-09 ASSESSMENT — PAIN - FUNCTIONAL ASSESSMENT: PAIN_FUNCTIONAL_ASSESSMENT: NONE - DENIES PAIN

## 2022-09-09 NOTE — DISCHARGE INSTRUCTIONS
Return if any new or worsening symptoms. Return if any chest pain or shortness of breath. Follow-up with your primary care provider.

## 2022-09-09 NOTE — ED PROVIDER NOTES
ATTENDING PROVIDER ATTESTATION:     Darion Luna presented to the emergency department for evaluation of Palpitations ( palpitations through the night with intermittent SOB, Bilateral Shoulder pain, hx of a-fib, sent in by PCP for eval )   and was initially evaluated by the Medical Resident. See Original ED Note for H&P and ED course above. I have reviewed and discussed the case, including pertinent history (medical, surgical, family and social) and exam findings with the Medical Resident assigned to Darion Luna. I have personally performed and/or participated in the history, exam, medical decision making, and procedures and agree with all pertinent clinical information and any additional changes or corrections are noted below in my assessment and plan. I have discussed this patient in detail with the resident, and provided the instruction and education,       I have reviewed my findings and recommendations with the assigned Medical Resident, Darion Luna and members of family present at the time of disposition. I have performed a history and physical examination of this patient and directly supervised the resident caring for this patient      History of Present Illness:    Presents to the ED for palpitations, beginning prior to arrival.  The complaint has been persistent, mild in severity, and worsened by nothing. Hx of afib, reports palpitations. Sent in by PCP to be evaluated. Hx of chronic afib. No chest pain. Says he felt short of breath earlier but this has resolved. On blood thinners. Denies orthopnea. Denies other complaints. He says he feels fine now.     Review of Systems:   A complete review of systems was performed and pertinent positives and negatives are stated within HPI, all other systems reviewed and are negative.    --------------------------------------------- PAST HISTORY ---------------------------------------------  Past Medical History:  has a past medical history of A-fib ([facility] Chronic kidney disease, Chronic renal impairment, stage 3 (moderate) ([address] Colitis, ulcerative chronic ([address] Diabetes mellitus ([address] DM (diabetes mellitus) ([facility] Encounter for therapeutic drug monitoring, Hyperlipidemia, Hyperlipidemia associated with type 2 diabetes mellitus ([address] Hypertension, Long term (current) use of anticoagulants, Prostate enlargement, Stroke (cerebrum) ([address] and Unspecified cerebral artery occlusion with cerebral infarction. Past Surgical History:  has a past surgical history that includes ECHO Compl W Dop Color Flow (11/26/2013); ECHO Transesophageal (11/27/2013); colostomy (1978); Cystoscopy (more than 5 yrs); TURP (6/16/14); Colonoscopy; Abdomen surgery; Endoscopy, colon, diagnostic (12/10/15); and Upper gastrointestinal endoscopy (N/A, 3/11/2022). Social History:  reports that he has quit smoking. He has never used smokeless tobacco. He reports that he does not drink alcohol and does not use drugs. Family History: family history includes COPD in his sister; Coronary Art Dis in his sister; Diabetes in an other family member; Hypotension in his mother. Unless otherwise noted, family history is non contributory    The patients home medications have been reviewed. Allergies: Patient has no known allergies.     EKG Interpretation  Interpreted by emergency department physician, Dr. Humaira Villalobos     9/9/22  Time: 0911    Rhythm: atrial fibrillation - controlled  Rate: normal  Axis: normal  Conduction: normal  ST Segments: no acute change  T Waves: no acute change    Clinical Impression: atrial fibrillation, no acute ischemic changes  Comparison to Old EKG  stable from prior      Physical Exam:  Constitutional/General: Alert and oriented x3  Head: Normocephalic and atraumatic  Eyes: PERRL, EOMI, sclera non icteric  ENT: Oropharynx clear, handling secretions  Neck: Supple, full ROM, no stridor, no meningeal signs  Respiratory: Lungs clear to auscultation bilaterally, no wheezes, rales, or rhonchi. Not in respiratory distress  Cardiovascular:  irregularly irregular,  Regular rhythm. No murmurs, no gallops, no rubs. 2+ distal pulses. Equal extremity pulses. GI:  Abdomen Soft, Non tender, Non distended. No rebound, guarding, or rigidity. No pulsatile masses. Musculoskeletal: Moves all extremities x 4. Warm and well perfused,  no clubbing, no cyanosis, no edema. Palpable peripheral pulses  Integument: skin warm and dry. No rashes. Neurologic: GCS 15, no focal deficits  Psychiatric: Normal Affect      I directly supervised any procedures performed by the resident and was present for the procedure including all critical portions of the procedure      The cardiac monitor revealed atrial fibrillation with a heart rate in the 60s as interpreted by me. The cardiac monitor was ordered secondary to the patient's palpitations and to monitor the patient for dysrhythmia. CPT [id number]      I, Dr. Kayla Hollingsworth, am the primary provider of record    My Medical Decision Making:         Palpitations, rate controlled. Looks slightly dry. Saline bolus ordered. PCP consulted, he will see in the office for follow up        1.  Neil Schultz MD  09/09/22 [id number]

## 2022-09-09 NOTE — ED PROVIDER NOTES
HPI     Patient is a 80 y.o. male presents with a chief complaint of palpatations  This has been occurring for a few hours. Patient states that it gets better with time. Patient states that it gets worse with nothing. Patient states that it is moderate in severity. Patient states it was gradual in onset. Patient presents with palpitations. Patient has a history of A. fib. Patient stated last night he had palpitations for approximately 3 hours. Went away on their own. Patient takes warfarin and has not missed a dose. Patient states that he has been sent in here multiple times for this in the past.  Usually last an hour and a half. Patient denies any shortness of breath currently. Stated that he was mildly short of breath when this occurred. Patient denies any changes in urinary or bowel habits. Patient stated that his symptoms are completely resolved at this time. Patient was sent in by his primary care provider. Review of Systems   Constitutional:  Negative for activity change, appetite change, fatigue and fever. HENT:  Negative for congestion, rhinorrhea, sinus pressure and sinus pain. Eyes:  Negative for discharge. Respiratory:  Negative for cough, chest tightness and shortness of breath. Cardiovascular:  Positive for palpitations. Negative for chest pain. Gastrointestinal:  Negative for abdominal distention, abdominal pain, anal bleeding, constipation, diarrhea, nausea and vomiting. Endocrine: Negative for polydipsia and polyuria. Genitourinary:  Negative for decreased urine volume, difficulty urinating, enuresis, flank pain, frequency and urgency. Musculoskeletal:  Negative for arthralgias, back pain and neck stiffness. Skin:  Negative for rash and wound. Neurological:  Negative for dizziness, weakness, light-headedness and headaches. Psychiatric/Behavioral:  Negative for agitation, behavioral problems and confusion.        Physical Exam  Vitals and nursing note reviewed. Constitutional:       Appearance: He is well-developed. HENT:      Head: Normocephalic and atraumatic. Eyes:      Conjunctiva/sclera: Conjunctivae normal.   Cardiovascular:      Rate and Rhythm: Normal rate. Rhythm irregular. Heart sounds: Normal heart sounds. No murmur heard. Pulmonary:      Effort: Pulmonary effort is normal. No respiratory distress. Breath sounds: Normal breath sounds. No wheezing or rales. Abdominal:      General: Bowel sounds are normal.      Palpations: Abdomen is soft. Tenderness: There is no abdominal tenderness. There is no guarding or rebound. Musculoskeletal:         General: No tenderness or deformity. Cervical back: Normal range of motion and neck supple. Skin:     General: Skin is warm and dry. Neurological:      Mental Status: He is alert and oriented to person, place, and time. Cranial Nerves: No cranial nerve deficit. Coordination: Coordination normal.        Procedures   EKG read interpreted by me. Rate 73 bpm.  Normal axis. Irregularly irregular rhythm. No ST elevations or depressions. Compared to prior EKG with no significant changes  MDM       ED Course as of 09/09/22 1212   Fri Sep 09, 2022   1133 Discussed case with patient's PCP. Stated that he will follow-up patient closely in the office. [JM]      ED Course User Index  [JM] Marquise Mishra MD      Patient is a 80 y.o. male presenting with palpitations. Patient has been seen for this multiple times in the past.  Patient is denying any chest pain or shortness of breath. Patient stated that his symptoms are completely resolved. Patient only came here because he was sent by his primary care office. Patient's EKG is benign. Patient had a chest x-ray that was benign. Patient's lab work is otherwise unremarkable. Called and discussed case with patient's primary care provider. Am[place] the patient is safe to go home and follow-up closely as an outpatient. .      Patient was given return precautions. Labs were interpreted by me. Patient will follow up with their primary care provider. Patient is agreeable to this plan. Patient has remained stable throughout their stay in the ED. Patient was seen and evaluated by myself and my attending Dr. Stef Solomon. Assessment and Plan discussed with attending provider, please see attestation for final plan of care. This note was done using dictation software and there may be some grammatical errors associated with this. Jeniffer Nichole MD       ED Course as of 09/09/22 1213   Fri Sep 09, 2022   1133 Discussed case with patient's PCP. Stated that he will follow-up patient closely in the office. [JM]      ED Course User Index  [JM] Jeniffer Nichole MD       --------------------------------------------- PAST HISTORY ---------------------------------------------  Past Medical History:  has a past medical history of A-fib ([address] Chronic kidney disease, Chronic renal impairment, stage 3 (moderate) ([address] Colitis, ulcerative chronic ([address] Diabetes mellitus ([address] DM (diabetes mellitus) ([address] Encounter for therapeutic drug monitoring, Hyperlipidemia, Hyperlipidemia associated with type 2 diabetes mellitus ([address] Hypertension, Long term (current) use of anticoagulants, Prostate enlargement, Stroke (cerebrum) ([address] and Unspecified cerebral artery occlusion with cerebral infarction. Past Surgical History:  has a past surgical history that includes ECHO Compl W Dop Color Flow (11/26/2013); ECHO Transesophageal (11/27/2013); colostomy (1978); Cystoscopy (more than 5 yrs); TURP (6/16/14); Colonoscopy; Abdomen surgery; Endoscopy, colon, diagnostic (12/10/15); and Upper gastrointestinal endoscopy (N/A, 3/11/2022). Social History:  reports that he has quit smoking. He has never used smokeless tobacco. He reports that he does not drink alcohol and does not use drugs.     Family History: family history includes COPD in his sister; Coronary Art Dis in his sister; Diabetes in an other family member; Hypotension in his mother. The patients home medications have been reviewed. Allergies: Patient has no known allergies.     -------------------------------------------------- RESULTS -------------------------------------------------  Labs:  Results for orders placed or performed during the hospital encounter of 09/09/22   CBC with Auto Differential   Result Value Ref Range    WBC 4.0 (L) 4.5 - 11.5 E9/L    RBC 4.43 3.80 - 5.80 E12/L    Hemoglobin 11.7 (L) 12.5 - 16.5 g/dL    Hematocrit 37.5 37.0 - 54.0 %    MCV 84.7 80.0 - 99.9 fL    MCH 26.4 26.0 - 35.0 pg    MCHC 31.2 (L) 32.0 - 34.5 %    RDW 15.0 11.5 - 15.0 fL    Platelets 99 (L) 589 - 450 E9/L    MPV 10.9 7.0 - 12.0 fL    Neutrophils % 68.3 43.0 - 80.0 %    Immature Granulocytes % 0.3 0.0 - 5.0 %    Lymphocytes % 21.5 20.0 - 42.0 %    Monocytes % 6.8 2.0 - 12.0 %    Eosinophils % 2.3 0.0 - 6.0 %    Basophils % 0.8 0.0 - 2.0 %    Neutrophils Absolute 2.74 1.80 - 7.30 E9/L    Immature Granulocytes # 0.01 E9/L    Lymphocytes Absolute 0.86 (L) 1.50 - 4.00 E9/L    Monocytes Absolute 0.27 0.10 - 0.95 E9/L    Eosinophils Absolute 0.09 0.05 - 0.50 E9/L    Basophils Absolute 0.03 0.00 - 0.20 E9/L   Comprehensive Metabolic Panel w/ Reflex to MG   Result Value Ref Range    Sodium 141 132 - 146 mmol/L    Potassium reflex Magnesium 4.6 3.5 - 5.0 mmol/L    Chloride 107 98 - 107 mmol/L    CO2 23 22 - 29 mmol/L    Anion Gap 11 7 - 16 mmol/L    Glucose 233 (H) 74 - 99 mg/dL    BUN 29 (H) 6 - 23 mg/dL    Creatinine 1.9 (H) 0.7 - 1.2 mg/dL    GFR Non-African American 41 >=60 mL/min/1.73    GFR African American 41     Calcium 9.6 8.6 - 10.2 mg/dL    Total Protein 7.5 6.4 - 8.3 g/dL    Albumin 3.9 3.5 - 5.2 g/dL    Total Bilirubin 0.4 0.0 - 1.2 mg/dL    Alkaline Phosphatase 104 40 - 129 U/L    ALT 12 0 - 40 U/L    AST 31 0 - 39 U/L   Troponin   Result Value Ref Range    Troponin, High Sensitivity 25 (H) 0 - 11 ng/L Protime-INR   Result Value Ref Range    Protime 25.2 (H) 9.3 - 12.4 sec    INR 2.3    Troponin   Result Value Ref Range    Troponin, High Sensitivity 23 (H) 0 - 11 ng/L   Brain Natriuretic Peptide   Result Value Ref Range    Pro-BNP 1,212 (H) 0 - 450 pg/mL   Platelet Confirmation   Result Value Ref Range    Platelet Confirmation CONFIRMED    EKG 12 Lead   Result Value Ref Range    Ventricular Rate 73 BPM    Atrial Rate 85 BPM    QRS Duration 68 ms    Q-T Interval 374 ms    QTc Calculation (Bazett) 412 ms    R Axis 45 degrees    T Axis 45 degrees       Radiology:  XR CHEST PORTABLE   Final Result   No acute cardiopulmonary process. ------------------------- NURSING NOTES AND VITALS REVIEWED ---------------------------  Date / Time Roomed:  9/9/2022  9:00 AM  ED Bed Assignment:  [id number]/[id number]-[id number]    The nursing notes within the ED encounter and vital signs as below have been reviewed. BP (!) 188/98   Pulse 62   Temp 98.1 °F (36.7 °C)   Resp 20   SpO2 99%   Oxygen Saturation Interpretation: Normal      ------------------------------------------ PROGRESS NOTES ------------------------------------------  12:13 PM EDT  I have spoken with the patient and family if present and discussed todays results, in addition to providing specific details for the plan of care and counseling regarding the diagnosis and prognosis. Their questions are answered at this time and they are agreeable with the plan. I discussed at length with them reasons for immediate return here for re evaluation. They will followup with their primary care provider by calling their office as soon as possible.      --------------------------------- ADDITIONAL PROVIDER NOTES ---------------------------------  At this time the patient is without objective evidence of an acute process requiring hospitalization or inpatient management.   They have remained hemodynamically stable throughout their entire ED visit and are stable for discharge with

## 2023-02-17 ENCOUNTER — TELEPHONE (OUTPATIENT)
Dept: HEMATOLOGY | Age: 88
End: 2023-02-17

## 2023-02-17 DIAGNOSIS — D49.0 IPMN (INTRADUCTAL PAPILLARY MUCINOUS NEOPLASM): Primary | ICD-10-CM

## 2023-02-17 NOTE — TELEPHONE ENCOUNTER
MRI abdomen  Approved through [email or web address]  Order ID [id number]  Valid 2/1/23-5/1/23    Called and spoke to [place] regarding his scheduled MRI appt. He confirmed appt date, time, and location. Reviewed instructions and he expressed understanding. [facility]aud agreed to arrive to his scan at 1130 to complete labs first. Lab orders in [address] A follow up appt was scheduled with Dr Emma uCellar at the [place] office. Encouraged pt to call with any questions or concerns.

## 2023-02-19 ENCOUNTER — APPOINTMENT (OUTPATIENT)
Dept: GENERAL RADIOLOGY | Age: 88
DRG: 310 | End: 2023-02-19
Payer: MEDICARE

## 2023-02-19 ENCOUNTER — HOSPITAL ENCOUNTER (INPATIENT)
Age: 88
LOS: 1 days | Discharge: HOME | DRG: 310 | End: 2023-02-20
Attending: EMERGENCY MEDICINE | Admitting: INTERNAL MEDICINE
Payer: MEDICARE

## 2023-02-19 ENCOUNTER — APPOINTMENT (OUTPATIENT)
Dept: CT IMAGING | Age: 88
DRG: 310 | End: 2023-02-19
Payer: MEDICARE

## 2023-02-19 DIAGNOSIS — R07.89 OTHER CHEST PAIN: Primary | ICD-10-CM

## 2023-02-19 PROBLEM — R07.9 ACUTE CHEST PAIN: Status: ACTIVE | Noted: 2023-02-19

## 2023-02-19 LAB
ANION GAP SERPL CALCULATED.3IONS-SCNC: 7 MMOL/L (ref 7–16)
BASOPHILS ABSOLUTE: 0.01 E9/L (ref 0–0.2)
BASOPHILS RELATIVE PERCENT: 0.2 % (ref 0–2)
BUN BLDV-MCNC: 22 MG/DL (ref 6–23)
CALCIUM SERPL-MCNC: 9.2 MG/DL (ref 8.6–10.2)
CHLORIDE BLD-SCNC: 108 MMOL/L (ref 98–107)
CO2: 24 MMOL/L (ref 22–29)
CREAT SERPL-MCNC: 1.5 MG/DL (ref 0.7–1.2)
EOSINOPHILS ABSOLUTE: 0.04 E9/L (ref 0.05–0.5)
EOSINOPHILS RELATIVE PERCENT: 0.8 % (ref 0–6)
GFR SERPL CREATININE-BSD FRML MDRD: 45 ML/MIN/1.73
GLUCOSE BLD-MCNC: 228 MG/DL (ref 74–99)
HCT VFR BLD CALC: 34 % (ref 37–54)
HEMOGLOBIN: 10.8 G/DL (ref 12.5–16.5)
IMMATURE GRANULOCYTES #: 0.01 E9/L
IMMATURE GRANULOCYTES %: 0.2 % (ref 0–5)
LYMPHOCYTES ABSOLUTE: 0.82 E9/L (ref 1.5–4)
LYMPHOCYTES RELATIVE PERCENT: 16.6 % (ref 20–42)
MCH RBC QN AUTO: 26.3 PG (ref 26–35)
MCHC RBC AUTO-ENTMCNC: 31.8 % (ref 32–34.5)
MCV RBC AUTO: 82.7 FL (ref 80–99.9)
MONOCYTES ABSOLUTE: 0.34 E9/L (ref 0.1–0.95)
MONOCYTES RELATIVE PERCENT: 6.9 % (ref 2–12)
NEUTROPHILS ABSOLUTE: 3.72 E9/L (ref 1.8–7.3)
NEUTROPHILS RELATIVE PERCENT: 75.3 % (ref 43–80)
PDW BLD-RTO: 15.8 FL (ref 11.5–15)
PLATELET # BLD: 87 E9/L (ref 130–450)
PLATELET CONFIRMATION: NORMAL
PMV BLD AUTO: 11.1 FL (ref 7–12)
POTASSIUM SERPL-SCNC: 4.7 MMOL/L (ref 3.5–5)
RBC # BLD: 4.11 E12/L (ref 3.8–5.8)
REASON FOR REJECTION: NORMAL
REJECTED TEST: NORMAL
SODIUM BLD-SCNC: 139 MMOL/L (ref 132–146)
TROPONIN, HIGH SENSITIVITY: 21 NG/L (ref 0–11)
TROPONIN, HIGH SENSITIVITY: 22 NG/L (ref 0–11)
WBC # BLD: 4.9 E9/L (ref 4.5–11.5)

## 2023-02-19 PROCEDURE — 85025 COMPLETE CBC W/AUTO DIFF WBC: CPT

## 2023-02-19 PROCEDURE — 74174 CTA ABD&PLVS W/CONTRAST: CPT

## 2023-02-19 PROCEDURE — 36415 COLL VENOUS BLD VENIPUNCTURE: CPT

## 2023-02-19 PROCEDURE — 80048 BASIC METABOLIC PNL TOTAL CA: CPT

## 2023-02-19 PROCEDURE — 2580000003 HC RX 258

## 2023-02-19 PROCEDURE — 70450 CT HEAD/BRAIN W/O DYE: CPT

## 2023-02-19 PROCEDURE — 99285 EMERGENCY DEPT VISIT HI MDM: CPT

## 2023-02-19 PROCEDURE — 2140000000 HC CCU INTERMEDIATE R&B

## 2023-02-19 PROCEDURE — 93005 ELECTROCARDIOGRAM TRACING: CPT | Performed by: NURSE PRACTITIONER

## 2023-02-19 PROCEDURE — 71275 CT ANGIOGRAPHY CHEST: CPT

## 2023-02-19 PROCEDURE — 71046 X-RAY EXAM CHEST 2 VIEWS: CPT

## 2023-02-19 PROCEDURE — 6360000004 HC RX CONTRAST MEDICATION: Performed by: RADIOLOGY

## 2023-02-19 PROCEDURE — 84484 ASSAY OF TROPONIN QUANT: CPT

## 2023-02-19 RX ORDER — 0.9 % SODIUM CHLORIDE 0.9 %
1000 INTRAVENOUS SOLUTION INTRAVENOUS ONCE
Status: COMPLETED | OUTPATIENT
Start: 2023-02-19 | End: 2023-02-19

## 2023-02-19 RX ADMIN — SODIUM CHLORIDE 1000 ML: 9 INJECTION, SOLUTION INTRAVENOUS at 19:07

## 2023-02-19 RX ADMIN — IOPAMIDOL 75 ML: 755 INJECTION, SOLUTION INTRAVENOUS at 19:09

## 2023-02-19 ASSESSMENT — ENCOUNTER SYMPTOMS
ABDOMINAL DISTENTION: 0
APNEA: 0
ABDOMINAL PAIN: 0
CHEST TIGHTNESS: 1

## 2023-02-19 ASSESSMENT — HEART SCORE: ECG: 0

## 2023-02-19 ASSESSMENT — LIFESTYLE VARIABLES
HOW MANY STANDARD DRINKS CONTAINING ALCOHOL DO YOU HAVE ON A TYPICAL DAY: PATIENT DOES NOT DRINK
HOW OFTEN DO YOU HAVE A DRINK CONTAINING ALCOHOL: NEVER

## 2023-02-19 NOTE — ED PROVIDER NOTES
Zbigniew Ward 80 y.o. male PHMx of CVA, Afib, Essential HTN, Alcohol Cirrhosis, HLD, DMII presents to the ED c/o \"chest and back pain\". Onset:for month but worse in past 3 days. Location/Radiation:central chest/back. Duration: intermittent to constant now. Characterization: \"dull achy\". Aggravating Factors: walking. Relieving Factors: rest/relaxing. Severity: mild to moderate. Pt also reports a \"numbness on my left side\" with tingling sensation. Assx Sxs: chest/back pain, weakness, numbness/tingling. Loreatha Press He Denies: shob/palpitations, n/v, abd pain, Hx of AAA. Review of Systems   Constitutional:  Negative for fever. HENT:  Negative for congestion. Eyes:  Negative for redness. Respiratory:  Positive for chest tightness. Negative for apnea and shortness of breath. Cardiovascular:  Positive for chest pain. Negative for palpitations and leg swelling. Gastrointestinal:  Negative for abdominal distention, abdominal pain, nausea and vomiting. Genitourinary:  Negative for dysuria. Musculoskeletal:  Negative for arthralgias. Skin:  Negative for rash. Neurological:  Positive for weakness, light-headedness and numbness. Negative for dizziness. Psychiatric/Behavioral:  Negative for confusion. Physical Exam  Vitals reviewed. Constitutional:       General: He is not in acute distress. Appearance: Normal appearance. He is not ill-appearing. HENT:      Head: Normocephalic. Right Ear: External ear normal.      Left Ear: External ear normal.      Nose: Nose normal.      Mouth/Throat:      Mouth: Mucous membranes are moist.      Pharynx: Oropharynx is clear. No oropharyngeal exudate or posterior oropharyngeal erythema. Eyes:      General:         Right eye: No discharge. Left eye: No discharge. Extraocular Movements: Extraocular movements intact. Conjunctiva/sclera: Conjunctivae normal.      Pupils: Pupils are equal, round, and reactive to light. Cardiovascular:      Rate and Rhythm: Normal rate and regular rhythm. Heart sounds: No friction rub. No gallop. Pulmonary:      Effort: Pulmonary effort is normal. No respiratory distress. Breath sounds: Normal breath sounds. No wheezing or rales. Abdominal:      General: Abdomen is flat. Palpations: Abdomen is soft. Tenderness: There is no abdominal tenderness. There is no guarding or rebound. Musculoskeletal:         General: No deformity or signs of injury. Cervical back: Normal range of motion. No rigidity. Skin:     General: Skin is warm. Capillary Refill: Capillary refill takes less than 2 seconds. Coloration: Skin is not jaundiced. Neurological:      General: No focal deficit present. Mental Status: He is alert and oriented to person, place, and time. Sensory: No sensory deficit. Motor: No weakness. Psychiatric:         Mood and Affect: Mood normal.         Behavior: Behavior normal.         Thought Content: Thought content normal.        Procedures     Medical Decision Making  Dionna Sales 80 y.o. male presented to the ED c/o chest pain/shob. Upon evaluation/physical examination of the Pt he was AOX4, cooperative, normotensive, non hypoxic on room air, CN II-XII grossly intact. Patient has a significant cardiac history, differential diagnosis included: ACS, pneumonia, electrolyte derangements, musculoskeletal pain, aortic dissection,  other.   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 Discussion was made with patient's primary care team for admission. Patient likely also needs PT/OT evaluation and further chest pain rule out work-up. Patient verbally consented agreed to the plan. Suggested E/M Coding Level 57009  (This level has been selected based on the  CPT guidelines for E/M codes in the ED.)    COPA:    This patient has moderate complexity 1+ chronic illnesses with exacerbation, progression, or side effects    DATA:    This patient required moderate data complexity    Tests Ordered: 3    RISK:     This patient has high risk due to decision regarding hospitalization    Amount and/or Complexity of Data Reviewed  Labs: ordered. Radiology: ordered. ECG/medicine tests: ordered. Risk  Prescription drug management. Decision regarding hospitalization. ED Course as of 23   Sun  EKG: This EKG is signed and interpreted by EP. Rate: 92  Rhythm: Atrial fibrillation  Interpretation: atrial fibrillation (chronic)  Comparison: changes compared to previous EKG  [JR]    History: 1  EC  Patient Age: 2  *Risk factors for Atherosclerotic disease: Coronary Artery Disease; Hypercholesterolemia; Hypertension  Risk Factors: 2  Troponin: 1  Heart Score Total: 6  [JR]      ED Course User Index  [JR] Phillip Carmichael DO      ED Course as of 23   Sun  EKG: This EKG is signed and interpreted by EP. Rate: 92  Rhythm: Atrial fibrillation  Interpretation: atrial fibrillation (chronic)  Comparison: changes compared to previous EKG  [JR]    History: 1  EC  Patient Age: 2  *Risk factors for Atherosclerotic disease: Coronary Artery Disease; Hypercholesterolemia;  Hypertension  Risk Factors: 2  Troponin: 1  Heart Score Total: 6  [JR]      ED Course User Index  [JR] Adilia Buckner,        --------------------------------------------- PAST HISTORY ---------------------------------------------  Past Medical History:  has a past medical history of A-fib ([facility] Chronic kidney disease, Chronic renal impairment, stage 3 (moderate) ([facility] Colitis, ulcerative chronic ([facility] Diabetes mellitus ([facility] DM (diabetes mellitus) ([facility] Encounter for therapeutic drug monitoring, Hyperlipidemia, Hyperlipidemia associated with type 2 diabetes mellitus ([facility] Hypertension, Long term (current) use of anticoagulants, Prostate enlargement, Stroke (cerebrum) ([facility] and Unspecified cerebral artery occlusion with cerebral infarction. Past Surgical History:  has a past surgical history that includes ECHO Compl W Dop Color Flow (11/26/2013); ECHO Transesophageal (11/27/2013); colostomy (1978); Cystoscopy (more than 5 yrs); TURP (6/16/14); Colonoscopy; Abdomen surgery; Endoscopy, colon, diagnostic (12/10/15); and Upper gastrointestinal endoscopy (N/A, 3/11/2022). Social History:  reports that he has quit smoking. He has never used smokeless tobacco. He reports that he does not drink alcohol and does not use drugs. Family History: family history includes COPD in his sister; Coronary Art Dis in his sister; Diabetes in an other family member; Hypotension in his mother. The patients home medications have been reviewed. Allergies: Patient has no known allergies.     -------------------------------------------------- RESULTS -------------------------------------------------    LABS:  Results for orders placed or performed during the hospital encounter of 02/19/23   CBC with Auto Differential   Result Value Ref Range    WBC 4.9 4.5 - 11.5 E9/L    RBC 4.11 3.80 - 5.80 E12/L    Hemoglobin 10.8 (L) 12.5 - 16.5 g/dL    Hematocrit 34.0 (L) 37.0 - 54.0 %    MCV 82.7 80.0 - 99.9 fL    MCH 26.3 26.0 - 35.0 pg    MCHC 31.8 (L) 32.0 - 34.5 %    RDW 15.8 (H) 11.5 - 15.0 fL    Platelets 87 (L) 130 - 450 E9/L    MPV 11.1 7.0 - 12.0 fL    Neutrophils % 75.3 43.0 - 80.0 %    Immature Granulocytes % 0.2 0.0 - 5.0 %    Lymphocytes % 16.6 (L) 20.0 - 42.0 %    Monocytes % 6.9 2.0 - 12.0 %    Eosinophils % 0.8 0.0 - 6.0 %    Basophils % 0.2 0.0 - 2.0 %    Neutrophils Absolute 3.72 1.80 - 7.30 E9/L    Immature Granulocytes # 0.01 E9/L    Lymphocytes Absolute 0.82 (L) 1.50 - 4.00 E9/L    Monocytes Absolute 0.34 0.10 - 0.95 E9/L    Eosinophils Absolute 0.04 (L) 0.05 - 0.50 E9/L    Basophils Absolute 0.01 0.00 - 0.20 E9/L   BMP   Result Value Ref Range    Sodium 139 132 - 146 mmol/L    Potassium 4.7 3.5 - 5.0 mmol/L    Chloride 108 (H) 98 - 107 mmol/L    CO2 24 22 - 29 mmol/L    Anion Gap 7 7 - 16 mmol/L    Glucose 228 (H) 74 - 99 mg/dL    BUN 22 6 - 23 mg/dL    Creatinine 1.5 (H) 0.7 - 1.2 mg/dL    Est, Glom Filt Rate 45 >=60 mL/min/1.73    Calcium 9.2 8.6 - 10.2 mg/dL   Platelet Confirmation   Result Value Ref Range    Platelet Confirmation CONFIRMED    SPECIMEN REJECTION   Result Value Ref Range    Rejected Test TRP5     Reason for Rejection see below    Troponin   Result Value Ref Range    Troponin, High Sensitivity 21 (H) 0 - 11 ng/L   Troponin   Result Value Ref Range    Troponin, High Sensitivity 22 (H) 0 - 11 ng/L   EKG 12 Lead   Result Value Ref Range    Ventricular Rate 92 BPM    Atrial Rate 101 BPM    QRS Duration 66 ms    Q-T Interval 338 ms    QTc Calculation (Bazett) 417 ms    R Axis 69 degrees    T Axis 62 degrees       RADIOLOGY:  CTA CHEST W CONTRAST   Final Result   No acute arterial abnormality is identified. CTA ABDOMEN PELVIS W CONTRAST   Final Result   1. In the abdomen there are atherosclerotic changes without acute arterial   abnormality clearly identified and multiple incidental findings   2.  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 No significant interval change from 11/10/2021         XR CHEST (2 VW)   Final Result   No acute process. ------------------------- NURSING NOTES AND VITALS REVIEWED ---------------------------  Date / Time Roomed:  2/19/2023  4:34 PM  ED Bed Assignment:  [id number]    The nursing notes within the ED encounter and vital signs as below have been reviewed. Patient Vitals for the past 24 hrs:   BP Temp Temp src Pulse Resp SpO2 Height Weight   02/19/23 2321 (!) 178/79 97.6 °F (36.4 °C) Temporal 87 18 98 % 5' 9\" (1.753 m) 146 lb 6.4 oz (66.4 kg)   02/19/23 2245 (!) 160/84 -- -- 87 18 100 % -- --   02/19/23 2000 (!) 181/91 -- -- 91 16 100 % -- --   02/19/23 1632 (!) 162/101 -- -- 89 16 98 % -- --   02/19/23 1621 -- 97.4 °F (36.3 °C) Temporal 100 -- 100 % -- --       Oxygen Saturation Interpretation: Normal    ------------------------------------------ PROGRESS NOTES ------------------------------------------  Re-evaluation(s):  Time: 2100  Patients symptoms show no change  Repeat physical examination is not changed    Counseling:  I have spoken with the patient and discussed todays results, in addition to providing specific details for the plan of care and counseling regarding the diagnosis and prognosis. Their questions are answered at this time and they are agreeable with the plan of admission.    --------------------------------- ADDITIONAL PROVIDER NOTES ---------------------------------  Consultations:  Time: 2207. Spoke with Dr. Vanessa Taylor. Discussed case. They will admit the patient. This patient's ED course included: a personal history and physicial examination, re-evaluation prior to disposition, multiple bedside re-evaluations, cardiac monitoring, and continuous pulse oximetry    This patient has remained hemodynamically stable during their ED course. Diagnosis:  1.  Other chest pain        Disposition:  Patient's disposition: Admit to telemetry  Patient's condition is stable.           Lucio Fisher DO  Resident  02/20/23 0144       ATTENDING PROVIDER ATTESTATION:     I,  Dr. Phelps am the primary physician of record for this patient.    Johny Nichols presented to the emergency department for evaluation of Extremity Weakness (Increased weakness, back pain that radiates to chest after standing for long periods of time, chest tightness, SOB with exertion, +thinner, hx of afib )   and was initially evaluated by the Medical Resident. See Original ED Note for H&P and ED course above.     I have reviewed and discussed the case, including pertinent history (medical, surgical, family and social) and exam findings with the Medical Resident assigned to Johny Nichols.  I have personally performed and/or participated in the history, exam, medical decision making, and procedures and agree with all pertinent clinical information.  If an EKG was performed I did review the EKG and did discuss the interpretation with the resident.  I do agree with the residents interpretation.       I have reviewed my findings and recommendations with the assigned Medical Resident, Johny Nichols and members of family present at the time of disposition.    My findings/plan: The encounter diagnosis was Other chest pain.  Current Discharge Medication List        DO Nina Awad DO  02/20/23 0144

## 2023-02-19 NOTE — ED NOTES
Department of Emergency Medicine  FIRST PROVIDER TRIAGE NOTE             Independent MLP           2/19/23  4:30 PM EST    Date of Encounter: 2/19/23   MRN: [id number]      HPI: Debbie aAron is a 80 y.o. male who presents to the ED for No chief complaint on file. Patient has been having back pain that rates into his chest along with shoulder pain, shortness of breath and overall weakness has been ongoing for the last week    ROS: Negative for fever or cough. PE: Gen Appearance/Constitutional: alert  Musculoskeletal: moves all extremities x 4     Initial Plan of Care: All treatment areas with department are currently occupied. Plan to order/Initiate the following while awaiting opening in ED: labs, EKG, and imaging studies.   Initiate Treatment-Testing, Proceed toTreatment Area When Bed Available for ED Attending/DAPHNEP to Continue Care    Electronically signed by Aura Gowers, APRN - CNP   DD: 2/19/23       Aura Gowers, APRN - CNP  02/19/23 [id number]

## 2023-02-20 ENCOUNTER — APPOINTMENT (OUTPATIENT)
Dept: NON INVASIVE DIAGNOSTICS | Age: 88
DRG: 310 | End: 2023-02-20
Payer: MEDICARE

## 2023-02-20 ENCOUNTER — APPOINTMENT (OUTPATIENT)
Dept: NUCLEAR MEDICINE | Age: 88
DRG: 310 | End: 2023-02-20
Payer: MEDICARE

## 2023-02-20 VITALS
WEIGHT: 146.4 LBS | HEART RATE: 85 BPM | BODY MASS INDEX: 21.68 KG/M2 | HEIGHT: 69 IN | OXYGEN SATURATION: 99 % | TEMPERATURE: 97.3 F | SYSTOLIC BLOOD PRESSURE: 166 MMHG | DIASTOLIC BLOOD PRESSURE: 99 MMHG | RESPIRATION RATE: 16 BRPM

## 2023-02-20 PROBLEM — E78.00 PURE HYPERCHOLESTEROLEMIA: Status: ACTIVE | Noted: 2023-02-20

## 2023-02-20 LAB
EKG ATRIAL RATE: 101 BPM
EKG Q-T INTERVAL: 338 MS
EKG QRS DURATION: 66 MS
EKG QTC CALCULATION (BAZETT): 417 MS
EKG R AXIS: 69 DEGREES
EKG T AXIS: 62 DEGREES
EKG VENTRICULAR RATE: 92 BPM
INR BLD: 2.2
LV EF: 59 %
LVEF MODALITY: NORMAL
METER GLUCOSE: 128 MG/DL (ref 74–99)
METER GLUCOSE: 141 MG/DL (ref 74–99)
METER GLUCOSE: 158 MG/DL (ref 74–99)
PROTHROMBIN TIME: 23.4 SEC (ref 9.3–12.4)

## 2023-02-20 PROCEDURE — APPSS45 APP SPLIT SHARED TIME 31-45 MINUTES: Performed by: PHYSICIAN ASSISTANT

## 2023-02-20 PROCEDURE — 93010 ELECTROCARDIOGRAM REPORT: CPT | Performed by: INTERNAL MEDICINE

## 2023-02-20 PROCEDURE — 99223 1ST HOSP IP/OBS HIGH 75: CPT | Performed by: INTERNAL MEDICINE

## 2023-02-20 PROCEDURE — 97530 THERAPEUTIC ACTIVITIES: CPT

## 2023-02-20 PROCEDURE — 82962 GLUCOSE BLOOD TEST: CPT

## 2023-02-20 PROCEDURE — 78452 HT MUSCLE IMAGE SPECT MULT: CPT | Performed by: INTERNAL MEDICINE

## 2023-02-20 PROCEDURE — 6370000000 HC RX 637 (ALT 250 FOR IP): Performed by: INTERNAL MEDICINE

## 2023-02-20 PROCEDURE — 93016 CV STRESS TEST SUPVJ ONLY: CPT | Performed by: INTERNAL MEDICINE

## 2023-02-20 PROCEDURE — 85610 PROTHROMBIN TIME: CPT

## 2023-02-20 PROCEDURE — 97165 OT EVAL LOW COMPLEX 30 MIN: CPT

## 2023-02-20 PROCEDURE — 6370000000 HC RX 637 (ALT 250 FOR IP)

## 2023-02-20 PROCEDURE — 93018 CV STRESS TEST I&R ONLY: CPT | Performed by: INTERNAL MEDICINE

## 2023-02-20 PROCEDURE — 93017 CV STRESS TEST TRACING ONLY: CPT

## 2023-02-20 PROCEDURE — A9500 TC99M SESTAMIBI: HCPCS | Performed by: RADIOLOGY

## 2023-02-20 PROCEDURE — 97535 SELF CARE MNGMENT TRAINING: CPT

## 2023-02-20 PROCEDURE — 6360000002 HC RX W HCPCS: Performed by: PHYSICIAN ASSISTANT

## 2023-02-20 PROCEDURE — 36415 COLL VENOUS BLD VENIPUNCTURE: CPT

## 2023-02-20 PROCEDURE — 3430000000 HC RX DIAGNOSTIC RADIOPHARMACEUTICAL: Performed by: RADIOLOGY

## 2023-02-20 PROCEDURE — 78452 HT MUSCLE IMAGE SPECT MULT: CPT

## 2023-02-20 RX ORDER — TECHNETIUM TC-99M SESTAMIBI 1 MG/10ML
12 INJECTION INTRAVENOUS
Status: COMPLETED | OUTPATIENT
Start: 2023-02-20 | End: 2023-02-20

## 2023-02-20 RX ORDER — ACETAMINOPHEN 500 MG
500 TABLET ORAL EVERY 4 HOURS PRN
Status: DISCONTINUED | OUTPATIENT
Start: 2023-02-20 | End: 2023-02-21 | Stop reason: HOSPADM

## 2023-02-20 RX ORDER — TECHNETIUM TC-99M SESTAMIBI 1 MG/10ML
30 INJECTION INTRAVENOUS
Status: COMPLETED | OUTPATIENT
Start: 2023-02-20 | End: 2023-02-20

## 2023-02-20 RX ORDER — INSULIN LISPRO 100 [IU]/ML
0-4 INJECTION, SOLUTION INTRAVENOUS; SUBCUTANEOUS NIGHTLY
Status: DISCONTINUED | OUTPATIENT
Start: 2023-02-20 | End: 2023-02-21 | Stop reason: HOSPADM

## 2023-02-20 RX ORDER — ATORVASTATIN CALCIUM 20 MG/1
20 TABLET, FILM COATED ORAL DAILY
Status: DISCONTINUED | OUTPATIENT
Start: 2023-02-20 | End: 2023-02-21 | Stop reason: HOSPADM

## 2023-02-20 RX ORDER — CLOPIDOGREL BISULFATE 75 MG/1
75 TABLET ORAL DAILY
Status: DISCONTINUED | OUTPATIENT
Start: 2023-02-20 | End: 2023-02-21 | Stop reason: HOSPADM

## 2023-02-20 RX ORDER — WARFARIN SODIUM 2 MG/1
2 TABLET ORAL
Status: COMPLETED | OUTPATIENT
Start: 2023-02-20 | End: 2023-02-20

## 2023-02-20 RX ORDER — INSULIN LISPRO 100 [IU]/ML
0-8 INJECTION, SOLUTION INTRAVENOUS; SUBCUTANEOUS
Status: DISCONTINUED | OUTPATIENT
Start: 2023-02-20 | End: 2023-02-21 | Stop reason: HOSPADM

## 2023-02-20 RX ORDER — DEXTROSE MONOHYDRATE 100 MG/ML
INJECTION, SOLUTION INTRAVENOUS CONTINUOUS PRN
Status: DISCONTINUED | OUTPATIENT
Start: 2023-02-20 | End: 2023-02-21 | Stop reason: HOSPADM

## 2023-02-20 RX ORDER — SODIUM BICARBONATE 650 MG/1
650 TABLET ORAL 2 TIMES DAILY
Status: DISCONTINUED | OUTPATIENT
Start: 2023-02-20 | End: 2023-02-21 | Stop reason: HOSPADM

## 2023-02-20 RX ADMIN — METOPROLOL TARTRATE 12.5 MG: 25 TABLET, FILM COATED ORAL at 09:17

## 2023-02-20 RX ADMIN — REGADENOSON 0.4 MG: 0.08 INJECTION, SOLUTION INTRAVENOUS at 14:22

## 2023-02-20 RX ADMIN — SODIUM BICARBONATE 650 MG: 650 TABLET ORAL at 09:17

## 2023-02-20 RX ADMIN — Medication 30 MILLICURIE: at 15:32

## 2023-02-20 RX ADMIN — CLOPIDOGREL BISULFATE 75 MG: 75 TABLET ORAL at 09:17

## 2023-02-20 RX ADMIN — WARFARIN SODIUM 2 MG: 2 TABLET ORAL at 17:51

## 2023-02-20 RX ADMIN — Medication 12 MILLICURIE: at 13:44

## 2023-02-20 RX ADMIN — ATORVASTATIN CALCIUM 20 MG: 20 TABLET, FILM COATED ORAL at 09:17

## 2023-02-20 RX ADMIN — METOPROLOL TARTRATE 25 MG: 25 TABLET, FILM COATED ORAL at 17:51

## 2023-02-20 ASSESSMENT — ENCOUNTER SYMPTOMS
SHORTNESS OF BREATH: 0
VOMITING: 0
EYE REDNESS: 0
NAUSEA: 0

## 2023-02-20 NOTE — PROGRESS NOTES
Fusion imaging was reviewed and demonstrates no evidence of perfusion abnormalities with normal left ventricular systolic function in the face of atrial fibrillation. This suggests a low risk of ischemic events with recommendations of medical management and based on present blood pressure and heart rates further modification of his beta-blocker therapy. Ongoing aggressive factor modification of blood pressure, diabetes and serum lipids will remain essential to reducing risk of future atherosclerotic development. We will further evaluate him hospitalization should additional cardiovascular difficulties or concerns arise with arrangements made for outpatient cardiovascular follow-up in approximately 1 month.

## 2023-02-20 NOTE — ACP (ADVANCE CARE PLANNING)
Advance Care Planning   Healthcare Decision Maker:    Primary Decision Maker: Hector Arevalo - Spouse - [phone]    Click here to complete Healthcare Decision Makers including selection of the Healthcare Decision Maker Relationship (ie \"Primary\"). Today we documented Decision Maker(s) consistent with Legal Next of Kin hierarchy.        Efren Bruno, [email or web address].W.  [phone]

## 2023-02-20 NOTE — PROGRESS NOTES
Dr Richard العلي notified via perfect serve of patient needing admission orders. RN also informed of patient's high blood pressure with a SBP of 178. Awaiting orders.

## 2023-02-20 NOTE — PROGRESS NOTES
Occupational Therapy  OCCUPATIONAL THERAPY INITIAL EVALUATION    [address] [address] [address]      Date:2023                                                Patient Name: Homa Baron  MRN: [id number]  : 1935  Room: [address] [place] [address]    Evaluating [address] [place] [address] [id number]    Referring Provider: Kandice Hill DO  Specific Provider Orders/Date: OT eval and treat 23    Diagnosis: Other chest pain [R07.89]  Acute chest pain [R07.9]   Pt admitted to hospital on 23 for chest pain, SOB, weakness      Pertinent Medical History:  has a past medical history of A-fib ([address] Chronic kidney disease, Chronic renal impairment, stage 3 (moderate) ([address] Colitis, ulcerative chronic ([address] Diabetes mellitus ([address] DM (diabetes mellitus) ([facility] [address] Encounter for therapeutic drug monitoring, Hyperlipidemia, Hyperlipidemia associated with type 2 diabetes mellitus ([address] Hypertension, Long term (current) use of anticoagulants, Prostate enlargement, Stroke (cerebrum) ([address] and Unspecified cerebral artery occlusion with cerebral infarction.        Precautions:  Fall Risk    Assessment of current deficits    [x] Functional mobility  [x]ADLs  [x] Strength               []Cognition    [x] Functional transfers   [x] IADLs         [x] Safety Awareness   [x]Endurance    [] Fine Coordination              [x] Balance      [] Vision/perception   []Sensation     []Gross Motor Coordination  [] ROM  [] Delirium                   [] Motor Control     OT PLAN OF CARE   OT POC based on physician orders, patient diagnosis and results of clinical assessment    Frequency/Duration 1-3 days/wk for 2 weeks PRN   Specific OT Treatment Interventions to include:   * Instruction/training on adapted ADL techniques and AE recommendations to increase functional independence within precautions       * Training on energy conservation strategies, correct breathing pattern and techniques to improve independence/tolerance for self-care routine  * Functional transfer/mobility training/DME recommendations for increased independence, safety, and fall prevention  * Patient/Family education to increase follow through with safety techniques and functional independence  * Recommendation of environmental modifications for increased safety with functional transfers/mobility and ADLs  * Therapeutic exercise to improve motor endurance, ROM, and functional strength for ADLs/functional transfers  * Therapeutic activities to facilitate/challenge dynamic balance, stand tolerance for increased safety and independence with ADLs  * Therapeutic activities to facilitate gross/fine motor skills for increased independence with ADLs    Recommended Adaptive Equipment: TBD     Home Living: Pt lives alone in 1 floor home. 0 CARA    Bathroom setup: tub/shower with grab bar    Equipment owned: [facility]    Prior Level of Function: independent//mod I with ADLs , shares IADLs (w/ spouse); ambulated w/ SPC for longer distances/in community   Driving: yes when necessary  \"Spouse lives ~20 minutes away\"    Pain Level: Pt c/o no pain at rest, c/o mild headache this session    Cognition: A&O: 4/4; Follows 1-2 step directions   Memory:  good    Sequencing:  good    Problem solving:  fair    Judgement/safety:  fair      Functional Assessment:  AM-PAC Daily Activity Raw Score: 18/24   Initial Eval Status  Date: 2/20/23 Treatment Status  Date: STGs = LTGs  Time frame: 10-14 days   Feeding NPO  For testing  -   Grooming Minimal Assist   Standing at sink for various tasks  Modified [place]    UB Dressing Stand by Assist   Gown while seated  Modified [place]    LB Dressing Moderate Assist   Pants    Min A  Socks  Supervision    Bathing Minimal Assist  Supervision    Toileting Minimal Assist  For standing balance at commode during bladder management and to manage ostomy   Modified [place]    Bed Mobility  Supine to sit: Stand by Assist   Sit to supine: NT  Supine to sit: Modified [place]   Sit to supine: Modified [place]    Functional Transfers Contact Guard Assist   Various surfaces  Modified [place]    Functional Mobility Minimal Assist > CGA w/o AD  In room, bathroom, household distance in hallway  Improved as progressed  Modified [place]    Balance Sitting:     Static:  Supervision    Dynamic:SBA  Standing: Min>CGA, no AD     Activity Tolerance Fair  Good   Visual/  Perceptual Glasses: yes                  Hand Dominance R   AROM (PROM) Strength Additional Info:    RUE  WFL 4/5 good  and wfl FMC/dexterity noted during ADL tasks       LUE WFL Distal: 4/5  Proximal: 4-/5 good  and wfl FMC/dexterity noted during ADL tasks       Hearing: WFL   Sensation:  No c/o numbness or tingling   Tone: WFL  Edema: none noted    Comments: Upon arrival patient lying in bed. Therapist educated pt on role of OT. RN clearance. At end of session, patient seated in chair (RN approved/aware) with call light and phone within reach, all lines and tubes intact. Overall patient demonstrated decreased independence and safety during completion of ADL/functional transfer/mobility tasks. Pt would benefit from continued skilled OT to increase safety and independence with completion of ADL/IADL tasks for functional independence and quality of life.     Treatment: OT treatment provided this date includes: Facilitation of bed mobility (education/cues for body mechanics), unsupported sitting balance (addressing posture, weight shifting, dynamic reaching to prep for ADL's), functional transfers (various surfaces w/ education/cues for safety/hand placement), standing tolerance tasks (addressing posture, balance and activity tolerance while incorporating light functional reaching impacting ADLs and functional activity) and functional ambulation tasks (including to/ from bathroom, functional distance in hallway and in preparation for item retrieval tasks w/ education/skilled cuing on hand placement, posture, body mechanics, energy conservation techniques and safety). Therapist facilitated self-care retraining: UB/LB self-care tasks (gown, socks, pants), toileting task and standing grooming tasks while educating/cuing pt on modified techniques, posture, safety and energy conservation techniques. Skilled monitoring of HR, O2 sats and pts response to treatment. Rehab Potential: Good for established goals     Patient / Family Goal: not stated      Patient and/or family were instructed on functional diagnosis, prognosis/goals and OT plan of care. Demonstrated fair understanding. Eval Complexity: Low    Time In: 09:12  Time Out: 09:48  Total Treatment Time: 23 minutes    Min Units   OT Eval Low 97165  X  1   OT Eval Medium 39444      OT Eval High 32391      OT Re-Eval [id number]       Therapeutic Ex 35090       Therapeutic Activities 05230  9  1   ADL/Self Care 75812  14  1   Orthotic Management 45370       Manual [id number]     Neuro Re-Ed 05276       Non-Billable Time          Evaluation Time additionally includes thorough review of current medical information, gathering information on past medical history/social history and prior level of function, interpretation of standardized testing/informal observation of tasks, assessment of data and development of plan of care and goals.         [place] OTR/L #[id number]

## 2023-02-20 NOTE — CONSULTS
Inpatient Cardiology Consultation      Reason for Consult:  chest pain    Requesting Physician:  Phyllis Wu MD    Date of Consultation: 2/20/2023    HISTORY OF PRESENT ILLNESS:   Mr. Dontae Wharton is an 81 yo AAM who is known to Dr. Deangelo Haley from inpatient admission 11/2021. PMH is significant for HTN, HLD, permanent AFib on chronic warfarin, right MCA stroke (2013), anemia, CKD, and BPH s/p TURP. We are asked to see him regarding chest pain. He presented to [facility] ER on 2/19/23 with chief complaint of exertional back and chest pain. He describes an \"uncomfortable feeling\" in the back and chest when he is ambulatory. It starts in the left scapular area and radiates across the back and into the left chest. It is associated with SOB at peak intensity and occasionally left sided facial numbness. The discomfort is always provoked by exertion, and usually relieved after 10-15 at minutes at rest; lasting no more than 30 minutes at longest. He states symptoms have been present \"for quite a while\", but yesterday an episode was associated with significant diaphoresis, prompting ER evaluation. He is pain free currently. EKG showed AFib HR 92 bpm with no acute changes; no significant change compared to tracing from 9/9/2022. He is currently AFib with HR 81 on telemetry. Troponins were slightly elevated at 21 and 22 respectively, which is in the range of previous samples. CTA chest was negative for PE, and CT head/CXR both showed no acute findings. Please note: past medical records were reviewed per electronic medical record - see detailed reports under Past Medical/ Surgical History. Past Medical History:    April 22, 2002. A 2-D echocardiogram (as interpreted by Dr. Garret Hooker) was a technically adequate study. There was a normal size left ventricle with an overall left ventricular ejection fraction of 50%. No segmental wall motion abnormalities were noted.   No valvular disease was noted, except for aortic sclerosis in the presence of trace aortic regurgitation. Trace mitral and tricuspid regurgitation were noted. Mild enlargement of the left atrium and right atrium were noted. No pericardial effusion  December 4, 2007. A 2-D echocardiogram (as interpreted by Dr. Kiara Flanagan). The left ventricular systolic function was low normal.  There was moderate mitral regurgitation. There was mild tricuspid regurgitation. There was moderate aortic valve sclerosis. No hemodynamically significant valvular aortic stenosis. There was mild aortic regurgitation. The pericardium appeared normal.  Hypertension. Diabetes mellitus type II, insulin requiring. Hyperlipidemia, on statin therapy. Permanent atrial fibrillation. March 28, 2000. A 4-catheter electrophysiology study (Dr. Kiara Flanagan). Normal cardiac intervals. Normal arterial ventricular and ventriculoatrial conduction. No evidence of accessory pathway,concealed or otherwise. There was easily inducible arterial ventricular ejrri re-entrant tachycardia. Atrial flutter was also induced with typical morphology. Negative flutter waves in II, III, and aVF with upright flutter waves in V1. February 28, 2000, status-post radiofrequency ablation (Dr. Kiara Flanagan). June 29, 2007. Direct-current cardioversion (Dr. Kiara Flanagan). Successful direct-current cardioversion to sinus rhythm. April 2009. Tikosyn therapy was discontinued due to acute renal insufficiency. Subsequently, it was decided upon that his rate would be controlled with systemic anticoagulation rather than rhythm control (Dr. Kiara Flanagan). April 2009. Chronic Coumadin therapy was initiated  BPH. Status post cystopanendoscopy, retrograde pyelograms, perineal biopsy of the prostate, transurethral resection biopsy of the prostate on September 19, 2006, (Dr. Travis Lincoln). Remote tobacco abuse. Remote alcohol use. Chronic kidney disease. Baseline Cr 1.5 to 1.9. Anemia of chronic disease. Right MCA stroke in 2013.   No residual deficits.  LARRY 2013: No PFO, ASD.  No thrombus.  Severe gastritis on EGD in 2015.  TIA in 2017.  Bradycardia noted March 2021. Beta blocker decreased by Dr. Stahl.   Mild valvular heart disease.  Lexiscan stress 11/8/21:   Mild inferior attenuation artifact.   Mild proximal fixed inferior defect with no wall motion abnormalities   and normal LV systolic function consistent with attenuation artifact.   No reversible defect. No ischemia.   Normal LV systolic function. No wall motion abnormalities.   Low risk myocardial perfusion scan.   Echocardiogram 3/8/21: EF 60-65%, mild MR, mild AI, trace TR    Past Surgical History:    Past Surgical History:   Procedure Laterality Date    ABDOMEN SURGERY      1978    COLONOSCOPY      COLOSTOMY  1978    CYSTOSCOPY  more than 5 yrs    ECHO COMPL W DOP COLOR FLOW  11/26/2013         ECHOCARDIOGRAM TRANSESOPHAGEAL  11/27/2013         ENDOSCOPY, COLON, DIAGNOSTIC  12/10/15    ileoscopy    TURP  6/16/14    cystoscopy retrogrades    UPPER GASTROINTESTINAL ENDOSCOPY N/A 3/11/2022    EGD ESOPHAGOGASTRODUODENOSCOPY ULTRASOUND performed by Conrad Gerber MD at [facility] ENDOSCOPY       Medications Prior to admit:  Prior to Admission medications    Medication Sig Start Date End Date Taking? Authorizing Provider   insulin 70-30 (HUMULIN;NOVOLIN) (70-30) 100 UNIT per ML injection vial Inject 15 Units into the skin nightly  Patient not taking: Reported on 2/19/2023    Historical Provider, MD   simvastatin (ZOCOR) 20 MG tablet Take 20 mg by mouth nightly    Historical Provider, MD   warfarin (COUMADIN) 4 MG tablet Take 4 mg by mouth Twice a Week Given Tuesday,Saturday    Historical Provider, MD   warfarin (COUMADIN) 2 MG tablet Take 2 mg by mouth Five times weekly Given Sunday,Monday,Wednesday,Thursday,Friday    Historical Provider, MD   diphenoxylate-atropine (LOMOTIL) 2.5-0.025 MG per tablet Take 1 tablet by mouth 2 times daily.  Patient not taking: Reported on 2/19/2023    Historical  Provider, MD   sodium bicarbonate 650 MG tablet Take 650 mg by mouth 2 times daily  Patient not taking: Reported on 2/19/2023    Historical Provider, MD   metoprolol tartrate (LOPRESSOR) 25 MG tablet Take 0.5 tablets by mouth 2 times daily (with meals) 11/9/21   Audra Phan MD   insulin 70-30 (HUMULIN;NOVOLIN) (70-30) 100 UNIT per ML injection vial Inject 14 Units into the skin every morning    Historical Provider, MD   clopidogrel (PLAVIX) 75 MG tablet Take 1 tablet by mouth daily 6/15/20   Audra Phan MD       Current Medications:    Current Facility-Administered Medications: clopidogrel (PLAVIX) tablet 75 mg, 75 mg, Oral, Daily  [Held by provider] metoprolol tartrate (LOPRESSOR) tablet 12.5 mg, 12.5 mg, Oral, BID WC  atorvastatin (LIPITOR) tablet 20 mg, 20 mg, Oral, Daily  sodium bicarbonate tablet 650 mg, 650 mg, Oral, BID  acetaminophen (TYLENOL) tablet 500 mg, 500 mg, Oral, Q4H PRN  insulin lispro (HUMALOG) injection vial 0-8 Units, 0-8 Units, SubCUTAneous, TID WC  insulin lispro (HUMALOG) injection vial 0-4 Units, 0-4 Units, SubCUTAneous, Nightly  glucose chewable tablet 16 g, 4 tablet, Oral, PRN  dextrose bolus 10% 125 mL, 125 mL, IntraVENous, PRN **OR** dextrose bolus 10% 250 mL, 250 mL, IntraVENous, PRN  glucagon injection 1 mg, 1 mg, SubCUTAneous, PRN  dextrose 10 % infusion, , IntraVENous, Continuous PRN  warfarin placeholder: dosing by pharmacy, , Other, RX Placeholder  regadenoson (LEXISCAN) injection 0.4 mg, 0.4 mg, IntraVENous, ONCE PRN    Allergies:  Patient has no known allergies.     Social History:    Social History     Socioeconomic History    Marital status:      Spouse name: Not on file    Number of children: Not on file    Years of education: Not on file    Highest education level: Not on file   Occupational History    Not on file   Tobacco Use    Smoking status: Former    Smokeless tobacco: Never   Vaping Use    Vaping Use: Never used   Substance and Sexual Activity    Alcohol use: No    Drug use: No    Sexual activity: Not on file   Other Topics Concern    Not on file   Social History Narrative    Not on file     Social Determinants of Health     Financial Resource Strain: Not on file   Food Insecurity: Not on file   Transportation Needs: Not on file   Physical Activity: Not on file   Stress: Not on file   Social Connections: Not on file   Intimate Partner Violence: Not on file   Housing Stability: Not on file       Family History:   Family History   Problem Relation Age of Onset    Hypotension Mother     COPD Sister     Coronary Art Dis Sister     Diabetes Other        REVIEW OF SYSTEMS:     Review of Systems - History obtained from the patient  General ROS: negative for - chills, fever, night sweats, or weight loss   ENT ROS: negative for - epistaxis, headaches, nasal discharge, sore throat  Respiratory ROS: negative for - cough, hemoptysis, orthopnea, shortness of breath  Cardiovascular ROS: see HPI  Gastrointestinal ROS: negative for - constipation, diarrhea, heartburn, nausea/vomiting  Genito-Urinary ROS: no dysuria, trouble voiding, or hematuria  Musculoskeletal ROS: negative for - joint pain, joint swelling, muscle pain  Neurological ROS: negative for - confusion, dizziness, headaches   Dermatological ROS: negative for - hair changes, nail changes, pruritus, or rash    PHYSICAL EXAM:  BP (!) 144/84   Pulse 92   Temp 97 °F (36.1 °C) (Temporal)   Resp 17   Ht 5' 9\" (1.753 m)   Wt 146 lb 6.4 oz (66.4 kg)   SpO2 100%   BMI 21.62 kg/m²     General Appearance:    Alert, cooperative, no distress, appears stated age    Head:    Normocephalic, without obvious abnormality, atraumatic    Eyes:    PERRL, conjunctiva/corneas clear, EOM's intact    Neck:   Supple, symmetrical, trachea midline; no carotid    bruit or JVP    Lungs:     Clear to auscultation bilaterally, respirations unlabored, no wheezing, rales or rhonchi    Heart:    Regular rate and rhythm, no murmur, rub   or gallop Abdomen:     Soft, non-tender, non-distended    Extremities:   Extremities normal, atraumatic, no cyanosis or edema    Skin:   Skin color, texture, turgor normal for age    Neurologic:   Oriented x3, CNII-XII intact. Normal gross strength and sensation       DATA:    ECG / Tele strips: please see HPI           Intake/Output Summary (Last 24 hours) at 2/20/2023 0835  Last data filed at 2/20/2023 0317  Gross per 24 hour   Intake --   Output 300 ml   Net -300 ml       Labs:   CBC:   Recent Labs     02/19/23  1639   WBC 4.9   HGB 10.8*   HCT 34.0*   PLT 87*     BMP:   Recent Labs     02/19/23  1639      K 4.7   CO2 24   BUN 22   CREATININE 1.5*   LABGLOM 45   CALCIUM 9.2     Mag: No results for input(s): MG in the last 72 hours. Phos: No results for input(s): PHOS in the last 72 hours. TSH: No results for input(s): TSH in the last 72 hours. HgA1c:   Lab Results   Component Value Date    LABA1C 7.6 (H) 03/05/2021     No results found for: EAG  proBNP: No results for input(s): PROBNP in the last 72 hours. PT/INR: No results for input(s): PROTIME, INR in the last 72 hours. APTT:No results for input(s): APTT in the last 72 hours. CARDIAC ENZYMES:No results for input(s): CKTOTAL, CKMB, CKMBINDEX, TROPONINI in the last 72 hours. FASTING LIPID PANEL:  Lab Results   Component Value Date/Time    CHOL 141 03/05/2021 05:19 AM    HDL 55 03/05/2021 05:19 AM    LDLCALC 64 03/05/2021 05:19 AM    TRIG 111 03/05/2021 05:19 AM     LIVER PROFILE:No results for input(s): AST, ALT, LABALBU in the last 72 hours. Discussed with Dr. Gricel Trevino. Please see his assessment/recommendations to follow  Electronically signed by Ree Urias PA-C on 2/20/2023 at 8:35 AM     The above documentation has been prepared under my direction and personally reviewed by me in its entirety. I confirm that the note above accurately reflects all work, treatment, procedures, and medical decision making performed by me.     The patient's history was independently obtained. The patient was independently examined. Electrocardiogram, prior and present cardiovascular assessment, and laboratory studies were reviewed. The patient is an 22-year-old black male with no active association to [facility] and prior evaluation by Mary Lindsey during hospital admission and November, 2021. He has a history of permanent atrial fibrillation with a rate control and anticoagulation strategy and a remote cerebral ischemic episode in the absence of significant residual deficits in addition to that of hypertension diabetes with associated stage IIIb chronic kidney disease and hyperlipidemia. He has most recently undergone objective assessment with an echocardiogram in April, 2022 demonstrating evidence of a normal-sized left ventricular chamber with left ventricular systolic function at the left limits of normal and estimated ejection fraction of approximately 50% independent of regional wall motion at baseline with mild left atrial enlargement and moderate mitral regurgitation. He remains active with functional capabilities of approximately 4-5 METs and relates a several month history of symptoms of a pressure-like discomfort initially noted in his left scapular region but more recently radiating to his intrascapular region as well as at the time of his most recent assessment his precordium and associated with dyspnea and diaphoresis with episodes generally lasting approximately 10-15 minutes but on the most recent episode approximately 30 minutes duration. He denies additional manifestations of decompensated left ventricular systolic dysfunction or volume overload nor arrhythmia related symptoms and is experienced no symptoms of a focal neurological origin or bleeding in the face of anticoagulation.   Upon presentation to the emergency room, a resting electrocardiogram reviewed at time of evaluation demonstrated evidence of atrial fibrillation with a mean ventricular response of approximately 90 bpm with nonspecific ST changes x-ray again reviewed demonstrated no evidence of cardiomegaly with mild chronic interstitial changes and a contrast CT angiogram demonstrated no evidence of a pulm embolism nor significant cardiothoracic pathology. Additional laboratory studies demonstrated renal function remaining at that of his baseline and with mild elevation of high-sensitivity troponin levels similar to that previously noted and in a pattern not suggestive of an acute coronary syndrome. At the time of evaluation, the patient's medications and allergies were reviewed as well as that of his past medical history and review of systems as documented. On examination, he presently appears in no discomfort nor distress and is hemodynamically stable with vital signs as documented and arrhythmia monitoring demonstrating atrial fibrillation with mean rates of approximately 70-80 bpm.  Jugular venous pressure is normal with no identified carotid bruits. Lung fields are clear to auscultation. Cardiac examination is notable for a regular rhythm consistent with that of his atrial fibrillation with no gallop rhythm or cardiac murmur. A benign abdominal examination is present and no peripheral edema identified. Diagnostic Assessment and Plan: On a clinical basis, the patient presents with symptoms compatible with that of potential coronary atherosclerosis and that of mildly progressive, albeit stable angina in the absence of an acute coronary syndrome. He is at significant risk for coronary atherosclerosis and combination of his age and risk profile with present plans of continuing that of medical management at least pending the performance of a regadenoson myocardial perfusion imaging study on a prognostic basis to guide additional management decisions with plans of potential intensified medical therapy unless high risk features are identified.   Independent of findings, ongoing aggressive risk factor modification of blood pressure, diabetes and serum lipids will remain essential to reducing risk of future atherosclerotic development. Careful monitoring of anticoagulation will be necessary with goals of maintaining prothrombin times in range of 2.0-2.5 times INR control in the face of concomitant antiplatelet therapy. I have participated in a substantive portion of the present encounter inclusive of a component of independent history and examination in addition to that of medical decision making regarding patient care. Thank you for allowing me to participate in your patient's care. Please feel free to contact me if you have any questions or concerns. Pollo Begum.  Rhett Bang, [address] [facility]

## 2023-02-20 NOTE — ED NOTES
While ambulating around this patient's room, his oxygen stayed at 98-99% and his HR stayed between 95 and 100BPM. He did not c/o dizziness or SOB.      Epi Santoro RN  02/19/23 2008
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
## 2023-02-20 NOTE — PATIENT CARE CONFERENCE
[facility] Quality Flow/Interdisciplinary Rounds Progress Note        Quality Flow Rounds held on February 20, 2023    Disciplines Attending:  Bedside Nurse, , , and Nursing Unit Leadership    Benigno Corral was admitted on 2/19/2023  4:34 PM    Anticipated Discharge Date:       Disposition:    Clark Score:  Clark Scale Score: 20    Readmission Risk              Risk of Unplanned Readmission:  15           Discussed patient goal for the day, patient clinical progression, and barriers to discharge.   The following Goal(s) of the Day/Commitment(s) have been identified:  Diagnostics - Report Results and have no chest pain      Reinaldo Serra RN  February 20, 2023

## 2023-02-20 NOTE — CARE COORDINATION
Spoke with Pt about Transition Plan of Care. Pt is  from Wife but are on good terms. Pt lives a lone with 3 step to enter and uses a cane PRN. PCP: Dr. Diya Santana. Pharmacy: [facility]. Family to provide transport at discharge and Pt will call. No HX  - [facility]. Discharge Plan is to return home with no needs at this time. /CM to follow for discharge needs. CARL Henry  [phone]     Case Management Assessment  Initial Evaluation    Date/Time of Evaluation: 2/20/2023 12:56 PM  Assessment Completed by: MERY Henry    If patient is discharged prior to next notation, then this note serves as note for discharge by case management. Patient Name: Jn Beckham                   YOB: 1935  Diagnosis: Other chest pain [R07.89]  Acute chest pain [R07.9]                   Date / Time: 2/19/2023  4:34 PM    Patient Admission Status: Inpatient   Readmission Risk (Low < 19, Mod (19-27), High > 27): Readmission Risk Score: 14.6    Current PCP: Mattie Yu MD  PCP verified by CM? Yes    Chart Reviewed: Yes      History Provided by: Patient  Patient Orientation: Alert and Oriented    Patient Cognition: Alert    Hospitalization in the last 30 days (Readmission):  No    If yes, Readmission Assessment in  Navigator will be completed. Advance Directives:      Code Status: Prior   Patient's Primary Decision Maker is:        Discharge Planning:    Patient lives with: Spouse/Significant Other Type of Home: House  Primary Care Giver: Self  Patient Support Systems include: Spouse/Significant Other, Family Members   Current Financial resources:    Current community resources:    Current services prior to admission: None            Current DME:              Type of Home Care services:  None    ADLS  Prior functional level: Independent in ADLs/IADLs  Current functional level:  Independent in ADLs/IADLs    PT AM-PAC:   /24  OT AM-PAC:   /24    Family can provide assistance at DC: No  Would you like Case Management to discuss the discharge plan with any other family members/significant others, and if so, who? No  Plans to Return to Present Housing: Yes  Other Identified Issues/Barriers to RETURNING to current housing:   Potential Assistance needed at discharge: N/A            Potential DME:    Patient expects to discharge to: [address] [place] [address] for transportation at discharge:      Financial    Payor: Fran Pandya / Plan: Marisa Mack ESSENTIAL/PLUS / Product Type: *No Product type* /     Does insurance require precert for SNF: Yes    Potential assistance Purchasing Medications:    Meds-to-Beds request:        [employer], [phone] - F [phone] [address] [facility]  [address] [address]  Phone: [phone] Fax: [phone]    [address] [phone] Kirt Correia [phone]  [address] [facility] [address]  Phone: [phone] Fax: [id number] - [place], [address] [address] [address] [address]  Phone: [phone] Fax: [phone]    . . [phone] - F [phone]  [address] [place] [address]  Phone: [phone] Fax: [phone]      Notes:    Factors facilitating achievement of predicted outcomes: Motivated, Cooperative, and Pleasant    Barriers to discharge: Limited family support and Decreased endurance    Additional Case Management Notes: The Plan for Transition of Care is related to the following treatment goals of Other chest pain [R07.89]  Acute chest pain [Z46.9]    IF APPLICABLE: The Patient and/or patient representative Deanna Gupta and his family were provided with a choice of provider and agrees with the discharge plan.  Freedom of choice list with basic dialogue that supports the patient's individualized plan of care/goals and shares the quality data associated with the providers was provided to:     Patient Representative Name:       The Patient and/or Patient Representative Agree with the Discharge Plan?       Meghan Bobo [facility]  Case Management Department  Ph: [phone] Fax: [phone]

## 2023-02-20 NOTE — PROGRESS NOTES
Spoke with Dr. Elvia Jiménez at bedside, [facility] to give lopressor before stress.     Wili Paz RN

## 2023-02-20 NOTE — PROGRESS NOTES
Lexiscan Stress Test:    Reason for study: Chest pain    Resting EKG showed A Fib  Exam:  Heart - Irregularly irrregular, Lungs- clear    Patient received 0.4mg Lexiscan per protocol    Symptoms: No chest pain, No short of breath    EKG:  No ischemia; underlying A fib            Maximal heart rate 93        Peak /64 mmHg       Post test complications: None      SPECT image report pending.     Electronically signed by Joey Preciado MD on 2/20/2023 at 2:43 PM

## 2023-02-20 NOTE — PROGRESS NOTES
Pharmacy Consultation Note  (Warfarin Dosing and Monitoring)    Initial consult date: 2/20/23  Consulting Provider: Ventura Baron MD     Zoraida Foster is a 80 y.o. male for whom pharmacy has been asked to manage warfarin therapy. Weight:   Wt Readings from Last 1 Encounters:   02/19/23 146 lb 6.4 oz (66.4 kg)       TSH:  No results found for: TSH    Hepatic Function Panel:                            Lab Results   Component Value Date/Time    ALKPHOS 104 09/09/2022 09:37 AM    ALT 12 09/09/2022 09:37 AM    AST 31 09/09/2022 09:37 AM    PROT 7.5 09/09/2022 09:37 AM    BILITOT 0.4 09/09/2022 09:37 AM    BILIDIR <0.2 10/01/2020 07:50 AM    IBILI see below 10/01/2020 07:50 AM    LABALBU 3.9 09/09/2022 09:37 AM       Current warfarin drug-drug interactions include: No significant interactions    Recent Labs     02/19/23  1639   HGB 10.8*   PLT 87*     Date Warfarin Dose INR Heparin or LMWH Comment   2/20 2 mg 2.2 X                                  Assessment:  Patient is a 80 y.o. male on warfarin for Atrial Fibrillation. Patient's home warfarin dosing regimen is 4 mg on Tues/Sat and 2 mg on all other days.    Goal INR 2 - 3  2/20: INR 2.2 today    Plan:  Warfarin 2 mg tonight  Daily PT/INR until the INR is stable within the therapeutic range  Pharmacist will follow and monitor/adjust dosing as necessary    Thank you for this consult,  Albin Luque, PharmD  PGY1 Pharmacy Resident  2/20/2023 8:17 AM

## 2023-02-23 ENCOUNTER — TELEPHONE (OUTPATIENT)
Dept: CARDIOLOGY CLINIC | Age: 88
End: 2023-02-23

## 2023-02-23 NOTE — TELEPHONE ENCOUNTER
----- Message from Barbi Zelaya MD sent at 2/20/2023  4:39 PM EST -----  Patient has become, mine during hospitalization with chest discomfort consistent with angina and a low risk myocardial perfusion imaging study.   Schedule follow-up with me in approximately 1 month

## 2023-03-14 ENCOUNTER — HOSPITAL ENCOUNTER (OUTPATIENT)
Dept: MRI IMAGING | Age: 88
Discharge: HOME | End: 2023-03-16
Payer: MEDICARE

## 2023-03-14 DIAGNOSIS — D49.0 IPMN (INTRADUCTAL PAPILLARY MUCINOUS NEOPLASM): ICD-10-CM

## 2023-03-14 PROCEDURE — 6360000004 HC RX CONTRAST MEDICATION: Performed by: RADIOLOGY

## 2023-03-14 PROCEDURE — 74183 MRI ABD W/O CNTR FLWD CNTR: CPT

## 2023-03-14 PROCEDURE — A9579 GAD-BASE MR CONTRAST NOS,1ML: HCPCS | Performed by: RADIOLOGY

## 2023-03-14 RX ADMIN — GADOTERIDOL 15 ML: 279.3 INJECTION, SOLUTION INTRAVENOUS at 13:14

## 2023-03-15 ENCOUNTER — HOSPITAL ENCOUNTER (EMERGENCY)
Age: 88
Discharge: HOME | End: 2023-03-15
Attending: EMERGENCY MEDICINE
Payer: MEDICARE

## 2023-03-15 ENCOUNTER — APPOINTMENT (OUTPATIENT)
Dept: CT IMAGING | Age: 88
End: 2023-03-15
Payer: MEDICARE

## 2023-03-15 VITALS
DIASTOLIC BLOOD PRESSURE: 93 MMHG | HEART RATE: 89 BPM | RESPIRATION RATE: 16 BRPM | OXYGEN SATURATION: 99 % | TEMPERATURE: 98.2 F | SYSTOLIC BLOOD PRESSURE: 184 MMHG

## 2023-03-15 DIAGNOSIS — S01.81XA FACIAL LACERATION, INITIAL ENCOUNTER: ICD-10-CM

## 2023-03-15 DIAGNOSIS — S09.90XA CLOSED HEAD INJURY, INITIAL ENCOUNTER: ICD-10-CM

## 2023-03-15 DIAGNOSIS — S00.83XA FACIAL CONTUSION, INITIAL ENCOUNTER: Primary | ICD-10-CM
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
## 2023-03-15 PROCEDURE — 72125 CT NECK SPINE W/O DYE: CPT

## 2023-03-15 PROCEDURE — 6360000002 HC RX W HCPCS: Performed by: STUDENT IN AN ORGANIZED HEALTH CARE EDUCATION/TRAINING PROGRAM

## 2023-03-15 PROCEDURE — 70486 CT MAXILLOFACIAL W/O DYE: CPT

## 2023-03-15 PROCEDURE — 90471 IMMUNIZATION ADMIN: CPT | Performed by: STUDENT IN AN ORGANIZED HEALTH CARE EDUCATION/TRAINING PROGRAM

## 2023-03-15 PROCEDURE — 99284 EMERGENCY DEPT VISIT MOD MDM: CPT

## 2023-03-15 PROCEDURE — 90714 TD VACC NO PRESV 7 YRS+ IM: CPT | Performed by: STUDENT IN AN ORGANIZED HEALTH CARE EDUCATION/TRAINING PROGRAM

## 2023-03-15 PROCEDURE — 70450 CT HEAD/BRAIN W/O DYE: CPT

## 2023-03-15 RX ORDER — BACITRACIN, NEOMYCIN, POLYMYXIN B 400; 3.5; 5 [USP'U]/G; MG/G; [USP'U]/G
OINTMENT TOPICAL
Qty: 30 G | Refills: 0 | Status: SHIPPED | OUTPATIENT
Start: 2023-03-15 | End: 2023-03-25

## 2023-03-15 RX ADMIN — CLOSTRIDIUM TETANI TOXOID ANTIGEN (FORMALDEHYDE INACTIVATED) AND CORYNEBACTERIUM DIPHTHERIAE TOXOID ANTIGEN (FORMALDEHYDE INACTIVATED) 0.5 ML: 5; 2 INJECTION, SUSPENSION INTRAMUSCULAR at 20:02

## 2023-03-15 ASSESSMENT — PAIN - FUNCTIONAL ASSESSMENT: PAIN_FUNCTIONAL_ASSESSMENT: NONE - DENIES PAIN

## 2023-03-15 NOTE — ED PROVIDER NOTES
Department of Emergency Medicine   ED Provider Note  Admit Date/RoomTime: 3/15/2023  2:31 PM  ED Room: [facility]          History of Present Illness:  3/15/23, Time: 3:01 PM EDT       Mayo Pino is a 80 y.o. male with history of atrial fibrillation on Coumadin, alcoholic cirrhosis, atrial fibrillation, diabetes, CVA, IPMN, presenting to the ED for fall and head strike. Patient states he was walking without his cane, normally uses a cane to ambulate. He tripped and lost his balance and fell onto his left side. He was wearing glasses, thinks his glasses cut the left side of his face. He does not have any significant pain at this time. No loss of consciousness or lightheadedness. No neck pain or back pain. No hip pain, was able to stand afterwards. He does not have any chest pain or shortness of breath, no abdominal pain or nausea or vomiting. Does not member last time he had a tetanus vaccine. He is on Coumadin. No visual changes or numbness or weakness or tingling. Additional history obtained from chart review. Review of Systems:     Pertinent positives and negatives are stated within HPI.      --------------------------------------------- PAST HISTORY ---------------------------------------------  Past Medical History:  has a past medical history of A-fib ([address] Chronic kidney disease, Chronic renal impairment, stage 3 (moderate) ([address] Colitis, ulcerative chronic ([address] Diabetes mellitus ([address] DM (diabetes mellitus) ([facility] Encounter for therapeutic drug monitoring, Hyperlipidemia, Hyperlipidemia associated with type 2 diabetes mellitus ([address] Hypertension, Long term (current) use of anticoagulants, Prostate enlargement, Stroke (cerebrum) ([address] and Unspecified cerebral artery occlusion with cerebral infarction. Past Surgical History:  has a past surgical history that includes ECHO Compl W Dop Color Flow (11/26/2013); ECHO Transesophageal (11/27/2013); colostomy (1978);  Cystoscopy (more than 5 yrs); TURP (6/16/14); Colonoscopy; Abdomen surgery; Endoscopy, colon, diagnostic (12/10/15); and Upper gastrointestinal endoscopy (N/A, 3/11/2022). Social History:  reports that he has quit smoking. He has never used smokeless tobacco. He reports that he does not drink alcohol and does not use drugs. Family History: family history includes COPD in his sister; Coronary Art Dis in his sister; Diabetes in an other family member; Hypotension in his mother. The patients home medications have been reviewed. Allergies: Patient has no known allergies. ---------------------------------------------------PHYSICAL EXAM--------------------------------------    Constitutional/General: Awake and alert, NAD, no labored breathing  Head: Normocephalic, contusion left infraorbital region  Eyes: EOMI, PERRL, conjunctiva normal, sclera non icteric  Ears: Normal external ears  Nose: Small 2 mm superficial laceration in the left nare, no bony tenderness, no septal hematoma  Mouth: Moist mucous membranes, uvula midline  Neck: Supple, no stridor, no meningeal signs, no midline spinal tenderness  Respiratory: Lungs clear to auscultation bilaterally, no wheezes, rales, or rhonchi. Not in respiratory distress  Cardiovascular:  Regular rate. Regular rhythm. No murmurs, no gallops, or rubs. 2+ distal pulses. Equal extremity pulses. Chest: No chest wall tenderness or deformity  GI:  Abdomen Soft, Non tender, Non distended. No rebound, guarding, or rigidity. No pulsatile masses. Musculoskeletal: Moves all extremities x 4. Warm and well perfused, no clubbing, cyanosis, or edema. Capillary refill <3 seconds. No midline cervical or thoracic or lumbar spinal tenderness. Full range of motion bilateral hips, no pain to palpation or pain with movement. Integument: skin warm and dry.   5 cm superficial laceration with dried blood across the left side of the face over the maxilla, small laceration with dried blood in the left temporal region  Neurologic: GCS 15, no focal deficits, alert and responsive, symmetric strength 5/5 in the major muscle groups of upper and lower extremities bilaterally  Psychiatric: Normal Affect      -------------------------------------------------- RESULTS -------------------------------------------------  I have personally reviewed and interpreted all laboratory and imaging results for this patient. Results are listed below. LABS:  No results found for this visit on 03/15/23. RADIOLOGY:  Imaging Interpreted by Radiologist, initial wet read of imaging interpreted by myself  CT Head W/O Contrast   Final Result      1. No evidence of acute intracranial process. 2.  Findings of presumed small vessel ischemic deep white matter disease. 3.  Possible small old infarct involving the high right parietal lobe. CT CSpine W/O Contrast   Final Result      1. Scoliosis of the cervical spine. 2.  No evidence of fracture with degenerative changes as described. CT FACIAL BONES WO CONTRAST   Final Result      Contusion with possible small hematoma overlying the left maxilla. No   evidence of fracture and no intraorbital abnormalities are noted. EKG:    See ED Course for EKG documentation        ------------------------- NURSING NOTES AND VITALS REVIEWED ---------------------------   The nursing notes within the ED encounter and vital signs as below have been reviewed by myself. BP (!) 184/93   Pulse 89   Temp 98.2 °F (36.8 °C)   Resp 16   SpO2 99%   Oxygen Saturation Interpretation: Normal    The patients available past medical records and past encounters were reviewed, see [facility] for details.         ------------------------------ ED COURSE/MEDICAL DECISION MAKING----------------------  Medications   tetanus & diphtheria toxoids (adult) 5-2 LFU injection 0.5 mL (0.5 mLs IntraMUSCular Given 3/15/23 2002)            Medical Decision Making:     ED Course as of 03/15/23 2044   Wed Mar 15, 2023   1906 Patient re-evaluated, resting in bed comfortably. Updated on results of imaging. Patient is very upset about how long it took to get his scans done and not he has not yet received his tetanus vaccine or had his wounds cleaned to evaluate for whether he needs laceration repair. This was ordered on my initial evaluation, will attempt to find nursing staff. Apologized to patient for the delay in imaging and wound care and medications. He has no significant pain at this time. [RH]   1958 Wound re-evaluated, no wound requiring repair. Return precautions given. [RH]      ED Course User Index  [RH] Ryley Darlin DO Marielos     This is an 75-year-old male on anticoagulation with warfarin presenting to the emergency department after mechanical fall. No syncope, no lightheadedness, no chest pain or shortness of breath. Patient here with no active bleeding, some superficial lacerations from his glasses. Small contusion in the left maxilla. Intact extraocular movements, no focal neurodeficits. No vomiting or diarrhea patient able to ambulate without difficulty. Patient is moderately hypertensive, afebrile, not tachycardic, well-appearing. CT was obtained of the facial bones, C-spine and head without acute fracture dislocation or acute intracranial process. Patient was updated on tetanus vaccination, wounds were cleansed. Patient given prescription for topical bacitracin, follow-up with PCP for wound recheck. Patient given strict return precautions and all questions were answered.     Chronic Conditions affecting care:    has a past medical history of A-fib ([address] Chronic kidney disease, Chronic renal impairment, stage 3 (moderate) ([address] (10/10/2016), Colitis, ulcerative chronic ([address] Diabetes mellitus ([address] DM (diabetes mellitus) ([facility] [address] (11/25/2013), Encounter for therapeutic drug monitoring (4/5/2016), Hyperlipidemia, Hyperlipidemia associated with type 2 diabetes mellitus ([address] (1/17/2016), Hypertension, Long term (current) use of anticoagulants (4/5/2016), Prostate enlargement, Stroke (cerebrum) ([facility] [address] (2013), and Unspecified cerebral artery occlusion with cerebral infarction (1973). Diagnoses considered include (but not limited to): facial fracture, intracranial hemorrhage, cervical fracture, laceration, skull fracture    See ED COURSE for additional MDM. Labs & imaging reviewed and interpreted, see RESULTS above. Re-Evaluations:           See ED Course above. This patient's ED course included: a personal history and physicial examination, re-evaluation prior to disposition, and multiple bedside re-evaluations    This patient has remained hemodynamically stable and been closely monitored during their ED course. Consultations:  See ED Course    Counseling: The emergency physician has spoken with the patient and discussed todays results, in addition to providing specific details for the plan of care and counseling regarding the diagnosis and prognosis. Questions are answered at this time and they are agreeable with the plan.       --------------------------------- IMPRESSION AND DISPOSITION ---------------------------------    IMPRESSION  1. Facial contusion, initial encounter    2. Facial laceration, initial encounter    3. Closed head injury, initial encounter    4. Fall from standing, initial encounter        DISPOSITION  Disposition: Discharge to home  Patient condition is stable    NOTE: This report was transcribed using voice recognition software. Every effort was made to ensure accuracy; however, inadvertent computerized transcription errors may be present. Also please note that patient was seen and examined by attending physician. Plan of care and disposition discussed with attending physician and they were immediately available or present for all procedures performed.        -- Rea Batres D.O. PGY-3     Emergency Medicine      3/15/2023 8:44 PM [address] DO  Resident  03/15/23 [id number]

## 2023-03-23 ENCOUNTER — OFFICE VISIT (OUTPATIENT)
Dept: HEMATOLOGY | Age: 88
End: 2023-03-23
Payer: MEDICARE

## 2023-03-23 VITALS
TEMPERATURE: 97.2 F | DIASTOLIC BLOOD PRESSURE: 67 MMHG | HEART RATE: 88 BPM | SYSTOLIC BLOOD PRESSURE: 127 MMHG | RESPIRATION RATE: 16 BRPM | HEIGHT: 69 IN | WEIGHT: 145 LBS | OXYGEN SATURATION: 99 % | BODY MASS INDEX: 21.48 KG/M2

## 2023-03-23 DIAGNOSIS — D49.0 IPMN (INTRADUCTAL PAPILLARY MUCINOUS NEOPLASM): ICD-10-CM

## 2023-03-23 DIAGNOSIS — K86.2 PANCREATIC CYST: Primary | ICD-10-CM

## 2023-03-23 PROCEDURE — 99212 OFFICE O/P EST SF 10 MIN: CPT | Performed by: TRANSPLANT SURGERY

## 2023-03-23 PROCEDURE — 1123F ACP DISCUSS/DSCN MKR DOCD: CPT | Performed by: TRANSPLANT SURGERY

## 2023-03-23 PROCEDURE — 99213 OFFICE O/P EST LOW 20 MIN: CPT | Performed by: TRANSPLANT SURGERY

## 2023-03-23 NOTE — PROGRESS NOTES
images prior to our office visit and we also reviewed them together today. - There has been no change on his pancreas  - We discussed that he does not have any worrisome features which is reassuring. We discussed what worrisome features are.  - repeat MRI in 1 year      20 Minutes of which greater than 50% was spent counseling or coordinating his care. Thank you Dr. Shannon Craig for the consultation allowing me to take part in Mr. Carlene Wall care. Please send a copy of my note to Dr. Kristyn Up.  Emigdio    Electronically signed by Kendrick Elliott MD on 3/23/2023 at 1:02 PM

## 2023-04-03 PROBLEM — I25.10 CORONARY ARTERY DISEASE INVOLVING NATIVE CORONARY ARTERY OF NATIVE HEART WITHOUT ANGINA PECTORIS: Status: ACTIVE | Noted: 2023-04-03

## 2023-05-30 ENCOUNTER — APPOINTMENT (OUTPATIENT)
Dept: GENERAL RADIOLOGY | Age: 88
End: 2023-05-30
Payer: MEDICARE

## 2023-05-30 ENCOUNTER — HOSPITAL ENCOUNTER (INPATIENT)
Age: 88
LOS: 1 days | Discharge: HOME | End: 2023-06-01
Attending: EMERGENCY MEDICINE | Admitting: INTERNAL MEDICINE
Payer: MEDICARE

## 2023-05-30 DIAGNOSIS — R07.9 CHEST PAIN, UNSPECIFIED TYPE: Primary | ICD-10-CM

## 2023-05-30 LAB
ALBUMIN SERPL-MCNC: 3.5 G/DL (ref 3.5–5.2)
ALP SERPL-CCNC: 106 U/L (ref 40–129)
ALT SERPL-CCNC: 10 U/L (ref 0–40)
ANION GAP SERPL CALCULATED.3IONS-SCNC: 12 MMOL/L (ref 7–16)
AST SERPL-CCNC: 31 U/L (ref 0–39)
BASOPHILS # BLD: 0.02 E9/L (ref 0–0.2)
BASOPHILS NFR BLD: 0.4 % (ref 0–2)
BILIRUB SERPL-MCNC: 0.4 MG/DL (ref 0–1.2)
BUN SERPL-MCNC: 28 MG/DL (ref 6–23)
CALCIUM SERPL-MCNC: 8.8 MG/DL (ref 8.6–10.2)
CHLORIDE SERPL-SCNC: 107 MMOL/L (ref 98–107)
CO2 SERPL-SCNC: 17 MMOL/L (ref 22–29)
CREAT SERPL-MCNC: 1.6 MG/DL (ref 0.7–1.2)
EOSINOPHIL # BLD: 0.1 E9/L (ref 0.05–0.5)
EOSINOPHIL NFR BLD: 2 % (ref 0–6)
ERYTHROCYTE [DISTWIDTH] IN BLOOD BY AUTOMATED COUNT: 15.1 FL (ref 11.5–15)
GLUCOSE SERPL-MCNC: 74 MG/DL (ref 74–99)
HCT VFR BLD AUTO: 34.5 % (ref 37–54)
HGB BLD-MCNC: 11 G/DL (ref 12.5–16.5)
IMM GRANULOCYTES # BLD: 0.03 E9/L
IMM GRANULOCYTES NFR BLD: 0.6 % (ref 0–5)
INR BLD: 2.6
LYMPHOCYTES # BLD: 0.85 E9/L (ref 1.5–4)
LYMPHOCYTES NFR BLD: 16.7 % (ref 20–42)
MCH RBC QN AUTO: 26.3 PG (ref 26–35)
MCHC RBC AUTO-ENTMCNC: 31.9 % (ref 32–34.5)
MCV RBC AUTO: 82.3 FL (ref 80–99.9)
MONOCYTES # BLD: 0.34 E9/L (ref 0.1–0.95)
MONOCYTES NFR BLD: 6.7 % (ref 2–12)
NEUTROPHILS # BLD: 3.75 E9/L (ref 1.8–7.3)
NEUTS SEG NFR BLD: 73.6 % (ref 43–80)
PLATELET # BLD AUTO: 71 E9/L (ref 130–450)
PLATELET CONFIRMATION: NORMAL
PMV BLD AUTO: 11.5 FL (ref 7–12)
POTASSIUM SERPL-SCNC: 4.2 MMOL/L (ref 3.5–5)
PROT SERPL-MCNC: 6.8 G/DL (ref 6.4–8.3)
PROTHROMBIN TIME: 27.9 SEC (ref 9.3–12.4)
RBC # BLD AUTO: 4.19 E12/L (ref 3.8–5.8)
REASON FOR REJECTION: NORMAL
REJECTED TEST: NORMAL
SODIUM SERPL-SCNC: 136 MMOL/L (ref 132–146)
TROPONIN, HIGH SENSITIVITY: 20 NG/L (ref 0–11)
TROPONIN, HIGH SENSITIVITY: 23 NG/L (ref 0–11)
WBC # BLD: 5.1 E9/L (ref 4.5–11.5)

## 2023-05-30 PROCEDURE — 84484 ASSAY OF TROPONIN QUANT: CPT

## 2023-05-30 PROCEDURE — 71045 X-RAY EXAM CHEST 1 VIEW: CPT

## 2023-05-30 PROCEDURE — 36415 COLL VENOUS BLD VENIPUNCTURE: CPT

## 2023-05-30 PROCEDURE — 99285 EMERGENCY DEPT VISIT HI MDM: CPT

## 2023-05-30 PROCEDURE — 85610 PROTHROMBIN TIME: CPT

## 2023-05-30 PROCEDURE — 93005 ELECTROCARDIOGRAM TRACING: CPT

## 2023-05-30 PROCEDURE — 85025 COMPLETE CBC W/AUTO DIFF WBC: CPT

## 2023-05-30 PROCEDURE — 80053 COMPREHEN METABOLIC PANEL: CPT

## 2023-05-30 ASSESSMENT — PAIN - FUNCTIONAL ASSESSMENT: PAIN_FUNCTIONAL_ASSESSMENT: NONE - DENIES PAIN

## 2023-05-30 NOTE — ED PROVIDER NOTES
[facility]  Department of Emergency Medicine     Written by: Eliza Issa MD  Patient Name: Nohemi White  Attending Provider: Monica Henson MD  Admit Date: 2023  7:08 PM  MRN: [id number]                   : 1935      HPI  This an 70-year-old male with medical history of stroke many years ago, chronic atrial fibrillation, diabetes, hypercholesterolemia, hypertension, alcoholic cirrhosis, prostate cancer, coronary disease that presents to the ED with complaints of sudden chest pain with shortness of breath and nausea without vomiting. The patient says that it was at 2 PM when he was walking and developed chest pain and shortness of breath. The patient says that over the past few years, every time he walks, he developed severe chest pain shortness of breath or shaking. He does follow with a cardiologist, last seen by Dr. Apple Proctor, and was told to come to the ER by his primary care physician when he developed the symptoms today. He describes as 10 out of 10 pain of the left side of the chest, nonradiating anywhere. He does state he has chronic left shoulder pain due to osteoarthritis. He also says that he felt nauseous today without vomiting. Review of systems:    Pertinent positives and negatives mentioned in the HPI/MDM. Physical Exam  Constitutional:       General: He is not in acute distress. Appearance: Normal appearance. He is not ill-appearing. HENT:      Head: Normocephalic and atraumatic. Nose: Nose normal. No congestion. Mouth/Throat:      Mouth: Mucous membranes are moist.      Pharynx: No posterior oropharyngeal erythema. Eyes:      General: No scleral icterus. Extraocular Movements: Extraocular movements intact. Pupils: Pupils are equal, round, and reactive to light. Cardiovascular:      Rate and Rhythm: Normal rate and regular rhythm. Pulses: Normal pulses. Heart sounds: Normal heart sounds.    Pulmonary:

## 2023-05-31 LAB
EKG ATRIAL RATE: 94 BPM
EKG Q-T INTERVAL: 342 MS
EKG QRS DURATION: 68 MS
EKG QTC CALCULATION (BAZETT): 413 MS
EKG R AXIS: 59 DEGREES
EKG T AXIS: 57 DEGREES
EKG VENTRICULAR RATE: 88 BPM
METER GLUCOSE: 127 MG/DL (ref 74–99)
METER GLUCOSE: 175 MG/DL (ref 74–99)
METER GLUCOSE: 220 MG/DL (ref 74–99)

## 2023-05-31 PROCEDURE — 93010 ELECTROCARDIOGRAM REPORT: CPT | Performed by: INTERNAL MEDICINE

## 2023-05-31 PROCEDURE — 6370000000 HC RX 637 (ALT 250 FOR IP): Performed by: INTERNAL MEDICINE

## 2023-05-31 PROCEDURE — 82962 GLUCOSE BLOOD TEST: CPT

## 2023-05-31 PROCEDURE — 2140000000 HC CCU INTERMEDIATE R&B

## 2023-05-31 PROCEDURE — 99223 1ST HOSP IP/OBS HIGH 75: CPT | Performed by: INTERNAL MEDICINE

## 2023-05-31 RX ORDER — ATORVASTATIN CALCIUM 10 MG/1
10 TABLET, FILM COATED ORAL DAILY
Status: DISCONTINUED | OUTPATIENT
Start: 2023-05-31 | End: 2023-06-01 | Stop reason: HOSPADM

## 2023-05-31 RX ORDER — SODIUM BICARBONATE 650 MG/1
650 TABLET ORAL 2 TIMES DAILY
Status: DISCONTINUED | OUTPATIENT
Start: 2023-05-31 | End: 2023-06-01 | Stop reason: HOSPADM

## 2023-05-31 RX ORDER — WARFARIN SODIUM 2 MG/1
2 TABLET ORAL
Status: COMPLETED | OUTPATIENT
Start: 2023-05-31 | End: 2023-05-31

## 2023-05-31 RX ORDER — DEXTROSE MONOHYDRATE 100 MG/ML
INJECTION, SOLUTION INTRAVENOUS CONTINUOUS PRN
Status: DISCONTINUED | OUTPATIENT
Start: 2023-05-31 | End: 2023-06-01 | Stop reason: HOSPADM

## 2023-05-31 RX ORDER — INSULIN LISPRO 100 [IU]/ML
0-4 INJECTION, SOLUTION INTRAVENOUS; SUBCUTANEOUS NIGHTLY
Status: DISCONTINUED | OUTPATIENT
Start: 2023-05-31 | End: 2023-06-01 | Stop reason: HOSPADM

## 2023-05-31 RX ORDER — RANOLAZINE 500 MG/1
500 TABLET, EXTENDED RELEASE ORAL 2 TIMES DAILY
Status: DISCONTINUED | OUTPATIENT
Start: 2023-05-31 | End: 2023-06-01 | Stop reason: HOSPADM

## 2023-05-31 RX ORDER — INSULIN LISPRO 100 [IU]/ML
0-8 INJECTION, SOLUTION INTRAVENOUS; SUBCUTANEOUS
Status: DISCONTINUED | OUTPATIENT
Start: 2023-05-31 | End: 2023-06-01 | Stop reason: HOSPADM

## 2023-05-31 RX ORDER — CLOPIDOGREL BISULFATE 75 MG/1
75 TABLET ORAL DAILY
Status: DISCONTINUED | OUTPATIENT
Start: 2023-05-31 | End: 2023-06-01 | Stop reason: HOSPADM

## 2023-05-31 RX ORDER — DOXEPIN HYDROCHLORIDE 10 MG/1
10 CAPSULE ORAL NIGHTLY
COMMUNITY
Start: 2023-05-19

## 2023-05-31 RX ADMIN — RANOLAZINE 500 MG: 500 TABLET, EXTENDED RELEASE ORAL at 12:28

## 2023-05-31 RX ADMIN — INSULIN HUMAN 14 UNITS: 100 INJECTION, SOLUTION PARENTERAL at 14:00

## 2023-05-31 RX ADMIN — CLOPIDOGREL BISULFATE 75 MG: 75 TABLET ORAL at 09:48

## 2023-05-31 RX ADMIN — METOPROLOL TARTRATE 25 MG: 25 TABLET, FILM COATED ORAL at 18:02

## 2023-05-31 RX ADMIN — INSULIN LISPRO 2 UNITS: 100 INJECTION, SOLUTION INTRAVENOUS; SUBCUTANEOUS at 12:28

## 2023-05-31 RX ADMIN — METOPROLOL TARTRATE 25 MG: 25 TABLET, FILM COATED ORAL at 09:48

## 2023-05-31 RX ADMIN — SODIUM BICARBONATE 650 MG: 650 TABLET ORAL at 09:48

## 2023-05-31 RX ADMIN — RANOLAZINE 500 MG: 500 TABLET, EXTENDED RELEASE ORAL at 21:15

## 2023-05-31 RX ADMIN — SODIUM BICARBONATE 650 MG: 650 TABLET ORAL at 21:15

## 2023-05-31 RX ADMIN — WARFARIN SODIUM 2 MG: 2 TABLET ORAL at 18:02

## 2023-05-31 RX ADMIN — ATORVASTATIN CALCIUM 10 MG: 10 TABLET, FILM COATED ORAL at 09:48

## 2023-05-31 NOTE — CONSULTS
INPATIENT CARDIOLOGY CONSULT    Name: Evalyn Dance    Age: 80 y.o. Date of Admission: 5/30/2023  7:08 PM    Date of Service: 5/31/2023    Reason for Consultation: Precordial chest pain, permanent atrial fibrillation, hypertension, cerebrovascular accident, chronic kidney disease    Referring Physician: Jaden Huffman MD    History of Present Illness: The patient is an 51-year-old black male known to [facility] exclusively from hospital evaluation most recently in February, 2023 by myself. He has a history of permanent atrial fibrillation with a rate control and anticoagulation strategy with prior antiarrhythmic therapy limited by his chronic kidney disease and relative bradycardia in addition to that of hypertension, diabetes with associated stage IIIb chronic kidney disease, hyperlipidemia and a remote cerebrovascular accident. At the time of his prior assessment, while sedentary he related symptoms of scapular and intrascapular discomfort in addition to that of precordial chest discomfort and dyspnea not incompatible with that of stable angina. He has most recently undergone objective assessment with an echocardiogram in April, 2022 demonstrating evidence of a normal-sized left ventricular chamber with left ventricular systolic function at the lower limits of normal and estimated ejection fraction of 50% with severe left atrial enlargement and moderate mitral regurgitation and time of his most recent evaluation perfusion imaging demonstrating no evidence of perfusion abnormalities with normal left ventricular systolic function in the face of his atrial arrhythmias. He has subsequently undergone no cardiovascular follow-up with him declining he is recommended post hospital follow-up appointment. In the interim, he has become increasingly sedentary and spends the majority of time in bed and upon attempting to be active he will note periods of extreme weakness and shaking.   In addition, he relates

## 2023-05-31 NOTE — PROGRESS NOTES
Pharmacy Consultation Note  (Warfarin Dosing and Monitoring)    Initial consult date: 5-  Consulting Provider: Dr. Guillermina Bright is a 80 y.o. male for whom pharmacy has been asked to manage warfarin therapy. Weight:   Wt Readings from Last 1 Encounters:   05/31/23 145 lb (65.8 kg)       TSH:  No results found for: TSH    Hepatic Function Panel:                            Lab Results   Component Value Date/Time    ALKPHOS 106 05/30/2023 07:41 PM    ALT 10 05/30/2023 07:41 PM    AST 31 05/30/2023 07:41 PM    PROT 6.8 05/30/2023 07:41 PM    BILITOT 0.4 05/30/2023 07:41 PM    BILIDIR <0.2 10/01/2020 07:50 AM    IBILI see below 10/01/2020 07:50 AM    LABALBU 3.5 05/30/2023 07:41 PM       Current warfarin drug-drug interactions include: No significant interactions    Recent Labs     05/30/23  1941   HGB 11.0*   PLT 71*     Date Warfarin Dose INR Heparin or LMWH Comment   5/30 ? 2.6 X    5/31 < 2 mg > N/A                            Assessment:  86 yo/M, admitted for CP. On warfarin PTA for Hx of PAF. Home warfarin dose is 2 mg SunMWRF and 4 mg on TSat only. INR = 2.6 on 5/30. Goal INR 2 - 3  5/31: INR not obtained today. Plan:  Give warfarin 2 mg x1 tonight. Daily PT/INR until the INR is stable within the therapeutic range. Pharmacist will follow and monitor/adjust dosing as necessary.     Lucila Louie, LissaD  5/31/2023  1:05 PM

## 2023-05-31 NOTE — H&P
Wyatt Monterroso MD [address]  Internal medicine   History and Physical      CHIEF COMPLAINT: Chest pain      HISTORY OF PRESENT ILLNESS:    Patient was seen in the emergency room earlier this morning at the main campus of [address] reviewed in detail with the patient  Spoke with the ER physician earlier at the time of admission  55-year-old -American man with known history of persistent atrial fibrillation on warfarin and beta-blocker therapy  He seems very anxious  He claims that every time he stands up and starts walking he gets pain in the middle of the chest that goes to the back and he starts shaking  EKG troponins are not all that elevated  Stress test in February of this year was negative  Being admitted for concern for angina    Past Medical History:    Past Medical History:   Diagnosis Date    A-fib ([address]     Chronic kidney disease     Chronic renal impairment, stage 3 (moderate) ([address] 10/10/2016    Colitis, ulcerative chronic ([address]     Diabetes mellitus ([address]     DM (diabetes mellitus) ([address] 11/25/2013    Encounter for therapeutic drug monitoring 4/5/2016    Hyperlipidemia     Hyperlipidemia associated with type 2 diabetes mellitus ([address] 1/17/2016    Hypertension     Long term (current) use of anticoagulants 4/5/2016    Prostate enlargement     Stroke (cerebrum) ([address] 2013    Unspecified cerebral artery occlusion with cerebral infarction 1973       Past Surgical History:    Past Surgical History:   Procedure Laterality Date    [address]  more than 5 yrs    ECHO COMPL W DOP COLOR FLOW  11/26/2013         ECHOCARDIOGRAM TRANSESOPHAGEAL  11/27/2013         ENDOSCOPY, COLON, DIAGNOSTIC  12/10/15    ileoscopy    TURP  6/16/14    cystoscopy retrogrades    UPPER GASTROINTESTINAL ENDOSCOPY N/A 3/11/2022    EGD ESOPHAGOGASTRODUODENOSCOPY ULTRASOUND performed by Jen Brown MD at [facility] ENDOSCOPY       Medications Prior to Admission:

## 2023-05-31 NOTE — ED NOTES
Care taken over at this time. Patient ambulating independently to the restroom and back.       Zahida Mayer RN  05/30/23 0694

## 2023-05-31 NOTE — PROGRESS NOTES
4 Eyes Skin Assessment     NAME:  Iman Cochran  YOB: 1935  MEDICAL RECORD NUMBER:  [id number]    The patient is being assessed for  Admission    I agree that at least one RN has performed a thorough Head to Toe Skin Assessment on the patient. ALL assessment sites listed below have been assessed. Areas assessed by both nurses:    Head, Face, Ears, Shoulders, Back, Chest, Arms, Elbows, Hands, Sacrum. Buttock, Coccyx, Ischium, and Legs. Feet and Heels        Does the Patient have a Wound?  No noted wound(s)       Clark Prevention initiated by RN: Yes  Wound Care Orders initiated by RN: No    Pressure Injury (Stage 3,4, Unstageable, DTI, NWPT, and Complex wounds) if present, place Wound referral order by RN under : No    New Ostomies, if present place, Ostomy referral order under : No     Nurse 1 eSignature: Electronically signed by Vero Kaufman RN on 5/31/23 at 3:45 PM EDT    **SHARE this note so that the co-signing nurse can place an eSignature**    Nurse 2 eSignature: Electronically signed by Erasto Arango RN on 5/31/23 at 3:46 PM EDT

## 2023-06-01 VITALS
DIASTOLIC BLOOD PRESSURE: 105 MMHG | HEIGHT: 69 IN | BODY MASS INDEX: 21.48 KG/M2 | HEART RATE: 79 BPM | RESPIRATION RATE: 20 BRPM | SYSTOLIC BLOOD PRESSURE: 140 MMHG | WEIGHT: 145 LBS | TEMPERATURE: 97.9 F | OXYGEN SATURATION: 99 %

## 2023-06-01 LAB
ERYTHROCYTE [DISTWIDTH] IN BLOOD BY AUTOMATED COUNT: 14.8 FL (ref 11.5–15)
HCT VFR BLD AUTO: 33.3 % (ref 37–54)
HGB BLD-MCNC: 10.9 G/DL (ref 12.5–16.5)
INR BLD: 2.1
MCH RBC QN AUTO: 26.5 PG (ref 26–35)
MCHC RBC AUTO-ENTMCNC: 32.7 % (ref 32–34.5)
MCV RBC AUTO: 80.8 FL (ref 80–99.9)
METER GLUCOSE: 123 MG/DL (ref 74–99)
METER GLUCOSE: 147 MG/DL (ref 74–99)
PLATELET # BLD AUTO: 67 E9/L (ref 130–450)
PLATELET CONFIRMATION: NORMAL
PMV BLD AUTO: ABNORMAL FL (ref 7–12)
PROTHROMBIN TIME: 23.1 SEC (ref 9.3–12.4)
RBC # BLD AUTO: 4.12 E12/L (ref 3.8–5.8)
WBC # BLD: 4.4 E9/L (ref 4.5–11.5)

## 2023-06-01 PROCEDURE — 82962 GLUCOSE BLOOD TEST: CPT

## 2023-06-01 PROCEDURE — 6370000000 HC RX 637 (ALT 250 FOR IP): Performed by: INTERNAL MEDICINE

## 2023-06-01 PROCEDURE — 36415 COLL VENOUS BLD VENIPUNCTURE: CPT

## 2023-06-01 PROCEDURE — 99233 SBSQ HOSP IP/OBS HIGH 50: CPT | Performed by: INTERNAL MEDICINE

## 2023-06-01 PROCEDURE — 85610 PROTHROMBIN TIME: CPT

## 2023-06-01 PROCEDURE — 85027 COMPLETE CBC AUTOMATED: CPT

## 2023-06-01 RX ORDER — RANOLAZINE 500 MG/1
500 TABLET, EXTENDED RELEASE ORAL 2 TIMES DAILY
Qty: 60 TABLET | Refills: 3 | Status: SHIPPED | OUTPATIENT
Start: 2023-06-01

## 2023-06-01 RX ORDER — WARFARIN SODIUM 2 MG/1
2 TABLET ORAL
Status: DISCONTINUED | OUTPATIENT
Start: 2023-06-01 | End: 2023-06-01 | Stop reason: HOSPADM

## 2023-06-01 RX ORDER — METOPROLOL TARTRATE 37.5 MG/1
37.5 TABLET, FILM COATED ORAL 2 TIMES DAILY WITH MEALS
Qty: 90 TABLET | Refills: 3 | Status: SHIPPED | OUTPATIENT
Start: 2023-06-01

## 2023-06-01 RX ADMIN — ATORVASTATIN CALCIUM 10 MG: 10 TABLET, FILM COATED ORAL at 09:05

## 2023-06-01 RX ADMIN — METOPROLOL TARTRATE 37.5 MG: 25 TABLET, FILM COATED ORAL at 09:06

## 2023-06-01 RX ADMIN — INSULIN HUMAN 14 UNITS: 100 INJECTION, SOLUTION PARENTERAL at 09:06

## 2023-06-01 RX ADMIN — SODIUM BICARBONATE 650 MG: 650 TABLET ORAL at 09:05

## 2023-06-01 RX ADMIN — RANOLAZINE 500 MG: 500 TABLET, EXTENDED RELEASE ORAL at 09:05

## 2023-06-01 NOTE — CARE COORDINATION
6/1:  Transition of care:  Pt presented to the Er for CP from home. Pt is dc today. CM spoke with pt via his room phone to discuss CM role & dc planning. Pt's PCP is Dr. Lyda Babinski. Pt lives alone with in a [place] with no steps to enter. PTA pt was independent with a cane. Pt has a life alert button, glucometer & Sq injections. Pt has no HHC/SNF. Pt's dc plan is to return home with his wife to transport. Sw/EVAN will continue to follow for dc planning.   Electronically signed by Mary Hua RN on 6/1/2023 at 12:53 PM

## 2023-06-01 NOTE — PROGRESS NOTES
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 Weight:   Wt Readings from Last 1 Encounters:   05/31/23 145 lb (65.8 kg)       TSH:  No results found for: TSH    Hepatic Function Panel:                            Lab Results   Component Value Date/Time    ALKPHOS 106 05/30/2023 07:41 PM    ALT 10 05/30/2023 07:41 PM    AST 31 05/30/2023 07:41 PM    PROT 6.8 05/30/2023 07:41 PM    BILITOT 0.4 05/30/2023 07:41 PM    BILIDIR <0.2 10/01/2020 07:50 AM    IBILI see below 10/01/2020 07:50 AM    LABALBU 3.5 05/30/2023 07:41 PM       Current warfarin drug-drug interactions include: No significant interactions    Recent Labs     05/30/23  1941 06/01/23  0545   HGB 11.0* 10.9*   PLT 71* 67*       Date Warfarin Dose INR Heparin or LMWH Comment   5/30 ? 2.6 X    5/31 2 mg  N/A X    6/1 2 mg 2.1 X                    Assessment:  88 yo/M, admitted for CP. On warfarin PTA for Hx of PAF. Home warfarin dose is 2 mg SunMWRF and 4 mg on TSat only. INR = 2.6 on 5/30. Goal INR 2 - 3  5/31: INR not obtained today. 6/1: INR 2.1    Plan:  Give warfarin 2 mg x1 tonight. Daily PT/INR until the INR is stable within the therapeutic range. Pharmacist will follow and monitor/adjust dosing as necessary.     Zahraa Ibarra, PharmD, [facility], [facility] 6/1/2023 10:22 AM
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
## 2023-07-26 ENCOUNTER — OFFICE VISIT (OUTPATIENT)
Dept: CARDIOLOGY CLINIC | Age: 88
End: 2023-07-26
Payer: MEDICARE

## 2023-07-26 VITALS
SYSTOLIC BLOOD PRESSURE: 132 MMHG | BODY MASS INDEX: 22.13 KG/M2 | OXYGEN SATURATION: 99 % | RESPIRATION RATE: 16 BRPM | DIASTOLIC BLOOD PRESSURE: 70 MMHG | HEIGHT: 69 IN | HEART RATE: 58 BPM | WEIGHT: 149.4 LBS

## 2023-07-26 DIAGNOSIS — I48.21 PERMANENT ATRIAL FIBRILLATION (HCC): Primary | ICD-10-CM

## 2023-07-26 DIAGNOSIS — Z79.4 TYPE 2 DIABETES MELLITUS WITH STAGE 3B CHRONIC KIDNEY DISEASE, WITH LONG-TERM CURRENT USE OF INSULIN (HCC): ICD-10-CM

## 2023-07-26 DIAGNOSIS — E11.22 TYPE 2 DIABETES MELLITUS WITH STAGE 3B CHRONIC KIDNEY DISEASE, WITH LONG-TERM CURRENT USE OF INSULIN (HCC): ICD-10-CM

## 2023-07-26 DIAGNOSIS — K70.30 ALCOHOLIC CIRRHOSIS OF LIVER WITHOUT ASCITES (HCC): ICD-10-CM

## 2023-07-26 DIAGNOSIS — I63.9 CEREBROVASCULAR ACCIDENT (CVA), UNSPECIFIED MECHANISM (HCC): ICD-10-CM

## 2023-07-26 DIAGNOSIS — I10 PRIMARY HYPERTENSION: ICD-10-CM

## 2023-07-26 DIAGNOSIS — R07.89 ATYPICAL CHEST PAIN: ICD-10-CM

## 2023-07-26 DIAGNOSIS — E78.00 PURE HYPERCHOLESTEROLEMIA: ICD-10-CM

## 2023-07-26 DIAGNOSIS — N18.32 TYPE 2 DIABETES MELLITUS WITH STAGE 3B CHRONIC KIDNEY DISEASE, WITH LONG-TERM CURRENT USE OF INSULIN (HCC): ICD-10-CM

## 2023-07-26 PROCEDURE — 99215 OFFICE O/P EST HI 40 MIN: CPT | Performed by: INTERNAL MEDICINE

## 2023-07-26 PROCEDURE — 93000 ELECTROCARDIOGRAM COMPLETE: CPT | Performed by: INTERNAL MEDICINE

## 2023-07-26 PROCEDURE — 1123F ACP DISCUSS/DSCN MKR DOCD: CPT | Performed by: INTERNAL MEDICINE

## 2023-07-26 NOTE — PROGRESS NOTES
Patient was seen today and a 3 day monitor was placed. Monitor was ordered by Dr. Courtney Phipps. The monitor was applied, instructions were given to the patient. Patient stated understanding and gave verbalize readback.    Monitor company: [employer]  Serial number: [id number]

## 2023-07-26 NOTE — PROGRESS NOTES
OUTPATIENT CARDIOLOGY FOLLOW-UP    Name: Estee Jurado    Age: 80 y.o. Primary Care Physician: Ingris Lutz MD    Date of Service: 7/26/2023    Chief Complaint: Permanent atrial fibrillation, probable noncardiac chest pain, hypertension, chronic kidney disease, cerebrovascular accident, alcoholic cirrhosis    Interim History: The patient continues to experience extremely vague symptoms of chest discomfort exclusively in the standing position without a clear description of angina nor ischemic equivalents and with activity and positions outside that of standing not provoking symptomatology as well as that of a recent low risk myocardial perfusion imaging study. He additionally continues to relate symptoms of positional lightheadedness in the absence of additional arrhythmia related manifestations and no evidence of additional manifestations of a focal neurological origin nor bleeding. At the time of his present evaluation he remains normotensive. Review of Systems: The remainder of a complete multisystem review including consitutional, central nervous, respiratory, circulatory, gastrointestinal, genitourinary, endocrinologic, hematologic, musculoskeletal and psychiatric are negative.     Past Medical History:  Past Medical History:   Diagnosis Date    A-fib ([address])     Chronic kidney disease     Chronic renal impairment, stage 3 (moderate) ([address]) 10/10/2016    Colitis, ulcerative chronic ([address])     Diabetes mellitus ([address])     DM (diabetes mellitus) ([address]) 11/25/2013    Encounter for therapeutic drug monitoring 4/5/2016    Hyperlipidemia     Hyperlipidemia associated with type 2 diabetes mellitus ([address]) 1/17/2016    Hypertension     Long term (current) use of anticoagulants 4/5/2016    Prostate enlargement     Stroke (cerebrum) ([address]) 2013    Unspecified cerebral artery occlusion with cerebral infarction 1973       Past Surgical History:  Past Surgical History:   Procedure Laterality Date    ABDOMEN SURGERY

## 2023-08-07 ENCOUNTER — HOSPITAL ENCOUNTER (OUTPATIENT)
Age: 88
Setting detail: OBSERVATION
Discharge: HOME | End: 2023-08-09
Attending: EMERGENCY MEDICINE | Admitting: INTERNAL MEDICINE
Payer: MEDICARE

## 2023-08-07 ENCOUNTER — APPOINTMENT (OUTPATIENT)
Dept: GENERAL RADIOLOGY | Age: 88
End: 2023-08-07
Payer: MEDICARE

## 2023-08-07 DIAGNOSIS — R07.9 CHEST PAIN, UNSPECIFIED TYPE: Primary | ICD-10-CM

## 2023-08-07 PROBLEM — R79.89 ELEVATED TROPONIN: Status: ACTIVE | Noted: 2023-08-07

## 2023-08-07 PROBLEM — R77.8 ELEVATED TROPONIN: Status: ACTIVE | Noted: 2023-08-07

## 2023-08-07 PROBLEM — R79.1 SUPRATHERAPEUTIC INR: Status: ACTIVE | Noted: 2023-08-07

## 2023-08-07 PROBLEM — I34.0 NONRHEUMATIC MITRAL VALVE REGURGITATION: Status: ACTIVE | Noted: 2023-08-07

## 2023-08-07 PROBLEM — I51.7 LEFT VENTRICULAR HYPERTROPHY: Status: ACTIVE | Noted: 2023-08-07

## 2023-08-07 PROBLEM — I48.20 CHRONIC ATRIAL FIBRILLATION (HCC): Status: ACTIVE | Noted: 2023-08-07

## 2023-08-07 PROBLEM — I35.1 NONRHEUMATIC AORTIC VALVE INSUFFICIENCY: Status: ACTIVE | Noted: 2023-08-07

## 2023-08-07 LAB
ALBUMIN SERPL-MCNC: 3.8 G/DL (ref 3.5–5.2)
ALP SERPL-CCNC: 103 U/L (ref 40–129)
ALT SERPL-CCNC: 12 U/L (ref 0–40)
ANION GAP SERPL CALCULATED.3IONS-SCNC: 12 MMOL/L (ref 7–16)
AST SERPL-CCNC: 28 U/L (ref 0–39)
BASOPHILS # BLD: 0.02 K/UL (ref 0–0.2)
BASOPHILS NFR BLD: 0 % (ref 0–2)
BILIRUB SERPL-MCNC: 0.4 MG/DL (ref 0–1.2)
BNP SERPL-MCNC: 1028 PG/ML (ref 0–450)
BUN SERPL-MCNC: 34 MG/DL (ref 6–23)
CALCIUM SERPL-MCNC: 9 MG/DL (ref 8.6–10.2)
CHLORIDE SERPL-SCNC: 104 MMOL/L (ref 98–107)
CHOLEST SERPL-MCNC: 154 MG/DL
CHP ED QC CHECK: NORMAL
CO2 SERPL-SCNC: 18 MMOL/L (ref 22–29)
CREAT SERPL-MCNC: 2.2 MG/DL (ref 0.7–1.2)
EKG ATRIAL RATE: 202 BPM
EKG Q-T INTERVAL: 346 MS
EKG QRS DURATION: 68 MS
EKG QTC CALCULATION (BAZETT): 416 MS
EKG R AXIS: 67 DEGREES
EKG T AXIS: 69 DEGREES
EKG VENTRICULAR RATE: 87 BPM
EOSINOPHIL # BLD: 0.11 K/UL (ref 0.05–0.5)
EOSINOPHILS RELATIVE PERCENT: 3 % (ref 0–6)
ERYTHROCYTE [DISTWIDTH] IN BLOOD BY AUTOMATED COUNT: 16.2 % (ref 11.5–15)
FLUAV RNA RESP QL NAA+PROBE: NOT DETECTED
FLUBV RNA RESP QL NAA+PROBE: NOT DETECTED
GFR SERPL CREATININE-BSD FRML MDRD: 29 ML/MIN/1.73M2
GLUCOSE BLD-MCNC: 115 MG/DL
GLUCOSE SERPL-MCNC: 132 MG/DL (ref 74–99)
HCT VFR BLD AUTO: 34 % (ref 37–54)
HDLC SERPL-MCNC: 66 MG/DL
HGB BLD-MCNC: 10.4 G/DL (ref 12.5–16.5)
IMM GRANULOCYTES # BLD AUTO: <0.03 K/UL (ref 0–0.58)
IMM GRANULOCYTES NFR BLD: 0 % (ref 0–5)
INR PPP: 5.1
LDLC SERPL CALC-MCNC: 56 MG/DL
LYMPHOCYTES NFR BLD: 1.12 K/UL (ref 1.5–4)
LYMPHOCYTES RELATIVE PERCENT: 25 % (ref 20–42)
MAGNESIUM SERPL-MCNC: 1.7 MG/DL (ref 1.6–2.6)
MCH RBC QN AUTO: 26.7 PG (ref 26–35)
MCHC RBC AUTO-ENTMCNC: 30.6 G/DL (ref 32–34.5)
MCV RBC AUTO: 87.2 FL (ref 80–99.9)
METER GLUCOSE: 115 MG/DL (ref 74–99)
METER GLUCOSE: 212 MG/DL (ref 74–99)
MONOCYTES NFR BLD: 0.37 K/UL (ref 0.1–0.95)
MONOCYTES NFR BLD: 8 % (ref 2–12)
NEUTROPHILS NFR BLD: 63 % (ref 43–80)
NEUTS SEG NFR BLD: 2.81 K/UL (ref 1.8–7.3)
PARTIAL THROMBOPLASTIN TIME: 50.5 SEC (ref 24.5–35.1)
PLATELET # BLD AUTO: 68 K/UL (ref 130–450)
PMV BLD AUTO: 10.7 FL (ref 7–12)
POTASSIUM SERPL-SCNC: 4.7 MMOL/L (ref 3.5–5)
PROT SERPL-MCNC: 7 G/DL (ref 6.4–8.3)
PROTHROMBIN TIME: 56.1 SEC (ref 9.3–12.4)
RBC # BLD AUTO: 3.9 M/UL (ref 3.8–5.8)
REASON FOR REJECTION: NORMAL
SARS-COV-2 RNA RESP QL NAA+PROBE: NOT DETECTED
SODIUM SERPL-SCNC: 134 MMOL/L (ref 132–146)
SOURCE: NORMAL
SPECIMEN DESCRIPTION: NORMAL
SPECIMEN SOURCE: NORMAL
T4 FREE SERPL-MCNC: 1.1 NG/DL (ref 0.9–1.7)
TRIGL SERPL-MCNC: 158 MG/DL
TROPONIN I SERPL HS-MCNC: 22 NG/L (ref 0–11)
TROPONIN I SERPL HS-MCNC: 25 NG/L (ref 0–11)
TROPONIN I SERPL HS-MCNC: 52 NG/L (ref 0–11)
TSH SERPL DL<=0.05 MIU/L-ACNC: 2.62 UIU/ML (ref 0.27–4.2)
VLDLC SERPL CALC-MCNC: 32 MG/DL
WBC OTHER # BLD: 4.5 K/UL (ref 4.5–11.5)
ZZ NTE CLEAN UP: ORDERED TEST: NORMAL

## 2023-08-07 PROCEDURE — 85730 THROMBOPLASTIN TIME PARTIAL: CPT

## 2023-08-07 PROCEDURE — 80053 COMPREHEN METABOLIC PANEL: CPT

## 2023-08-07 PROCEDURE — 6370000000 HC RX 637 (ALT 250 FOR IP): Performed by: INTERNAL MEDICINE

## 2023-08-07 PROCEDURE — 93010 ELECTROCARDIOGRAM REPORT: CPT | Performed by: INTERNAL MEDICINE

## 2023-08-07 PROCEDURE — 84484 ASSAY OF TROPONIN QUANT: CPT

## 2023-08-07 PROCEDURE — 93005 ELECTROCARDIOGRAM TRACING: CPT | Performed by: EMERGENCY MEDICINE

## 2023-08-07 PROCEDURE — 85610 PROTHROMBIN TIME: CPT

## 2023-08-07 PROCEDURE — 99285 EMERGENCY DEPT VISIT HI MDM: CPT

## 2023-08-07 PROCEDURE — APPSS60 APP SPLIT SHARED TIME 46-60 MINUTES: Performed by: NURSE PRACTITIONER

## 2023-08-07 PROCEDURE — 2580000003 HC RX 258: Performed by: INTERNAL MEDICINE

## 2023-08-07 PROCEDURE — 82947 ASSAY GLUCOSE BLOOD QUANT: CPT

## 2023-08-07 PROCEDURE — G0378 HOSPITAL OBSERVATION PER HR: HCPCS

## 2023-08-07 PROCEDURE — 83880 ASSAY OF NATRIURETIC PEPTIDE: CPT

## 2023-08-07 PROCEDURE — 83735 ASSAY OF MAGNESIUM: CPT

## 2023-08-07 PROCEDURE — 6360000002 HC RX W HCPCS: Performed by: INTERNAL MEDICINE

## 2023-08-07 PROCEDURE — 84439 ASSAY OF FREE THYROXINE: CPT

## 2023-08-07 PROCEDURE — 80061 LIPID PANEL: CPT

## 2023-08-07 PROCEDURE — 96365 THER/PROPH/DIAG IV INF INIT: CPT

## 2023-08-07 PROCEDURE — 6370000000 HC RX 637 (ALT 250 FOR IP): Performed by: NURSE PRACTITIONER

## 2023-08-07 PROCEDURE — 96361 HYDRATE IV INFUSION ADD-ON: CPT

## 2023-08-07 PROCEDURE — 84443 ASSAY THYROID STIM HORMONE: CPT

## 2023-08-07 PROCEDURE — 87636 SARSCOV2 & INF A&B AMP PRB: CPT

## 2023-08-07 PROCEDURE — 96366 THER/PROPH/DIAG IV INF ADDON: CPT

## 2023-08-07 PROCEDURE — 99223 1ST HOSP IP/OBS HIGH 75: CPT | Performed by: INTERNAL MEDICINE

## 2023-08-07 PROCEDURE — 85025 COMPLETE CBC W/AUTO DIFF WBC: CPT

## 2023-08-07 PROCEDURE — 71045 X-RAY EXAM CHEST 1 VIEW: CPT

## 2023-08-07 RX ORDER — ATORVASTATIN CALCIUM 20 MG/1
20 TABLET, FILM COATED ORAL DAILY
Status: DISCONTINUED | OUTPATIENT
Start: 2023-08-08 | End: 2023-08-07 | Stop reason: SDUPTHER

## 2023-08-07 RX ORDER — CLOPIDOGREL BISULFATE 75 MG/1
75 TABLET ORAL DAILY
Status: DISCONTINUED | OUTPATIENT
Start: 2023-08-08 | End: 2023-08-09 | Stop reason: HOSPADM

## 2023-08-07 RX ORDER — ATORVASTATIN CALCIUM 20 MG/1
20 TABLET, FILM COATED ORAL DAILY
Status: DISCONTINUED | OUTPATIENT
Start: 2023-08-08 | End: 2023-08-09 | Stop reason: HOSPADM

## 2023-08-07 RX ORDER — SODIUM BICARBONATE 650 MG/1
650 TABLET ORAL 2 TIMES DAILY
Status: DISCONTINUED | OUTPATIENT
Start: 2023-08-07 | End: 2023-08-09 | Stop reason: HOSPADM

## 2023-08-07 RX ORDER — RANOLAZINE 500 MG/1
500 TABLET, EXTENDED RELEASE ORAL 2 TIMES DAILY
Status: DISCONTINUED | OUTPATIENT
Start: 2023-08-07 | End: 2023-08-07 | Stop reason: SDUPTHER

## 2023-08-07 RX ORDER — ATORVASTATIN CALCIUM 10 MG/1
10 TABLET, FILM COATED ORAL DAILY
Status: DISCONTINUED | OUTPATIENT
Start: 2023-08-07 | End: 2023-08-07

## 2023-08-07 RX ORDER — ASPIRIN 81 MG/1
81 TABLET ORAL DAILY
Status: DISCONTINUED | OUTPATIENT
Start: 2023-08-08 | End: 2023-08-09 | Stop reason: HOSPADM

## 2023-08-07 RX ORDER — SODIUM CHLORIDE 9 MG/ML
INJECTION, SOLUTION INTRAVENOUS CONTINUOUS
Status: DISCONTINUED | OUTPATIENT
Start: 2023-08-07 | End: 2023-08-09 | Stop reason: HOSPADM

## 2023-08-07 RX ORDER — INSULIN LISPRO 100 [IU]/ML
0-8 INJECTION, SOLUTION INTRAVENOUS; SUBCUTANEOUS
Status: DISCONTINUED | OUTPATIENT
Start: 2023-08-08 | End: 2023-08-09 | Stop reason: HOSPADM

## 2023-08-07 RX ORDER — INSULIN LISPRO 100 [IU]/ML
0-4 INJECTION, SOLUTION INTRAVENOUS; SUBCUTANEOUS NIGHTLY
Status: DISCONTINUED | OUTPATIENT
Start: 2023-08-07 | End: 2023-08-09 | Stop reason: HOSPADM

## 2023-08-07 RX ORDER — ACETAMINOPHEN 500 MG
500 TABLET ORAL EVERY 4 HOURS PRN
Status: DISCONTINUED | OUTPATIENT
Start: 2023-08-07 | End: 2023-08-08 | Stop reason: ALTCHOICE

## 2023-08-07 RX ORDER — PANTOPRAZOLE SODIUM 40 MG/1
40 TABLET, DELAYED RELEASE ORAL
Status: DISCONTINUED | OUTPATIENT
Start: 2023-08-08 | End: 2023-08-09 | Stop reason: HOSPADM

## 2023-08-07 RX ORDER — ISOSORBIDE MONONITRATE 30 MG/1
30 TABLET, EXTENDED RELEASE ORAL DAILY
Status: DISCONTINUED | OUTPATIENT
Start: 2023-08-07 | End: 2023-08-09 | Stop reason: HOSPADM

## 2023-08-07 RX ORDER — ATORVASTATIN CALCIUM 20 MG/1
20 TABLET, FILM COATED ORAL DAILY
Status: DISCONTINUED | OUTPATIENT
Start: 2023-08-07 | End: 2023-08-07 | Stop reason: SDUPTHER

## 2023-08-07 RX ORDER — ASPIRIN 325 MG
325 TABLET, DELAYED RELEASE (ENTERIC COATED) ORAL ONCE
Status: COMPLETED | OUTPATIENT
Start: 2023-08-07 | End: 2023-08-07

## 2023-08-07 RX ORDER — RANOLAZINE 500 MG/1
500 TABLET, EXTENDED RELEASE ORAL 2 TIMES DAILY
Status: DISCONTINUED | OUTPATIENT
Start: 2023-08-07 | End: 2023-08-09 | Stop reason: HOSPADM

## 2023-08-07 RX ORDER — DEXTROSE MONOHYDRATE 100 MG/ML
INJECTION, SOLUTION INTRAVENOUS CONTINUOUS PRN
Status: DISCONTINUED | OUTPATIENT
Start: 2023-08-07 | End: 2023-08-09 | Stop reason: HOSPADM

## 2023-08-07 RX ORDER — ATORVASTATIN CALCIUM 20 MG/1
20 TABLET, FILM COATED ORAL DAILY
COMMUNITY

## 2023-08-07 RX ADMIN — METOPROLOL TARTRATE 37.5 MG: 25 TABLET, FILM COATED ORAL at 20:40

## 2023-08-07 RX ADMIN — SODIUM BICARBONATE TAB 650 MG 650 MG: 650 TAB at 21:53

## 2023-08-07 RX ADMIN — ISOSORBIDE MONONITRATE 30 MG: 30 TABLET, EXTENDED RELEASE ORAL at 12:30

## 2023-08-07 RX ADMIN — ATORVASTATIN CALCIUM 20 MG: 20 TABLET, FILM COATED ORAL at 20:40

## 2023-08-07 RX ADMIN — RANOLAZINE 500 MG: 500 TABLET, EXTENDED RELEASE ORAL at 09:33

## 2023-08-07 RX ADMIN — SODIUM CHLORIDE: 9 INJECTION, SOLUTION INTRAVENOUS at 12:29

## 2023-08-07 RX ADMIN — RANOLAZINE 500 MG: 500 TABLET, EXTENDED RELEASE ORAL at 20:40

## 2023-08-07 RX ADMIN — PHYTONADIONE 10 MG: 10 INJECTION, EMULSION INTRAMUSCULAR; INTRAVENOUS; SUBCUTANEOUS at 12:28

## 2023-08-07 RX ADMIN — ASPIRIN 325 MG: 325 TABLET, COATED ORAL at 12:29

## 2023-08-07 RX ADMIN — METOPROLOL TARTRATE 37.5 MG: 25 TABLET, FILM COATED ORAL at 09:32

## 2023-08-07 ASSESSMENT — PAIN SCALES - GENERAL
PAINLEVEL_OUTOF10: 0

## 2023-08-07 NOTE — ED NOTES
Patient changed into hospital gown and placed on monitor at this time. Patient given basin and wipes per request to empty colostomy.       Dereck Ji RN  08/07/23 0134

## 2023-08-07 NOTE — ED NOTES
Patient ambulated with pulse ox at this time. Upon standing, the patient began to \"twitch\". He states that happens often when he first stands up. While ambulating, patient's pulse ox stayed at 98% and HR went up to 120. Patient denied dizziness. Dr. Berenice Henley notified.       Terese Rebolledo RN  08/07/23 3258

## 2023-08-07 NOTE — ED NOTES
The following labs were labeled with appropriate pt sticker and tubed to lab:     [] Blue     [] Lavender   [] on ice  [x] Green/yellow  [] Green/black [] on ice  [] Tenna Akash  [] on ice  [] Yellow  [] Red  [] Type/ Screen  [] ABG  [] VBG    [] COVID-19 swab    [] Rapid  [] PCR  [] Flu swab  [] Peds Viral Panel     [] Urine Sample  [] Fecal Sample  [] Pelvic Cultures  [] Blood Cultures  [] X 2  [] STREP Cultures       Ami Riley RN  08/07/23 0660

## 2023-08-07 NOTE — ED NOTES
This RN called lab again in regards to the labs that were made add-ons at 0900 and are still not in process. Lab stated they were busy but will put them on now. This RN will follow up to make sure labs are being ran.      Cherry Hammer RN  08/07/23 1559

## 2023-08-07 NOTE — CONSULTS
Inpatient Cardiology Consultation      Reason for Consult:  Chest Pain     Consulting Physician: Dr. Selma Quijano     Requesting Physician:  Dr. Bart Hutchinson    Date of Consultation: 8/7/2023    HISTORY OF PRESENT ILLNESS:   Mr. Brendon Quijano is an 42-year-old -American male who follows with Dr. Arcenio Phan. He was most recently seen in the office with Dr. Arcenio Phan on 07/26/2023. His medical history includes HTN, HLD, T2DM, CKD, CVA, Alcoholic Cirrhosis, Permanent Atrial Fibrillation, and chronic anticoagulation with Coumadin. Mr. Brendon Quijano presented to [facility] ED on 08/07/2023 with complaints of midsternal chest tightness. He states that prior to presentation over the past several months he has been having midsternal chest tightness that last hours in duration with associated ACEVEDO with exertion. He states, \" I would need to go lay down for my chest pain to go away\". He denies taking any medication to alleviate his chest discomfort. He also denies orthopnea and PND. He states more recently he has noticed abdominal edema and denies change in his diet or edema to BLE. He states that in follow-up with Dr. Arcenio Phan on 07/26/2023 his metoprolol was increased from 25 mg to 37.5 mg twice daily and he also was started on Ranexa 500 mg twice daily. He states compliance with both of these medications and states that this has not lessened his chest discomfort. He states that on 08/06/2023 he went to bed around 9:30 PM with midsternal chest pain and went to sleep. He states that he woke several hours later with midsternal chest pain and associated dyspnea. He states that this concerned him as this was his first occurrence of chest discomfort with rest/sleep. Upon arrival to the ED his VS were oral temperature 97.9-89-% RA-148/85. EKG Atrial Fibrillation with CVR with septal infarct pattern, age undetermined. Troponin 23, 22, 25. INR 5.1. Rapid COVID and influenza screening negative. BUN/SCR 34/2.2.   Cardiology was consulted for Lab Results   Component Value Date/Time    APTT 50.5 08/07/2023 02:35 AM    APTT 35.5 06/13/2022 05:33 PM     PTT Heparin:  No components found for: APTTHEP  Magnesium:    Lab Results   Component Value Date/Time    MG 1.7 08/07/2023 02:35 AM     TSH:    Lab Results   Component Value Date/Time    TSH 2.62 08/07/2023 04:02 AM     TROPONIN:  No components found for: TROP  BNP:  No results found for: BNP  FASTING LIPID PANEL:    Lab Results   Component Value Date/Time    CHOL 141 03/05/2021 05:19 AM    HDL 66 08/07/2023 05:02 PM    TRIG 111 03/05/2021 05:19 AM     XR CHEST PORTABLE   Final Result   No acute disease. RECOMMENDATION:   Careful clinical correlation and follow up recommended. I have personally reviewed the laboratory, cardiac diagnostic and radiographic testing as outlined above:    IMPRESSION:  Chest pain: Atypical, recurrent, recent negative Lexiscan stress test, will need more definitive evaluation, will schedule for cardiac catheterization for further evaluation  Elevated troponin: Secondary to comorbid conditions  Chronic atrial fibrillation: Controlled ventricular response, on chronic anticoagulation with Coumadin  Mitral valve regurgitation: Mild  Aortic valve regurgitation: Mild  Left ventricular hypertrophy  Hypertension: Controlled  Hyperlipidemia: On statin  Type 2 diabetes mellitus  Alcoholic liver cirrhosis  Chronic anticoagulation with Coumadin  Supratherapeutic INR    RECOMMENDATIONS:   Vitamin K 10 mg IV once  PT and INR in a.m.  will hold Coumadin, will continue the rest of treatment  Cardiac catheterization: Indications, procedures, risks and benefits of cardiac catheterization were discussed with the patient with risks including, but not limited to, infection, vessel damage, bleeding, dye allergy, nephrotoxicity, dysrhythmia, MI, CVA and death as well as the potential need for emergent cardiac surgery. Patient verbalized understanding and is agreeable to proceed.   will

## 2023-08-07 NOTE — ED NOTES
Call made to lab about add on lipid, TSH and T4. Lab stated they would add these on and run them.       Celena Felder RN  08/07/23 3802

## 2023-08-07 NOTE — PLAN OF CARE
Problem: Chronic Conditions and Co-morbidities  Goal: Patient's chronic conditions and co-morbidity symptoms are monitored and maintained or improved  Outcome: Progressing     Problem: Pain  Goal: Verbalizes/displays adequate comfort level or baseline comfort level  Outcome: Progressing  Flowsheets (Taken 8/7/2023 1466)  Verbalizes/displays adequate comfort level or baseline comfort level: Encourage patient to monitor pain and request assistance     Problem: Safety - Adult  Goal: Free from fall injury  Outcome: Progressing     Problem: ABCDS Injury Assessment  Goal: Absence of physical injury  Outcome: Progressing

## 2023-08-08 ENCOUNTER — TELEPHONE (OUTPATIENT)
Dept: CARDIOLOGY CLINIC | Age: 88
End: 2023-08-08

## 2023-08-08 ENCOUNTER — APPOINTMENT (OUTPATIENT)
Dept: ULTRASOUND IMAGING | Age: 88
End: 2023-08-08
Payer: MEDICARE

## 2023-08-08 DIAGNOSIS — I48.21 PERMANENT ATRIAL FIBRILLATION (HCC): ICD-10-CM

## 2023-08-08 LAB
ABO + RH BLD: NORMAL
ALBUMIN SERPL-MCNC: 3.5 G/DL (ref 3.5–5.2)
ALP SERPL-CCNC: 98 U/L (ref 40–129)
ALT SERPL-CCNC: 9 U/L (ref 0–40)
ANION GAP SERPL CALCULATED.3IONS-SCNC: 10 MMOL/L (ref 7–16)
ARM BAND NUMBER: NORMAL
AST SERPL-CCNC: 22 U/L (ref 0–39)
BASOPHILS # BLD: 0.02 K/UL (ref 0–0.2)
BASOPHILS NFR BLD: 1 % (ref 0–2)
BILIRUB SERPL-MCNC: 0.6 MG/DL (ref 0–1.2)
BLOOD BANK SAMPLE EXPIRATION: NORMAL
BLOOD GROUP ANTIBODIES SERPL: NEGATIVE
BUN SERPL-MCNC: 30 MG/DL (ref 6–23)
CALCIUM SERPL-MCNC: 9.1 MG/DL (ref 8.6–10.2)
CHLORIDE SERPL-SCNC: 109 MMOL/L (ref 98–107)
CO2 SERPL-SCNC: 20 MMOL/L (ref 22–29)
CREAT SERPL-MCNC: 2 MG/DL (ref 0.7–1.2)
EOSINOPHIL # BLD: 0.1 K/UL (ref 0.05–0.5)
EOSINOPHILS RELATIVE PERCENT: 3 % (ref 0–6)
ERYTHROCYTE [DISTWIDTH] IN BLOOD BY AUTOMATED COUNT: 16.3 % (ref 11.5–15)
GFR SERPL CREATININE-BSD FRML MDRD: 31 ML/MIN/1.73M2
GLUCOSE SERPL-MCNC: 197 MG/DL (ref 74–99)
HCT VFR BLD AUTO: 32.5 % (ref 37–54)
HGB BLD-MCNC: 10.2 G/DL (ref 12.5–16.5)
IMM GRANULOCYTES # BLD AUTO: <0.03 K/UL (ref 0–0.58)
IMM GRANULOCYTES NFR BLD: 0 % (ref 0–5)
INR PPP: 1.9
LV EF: 60 %
LVEF MODALITY: NORMAL
LYMPHOCYTES NFR BLD: 0.78 K/UL (ref 1.5–4)
LYMPHOCYTES RELATIVE PERCENT: 20 % (ref 20–42)
MCH RBC QN AUTO: 26.8 PG (ref 26–35)
MCHC RBC AUTO-ENTMCNC: 31.4 G/DL (ref 32–34.5)
MCV RBC AUTO: 85.5 FL (ref 80–99.9)
METER GLUCOSE: 138 MG/DL (ref 74–99)
METER GLUCOSE: 175 MG/DL (ref 74–99)
METER GLUCOSE: 194 MG/DL (ref 74–99)
METER GLUCOSE: 204 MG/DL (ref 74–99)
MONOCYTES NFR BLD: 0.37 K/UL (ref 0.1–0.95)
MONOCYTES NFR BLD: 9 % (ref 2–12)
NEUTROPHILS NFR BLD: 68 % (ref 43–80)
NEUTS SEG NFR BLD: 2.72 K/UL (ref 1.8–7.3)
PLATELET # BLD AUTO: 67 K/UL (ref 130–450)
PMV BLD AUTO: 11 FL (ref 7–12)
POTASSIUM SERPL-SCNC: 4.4 MMOL/L (ref 3.5–5)
PROT SERPL-MCNC: 6.8 G/DL (ref 6.4–8.3)
PROTHROMBIN TIME: 20.8 SEC (ref 9.3–12.4)
RBC # BLD AUTO: 3.8 M/UL (ref 3.8–5.8)
SODIUM SERPL-SCNC: 139 MMOL/L (ref 132–146)
WBC OTHER # BLD: 4 K/UL (ref 4.5–11.5)

## 2023-08-08 PROCEDURE — 6370000000 HC RX 637 (ALT 250 FOR IP): Performed by: INTERNAL MEDICINE

## 2023-08-08 PROCEDURE — 86850 RBC ANTIBODY SCREEN: CPT

## 2023-08-08 PROCEDURE — 2500000003 HC RX 250 WO HCPCS

## 2023-08-08 PROCEDURE — C1769 GUIDE WIRE: HCPCS

## 2023-08-08 PROCEDURE — 85610 PROTHROMBIN TIME: CPT

## 2023-08-08 PROCEDURE — 82962 GLUCOSE BLOOD TEST: CPT

## 2023-08-08 PROCEDURE — 36415 COLL VENOUS BLD VENIPUNCTURE: CPT

## 2023-08-08 PROCEDURE — 93458 L HRT ARTERY/VENTRICLE ANGIO: CPT | Performed by: INTERNAL MEDICINE

## 2023-08-08 PROCEDURE — 93931 UPPER EXTREMITY STUDY: CPT

## 2023-08-08 PROCEDURE — 2709999900 HC NON-CHARGEABLE SUPPLY

## 2023-08-08 PROCEDURE — 6360000002 HC RX W HCPCS

## 2023-08-08 PROCEDURE — C1894 INTRO/SHEATH, NON-LASER: HCPCS

## 2023-08-08 PROCEDURE — 86901 BLOOD TYPING SEROLOGIC RH(D): CPT

## 2023-08-08 PROCEDURE — 86900 BLOOD TYPING SEROLOGIC ABO: CPT

## 2023-08-08 PROCEDURE — G0378 HOSPITAL OBSERVATION PER HR: HCPCS

## 2023-08-08 PROCEDURE — 85025 COMPLETE CBC W/AUTO DIFF WBC: CPT

## 2023-08-08 PROCEDURE — 96361 HYDRATE IV INFUSION ADD-ON: CPT

## 2023-08-08 PROCEDURE — 93306 TTE W/DOPPLER COMPLETE: CPT

## 2023-08-08 PROCEDURE — 93458 L HRT ARTERY/VENTRICLE ANGIO: CPT

## 2023-08-08 PROCEDURE — 80053 COMPREHEN METABOLIC PANEL: CPT

## 2023-08-08 RX ORDER — ACETAMINOPHEN 325 MG/1
650 TABLET ORAL EVERY 4 HOURS PRN
Status: DISCONTINUED | OUTPATIENT
Start: 2023-08-08 | End: 2023-08-09 | Stop reason: HOSPADM

## 2023-08-08 RX ORDER — WARFARIN SODIUM 4 MG/1
4 TABLET ORAL
Status: COMPLETED | OUTPATIENT
Start: 2023-08-08 | End: 2023-08-08

## 2023-08-08 RX ADMIN — SODIUM BICARBONATE TAB 650 MG 650 MG: 650 TAB at 20:27

## 2023-08-08 RX ADMIN — PANTOPRAZOLE SODIUM 40 MG: 40 TABLET, DELAYED RELEASE ORAL at 06:03

## 2023-08-08 RX ADMIN — SODIUM BICARBONATE TAB 650 MG 650 MG: 650 TAB at 09:32

## 2023-08-08 RX ADMIN — WARFARIN SODIUM 4 MG: 4 TABLET ORAL at 17:45

## 2023-08-08 RX ADMIN — ASPIRIN 81 MG: 81 TABLET, COATED ORAL at 09:32

## 2023-08-08 RX ADMIN — ATORVASTATIN CALCIUM 20 MG: 20 TABLET, FILM COATED ORAL at 09:32

## 2023-08-08 RX ADMIN — RANOLAZINE 500 MG: 500 TABLET, EXTENDED RELEASE ORAL at 09:33

## 2023-08-08 RX ADMIN — RANOLAZINE 500 MG: 500 TABLET, EXTENDED RELEASE ORAL at 20:27

## 2023-08-08 RX ADMIN — ISOSORBIDE MONONITRATE 30 MG: 30 TABLET, EXTENDED RELEASE ORAL at 09:32

## 2023-08-08 RX ADMIN — INSULIN LISPRO 2 UNITS: 100 INJECTION, SOLUTION INTRAVENOUS; SUBCUTANEOUS at 16:34

## 2023-08-08 RX ADMIN — METOPROLOL TARTRATE 37.5 MG: 25 TABLET, FILM COATED ORAL at 16:37

## 2023-08-08 RX ADMIN — METOPROLOL TARTRATE 37.5 MG: 25 TABLET, FILM COATED ORAL at 09:32

## 2023-08-08 RX ADMIN — CLOPIDOGREL BISULFATE 75 MG: 75 TABLET ORAL at 09:32

## 2023-08-08 ASSESSMENT — PAIN SCALES - GENERAL: PAINLEVEL_OUTOF10: 0

## 2023-08-08 NOTE — TELEPHONE ENCOUNTER
----- Message from Mic Garcia MD sent at 8/8/2023  7:42 AM EDT -----  Please notify patient (presently in hospital with ongoing chest pain and anticipated catheterization likely later today or tomorrow) that arrhythmia monitor shows heart rates acceptable with present therapy.   Continue as directed with further recommendations based on his catheterization findings and follow-up in approximately 1 month based on catheterization findings

## 2023-08-08 NOTE — PROCEDURES
Procedure:    Left heart catheterization with coronary angiography    Physician: Marco Mccormick DO. Assistant: none    Indication: Chest pain  AUC: 8  AUC indication: 4    Complication: None. Sedation: Intravenous Versed. Anesthesia: Xylocaine, fentanyl     Sedation time: I was present for sedation and ministration at 1058 and I ended sedation at 1113 for a total face-to-face sedation time of 15 minutes. Estimated blood loss: Minimal    Specimens: none    Contrast used: 27 cc    Hemodynamics:  Opening Aortic pressure: 94/01  LV systolic pressure: 77  LVEDP: 4   No gradient across the aortic valve    Angiographic Results/findings:  Left Main: No angiographically significant stenosis  LAD: Mildly aneurysmal.  Ostial 30% stenosis. Distal diffuse 40 to 50% stenosis. D1: 1.5 mm vessel. Proximal to mid diffuse 75% stenosis. Cx: dominant vessel. Distal diffuse 40 to 50% stenosis. OM1: Very high takeoff. 1.5 mm vessel. Mid diffuse 95% stenosis. OM2: Mid diffuse mild luminal irregularities. OM 3: Tiny vessel. OM 4: Angiographically significant stenosis. OM 5: (PDA) no angiographically significant stenosis. Ramus: Absent  RCA: Non-dominant. Proximal diffuse mild luminal irregularities. Mid diffuse 50% stenosis. Procedure:   After obtaining informed consent the patient was taken to the cardiac Cath Lab where the area over the right radial artery was prepped and draped in a sterile fashion. Using ultrasound guidance and a micropuncture technique a 6 Occitan slender rain sheath was placed in the right radial artery. This was aspirated & flushed several times throughout the procedure. This was medicated with verapamil and nitroglycerin. They were given heparin systemically. A 5 [place] TIG catheter was advanced over a wire to the root of the aorta. It was aspirated & flushed with saline. Pressures were obtained.   It was filled with contrast.  This was then manipulated into the left main coronary artery. 4 orthogonal views were obtained. A TIG catheter was then manipulated into the right coronary artery and to orthogonal views were then obtained. A 5 Samoan angled pigtail was then advanced & manipulated into the left ventricle. This was then aspirated & flushed with saline & pressures were obtained. The catheter was then aspirated & flushed with saline once again & pull back pressures were then obtained across the aortic vlave. The right radial artery sheath was removed and a vasc band was then placed with good patent hemostasis. They tolerated the procedure well with no complications. Note: This report was completed using computerized voice recognition software. Every effort has been made to ensure accuracy, however; and invert and computerized transcription errors may be present.

## 2023-08-08 NOTE — CARE COORDINATION
08/08/23 Transition of care: patient is observation due to c/o chest pain. He is currently in the cath lab having a heart cath. INR on admission 5.1 and today 1.9 after dose of vitamin K. Coumadin is on hold currently. He is pending an echo. Per the chart patient lives alone in a one story home. He follows with Dr Sharyle Gibbs and fills his meds at [facility] on [address]. Will follow the patient post cath for any needs.  Electronically signed by Estuardo Phillip RN CM on 8/8/2023 at 9:14 AM

## 2023-08-08 NOTE — H&P
Attempted to see the patient earlier this morning on the floor  He was away for a procedure  Spoke with the ER physician at the time of admission

## 2023-08-09 VITALS
BODY MASS INDEX: 22.11 KG/M2 | TEMPERATURE: 97.7 F | RESPIRATION RATE: 16 BRPM | HEIGHT: 69 IN | OXYGEN SATURATION: 95 % | HEART RATE: 72 BPM | WEIGHT: 149.25 LBS | DIASTOLIC BLOOD PRESSURE: 80 MMHG | SYSTOLIC BLOOD PRESSURE: 160 MMHG

## 2023-08-09 PROBLEM — I48.20 CHRONIC ATRIAL FIBRILLATION (HCC): Status: RESOLVED | Noted: 2023-08-07 | Resolved: 2023-08-09

## 2023-08-09 LAB
ALBUMIN SERPL-MCNC: 3.8 G/DL (ref 3.5–5.2)
ALP SERPL-CCNC: 96 U/L (ref 40–129)
ALT SERPL-CCNC: 10 U/L (ref 0–40)
ANION GAP SERPL CALCULATED.3IONS-SCNC: 11 MMOL/L (ref 7–16)
AST SERPL-CCNC: 24 U/L (ref 0–39)
BILIRUB SERPL-MCNC: 0.4 MG/DL (ref 0–1.2)
BUN SERPL-MCNC: 31 MG/DL (ref 6–23)
CALCIUM SERPL-MCNC: 9.1 MG/DL (ref 8.6–10.2)
CHLORIDE SERPL-SCNC: 109 MMOL/L (ref 98–107)
CO2 SERPL-SCNC: 19 MMOL/L (ref 22–29)
CREAT SERPL-MCNC: 2 MG/DL (ref 0.7–1.2)
GFR SERPL CREATININE-BSD FRML MDRD: 32 ML/MIN/1.73M2
GLUCOSE SERPL-MCNC: 187 MG/DL (ref 74–99)
INR PPP: 1.4
METER GLUCOSE: 173 MG/DL (ref 74–99)
METER GLUCOSE: 256 MG/DL (ref 74–99)
PLATELET CONFIRMATION: NORMAL
POTASSIUM SERPL-SCNC: 4.8 MMOL/L (ref 3.5–5)
PROT SERPL-MCNC: 6.9 G/DL (ref 6.4–8.3)
PROTHROMBIN TIME: 15.7 SEC (ref 9.3–12.4)
SODIUM SERPL-SCNC: 139 MMOL/L (ref 132–146)

## 2023-08-09 PROCEDURE — 36415 COLL VENOUS BLD VENIPUNCTURE: CPT

## 2023-08-09 PROCEDURE — G0378 HOSPITAL OBSERVATION PER HR: HCPCS

## 2023-08-09 PROCEDURE — 85025 COMPLETE CBC W/AUTO DIFF WBC: CPT

## 2023-08-09 PROCEDURE — 6370000000 HC RX 637 (ALT 250 FOR IP)

## 2023-08-09 PROCEDURE — 99231 SBSQ HOSP IP/OBS SF/LOW 25: CPT | Performed by: INTERNAL MEDICINE

## 2023-08-09 PROCEDURE — 85610 PROTHROMBIN TIME: CPT

## 2023-08-09 PROCEDURE — 6370000000 HC RX 637 (ALT 250 FOR IP): Performed by: INTERNAL MEDICINE

## 2023-08-09 PROCEDURE — 96361 HYDRATE IV INFUSION ADD-ON: CPT

## 2023-08-09 PROCEDURE — 80053 COMPREHEN METABOLIC PANEL: CPT

## 2023-08-09 PROCEDURE — 82962 GLUCOSE BLOOD TEST: CPT

## 2023-08-09 RX ORDER — WARFARIN SODIUM 4 MG/1
4 TABLET ORAL
Status: COMPLETED | OUTPATIENT
Start: 2023-08-09 | End: 2023-08-09

## 2023-08-09 RX ORDER — ISOSORBIDE MONONITRATE 30 MG/1
30 TABLET, EXTENDED RELEASE ORAL DAILY
Qty: 30 TABLET | Refills: 3 | Status: SHIPPED | OUTPATIENT
Start: 2023-08-10

## 2023-08-09 RX ORDER — ASPIRIN 81 MG/1
81 TABLET ORAL DAILY
Qty: 30 TABLET | Refills: 3 | Status: SHIPPED | OUTPATIENT
Start: 2023-08-10

## 2023-08-09 RX ADMIN — RANOLAZINE 500 MG: 500 TABLET, EXTENDED RELEASE ORAL at 09:10

## 2023-08-09 RX ADMIN — ISOSORBIDE MONONITRATE 30 MG: 30 TABLET, EXTENDED RELEASE ORAL at 09:10

## 2023-08-09 RX ADMIN — PANTOPRAZOLE SODIUM 40 MG: 40 TABLET, DELAYED RELEASE ORAL at 06:16

## 2023-08-09 RX ADMIN — ASPIRIN 81 MG: 81 TABLET, COATED ORAL at 09:09

## 2023-08-09 RX ADMIN — WARFARIN SODIUM 4 MG: 4 TABLET ORAL at 09:55

## 2023-08-09 RX ADMIN — SODIUM BICARBONATE TAB 650 MG 650 MG: 650 TAB at 09:10

## 2023-08-09 RX ADMIN — INSULIN LISPRO 4 UNITS: 100 INJECTION, SOLUTION INTRAVENOUS; SUBCUTANEOUS at 11:44

## 2023-08-09 RX ADMIN — METOPROLOL TARTRATE 37.5 MG: 25 TABLET, FILM COATED ORAL at 09:10

## 2023-08-09 RX ADMIN — INSULIN HUMAN 10 UNITS: 100 INJECTION, SOLUTION PARENTERAL at 09:10

## 2023-08-09 RX ADMIN — ATORVASTATIN CALCIUM 20 MG: 20 TABLET, FILM COATED ORAL at 09:10

## 2023-08-09 RX ADMIN — CLOPIDOGREL BISULFATE 75 MG: 75 TABLET ORAL at 09:10
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
## 2023-08-09 NOTE — CARE COORDINATION
08/09/23 Update CM Note; Post heart cath with arm hematoma to right wrist. He is back on his coumadin. He will get coumadin 4mg today. The u/s did not reveal a pseudoaneurysm. Plan is for possible discharge today.  Electronically signed by Ally Robison RN CM on 8/9/2023 at 10:46 AM

## 2023-08-09 NOTE — PLAN OF CARE
Problem: Chronic Conditions and Co-morbidities  Goal: Patient's chronic conditions and co-morbidity symptoms are monitored and maintained or improved  Outcome: Completed     Problem: Pain  Goal: Verbalizes/displays adequate comfort level or baseline comfort level  Outcome: Completed     Problem: Safety - Adult  Goal: Free from fall injury  Outcome: Completed     Problem: ABCDS Injury Assessment  Goal: Absence of physical injury  Outcome: Completed

## 2023-08-10 LAB
BASOPHILS # BLD: 0.02 K/UL (ref 0–0.2)
BASOPHILS NFR BLD: 1 % (ref 0–2)
EOSINOPHIL # BLD: 0.06 K/UL (ref 0.05–0.5)
EOSINOPHILS RELATIVE PERCENT: 2 % (ref 0–6)
ERYTHROCYTE [DISTWIDTH] IN BLOOD BY AUTOMATED COUNT: 16.2 % (ref 11.5–15)
HCT VFR BLD AUTO: 30.8 % (ref 37–54)
HGB BLD-MCNC: 9.7 G/DL (ref 12.5–16.5)
IMM GRANULOCYTES # BLD AUTO: <0.03 K/UL (ref 0–0.58)
IMM GRANULOCYTES NFR BLD: 0 % (ref 0–5)
LYMPHOCYTES NFR BLD: 0.73 K/UL (ref 1.5–4)
LYMPHOCYTES RELATIVE PERCENT: 19 % (ref 20–42)
MCH RBC QN AUTO: 26.7 PG (ref 26–35)
MCHC RBC AUTO-ENTMCNC: 31.5 G/DL (ref 32–34.5)
MCV RBC AUTO: 84.8 FL (ref 80–99.9)
MONOCYTES NFR BLD: 0.31 K/UL (ref 0.1–0.95)
MONOCYTES NFR BLD: 8 % (ref 2–12)
NEUTROPHILS NFR BLD: 71 % (ref 43–80)
NEUTS SEG NFR BLD: 2.73 K/UL (ref 1.8–7.3)
PLATELET # BLD AUTO: 65 K/UL (ref 130–450)
PMV BLD AUTO: 11.7 FL (ref 7–12)
RBC # BLD AUTO: 3.63 M/UL (ref 3.8–5.8)
WBC OTHER # BLD: 3.9 K/UL (ref 4.5–11.5)

## 2023-08-11 ENCOUNTER — TELEPHONE (OUTPATIENT)
Dept: ADMINISTRATIVE | Age: 88
End: 2023-08-11

## 2023-08-15 LAB
PLATELET CONFIRMATION: NORMAL
PLATELET CONFIRMATION: NORMAL

## 2023-09-06 ENCOUNTER — OFFICE VISIT (OUTPATIENT)
Dept: CARDIOLOGY CLINIC | Age: 88
End: 2023-09-06
Payer: MEDICARE

## 2023-09-06 VITALS
WEIGHT: 147.6 LBS | RESPIRATION RATE: 16 BRPM | HEART RATE: 57 BPM | DIASTOLIC BLOOD PRESSURE: 62 MMHG | SYSTOLIC BLOOD PRESSURE: 120 MMHG | OXYGEN SATURATION: 98 % | HEIGHT: 69 IN | BODY MASS INDEX: 21.86 KG/M2

## 2023-09-06 DIAGNOSIS — N18.32 TYPE 2 DIABETES MELLITUS WITH STAGE 3B CHRONIC KIDNEY DISEASE, WITH LONG-TERM CURRENT USE OF INSULIN (HCC): ICD-10-CM

## 2023-09-06 DIAGNOSIS — I10 PRIMARY HYPERTENSION: ICD-10-CM

## 2023-09-06 DIAGNOSIS — E78.00 PURE HYPERCHOLESTEROLEMIA: ICD-10-CM

## 2023-09-06 DIAGNOSIS — I48.21 PERMANENT ATRIAL FIBRILLATION (HCC): ICD-10-CM

## 2023-09-06 DIAGNOSIS — I63.9 CEREBROVASCULAR ACCIDENT (CVA), UNSPECIFIED MECHANISM (HCC): ICD-10-CM

## 2023-09-06 DIAGNOSIS — I25.10 CORONARY ARTERY DISEASE INVOLVING NATIVE CORONARY ARTERY OF NATIVE HEART WITHOUT ANGINA PECTORIS: Primary | ICD-10-CM

## 2023-09-06 DIAGNOSIS — Z79.4 TYPE 2 DIABETES MELLITUS WITH STAGE 3B CHRONIC KIDNEY DISEASE, WITH LONG-TERM CURRENT USE OF INSULIN (HCC): ICD-10-CM

## 2023-09-06 DIAGNOSIS — E11.22 TYPE 2 DIABETES MELLITUS WITH STAGE 3B CHRONIC KIDNEY DISEASE, WITH LONG-TERM CURRENT USE OF INSULIN (HCC): ICD-10-CM

## 2023-09-06 PROBLEM — R79.89 ELEVATED TROPONIN: Status: RESOLVED | Noted: 2023-08-07 | Resolved: 2023-09-06

## 2023-09-06 PROBLEM — R77.8 ELEVATED TROPONIN: Status: RESOLVED | Noted: 2023-08-07 | Resolved: 2023-09-06

## 2023-09-06 PROCEDURE — 93000 ELECTROCARDIOGRAM COMPLETE: CPT | Performed by: INTERNAL MEDICINE

## 2023-09-06 PROCEDURE — 1123F ACP DISCUSS/DSCN MKR DOCD: CPT | Performed by: INTERNAL MEDICINE

## 2023-09-06 PROCEDURE — 99215 OFFICE O/P EST HI 40 MIN: CPT | Performed by: INTERNAL MEDICINE

## 2023-09-06 RX ORDER — METOPROLOL TARTRATE 37.5 MG/1
37.5 TABLET, FILM COATED ORAL 2 TIMES DAILY WITH MEALS
Qty: 90 TABLET | Refills: 3 | Status: SHIPPED | OUTPATIENT
Start: 2023-09-06

## 2023-09-06 RX ORDER — ISOSORBIDE MONONITRATE 30 MG/1
30 TABLET, EXTENDED RELEASE ORAL DAILY
Qty: 30 TABLET | Refills: 3 | Status: SHIPPED | OUTPATIENT
Start: 2023-09-06

## 2023-09-06 NOTE — PROGRESS NOTES
antiplatelet therapy in the face of concomitant anticoagulation with present maintenance of his low-dose aspirin and discontinuation of his clopidogrel, I have maintained his existing medical regimen with needs of continued careful monitoring of his anticoagulation status and goals of maintaining from times in range of 2.0-2.5 times INR control to appropriate reduce risk of embolic events. Ongoing aggressive risk factor modification of blood pressure, diabetes and serum lipids will remain essential to reducing risk of future atherosclerotic development. I presently plan his clinical reevaluation in approximately 6 months would happily reevaluate him in the interim should additional cardiovascular difficulties or concerns arise. The patient's current medication list, allergies, problem list and results of all previously ordered testing were reviewed at today's visit. Follow-up office visit in 6 months      Note: This report was completed using computerized voice recognition software. Every effort has been made to ensure accuracy, however; inadvertent computerized transcription errors may be present. Nkechi Thompson.  Pam Padgetter, [address] [facility]    An electronic copy of this follow-up progress note was forwarded to Dr. Patrice Mariee

## 2023-09-11 RX ORDER — RANOLAZINE 500 MG/1
500 TABLET, EXTENDED RELEASE ORAL 2 TIMES DAILY
Qty: 180 TABLET | Refills: 3 | Status: SHIPPED | OUTPATIENT
Start: 2023-09-11

## 2023-10-16 RX ORDER — METOPROLOL TARTRATE 37.5 MG/1
37.5 TABLET, FILM COATED ORAL 2 TIMES DAILY WITH MEALS
Qty: 180 TABLET | Refills: 2 | Status: SHIPPED | OUTPATIENT
Start: 2023-10-16 | End: 2024-01-14

## 2023-10-16 RX ORDER — ASPIRIN 81 MG/1
81 TABLET ORAL DAILY
Qty: 90 TABLET | Refills: 2 | Status: SHIPPED | OUTPATIENT
Start: 2023-10-16

## 2023-11-27 RX ORDER — ISOSORBIDE MONONITRATE 30 MG/1
30 TABLET, EXTENDED RELEASE ORAL DAILY
Qty: 30 TABLET | Refills: 3 | Status: SHIPPED | OUTPATIENT
Start: 2023-11-27

## 2024-02-09 ENCOUNTER — TELEPHONE (OUTPATIENT)
Dept: HEMATOLOGY | Age: 89
End: 2024-02-09

## 2024-02-22 ENCOUNTER — TELEPHONE (OUTPATIENT)
Dept: HEMATOLOGY | Age: 89
End: 2024-02-22

## 2024-02-22 NOTE — TELEPHONE ENCOUNTER
Pt is due for his one year MRI in 03/24. I called and LVM for patient to call with updated insurance info so we can get the MRI authorized

## 2024-03-05 ENCOUNTER — APPOINTMENT (OUTPATIENT)
Dept: GENERAL RADIOLOGY | Age: 89
DRG: 204 | End: 2024-03-05
Payer: MEDICARE

## 2024-03-05 ENCOUNTER — HOSPITAL ENCOUNTER (INPATIENT)
Age: 89
LOS: 2 days | Discharge: HOME HEALTH | DRG: 204 | End: 2024-03-07
Attending: EMERGENCY MEDICINE | Admitting: INTERNAL MEDICINE
Payer: MEDICARE

## 2024-03-05 DIAGNOSIS — R06.09 EXERTIONAL DYSPNEA: Primary | ICD-10-CM

## 2024-03-05 PROBLEM — R06.02 SOB (SHORTNESS OF BREATH): Status: ACTIVE | Noted: 2024-03-05

## 2024-03-05 LAB
ALBUMIN SERPL-MCNC: 3.9 G/DL (ref 3.5–5.2)
ALP SERPL-CCNC: 96 U/L (ref 40–129)
ALT SERPL-CCNC: 9 U/L (ref 0–40)
ANION GAP SERPL CALCULATED.3IONS-SCNC: 10 MMOL/L (ref 7–16)
AST SERPL-CCNC: 25 U/L (ref 0–39)
B PARAP IS1001 DNA NPH QL NAA+NON-PROBE: NOT DETECTED
B PERT DNA SPEC QL NAA+PROBE: NOT DETECTED
BASOPHILS # BLD: 0.03 K/UL (ref 0–0.2)
BASOPHILS NFR BLD: 1 % (ref 0–2)
BILIRUB SERPL-MCNC: 0.4 MG/DL (ref 0–1.2)
BNP SERPL-MCNC: 1225 PG/ML (ref 0–450)
BUN SERPL-MCNC: 31 MG/DL (ref 6–23)
C PNEUM DNA NPH QL NAA+NON-PROBE: NOT DETECTED
CALCIUM SERPL-MCNC: 9.3 MG/DL (ref 8.6–10.2)
CHLORIDE SERPL-SCNC: 105 MMOL/L (ref 98–107)
CO2 SERPL-SCNC: 22 MMOL/L (ref 22–29)
CREAT SERPL-MCNC: 2.1 MG/DL (ref 0.7–1.2)
EOSINOPHIL # BLD: 0.07 K/UL (ref 0.05–0.5)
EOSINOPHILS RELATIVE PERCENT: 2 % (ref 0–6)
ERYTHROCYTE [DISTWIDTH] IN BLOOD BY AUTOMATED COUNT: 16.1 % (ref 11.5–15)
FLUAV RNA NPH QL NAA+NON-PROBE: NOT DETECTED
FLUBV RNA NPH QL NAA+NON-PROBE: NOT DETECTED
GFR SERPL CREATININE-BSD FRML MDRD: 29 ML/MIN/1.73M2
GLUCOSE SERPL-MCNC: 92 MG/DL (ref 74–99)
HADV DNA NPH QL NAA+NON-PROBE: NOT DETECTED
HCOV 229E RNA NPH QL NAA+NON-PROBE: NOT DETECTED
HCOV HKU1 RNA NPH QL NAA+NON-PROBE: NOT DETECTED
HCOV NL63 RNA NPH QL NAA+NON-PROBE: NOT DETECTED
HCOV OC43 RNA NPH QL NAA+NON-PROBE: NOT DETECTED
HCT VFR BLD AUTO: 35.3 % (ref 37–54)
HGB BLD-MCNC: 11 G/DL (ref 12.5–16.5)
HMPV RNA NPH QL NAA+NON-PROBE: NOT DETECTED
HPIV1 RNA NPH QL NAA+NON-PROBE: NOT DETECTED
HPIV2 RNA NPH QL NAA+NON-PROBE: NOT DETECTED
HPIV3 RNA NPH QL NAA+NON-PROBE: NOT DETECTED
HPIV4 RNA NPH QL NAA+NON-PROBE: NOT DETECTED
IMM GRANULOCYTES # BLD AUTO: <0.03 K/UL (ref 0–0.58)
IMM GRANULOCYTES NFR BLD: 0 % (ref 0–5)
INR PPP: 1.7
LYMPHOCYTES NFR BLD: 0.95 K/UL (ref 1.5–4)
LYMPHOCYTES RELATIVE PERCENT: 20 % (ref 20–42)
M PNEUMO DNA NPH QL NAA+NON-PROBE: NOT DETECTED
MCH RBC QN AUTO: 27 PG (ref 26–35)
MCHC RBC AUTO-ENTMCNC: 31.2 G/DL (ref 32–34.5)
MCV RBC AUTO: 86.7 FL (ref 80–99.9)
MONOCYTES NFR BLD: 0.4 K/UL (ref 0.1–0.95)
MONOCYTES NFR BLD: 9 % (ref 2–12)
NEUTROPHILS NFR BLD: 69 % (ref 43–80)
NEUTS SEG NFR BLD: 3.25 K/UL (ref 1.8–7.3)
PARTIAL THROMBOPLASTIN TIME: 38.4 SEC (ref 24.5–35.1)
PLATELET CONFIRMATION: NORMAL
PLATELET, FLUORESCENCE: 86 K/UL (ref 130–450)
PMV BLD AUTO: 11.6 FL (ref 7–12)
POTASSIUM SERPL-SCNC: 4.8 MMOL/L (ref 3.5–5)
PROT SERPL-MCNC: 7.4 G/DL (ref 6.4–8.3)
PROTHROMBIN TIME: 18.6 SEC (ref 9.3–12.4)
RBC # BLD AUTO: 4.07 M/UL (ref 3.8–5.8)
RSV RNA NPH QL NAA+NON-PROBE: NOT DETECTED
RV+EV RNA NPH QL NAA+NON-PROBE: NOT DETECTED
SARS-COV-2 RNA NPH QL NAA+NON-PROBE: NOT DETECTED
SODIUM SERPL-SCNC: 137 MMOL/L (ref 132–146)
SPECIMEN DESCRIPTION: NORMAL
TROPONIN I SERPL HS-MCNC: 29 NG/L (ref 0–11)
WBC OTHER # BLD: 4.7 K/UL (ref 4.5–11.5)

## 2024-03-05 PROCEDURE — 93005 ELECTROCARDIOGRAM TRACING: CPT | Performed by: EMERGENCY MEDICINE

## 2024-03-05 PROCEDURE — 85730 THROMBOPLASTIN TIME PARTIAL: CPT

## 2024-03-05 PROCEDURE — 85025 COMPLETE CBC W/AUTO DIFF WBC: CPT

## 2024-03-05 PROCEDURE — 83880 ASSAY OF NATRIURETIC PEPTIDE: CPT

## 2024-03-05 PROCEDURE — 80053 COMPREHEN METABOLIC PANEL: CPT

## 2024-03-05 PROCEDURE — 84484 ASSAY OF TROPONIN QUANT: CPT

## 2024-03-05 PROCEDURE — 99285 EMERGENCY DEPT VISIT HI MDM: CPT

## 2024-03-05 PROCEDURE — 85610 PROTHROMBIN TIME: CPT

## 2024-03-05 PROCEDURE — 99222 1ST HOSP IP/OBS MODERATE 55: CPT | Performed by: INTERNAL MEDICINE

## 2024-03-05 PROCEDURE — 71045 X-RAY EXAM CHEST 1 VIEW: CPT

## 2024-03-05 PROCEDURE — G0378 HOSPITAL OBSERVATION PER HR: HCPCS

## 2024-03-05 PROCEDURE — 0202U NFCT DS 22 TRGT SARS-COV-2: CPT

## 2024-03-05 PROCEDURE — 1200000000 HC SEMI PRIVATE

## 2024-03-05 ASSESSMENT — LIFESTYLE VARIABLES
HOW OFTEN DO YOU HAVE A DRINK CONTAINING ALCOHOL: NEVER
HOW MANY STANDARD DRINKS CONTAINING ALCOHOL DO YOU HAVE ON A TYPICAL DAY: PATIENT DOES NOT DRINK

## 2024-03-05 NOTE — ED PROVIDER NOTES
Department of Emergency Medicine   ED  Provider Note  Admit Date/RoomTime: 3/5/2024  4:48 PM  ED Room: [place] [address]          History of Present Illness:  3/5/24, Time: 6:56 PM EST  Chief Complaint   Patient presents with    Shortness of Breath     Patient states when he is laying down he is fine but when he stands up he gets SOB for 3 weeks. States intermittent chest pain started at that time as well.                 Johny Nichols is a 88 y.o. male presenting to the ED for shortness of breath.  Patient's been short of breath on exertion for the past 3 weeks.  Is gotten worse over the past couple of days.  States is okay when he is lying down or sitting, however, when he walks, he cannot go without getting chest pain with it.  Has not had this in the past.  No fevers or chills.  No cough or sputum.  No change in bowel or bladder otherwise.  No other symptoms or complaints.    Review of Systems:   Pertinent positives and negatives are stated within HPI, all other systems reviewed and are negative.        --------------------------------------------- PAST HISTORY ---------------------------------------------  Past Medical History:  has a past medical history of A-fib (HCC), Chronic kidney disease, Chronic renal impairment, stage 3 (moderate) (HCC), Colitis, ulcerative chronic (HCC), Diabetes mellitus (HCC), DM (diabetes mellitus) (HCC), Encounter for therapeutic drug monitoring, Hyperlipidemia, Hyperlipidemia associated with type 2 diabetes mellitus (HCC), Hypertension, Long term (current) use of anticoagulants, Prostate enlargement, Stroke (cerebrum) (HCC), and Unspecified cerebral artery occlusion with cerebral infarction.    Past Surgical History:  has a past surgical history that includes ECHO Compl W Dop Color Flow (11/26/2013); ECHO Transesophageal (11/27/2013); colostomy (1978); Cystoscopy (more than 5 yrs); TURP (6/16/14); Colonoscopy; Abdomen surgery; Endoscopy, colon, diagnostic (12/10/15); and Upper

## 2024-03-06 LAB
ALBUMIN SERPL-MCNC: 4.1 G/DL (ref 3.5–5.2)
ALP SERPL-CCNC: 107 U/L (ref 40–129)
ALT SERPL-CCNC: 9 U/L (ref 0–40)
ANION GAP SERPL CALCULATED.3IONS-SCNC: 15 MMOL/L (ref 7–16)
AST SERPL-CCNC: 29 U/L (ref 0–39)
BASOPHILS # BLD: 0.05 K/UL (ref 0–0.2)
BASOPHILS NFR BLD: 1 % (ref 0–2)
BILIRUB SERPL-MCNC: 0.6 MG/DL (ref 0–1.2)
BUN SERPL-MCNC: 29 MG/DL (ref 6–23)
CALCIUM SERPL-MCNC: 9.6 MG/DL (ref 8.6–10.2)
CHLORIDE SERPL-SCNC: 105 MMOL/L (ref 98–107)
CHOLEST SERPL-MCNC: 222 MG/DL
CO2 SERPL-SCNC: 19 MMOL/L (ref 22–29)
CREAT SERPL-MCNC: 2 MG/DL (ref 0.7–1.2)
EKG ATRIAL RATE: 65 BPM
EKG ATRIAL RATE: 84 BPM
EKG Q-T INTERVAL: 360 MS
EKG Q-T INTERVAL: 370 MS
EKG QRS DURATION: 68 MS
EKG QRS DURATION: 68 MS
EKG QTC CALCULATION (BAZETT): 381 MS
EKG QTC CALCULATION (BAZETT): 435 MS
EKG R AXIS: 16 DEGREES
EKG R AXIS: 77 DEGREES
EKG T AXIS: 41 DEGREES
EKG T AXIS: 79 DEGREES
EKG VENTRICULAR RATE: 64 BPM
EKG VENTRICULAR RATE: 88 BPM
EOSINOPHIL # BLD: 0.16 K/UL (ref 0.05–0.5)
EOSINOPHILS RELATIVE PERCENT: 2 % (ref 0–6)
ERYTHROCYTE [DISTWIDTH] IN BLOOD BY AUTOMATED COUNT: 15.9 % (ref 11.5–15)
GFR SERPL CREATININE-BSD FRML MDRD: 32 ML/MIN/1.73M2
GLUCOSE BLD-MCNC: 107 MG/DL (ref 74–99)
GLUCOSE BLD-MCNC: 158 MG/DL (ref 74–99)
GLUCOSE BLD-MCNC: 95 MG/DL (ref 74–99)
GLUCOSE BLD-MCNC: 96 MG/DL (ref 74–99)
GLUCOSE SERPL-MCNC: 99 MG/DL (ref 74–99)
HBA1C MFR BLD: 7 % (ref 4–5.6)
HCT VFR BLD AUTO: 36.5 % (ref 37–54)
HDLC SERPL-MCNC: 70 MG/DL
HGB BLD-MCNC: 11.5 G/DL (ref 12.5–16.5)
IMM GRANULOCYTES # BLD AUTO: <0.03 K/UL (ref 0–0.58)
IMM GRANULOCYTES NFR BLD: 0 % (ref 0–5)
LDLC SERPL CALC-MCNC: 128 MG/DL
LYMPHOCYTES NFR BLD: 2.01 K/UL (ref 1.5–4)
LYMPHOCYTES RELATIVE PERCENT: 28 % (ref 20–42)
MCH RBC QN AUTO: 27.1 PG (ref 26–35)
MCHC RBC AUTO-ENTMCNC: 31.5 G/DL (ref 32–34.5)
MCV RBC AUTO: 86.1 FL (ref 80–99.9)
MONOCYTES NFR BLD: 0.55 K/UL (ref 0.1–0.95)
MONOCYTES NFR BLD: 8 % (ref 2–12)
NEUTROPHILS NFR BLD: 62 % (ref 43–80)
NEUTS SEG NFR BLD: 4.47 K/UL (ref 1.8–7.3)
PLATELET # BLD AUTO: 105 K/UL (ref 130–450)
PMV BLD AUTO: 12.1 FL (ref 7–12)
POTASSIUM SERPL-SCNC: 4.7 MMOL/L (ref 3.5–5)
PROT SERPL-MCNC: 7.8 G/DL (ref 6.4–8.3)
PROTHROMBIN TIME: 19.2 SEC (ref 9.3–12.4)
RBC # BLD AUTO: 4.24 M/UL (ref 3.8–5.8)
SODIUM SERPL-SCNC: 139 MMOL/L (ref 132–146)
TRIGL SERPL-MCNC: 122 MG/DL
TROPONIN I SERPL HS-MCNC: 30 NG/L (ref 0–11)
VLDLC SERPL CALC-MCNC: 24 MG/DL
WBC OTHER # BLD: 7.3 K/UL (ref 4.5–11.5)

## 2024-03-06 PROCEDURE — 85025 COMPLETE CBC W/AUTO DIFF WBC: CPT

## 2024-03-06 PROCEDURE — 36415 COLL VENOUS BLD VENIPUNCTURE: CPT

## 2024-03-06 PROCEDURE — 84484 ASSAY OF TROPONIN QUANT: CPT

## 2024-03-06 PROCEDURE — 83036 HEMOGLOBIN GLYCOSYLATED A1C: CPT

## 2024-03-06 PROCEDURE — 82962 GLUCOSE BLOOD TEST: CPT

## 2024-03-06 PROCEDURE — APPSS60 APP SPLIT SHARED TIME 46-60 MINUTES: Performed by: CLINICAL NURSE SPECIALIST

## 2024-03-06 PROCEDURE — 2580000003 HC RX 258: Performed by: INTERNAL MEDICINE

## 2024-03-06 PROCEDURE — 93010 ELECTROCARDIOGRAM REPORT: CPT | Performed by: INTERNAL MEDICINE

## 2024-03-06 PROCEDURE — 6370000000 HC RX 637 (ALT 250 FOR IP): Performed by: INTERNAL MEDICINE

## 2024-03-06 PROCEDURE — 6360000002 HC RX W HCPCS: Performed by: INTERNAL MEDICINE

## 2024-03-06 PROCEDURE — 80061 LIPID PANEL: CPT

## 2024-03-06 PROCEDURE — 6370000000 HC RX 637 (ALT 250 FOR IP): Performed by: CLINICAL NURSE SPECIALIST

## 2024-03-06 PROCEDURE — 93005 ELECTROCARDIOGRAM TRACING: CPT | Performed by: INTERNAL MEDICINE

## 2024-03-06 PROCEDURE — 80053 COMPREHEN METABOLIC PANEL: CPT

## 2024-03-06 PROCEDURE — 1200000000 HC SEMI PRIVATE

## 2024-03-06 PROCEDURE — 85610 PROTHROMBIN TIME: CPT

## 2024-03-06 RX ORDER — SODIUM CHLORIDE 0.9 % (FLUSH) 0.9 %
5-40 SYRINGE (ML) INJECTION PRN
Status: DISCONTINUED | OUTPATIENT
Start: 2024-03-06 | End: 2024-03-07 | Stop reason: HOSPADM

## 2024-03-06 RX ORDER — ASPIRIN 81 MG/1
81 TABLET ORAL DAILY
Status: DISCONTINUED | OUTPATIENT
Start: 2024-03-06 | End: 2024-03-07 | Stop reason: HOSPADM

## 2024-03-06 RX ORDER — SODIUM CHLORIDE 9 MG/ML
INJECTION, SOLUTION INTRAVENOUS PRN
Status: DISCONTINUED | OUTPATIENT
Start: 2024-03-06 | End: 2024-03-07 | Stop reason: HOSPADM

## 2024-03-06 RX ORDER — INSULIN LISPRO 100 [IU]/ML
0-4 INJECTION, SOLUTION INTRAVENOUS; SUBCUTANEOUS
Status: DISCONTINUED | OUTPATIENT
Start: 2024-03-06 | End: 2024-03-07 | Stop reason: HOSPADM

## 2024-03-06 RX ORDER — MAGNESIUM HYDROXIDE/ALUMINUM HYDROXICE/SIMETHICONE 120; 1200; 1200 MG/30ML; MG/30ML; MG/30ML
30 SUSPENSION ORAL EVERY 6 HOURS PRN
Status: DISCONTINUED | OUTPATIENT
Start: 2024-03-06 | End: 2024-03-07 | Stop reason: HOSPADM

## 2024-03-06 RX ORDER — ATORVASTATIN CALCIUM 20 MG/1
20 TABLET, FILM COATED ORAL NIGHTLY
Status: DISCONTINUED | OUTPATIENT
Start: 2024-03-06 | End: 2024-03-07 | Stop reason: HOSPADM

## 2024-03-06 RX ORDER — ONDANSETRON 4 MG/1
4 TABLET, ORALLY DISINTEGRATING ORAL EVERY 8 HOURS PRN
Status: DISCONTINUED | OUTPATIENT
Start: 2024-03-06 | End: 2024-03-07 | Stop reason: HOSPADM

## 2024-03-06 RX ORDER — ACETAMINOPHEN 650 MG/1
650 SUPPOSITORY RECTAL EVERY 6 HOURS PRN
Status: DISCONTINUED | OUTPATIENT
Start: 2024-03-06 | End: 2024-03-07 | Stop reason: HOSPADM

## 2024-03-06 RX ORDER — HEPARIN SODIUM 10000 [USP'U]/ML
5000 INJECTION, SOLUTION INTRAVENOUS; SUBCUTANEOUS EVERY 8 HOURS
Status: DISCONTINUED | OUTPATIENT
Start: 2024-03-06 | End: 2024-03-06 | Stop reason: ALTCHOICE

## 2024-03-06 RX ORDER — ONDANSETRON 2 MG/ML
4 INJECTION INTRAMUSCULAR; INTRAVENOUS EVERY 6 HOURS PRN
Status: DISCONTINUED | OUTPATIENT
Start: 2024-03-06 | End: 2024-03-07 | Stop reason: HOSPADM

## 2024-03-06 RX ORDER — DEXTROSE MONOHYDRATE 100 MG/ML
INJECTION, SOLUTION INTRAVENOUS CONTINUOUS PRN
Status: DISCONTINUED | OUTPATIENT
Start: 2024-03-06 | End: 2024-03-07 | Stop reason: HOSPADM

## 2024-03-06 RX ORDER — GLUCAGON 1 MG/ML
1 KIT INJECTION PRN
Status: DISCONTINUED | OUTPATIENT
Start: 2024-03-06 | End: 2024-03-07 | Stop reason: HOSPADM

## 2024-03-06 RX ORDER — WARFARIN SODIUM 2 MG/1
2 TABLET ORAL
Status: COMPLETED | OUTPATIENT
Start: 2024-03-06 | End: 2024-03-06

## 2024-03-06 RX ORDER — RANOLAZINE 500 MG/1
500 TABLET, EXTENDED RELEASE ORAL 2 TIMES DAILY
Status: DISCONTINUED | OUTPATIENT
Start: 2024-03-06 | End: 2024-03-07 | Stop reason: HOSPADM

## 2024-03-06 RX ORDER — INSULIN LISPRO 100 [IU]/ML
0-4 INJECTION, SOLUTION INTRAVENOUS; SUBCUTANEOUS NIGHTLY
Status: DISCONTINUED | OUTPATIENT
Start: 2024-03-06 | End: 2024-03-07 | Stop reason: HOSPADM

## 2024-03-06 RX ORDER — IPRATROPIUM BROMIDE AND ALBUTEROL SULFATE 2.5; .5 MG/3ML; MG/3ML
1 SOLUTION RESPIRATORY (INHALATION) EVERY 4 HOURS PRN
Status: DISCONTINUED | OUTPATIENT
Start: 2024-03-06 | End: 2024-03-07 | Stop reason: HOSPADM

## 2024-03-06 RX ORDER — ACETAMINOPHEN 325 MG/1
650 TABLET ORAL EVERY 6 HOURS PRN
Status: DISCONTINUED | OUTPATIENT
Start: 2024-03-06 | End: 2024-03-07 | Stop reason: HOSPADM

## 2024-03-06 RX ORDER — SODIUM CHLORIDE 0.9 % (FLUSH) 0.9 %
5-40 SYRINGE (ML) INJECTION EVERY 12 HOURS SCHEDULED
Status: DISCONTINUED | OUTPATIENT
Start: 2024-03-06 | End: 2024-03-07 | Stop reason: HOSPADM

## 2024-03-06 RX ORDER — ISOSORBIDE MONONITRATE 30 MG/1
30 TABLET, EXTENDED RELEASE ORAL DAILY
Status: DISCONTINUED | OUTPATIENT
Start: 2024-03-06 | End: 2024-03-07 | Stop reason: HOSPADM

## 2024-03-06 RX ORDER — SENNOSIDES A AND B 8.6 MG/1
1 TABLET, FILM COATED ORAL DAILY PRN
Status: DISCONTINUED | OUTPATIENT
Start: 2024-03-06 | End: 2024-03-07 | Stop reason: HOSPADM

## 2024-03-06 RX ADMIN — METOPROLOL TARTRATE 37.5 MG: 25 TABLET, FILM COATED ORAL at 11:06

## 2024-03-06 RX ADMIN — WARFARIN SODIUM 2 MG: 2 TABLET ORAL at 18:13

## 2024-03-06 RX ADMIN — RANOLAZINE 500 MG: 500 TABLET, FILM COATED, EXTENDED RELEASE ORAL at 21:52

## 2024-03-06 RX ADMIN — SODIUM CHLORIDE, PRESERVATIVE FREE 10 ML: 5 INJECTION INTRAVENOUS at 21:52

## 2024-03-06 RX ADMIN — HEPARIN SODIUM 5000 UNITS: 10000 INJECTION INTRAVENOUS; SUBCUTANEOUS at 10:54

## 2024-03-06 RX ADMIN — RANOLAZINE 500 MG: 500 TABLET, FILM COATED, EXTENDED RELEASE ORAL at 10:58

## 2024-03-06 RX ADMIN — HEPARIN SODIUM 5000 UNITS: 10000 INJECTION INTRAVENOUS; SUBCUTANEOUS at 04:56

## 2024-03-06 RX ADMIN — ISOSORBIDE MONONITRATE 30 MG: 30 TABLET, EXTENDED RELEASE ORAL at 10:59

## 2024-03-06 RX ADMIN — SODIUM CHLORIDE, PRESERVATIVE FREE 10 ML: 5 INJECTION INTRAVENOUS at 10:59

## 2024-03-06 RX ADMIN — ATORVASTATIN CALCIUM 20 MG: 20 TABLET, FILM COATED ORAL at 21:52

## 2024-03-06 RX ADMIN — RANOLAZINE 500 MG: 500 TABLET, FILM COATED, EXTENDED RELEASE ORAL at 06:37

## 2024-03-06 RX ADMIN — METOPROLOL TARTRATE 37.5 MG: 25 TABLET, FILM COATED ORAL at 18:13

## 2024-03-06 RX ADMIN — ASPIRIN 81 MG: 81 TABLET, COATED ORAL at 10:58

## 2024-03-06 NOTE — H&P
[facility] Hospitalist Group History and Physical    PATIENT NAME:  Johny Nichols    MRN:  [id number]  SERVICE DATE:  03/05/24    Primary Care Physician: Nicholas Bela MD       SUBJECTIVE  CHIEF COMPLAINT:  had concerns including Shortness of Breath (Patient states when he is laying down he is fine but when he stands up he gets SOB for 3 weeks. States intermittent chest pain started at that time as well. ).    HPI:  Mr. Johny Nichols, a 88 y.o. year old male  who  has a past medical history of A-fib (HCC), Chronic kidney disease, Chronic renal impairment, stage 3 (moderate) (HCC), Colitis, ulcerative chronic (HCC), Diabetes mellitus (HCC), DM (diabetes mellitus) (HCC), Encounter for therapeutic drug monitoring, Hyperlipidemia, Hyperlipidemia associated with type 2 diabetes mellitus (HCC), Hypertension, Long term (current) use of anticoagulants, Prostate enlargement, Stroke (cerebrum) (HCC), and Unspecified cerebral artery occlusion with cerebral infarction. presents with Shortness of Breath (Patient states when he is laying down he is fine but when he stands up he gets SOB for 3 weeks. States intermittent chest pain started at that time as well. )  , exertional chest pain and SOB for couple weeks, no nausea or vomiting no fever or chills. No limb weakness or tingling.     PAST MEDICAL HISTORY:    Past Medical History:   Diagnosis Date    A-fib (HCC)     Chronic kidney disease     Chronic renal impairment, stage 3 (moderate) (HCC) 10/10/2016    Colitis, ulcerative chronic (HCC)     Diabetes mellitus (HCC)     DM (diabetes mellitus) (HCC) 11/25/2013    Encounter for therapeutic drug monitoring 4/5/2016    Hyperlipidemia     Hyperlipidemia associated with type 2 diabetes mellitus (HCC) 1/17/2016    Hypertension     Long term (current) use of anticoagulants 4/5/2016    Prostate enlargement     Stroke (cerebrum) ([facility]) 2013    Unspecified cerebral artery occlusion with cerebral infarction 1973     PAST SURGICAL HISTORY:

## 2024-03-06 NOTE — CONSULTS
Inpatient Cardiology Consultation      Reason for Consult:  CP / ACEVEDO    Consulting Physician: Dr. Albarado     Requesting Physician:  Dr. Pleitez     Date of Consultation: 3/6/2024    HISTORY OF PRESENT ILLNESS:   Johny Nichols  is a 88 y.o. black  male, known to [facility] - followed by Dr. Arechiga - seen last in office on 9/6/2023 - Plavix stopped during this visit due to on Aspirin & Coumadin     PMH: see below    [facility] ED 3/5/2024 1633 via walk in for 3 weeks of weakness, SOB, CP   Arrival vitals: T97.1 RR 18 HR 71 BP 97//106 SpO2 100% on room air  Significant Labs: Troponin 29 -30.  proBNP 1225 BUN 31 CR 2.1 K4.8 LFT WNL HG A1c 7.0 WBC 7.3 Hgb 11.5  INR 1.7  Full respiratory panel negative  RAD: Portable chest x-ray no acute process  ED treatment: Ranexa 500 mg,  Echocardiogram ordered by attending  Coumadin dosing ordered by pharmacy  Patient seen and examined.  Recent vitals: T97.3 RR 18 HR 73 /94 SpO2 100% on room air weight 150 pounds BMI 22.2 weight was stated not measured  Baseline functional capacity: Ambulates with a cane.  Tells me he has slowed down lately.  Patient lives in a ranch home with a basement.  He rarely goes up and down his steps.  He rarely drives.  His wife and he are legally  however are still friends and she helps him at home.    For the past month, patient has had intermittent left back/left shoulder discomfort that radiates into the top left part of his chest.  This occurs when standing and is slightly worse when walking.  He rates the pain as 3 out of 10 on the pain scale.  Associated symptoms are mild shortness of breath when standing.  Rare cough.  Intermittent palpitations.  Denies diaphoresis, nausea vomiting diarrhea indigestion, early satiety, orthopnea, PND, lower extremity swelling, dizziness, recent cold flu or infection, dark or bloody stools from his ileostomy.  He eats 2-3 times a day.  He drinks a Gatorade and 2 ennis a day.  He has

## 2024-03-06 NOTE — ACP (ADVANCE CARE PLANNING)
Advance Care Planning   Healthcare Decision Maker:    Primary Decision Maker: Tia Nichols - [facility] - [phone]  .  Today we documented Decision Maker(s) consistent with Legal Next of Kin hierarchy.

## 2024-03-06 NOTE — PLAN OF CARE
Problem: Chronic Conditions and Co-morbidities  Goal: Patient's chronic conditions and co-morbidity symptoms are monitored and maintained or improved  Flowsheets (Taken 3/6/2024 0803)  Care Plan - Patient's Chronic Conditions and Co-Morbidity Symptoms are Monitored and Maintained or Improved:   Monitor and assess patient's chronic conditions and comorbid symptoms for stability, deterioration, or improvement   Collaborate with multidisciplinary team to address chronic and comorbid conditions and prevent exacerbation or deterioration   Update acute care plan with appropriate goals if chronic or comorbid symptoms are exacerbated and prevent overall improvement and discharge

## 2024-03-06 NOTE — CARE COORDINATION
Patient presented to Ed with c/o SOB. Cardiology consulted Echo ordered. Met with patient at bedside to discuss transition of care. Pt's PCP is Dr. Beal & uses [facility]. Pt lives alone with in a ranch with no steps to enter. Patient is  but they live in separate residences. PTA pt was independent with a cane. Pt has a life alert button, glucometer.Patient gives his own insulin at home. His wife assists with bathing, cleaning, and shopping. He did have meals on Wheels but the charline was finished. I suggested [facility] and he and his spouse in agreement for a referral to be made. A referral was called to [facility] per this CM. Also  Pt has no H/O HHC/SNF. Patient's wife interested in HHC and has no preference but  wants to make sure it is covered by insurance. A tentative referral given to Ema from [facility] for her to check insurance coverage. Also PT/OT ordered to assist in finalizing discharge plan.Pt's dc plan is to return home alone and  his wife to transport. Sw/CM will continue to follow for dc planning.    Neuro

## 2024-03-06 NOTE — CONSULTS
Inpatient Cardiology Consultation      Reason for Consult:  CP / ACEVEDO    Consulting Physician: Dr. Albarado     Requesting Physician:  Dr. Pleitez     Date of Consultation: 3/6/2024    HISTORY OF PRESENT ILLNESS:   Johny Nichols  is a 88 y.o. black  male, known to [facility] - followed by Dr. Arechiga - seen last in office on 9/6/2023 - Plavix stopped during this visit due to on Aspirin & Coumadin     PMH: see below    [facility] ED 3/5/2024 1633 via walk in for 3 weeks of weakness, SOB, CP   Arrival vitals: T97.1 RR 18 HR 71 BP 97//106 SpO2 100% on room air  Significant Labs: Troponin 29 -30.  proBNP 1225 BUN 31 CR 2.1 K4.8 LFT WNL HG A1c 7.0 WBC 7.3 Hgb 11.5  INR 1.7  Full respiratory panel negative  RAD: Portable chest x-ray no acute process  ED treatment: Ranexa 500 mg,  Echocardiogram ordered by attending  Coumadin dosing ordered by pharmacy  Patient seen and examined.  Recent vitals: T97.3 RR 18 HR 73 /94 SpO2 100% on room air weight 150 pounds BMI 22.2 weight was stated not measured  Baseline functional capacity: Ambulates with a cane.  Tells me he has slowed down lately.  Patient lives in a ranch home with a basement.  He rarely goes up and down his steps.  He rarely drives.  His wife and he are legally  however are still friends and she helps him at home.    For the past month, patient has had intermittent left back/left shoulder discomfort that radiates into the top left part of his chest.  This occurs when standing and is slightly worse when walking.  He rates the pain as 3 out of 10 on the pain scale.  Associated symptoms are mild shortness of breath when standing.  Rare cough.  Intermittent palpitations.  Denies diaphoresis, nausea vomiting diarrhea indigestion, early satiety, orthopnea, PND, lower extremity swelling, dizziness, recent cold flu or infection, dark or bloody stools from his ileostomy.  He eats 2-3 times a day.  He drinks a Gatorade and 2 ennis a day.  He has

## 2024-03-07 VITALS
BODY MASS INDEX: 21.98 KG/M2 | SYSTOLIC BLOOD PRESSURE: 108 MMHG | TEMPERATURE: 96.4 F | RESPIRATION RATE: 18 BRPM | HEART RATE: 104 BPM | HEIGHT: 69 IN | DIASTOLIC BLOOD PRESSURE: 71 MMHG | WEIGHT: 148.37 LBS | OXYGEN SATURATION: 100 %

## 2024-03-07 LAB
BASOPHILS # BLD: 0.03 K/UL (ref 0–0.2)
BASOPHILS NFR BLD: 1 % (ref 0–2)
BUN SERPL-MCNC: 32 MG/DL (ref 6–23)
CALCIUM SERPL-MCNC: 9.7 MG/DL (ref 8.6–10.2)
CHLORIDE SERPL-SCNC: 104 MMOL/L (ref 98–107)
CO2 SERPL-SCNC: 21 MMOL/L (ref 22–29)
CREAT SERPL-MCNC: 1.9 MG/DL (ref 0.7–1.2)
EKG ATRIAL RATE: 122 BPM
EKG Q-T INTERVAL: 368 MS
EKG QRS DURATION: 70 MS
EKG QTC CALCULATION (BAZETT): 416 MS
EKG R AXIS: 28 DEGREES
EKG T AXIS: 48 DEGREES
EKG VENTRICULAR RATE: 77 BPM
EOSINOPHIL # BLD: 0.07 K/UL (ref 0.05–0.5)
EOSINOPHILS RELATIVE PERCENT: 2 % (ref 0–6)
ERYTHROCYTE [DISTWIDTH] IN BLOOD BY AUTOMATED COUNT: 15.9 % (ref 11.5–15)
GLUCOSE BLD-MCNC: 140 MG/DL (ref 74–99)
GLUCOSE SERPL-MCNC: 153 MG/DL (ref 74–99)
HCT VFR BLD AUTO: 40.3 % (ref 37–54)
IMM GRANULOCYTES # BLD AUTO: <0.03 K/UL (ref 0–0.58)
INR PPP: 1.6
LYMPHOCYTES NFR BLD: 1.09 K/UL (ref 1.5–4)
MCH RBC QN AUTO: 27.2 PG (ref 26–35)
MCHC RBC AUTO-ENTMCNC: 31.8 G/DL (ref 32–34.5)
MCV RBC AUTO: 85.7 FL (ref 80–99.9)
MONOCYTES NFR BLD: 0.37 K/UL (ref 0.1–0.95)
MONOCYTES NFR BLD: 8 % (ref 2–12)
NEUTROPHILS NFR BLD: 66 % (ref 43–80)
NEUTS SEG NFR BLD: 3.06 K/UL (ref 1.8–7.3)
PLATELET CONFIRMATION: NORMAL
PMV BLD AUTO: 11.7 FL (ref 7–12)
POTASSIUM SERPL-SCNC: 4.1 MMOL/L (ref 3.5–5)
PROTHROMBIN TIME: 17.9 SEC (ref 9.3–12.4)
RBC # BLD AUTO: 4.7 M/UL (ref 3.8–5.8)
SODIUM SERPL-SCNC: 139 MMOL/L (ref 132–146)
WBC OTHER # BLD: 4.6 K/UL (ref 4.5–11.5)

## 2024-03-07 PROCEDURE — 6370000000 HC RX 637 (ALT 250 FOR IP): Performed by: INTERNAL MEDICINE

## 2024-03-07 PROCEDURE — 6360000002 HC RX W HCPCS: Performed by: INTERNAL MEDICINE

## 2024-03-07 PROCEDURE — 36415 COLL VENOUS BLD VENIPUNCTURE: CPT

## 2024-03-07 PROCEDURE — 97165 OT EVAL LOW COMPLEX 30 MIN: CPT

## 2024-03-07 PROCEDURE — 85610 PROTHROMBIN TIME: CPT

## 2024-03-07 PROCEDURE — 97161 PT EVAL LOW COMPLEX 20 MIN: CPT

## 2024-03-07 PROCEDURE — 82962 GLUCOSE BLOOD TEST: CPT

## 2024-03-07 PROCEDURE — 85025 COMPLETE CBC W/AUTO DIFF WBC: CPT

## 2024-03-07 PROCEDURE — 80048 BASIC METABOLIC PNL TOTAL CA: CPT

## 2024-03-07 PROCEDURE — 97535 SELF CARE MNGMENT TRAINING: CPT

## 2024-03-07 PROCEDURE — 97530 THERAPEUTIC ACTIVITIES: CPT

## 2024-03-07 PROCEDURE — 6360000002 HC RX W HCPCS

## 2024-03-07 RX ORDER — LOPERAMIDE HYDROCHLORIDE 2 MG/1
2 CAPSULE ORAL 4 TIMES DAILY PRN
Status: DISCONTINUED | OUTPATIENT
Start: 2024-03-07 | End: 2024-03-07 | Stop reason: HOSPADM

## 2024-03-07 RX ORDER — WARFARIN SODIUM 4 MG/1
4 TABLET ORAL
Status: DISCONTINUED | OUTPATIENT
Start: 2024-03-07 | End: 2024-03-07 | Stop reason: HOSPADM

## 2024-03-07 RX ORDER — LOPERAMIDE HYDROCHLORIDE 2 MG/1
2 CAPSULE ORAL 4 TIMES DAILY PRN
Qty: 40 CAPSULE | Refills: 0 | Status: SHIPPED | OUTPATIENT
Start: 2024-03-07 | End: 2024-03-17

## 2024-03-07 RX ORDER — HYDRALAZINE HYDROCHLORIDE 20 MG/ML
10 INJECTION INTRAMUSCULAR; INTRAVENOUS EVERY 6 HOURS PRN
Status: DISCONTINUED | OUTPATIENT
Start: 2024-03-07 | End: 2024-03-07 | Stop reason: HOSPADM

## 2024-03-07 RX ORDER — ATORVASTATIN CALCIUM 20 MG/1
20 TABLET, FILM COATED ORAL NIGHTLY
Qty: 30 TABLET | Refills: 3 | Status: SHIPPED | OUTPATIENT
Start: 2024-03-07

## 2024-03-07 RX ADMIN — ISOSORBIDE MONONITRATE 30 MG: 30 TABLET, EXTENDED RELEASE ORAL at 09:13

## 2024-03-07 RX ADMIN — RANOLAZINE 500 MG: 500 TABLET, FILM COATED, EXTENDED RELEASE ORAL at 09:13

## 2024-03-07 RX ADMIN — HYDRALAZINE HYDROCHLORIDE 10 MG: 20 INJECTION, SOLUTION INTRAMUSCULAR; INTRAVENOUS at 05:18

## 2024-03-07 RX ADMIN — METOPROLOL TARTRATE 37.5 MG: 25 TABLET, FILM COATED ORAL at 09:13

## 2024-03-07 RX ADMIN — ASPIRIN 81 MG: 81 TABLET, COATED ORAL at 09:13

## 2024-03-07 RX ADMIN — ONDANSETRON 4 MG: 2 INJECTION INTRAMUSCULAR; INTRAVENOUS at 05:27

## 2024-03-07 RX ADMIN — LOPERAMIDE HYDROCHLORIDE 2 MG: 2 CAPSULE ORAL at 09:43

## 2024-03-07 NOTE — PROGRESS NOTES
[facility] Hospitalist Progress Note    Admitting Date and Time: 3/5/2024  4:48 PM  Admit Dx: SOB (shortness of breath) [R06.02]  Exertional dyspnea [R06.09]    Synopsis:    Johny Nichols is a 88 y.o. male with PMHx of A-fib (on Coumadin), CKD (baseline creatinine 2.0-2.2), T2DM, HTN, HLD, chronic colostomy bag presenting to the ED for shortness of breath.  Patient's been short of breath on exertion for the past 3 weeks.  Is gotten worse over the past couple of days.  States is okay when he is lying down or sitting, however, when he walks, he cannot go without getting chest pain with it.  Has not had this in the past.  No fevers or chills.  No cough or sputum.  No change in bowel or bladder otherwise.  No other symptoms or complaints.      At the ED, patient was slightly hypertensive.  Otherwise other vitals normal and on room air.  Lab work showed creatinine at baseline, elevated proBNP, troponin 29> 30, no leukocytosis, INR 1.7-1.8, CXR normal, EKG unremarkable.     Hospitalist was consulted for admission.    Subjective:  Patient is being followed for SOB (shortness of breath) [R06.02]  Exertional dyspnea [R06.09]     Patient seen and examined at bedside this morning.     ROS: denies fever, chills, cp, sob, n/v, HA unless stated above.     aspirin  81 mg Oral Daily    isosorbide mononitrate  30 mg Oral Daily    metoprolol tartrate  37.5 mg Oral BID WC    ranolazine  500 mg Oral BID    sodium chloride flush  5-40 mL IntraVENous 2 times per day    insulin lispro  0-4 Units SubCUTAneous TID WC    insulin lispro  0-4 Units SubCUTAneous Nightly    atorvastatin  20 mg Oral Nightly    warfarin placeholder: dosing by pharmacy   Other RX Placeholder     hydrALAZINE, 10 mg, Q6H PRN  loperamide, 2 mg, 4x Daily PRN  glucose, 4 tablet, PRN  dextrose bolus, 125 mL, PRN   Or  dextrose bolus, 250 mL, PRN  glucagon (rDNA), 1 mg, PRN  dextrose, , Continuous PRN  sodium chloride flush, 5-40 mL, PRN  sodium chloride, , 
  OCCUPATIONAL THERAPY INITIAL EVALUATION    [facility]  [address] [place], [place]        Date:3/7/2024                                                  Patient Name: Johny Nichols    MRN: [id number]    : 1935    Room: [address] [place] [address]          Evaluating OT: Greymarianna Love OTR/L; [id number]       Referring Provider: Merritt Anthony MD     Specific Provider Orders/Date: OT Eval and Treat 24       Diagnosis: Exertional dyspnea      Surgery: None this admission     Pertinent Medical History:  has a past medical history of A-fib (HCC), Chronic kidney disease, Chronic renal impairment, stage 3 (moderate) (HCC), Colitis, ulcerative chronic (HCC), Diabetes mellitus (HCC), DM (diabetes mellitus) (HCC), Encounter for therapeutic drug monitoring, Hyperlipidemia, Hyperlipidemia associated with type 2 diabetes mellitus (HCC), Hypertension, Long term (current) use of anticoagulants, Prostate enlargement, Stroke (cerebrum) ([facility]), and Unspecified cerebral artery occlusion with cerebral infarction.     Recommended Adaptive Equipment: extended tub bench, AE for LE bathing and dressing PRN     Precautions:  Fall Risk, colostomy     Assessment of current deficits    [x] Functional mobility  [x]ADLs  [x] Strength               [x]Cognition    [x] Functional transfers   [x] IADLs         [x] Safety Awareness   [x]Endurance    [] Fine Coordination              [x] Balance      [] Vision/perception   []Sensation     []Gross Motor Coordination  [] ROM  [] Delirium                   [] Motor Control     OT PLAN OF CARE   OT POC based on physician orders, patient diagnosis and results of clinical assessment    Frequency/Duration 1-3 days/wk for 2 weeks PRN   Specific OT Treatment Interventions to include:   * Instruction/training on adapted ADL techniques and AE recommendations to increase functional independence within precautions       * Training on energy 
CLINICAL PHARMACY NOTE: MEDS TO BEDS    Total # of Prescriptions Filled: 2   The following medications were delivered to the patient:  Atorvastatin calcium 20 mg  Loperamide 2 mg    Additional Documentation:   Wife (anirudh) picked up in the pharmacy at register  
[facility]     
Pharmacy Consultation Note  (Warfarin Dosing and Monitoring)    Initial consult date: 3/6/24  Consulting Provider: Merritt Anthony MD     Johny Nichols is a 88 y.o. male for whom pharmacy has been asked to manage warfarin therapy.     Weight:   Wt Readings from Last 1 Encounters:   03/06/24 67.3 kg (148 lb 5.9 oz)       TSH:    Lab Results   Component Value Date/Time    TSH 2.62 08/07/2023 04:02 AM       Hepatic Function Panel:                            Lab Results   Component Value Date/Time    ALKPHOS 107 03/06/2024 07:26 AM    ALT 9 03/06/2024 07:26 AM    AST 29 03/06/2024 07:26 AM    PROT 7.8 03/06/2024 07:26 AM    BILITOT 0.6 03/06/2024 07:26 AM    BILIDIR <0.2 10/01/2020 07:50 AM    IBILI see below 10/01/2020 07:50 AM    LABALBU 4.1 03/06/2024 07:26 AM       Current significant warfarin drug-drug interactions include: No significant interactions    Recent Labs     03/05/24  1732 03/06/24  0726   HGB 11.0* 11.5*   PLT  --  105*     Date Warfarin Dose INR Heparin or LMWH Comment   3/5 X 1.7 Heparin 5000 units SubQ TID    3/6 <2 mg>  1.8 -- Heparin SC discontinued                           Assessment:  Patient is a 88 y.o. male on warfarin for Atrial Fibrillation.  Patient's home warfarin dosing regimen is 2 mg daily per medication reconciliation and RN on floor.   Goal INR 2 - 3  INR 1.8 today. H/H 11.5/36.5     Plan:  Warfarin 2 mg tonight  Daily PT/INR until the INR is stable within the therapeutic range  Pharmacist will follow and monitor/adjust dosing as necessary    Thank you for this consult,     Israel Staples, PharmD, [facility]  PGY-1 Pharmacy Resident  [phone]   3/6/2024 3:48 PM    CAMELIA: [phone]  SEY: [phone]  SJW: [phone]     
Pharmacy Consultation Note  (Warfarin Dosing and Monitoring)    Initial consult date: 3/6/24  Consulting Provider: Merritt Contreras MD     Johny Nichols is a 88 y.o. male for whom pharmacy has been asked to manage warfarin therapy.     Weight:   Wt Readings from Last 1 Encounters:   03/06/24 67.3 kg (148 lb 5.9 oz)       TSH:    Lab Results   Component Value Date/Time    TSH 2.62 08/07/2023 04:02 AM       Hepatic Function Panel:                            Lab Results   Component Value Date/Time    ALKPHOS 107 03/06/2024 07:26 AM    ALT 9 03/06/2024 07:26 AM    AST 29 03/06/2024 07:26 AM    PROT 7.8 03/06/2024 07:26 AM    BILITOT 0.6 03/06/2024 07:26 AM    BILIDIR <0.2 10/01/2020 07:50 AM    IBILI see below 10/01/2020 07:50 AM    LABALBU 4.1 03/06/2024 07:26 AM       Current significant warfarin drug-drug interactions include: No significant interactions    Recent Labs     03/05/24  1732 03/06/24  0726 03/07/24  0713   HGB 11.0* 11.5* 12.8   PLT  --  105*  --        Date Warfarin Dose INR Heparin or LMWH Comment   3/5 X 1.7 Heparin 5000 units SubQ TID    3/6 2 mg 1.8 X Heparin SC discontinued    3/7 < 4 mg >  1.6 X                    Assessment:  Patient is a 88 y.o. male on warfarin for Atrial Fibrillation.  Patient's home warfarin dosing regimen is 2 mg daily per medication reconciliation and RN on floor.   Goal INR 2 - 3  INR 1.8 today. H/H 11.5/36.5   3/7: INR = 1.6; pending discharge today.  Discussed today's dose and home dose with Dr. Contreras.    Plan:  Give warfarin 4 mg x1 now before discharge as ordered by Dr. EV Contreras.  Resume home warfarin dose of 2 mg daily on 3/8/2024.  Daily PT/INR until the INR is stable within the therapeutic range or until discharged.  Pharmacist will follow and monitor/adjust dosing as necessary.    Hardik Dominguez, PharmD  3/7/2024  2:20 PM  [phone]         
further education in this area   yes yes reinforce     ASSESSMENT:    Conditions Requiring Skilled Therapeutic Intervention:    [x]Decreased strength     []Decreased ROM  [x]Decreased functional mobility  [x]Decreased balance   [x]Decreased endurance   [x]Decreased posture  []Decreased sensation  []Decreased coordination   []Decreased vision  []Decreased safety awareness   []Increased pain       Comments:  Pt supine in bed upon entering, pt agreeable to participate. Pt instructed to transfer to EOB, completing transfer with no physical assistance. Pt sitting upright with good sitting balance. Pt with no c/o dizziness with position change. Pt then cued for hand placement and instructed to stand from EOB. Pt standing with good balance with ww. Pt instructed to ambulate to tolerance. Pt ambulating with decreased airam and narrow MOHIT, cueing provided for energy conservation. Pt demonstrated good tolerance to ambulation bout. Pt was assisted back to bedside and was transferred to bedside chair. Pt positioned for comfort with all needs met and call bell in reach prior to exiting.    Treatment:  Patient practiced and was instructed in the following treatment:    Bed mobility training - pt given verbal and tactile cues to facilitate proper sequencing and safety during rolling and supine>sit as well as provided with stand by assistance to complete task   STS and pivot transfer training - pt educated on proper hand and foot placement, safety and sequencing, and use of verbal and tactile cues to safely complete sit<>stand and pivot transfers with stand by assistance to complete task safely   Gait training- pt was given verbal and tactile cues to facilitate pt safety with ww use during ambulation as well as provided with stand by assistance.    Pt's/ family goals   1. Return home    Prognosis is good for reaching above PT goals.    Patient and or family understand(s) diagnosis, prognosis, and plan of care.  yes    PHYSICAL 
planning: Home    NOTE: This report was transcribed using voice recognition software. Every effort was made to ensure accuracy; however, inadvertent computerized transcription errors may be present.  Electronically signed by Merritt Contreras MD on 3/6/2024 at 12:13 PM

## 2024-03-07 NOTE — DISCHARGE SUMMARY
minutes was spent in preparing discharge papers, discussing discharge with patient, medication review, etc.    Signed:  Electronically signed by Merritt Contreras MD on 3/7/2024 at 1:27 PM

## 2024-03-18 ENCOUNTER — TELEPHONE (OUTPATIENT)
Dept: CARDIOLOGY CLINIC | Age: 89
End: 2024-03-18

## 2024-03-19 ENCOUNTER — OFFICE VISIT (OUTPATIENT)
Dept: CARDIOLOGY CLINIC | Age: 89
End: 2024-03-19
Payer: MEDICARE

## 2024-03-19 VITALS
WEIGHT: 154.4 LBS | DIASTOLIC BLOOD PRESSURE: 62 MMHG | BODY MASS INDEX: 22.87 KG/M2 | HEART RATE: 61 BPM | HEIGHT: 69 IN | OXYGEN SATURATION: 99 % | SYSTOLIC BLOOD PRESSURE: 138 MMHG

## 2024-03-19 DIAGNOSIS — I25.10 CORONARY ARTERY DISEASE INVOLVING NATIVE CORONARY ARTERY OF NATIVE HEART WITHOUT ANGINA PECTORIS: Primary | ICD-10-CM

## 2024-03-19 DIAGNOSIS — Z79.4 TYPE 2 DIABETES MELLITUS WITH STAGE 3B CHRONIC KIDNEY DISEASE, WITH LONG-TERM CURRENT USE OF INSULIN (HCC): ICD-10-CM

## 2024-03-19 DIAGNOSIS — N18.32 TYPE 2 DIABETES MELLITUS WITH STAGE 3B CHRONIC KIDNEY DISEASE, WITH LONG-TERM CURRENT USE OF INSULIN (HCC): ICD-10-CM

## 2024-03-19 DIAGNOSIS — E11.22 TYPE 2 DIABETES MELLITUS WITH STAGE 3B CHRONIC KIDNEY DISEASE, WITH LONG-TERM CURRENT USE OF INSULIN (HCC): ICD-10-CM

## 2024-03-19 DIAGNOSIS — G45.9 TIA (TRANSIENT ISCHEMIC ATTACK): ICD-10-CM

## 2024-03-19 DIAGNOSIS — I48.21 PERMANENT ATRIAL FIBRILLATION (HCC): ICD-10-CM

## 2024-03-19 DIAGNOSIS — E78.00 PURE HYPERCHOLESTEROLEMIA: ICD-10-CM

## 2024-03-19 PROCEDURE — 93000 ELECTROCARDIOGRAM COMPLETE: CPT | Performed by: INTERNAL MEDICINE

## 2024-03-19 PROCEDURE — 3051F HG A1C>EQUAL 7.0%<8.0%: CPT | Performed by: INTERNAL MEDICINE

## 2024-03-19 PROCEDURE — 99215 OFFICE O/P EST HI 40 MIN: CPT | Performed by: INTERNAL MEDICINE

## 2024-03-19 PROCEDURE — 1123F ACP DISCUSS/DSCN MKR DOCD: CPT | Performed by: INTERNAL MEDICINE

## 2024-03-19 RX ORDER — LOPERAMIDE HYDROCHLORIDE 2 MG/1
2 CAPSULE ORAL PRN
COMMUNITY

## 2024-03-19 NOTE — PROGRESS NOTES
OUTPATIENT CARDIOLOGY FOLLOW-UP    Name: Johny Nichols    Age: 88 y.o.    Primary Care Physician: Nicholas Beal MD    Date of Service: 3/19/2024    Chief Complaint: Coronary atherosclerosis, permanent atrial fibrillation, chronic kidney disease, transient cerebral ischemia    Interim History: Since his most recent evaluation, the patient remains compensated from a cardiovascular standpoint and denies interim symptoms of anginal-like chest discomfort or other ischemic equivalents no manifestations of decompensated left ventricular systolic dysfunction or volume overload.  He has noted no arrhythmia related symptoms and denies symptoms of a focal neurological origin nor bleeding.  He relates intermittent randomly occurring episodes of diaphoresis including that leading to a recent emergency room evaluation hospitalization in which time a resting electrocardiogram reviewed time of assessment demonstrated evidence of atrial fibrillation with a mean ventricular response of approximately 80 bpm with nonspecific ST changes and a chest x-ray again reviewed demonstrated evidence of hyperinflation and chronic interstitial changes with mild chronic elevation of high-sensitivity troponin levels in a pattern not suggestive of an acute coronary syndrome and proBNP levels of 1225 pg/mL unchanged from that of his chronic baseline.  At the time of his emergency room evaluation, additional laboratory studies demonstrated mild relative hypoglycemia with serum glucose levels lower than that time of previous documented episodes.    Review of Systems:   The remainder of a complete multisystem review including consitutional, central nervous, respiratory, circulatory, gastrointestinal, genitourinary, endocrinologic, hematologic, musculoskeletal and psychiatric are negative.    Past Medical History:  Past Medical History:   Diagnosis Date    A-fib (HCC)     Chronic kidney disease     Chronic renal impairment, stage 3 (moderate) ([facility])

## 2024-04-04 ENCOUNTER — TELEPHONE (OUTPATIENT)
Dept: HEMATOLOGY | Age: 89
End: 2024-04-04

## 2024-04-04 DIAGNOSIS — D49.0 IPMN (INTRADUCTAL PAPILLARY MUCINOUS NEOPLASM): Primary | ICD-10-CM

## 2024-04-04 NOTE — TELEPHONE ENCOUNTER
Authorization for MRI through [employer] with auth#[id number] approved from 4/3/24-5/18/24.  I called patient and left a VM that I got his MRI scheduled and asked him to call the office back to get his appt information and instructions and to make a follow up appt.    Electronically signed by Laurie Greco RN on 4/4/2024 at 8:50 AM

## 2024-05-06 ENCOUNTER — HOSPITAL ENCOUNTER (OUTPATIENT)
Age: 89
Discharge: HOME | End: 2024-05-06
Attending: TRANSPLANT SURGERY
Payer: MEDICARE

## 2024-05-06 ENCOUNTER — HOSPITAL ENCOUNTER (OUTPATIENT)
Dept: MRI IMAGING | Age: 89
Discharge: HOME | End: 2024-05-08
Attending: TRANSPLANT SURGERY
Payer: MEDICARE

## 2024-05-06 ENCOUNTER — TELEPHONE (OUTPATIENT)
Dept: HEMATOLOGY | Age: 89
End: 2024-05-06

## 2024-05-06 DIAGNOSIS — D49.0 IPMN (INTRADUCTAL PAPILLARY MUCINOUS NEOPLASM): ICD-10-CM

## 2024-05-06 LAB
ANION GAP SERPL CALCULATED.3IONS-SCNC: 12 MMOL/L (ref 7–16)
BUN SERPL-MCNC: 31 MG/DL (ref 6–23)
CALCIUM SERPL-MCNC: 9.7 MG/DL (ref 8.6–10.2)
CHLORIDE SERPL-SCNC: 108 MMOL/L (ref 98–107)
CO2 SERPL-SCNC: 20 MMOL/L (ref 22–29)
CREAT SERPL-MCNC: 2.2 MG/DL (ref 0.7–1.2)
GFR, ESTIMATED: 28 ML/MIN/1.73M2
GLUCOSE SERPL-MCNC: 141 MG/DL (ref 74–99)
POTASSIUM SERPL-SCNC: 4.8 MMOL/L (ref 3.5–5)
SODIUM SERPL-SCNC: 140 MMOL/L (ref 132–146)

## 2024-05-06 PROCEDURE — 80048 BASIC METABOLIC PNL TOTAL CA: CPT

## 2024-05-06 PROCEDURE — 6360000004 HC RX CONTRAST MEDICATION: Performed by: RADIOLOGY

## 2024-05-06 PROCEDURE — A9579 GAD-BASE MR CONTRAST NOS,1ML: HCPCS | Performed by: RADIOLOGY

## 2024-05-06 PROCEDURE — 36415 COLL VENOUS BLD VENIPUNCTURE: CPT

## 2024-05-06 PROCEDURE — 74183 MRI ABD W/O CNTR FLWD CNTR: CPT

## 2024-05-06 RX ADMIN — GADOTERIDOL 11 ML: 279.3 INJECTION, SOLUTION INTRAVENOUS at 10:30

## 2024-05-06 NOTE — TELEPHONE ENCOUNTER
Staff was notified of the patients GFR of 28 via the MRI dept. Per Laurie Greco, office RN ok to proceed with MRI. MRI dept was also updated  Electronically signed by Nohemi Bustillo MA on 5/6/2024 at 9:53 AM

## 2024-05-06 NOTE — PROGRESS NOTES
Patient had labs drawn today and GFR <60  at 28. Called to speak with ordering MD. Sullivan at 's offices, and she instructed to continue with the MRI even with the GFR lab value

## 2024-05-09 ENCOUNTER — OFFICE VISIT (OUTPATIENT)
Dept: HEMATOLOGY | Age: 89
End: 2024-05-09
Payer: MEDICARE

## 2024-05-09 VITALS
SYSTOLIC BLOOD PRESSURE: 138 MMHG | WEIGHT: 152.6 LBS | HEART RATE: 63 BPM | DIASTOLIC BLOOD PRESSURE: 55 MMHG | HEIGHT: 69 IN | TEMPERATURE: 97 F | RESPIRATION RATE: 15 BRPM | BODY MASS INDEX: 22.6 KG/M2 | OXYGEN SATURATION: 93 %

## 2024-05-09 DIAGNOSIS — K86.2 PANCREATIC CYST: ICD-10-CM

## 2024-05-09 DIAGNOSIS — D49.0 IPMN (INTRADUCTAL PAPILLARY MUCINOUS NEOPLASM): Primary | ICD-10-CM

## 2024-05-09 PROCEDURE — 1123F ACP DISCUSS/DSCN MKR DOCD: CPT | Performed by: TRANSPLANT SURGERY

## 2024-05-09 PROCEDURE — 99212 OFFICE O/P EST SF 10 MIN: CPT | Performed by: TRANSPLANT SURGERY

## 2024-05-09 PROCEDURE — 99213 OFFICE O/P EST LOW 20 MIN: CPT | Performed by: TRANSPLANT SURGERY

## 2024-05-09 NOTE — PROGRESS NOTES
Hepatobiliary and Pancreatic Surgery Attending History and Physical    Patient's Name/Date of Birth: Johny Nichols /1935 (88 y.o.)    Date: May 9, 2024     CC: Follow-up imaging    HPI:  Patient is a very pleasant 86 year old male who has a pouch for UC.  He recently had a FIT test that was negative.  He also has new mid thoracic back pain and his blood sugars are between 150-160.  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  He recently underwent a MRI and is seeing me in follow up where the cysts were much bigger.  He underwent an EUS with Dr. Gerber 3/22 and is seeing me in follow up. His pancreatic EUS showed the following:  Multiple cystic lesions throughout the pancreas.  The largest 1 measures 18 mm in the pancreatic tail.  There are also cysts lying along and parallel to the pancreatic duct in the pancreatic body, the pancreatic neck, the pancreatic head.  No solid masses throughout the pancreas.  There is no associated dilation of the main pancreatic duct which measures about 3 mm throughout.  Patient continues to do well.  Occasionally has some bloating however it is not after eating fatty meals.  He is maintaining his weight.  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    Physical Exam:  BP (!) 138/55   Pulse 63   Temp 97 °F (36.1 °C) (Temporal)   Resp 15   Ht 1.753 m (5' 9\")   Wt 69.2 kg (152 lb 9.6 oz)   SpO2 93%   BMI 22.54 kg/m²     PSYCH: mood and affect normal, alert and oriented x 3: No apparent distress, comfortable  EYES: Sclera white, pupils equal round and reactive to light  ENMT:  Hearing normal, trachea midline, ears externally intact  LYMPH: no obvious lympadenopathy in neck.   RESP: Respiratory effort was normal with no retractions or use of accessory muscles.  CV:  No pedal edema  GI/ Abdomen: Soft, nondistended, nontender, no guarding, no peritoneal signs  MSK: no clubbing/ no cyanosis    Assessment & Plan

## 2024-07-29 RX ORDER — ATORVASTATIN CALCIUM 20 MG/1
20 TABLET, FILM COATED ORAL NIGHTLY
Qty: 90 TABLET | Refills: 3 | Status: SHIPPED | OUTPATIENT
Start: 2024-07-29

## 2024-07-29 RX ORDER — ISOSORBIDE MONONITRATE 30 MG/1
30 TABLET, EXTENDED RELEASE ORAL DAILY
Qty: 90 TABLET | Refills: 3 | Status: SHIPPED | OUTPATIENT
Start: 2024-07-29

## 2024-08-12 PROBLEM — Z86.73 HISTORY OF CEREBROVASCULAR ACCIDENT: Status: ACTIVE | Noted: 2024-08-12

## 2024-08-15 ENCOUNTER — APPOINTMENT (OUTPATIENT)
Dept: CT IMAGING | Age: 89
End: 2024-08-15
Payer: MEDICARE

## 2024-08-15 ENCOUNTER — HOSPITAL ENCOUNTER (EMERGENCY)
Age: 89
Discharge: HOME | End: 2024-08-15
Attending: EMERGENCY MEDICINE
Payer: MEDICARE

## 2024-08-15 VITALS
DIASTOLIC BLOOD PRESSURE: 90 MMHG | HEIGHT: 69 IN | HEART RATE: 100 BPM | BODY MASS INDEX: 21.48 KG/M2 | TEMPERATURE: 97.4 F | OXYGEN SATURATION: 93 % | RESPIRATION RATE: 18 BRPM | SYSTOLIC BLOOD PRESSURE: 174 MMHG | WEIGHT: 145 LBS

## 2024-08-15 DIAGNOSIS — R14.0 ABDOMINAL BLOATING: ICD-10-CM

## 2024-08-15 DIAGNOSIS — R11.2 NAUSEA AND VOMITING, UNSPECIFIED VOMITING TYPE: Primary | ICD-10-CM

## 2024-08-15 LAB
ALBUMIN SERPL-MCNC: 3.9 G/DL (ref 3.5–5.2)
ALP SERPL-CCNC: 125 U/L (ref 40–129)
ALT SERPL-CCNC: 12 U/L (ref 0–40)
ANION GAP SERPL CALCULATED.3IONS-SCNC: 13 MMOL/L (ref 7–16)
AST SERPL-CCNC: 31 U/L (ref 0–39)
BACTERIA URNS QL MICRO: ABNORMAL
BASOPHILS # BLD: 0.04 K/UL (ref 0–0.2)
BASOPHILS NFR BLD: 1 % (ref 0–2)
BILIRUB SERPL-MCNC: 0.9 MG/DL (ref 0–1.2)
BILIRUB UR QL STRIP: NEGATIVE
BUN SERPL-MCNC: 20 MG/DL (ref 6–23)
CALCIUM SERPL-MCNC: 9.3 MG/DL (ref 8.6–10.2)
CHLORIDE SERPL-SCNC: 94 MMOL/L (ref 98–107)
CLARITY UR: CLEAR
CO2 SERPL-SCNC: 21 MMOL/L (ref 22–29)
COLOR UR: YELLOW
CREAT SERPL-MCNC: 1.7 MG/DL (ref 0.7–1.2)
EOSINOPHIL # BLD: 0.06 K/UL (ref 0.05–0.5)
EOSINOPHILS RELATIVE PERCENT: 1 % (ref 0–6)
ERYTHROCYTE [DISTWIDTH] IN BLOOD BY AUTOMATED COUNT: 15.1 % (ref 11.5–15)
GFR, ESTIMATED: 39 ML/MIN/1.73M2
GLUCOSE SERPL-MCNC: 97 MG/DL (ref 74–99)
GLUCOSE UR STRIP-MCNC: NEGATIVE MG/DL
HCT VFR BLD AUTO: 34.1 % (ref 37–54)
HGB BLD-MCNC: 11.2 G/DL (ref 12.5–16.5)
HGB UR QL STRIP.AUTO: ABNORMAL
IMM GRANULOCYTES # BLD AUTO: 0.04 K/UL (ref 0–0.58)
IMM GRANULOCYTES NFR BLD: 1 % (ref 0–5)
KETONES UR STRIP-MCNC: NEGATIVE MG/DL
LEUKOCYTE ESTERASE UR QL STRIP: NEGATIVE
LIPASE SERPL-CCNC: 36 U/L (ref 13–60)
LYMPHOCYTES NFR BLD: 0.98 K/UL (ref 1.5–4)
LYMPHOCYTES RELATIVE PERCENT: 13 % (ref 20–42)
MCH RBC QN AUTO: 27.9 PG (ref 26–35)
MCHC RBC AUTO-ENTMCNC: 32.8 G/DL (ref 32–34.5)
MCV RBC AUTO: 84.8 FL (ref 80–99.9)
MONOCYTES NFR BLD: 0.72 K/UL (ref 0.1–0.95)
MONOCYTES NFR BLD: 9 % (ref 2–12)
NEUTROPHILS NFR BLD: 76 % (ref 43–80)
NEUTS SEG NFR BLD: 5.79 K/UL (ref 1.8–7.3)
NITRITE UR QL STRIP: NEGATIVE
PH UR STRIP: 5.5 [PH] (ref 5–9)
PLATELET CONFIRMATION: NORMAL
PLATELET, FLUORESCENCE: 80 K/UL (ref 130–450)
PMV BLD AUTO: 10.9 FL (ref 7–12)
POTASSIUM SERPL-SCNC: 4.8 MMOL/L (ref 3.5–5)
PROT SERPL-MCNC: 8.1 G/DL (ref 6.4–8.3)
PROT UR STRIP-MCNC: 30 MG/DL
RBC # BLD AUTO: 4.02 M/UL (ref 3.8–5.8)
RBC #/AREA URNS HPF: ABNORMAL /HPF
SODIUM SERPL-SCNC: 128 MMOL/L (ref 132–146)
SP GR UR STRIP: 1.02 (ref 1–1.03)
UROBILINOGEN UR STRIP-ACNC: 0.2 EU/DL (ref 0–1)
WBC #/AREA URNS HPF: ABNORMAL /HPF
WBC OTHER # BLD: 7.6 K/UL (ref 4.5–11.5)

## 2024-08-15 PROCEDURE — 74176 CT ABD & PELVIS W/O CONTRAST: CPT

## 2024-08-15 PROCEDURE — 99285 EMERGENCY DEPT VISIT HI MDM: CPT

## 2024-08-15 PROCEDURE — 6360000004 HC RX CONTRAST MEDICATION: Performed by: RADIOLOGY

## 2024-08-15 PROCEDURE — 85025 COMPLETE CBC W/AUTO DIFF WBC: CPT

## 2024-08-15 PROCEDURE — 83690 ASSAY OF LIPASE: CPT

## 2024-08-15 PROCEDURE — 2580000003 HC RX 258

## 2024-08-15 PROCEDURE — 80053 COMPREHEN METABOLIC PANEL: CPT

## 2024-08-15 PROCEDURE — 81001 URINALYSIS AUTO W/SCOPE: CPT

## 2024-08-15 PROCEDURE — 6360000002 HC RX W HCPCS

## 2024-08-15 PROCEDURE — 96374 THER/PROPH/DIAG INJ IV PUSH: CPT

## 2024-08-15 RX ORDER — 0.9 % SODIUM CHLORIDE 0.9 %
1000 INTRAVENOUS SOLUTION INTRAVENOUS ONCE
Status: COMPLETED | OUTPATIENT
Start: 2024-08-15 | End: 2024-08-15

## 2024-08-15 RX ORDER — ASPIRIN 81 MG/1
81 TABLET ORAL DAILY
Qty: 90 TABLET | Refills: 3 | Status: SHIPPED | OUTPATIENT
Start: 2024-08-15

## 2024-08-15 RX ORDER — ONDANSETRON 2 MG/ML
4 INJECTION INTRAMUSCULAR; INTRAVENOUS ONCE
Status: COMPLETED | OUTPATIENT
Start: 2024-08-15 | End: 2024-08-15

## 2024-08-15 RX ADMIN — ONDANSETRON 4 MG: 2 INJECTION INTRAMUSCULAR; INTRAVENOUS at 05:08

## 2024-08-15 RX ADMIN — IOPAMIDOL 18 ML: 755 INJECTION, SOLUTION INTRAVENOUS at 07:41

## 2024-08-15 RX ADMIN — SODIUM CHLORIDE 1000 ML: 9 INJECTION, SOLUTION INTRAVENOUS at 05:07

## 2024-08-15 ASSESSMENT — LIFESTYLE VARIABLES: HOW OFTEN DO YOU HAVE A DRINK CONTAINING ALCOHOL: NEVER

## 2024-08-15 ASSESSMENT — PAIN - FUNCTIONAL ASSESSMENT: PAIN_FUNCTIONAL_ASSESSMENT: NONE - DENIES PAIN

## 2024-08-15 NOTE — DISCHARGE INSTRUCTIONS
Follow-up with primary care doctor  Contact general surgery for follow-up appointment  If you notice any new worrisome symptoms please return to emergency department for evaluation

## 2024-08-15 NOTE — ED PROVIDER NOTES
Patient signed out to me by outgoing provider plan for discharge pending CT scans.  CT scan of the abdomen pelvis demonstrate no acute abnormalities.  Repeat evaluation patient is resting health at the bedside he notes improvement symptoms.  Decision made to discharge patient.  Patient instructed to follow-up with primary care doctor.  Patient also given general surgery follow-up to establish for care of ostomy.  Strict return precautions discussed all questions answered patient agreement plan of care discharged in stable condition Luis Carmen,   08/15/24 [id number]

## 2024-08-15 NOTE — ED PROVIDER NOTES
[facility] EMERGENCY DEPARTMENT  EMERGENCY DEPARTMENT ENCOUNTER        Pt Name: Johny Nichols  MRN: [id number]  Birthdate 1935  Date of evaluation: 8/15/2024  Provider: Oralia Higuera MD  PCP: Nicholas Beal MD  Note Started: 4:57 AM EDT 8/15/24    CHIEF COMPLAINT       Chief Complaint   Patient presents with    Emesis     Diarrhea for past 3 days states colostomy bag fills up with air       HISTORY OF PRESENT ILLNESS: 1 or more Elements   History From: patient    Limitations to history : None    Johny Nichols is a 88 y.o. male who presents for nausea, vomiting, diarrhea over the past couple days.  Patient has also had increased gas in his colostomy bag as well as abdominal bloating.  Denies abdominal pain, chest pain, shortness of breath, fever.  Denies black or bloody stools.    Nursing Notes were all reviewed and agreed with or any disagreements were addressed in the HPI.        REVIEW OF EXTERNAL NOTE :       Patient was last seen in office on 5/9/2024 by hepatobiliary for intraductal papillary mucinous neoplasm and pancreatic cyst    REVIEW OF SYSTEMS :           Positives and Pertinent negatives as per HPI.     SURGICAL HISTORY     Past Surgical History:   Procedure Laterality Date    ABDOMEN SURGERY      1978    COLONOSCOPY      COLOSTOMY  1978    CYSTOSCOPY  more than 5 yrs    ECHO COMPL W DOP COLOR FLOW  11/26/2013         ECHOCARDIOGRAM TRANSESOPHAGEAL  11/27/2013         ENDOSCOPY, COLON, DIAGNOSTIC  12/10/15    ileoscopy    TURP  6/16/14    cystoscopy retrogrades    UPPER GASTROINTESTINAL ENDOSCOPY N/A 3/11/2022    EGD ESOPHAGOGASTRODUODENOSCOPY ULTRASOUND performed by Conrad Gerber MD at [facility] ENDOSCOPY       CURRENTMEDICATIONS       Discharge Medication List as of 8/15/2024  8:50 AM        CONTINUE these medications which have NOT CHANGED    Details   isosorbide mononitrate (IMDUR) 30 MG extended release tablet Take 1 tablet by mouth daily, Disp-90  EOMI, conjunctiva normal, sclera non icteric  ENT:  Oropharynx clear, handling secretions  Neck: Supple, full ROM, no stridor, no meningeal signs  Respiratory: Lungs clear to auscultation bilaterally, no wheezes, rales, or rhonchi. Not in respiratory distress  Cardiovascular:  Regular rate. Regular rhythm.   GI:  Abdomen Soft, Non tender, Non distended.  No rebound, guarding, or rigidity.  Colostomy in place with some gas and mucus  Musculoskeletal: Moves all extremities x 4. Warm and well perfused, no clubbing, no cyanosis  Integument: skin warm and dry. No rashes.   Neurologic: GCS 15  Psychiatric: Normal Affect            DIAGNOSTIC RESULTS   LABS:    Labs Reviewed   CBC WITH AUTO DIFFERENTIAL - Abnormal; Notable for the following components:       Result Value    Hemoglobin 11.2 (*)     Hematocrit 34.1 (*)     RDW 15.1 (*)     Platelet, Fluorescence 80 (*)     Lymphocytes % 13 (*)     Lymphocytes Absolute 0.98 (*)     All other components within normal limits   COMPREHENSIVE METABOLIC PANEL W/ REFLEX TO MG FOR LOW K - Abnormal; Notable for the following components:    Sodium 128 (*)     Chloride 94 (*)     CO2 21 (*)     Creatinine 1.7 (*)     Est, Glom Filt Rate 39 (*)     All other components within normal limits   URINALYSIS WITH MICROSCOPIC - Abnormal; Notable for the following components:    Urine Hgb LARGE (*)     Protein, UA 30 (*)     RBC, UA 10 TO 20 (*)     Bacteria, UA TRACE (*)     All other components within normal limits   LIPASE   PLATELET CONFIRMATION       As interpreted by me, the above displayed labs are abnormal. All other labs obtained during this visit were within normal range or not returned as of this dictation.      EKG Interpretation  Interpreted by emergency department physician, Oralia Higuera MD    NA      RADIOLOGY:   Non-plain film images such as CT, Ultrasound and MRI are read by the radiologist. Plain radiographic images are visualized and preliminarily interpreted by the ED

## 2024-08-20 ENCOUNTER — HOSPITAL ENCOUNTER (INPATIENT)
Age: 89
LOS: 7 days | Discharge: OTHER INSTITUTION | DRG: 641 | End: 2024-08-27
Attending: EMERGENCY MEDICINE | Admitting: INTERNAL MEDICINE
Payer: MEDICARE

## 2024-08-20 ENCOUNTER — APPOINTMENT (OUTPATIENT)
Dept: GENERAL RADIOLOGY | Age: 89
DRG: 641 | End: 2024-08-20
Payer: MEDICARE

## 2024-08-20 DIAGNOSIS — R11.2 NAUSEA AND VOMITING, UNSPECIFIED VOMITING TYPE: Primary | ICD-10-CM

## 2024-08-20 DIAGNOSIS — E87.1 HYPONATREMIA: ICD-10-CM

## 2024-08-20 LAB
ALBUMIN SERPL-MCNC: 3.8 G/DL (ref 3.5–5.2)
ALP SERPL-CCNC: 135 U/L (ref 40–129)
ALT SERPL-CCNC: 17 U/L (ref 0–40)
ANION GAP SERPL CALCULATED.3IONS-SCNC: 13 MMOL/L (ref 7–16)
AST SERPL-CCNC: 44 U/L (ref 0–39)
BASOPHILS # BLD: 0.05 K/UL (ref 0–0.2)
BASOPHILS NFR BLD: 1 % (ref 0–2)
BILIRUB SERPL-MCNC: 0.5 MG/DL (ref 0–1.2)
BUN SERPL-MCNC: 28 MG/DL (ref 6–23)
CALCIUM SERPL-MCNC: 9.3 MG/DL (ref 8.6–10.2)
CHLORIDE SERPL-SCNC: 92 MMOL/L (ref 98–107)
CK SERPL-CCNC: 268 U/L (ref 20–200)
CO2 SERPL-SCNC: 18 MMOL/L (ref 22–29)
CREAT SERPL-MCNC: 2.2 MG/DL (ref 0.7–1.2)
EOSINOPHIL # BLD: 0.17 K/UL (ref 0.05–0.5)
EOSINOPHILS RELATIVE PERCENT: 3 % (ref 0–6)
ERYTHROCYTE [DISTWIDTH] IN BLOOD BY AUTOMATED COUNT: 14.6 % (ref 11.5–15)
FLUAV RNA RESP QL NAA+PROBE: NOT DETECTED
FLUBV RNA RESP QL NAA+PROBE: NOT DETECTED
GFR, ESTIMATED: 28 ML/MIN/1.73M2
GLUCOSE SERPL-MCNC: 104 MG/DL (ref 74–99)
HCT VFR BLD AUTO: 35.7 % (ref 37–54)
HGB BLD-MCNC: 11.5 G/DL (ref 12.5–16.5)
IMM GRANULOCYTES # BLD AUTO: 0.03 K/UL (ref 0–0.58)
IMM GRANULOCYTES NFR BLD: 1 % (ref 0–5)
INR PPP: 2.3
LYMPHOCYTES NFR BLD: 0.87 K/UL (ref 1.5–4)
LYMPHOCYTES RELATIVE PERCENT: 13 % (ref 20–42)
MAGNESIUM SERPL-MCNC: 1.6 MG/DL (ref 1.6–2.6)
MCH RBC QN AUTO: 26.8 PG (ref 26–35)
MCHC RBC AUTO-ENTMCNC: 32.2 G/DL (ref 32–34.5)
MCV RBC AUTO: 83.2 FL (ref 80–99.9)
MONOCYTES NFR BLD: 0.58 K/UL (ref 0.1–0.95)
MONOCYTES NFR BLD: 9 % (ref 2–12)
NEUTROPHILS NFR BLD: 74 % (ref 43–80)
NEUTS SEG NFR BLD: 4.82 K/UL (ref 1.8–7.3)
PLATELET # BLD AUTO: 141 K/UL (ref 130–450)
PMV BLD AUTO: 11.7 FL (ref 7–12)
POTASSIUM SERPL-SCNC: 4.6 MMOL/L (ref 3.5–5)
PROT SERPL-MCNC: 8 G/DL (ref 6.4–8.3)
PROTHROMBIN TIME: 25 SEC (ref 9.3–12.4)
RBC # BLD AUTO: 4.29 M/UL (ref 3.8–5.8)
SARS-COV-2 RNA RESP QL NAA+PROBE: NOT DETECTED
SODIUM SERPL-SCNC: 123 MMOL/L (ref 132–146)
SOURCE: NORMAL
SPECIMEN DESCRIPTION: NORMAL
TROPONIN I SERPL HS-MCNC: 29 NG/L (ref 0–11)
WBC OTHER # BLD: 6.5 K/UL (ref 4.5–11.5)

## 2024-08-20 PROCEDURE — 83735 ASSAY OF MAGNESIUM: CPT

## 2024-08-20 PROCEDURE — 99285 EMERGENCY DEPT VISIT HI MDM: CPT

## 2024-08-20 PROCEDURE — 82550 ASSAY OF CK (CPK): CPT

## 2024-08-20 PROCEDURE — 6360000002 HC RX W HCPCS: Performed by: EMERGENCY MEDICINE

## 2024-08-20 PROCEDURE — 2060000000 HC ICU INTERMEDIATE R&B

## 2024-08-20 PROCEDURE — 87636 SARSCOV2 & INF A&B AMP PRB: CPT

## 2024-08-20 PROCEDURE — 93005 ELECTROCARDIOGRAM TRACING: CPT | Performed by: EMERGENCY MEDICINE

## 2024-08-20 PROCEDURE — 2580000003 HC RX 258: Performed by: EMERGENCY MEDICINE

## 2024-08-20 PROCEDURE — 85025 COMPLETE CBC W/AUTO DIFF WBC: CPT

## 2024-08-20 PROCEDURE — 96361 HYDRATE IV INFUSION ADD-ON: CPT

## 2024-08-20 PROCEDURE — 71045 X-RAY EXAM CHEST 1 VIEW: CPT

## 2024-08-20 PROCEDURE — 85610 PROTHROMBIN TIME: CPT

## 2024-08-20 PROCEDURE — 96374 THER/PROPH/DIAG INJ IV PUSH: CPT

## 2024-08-20 PROCEDURE — 80053 COMPREHEN METABOLIC PANEL: CPT

## 2024-08-20 PROCEDURE — 84484 ASSAY OF TROPONIN QUANT: CPT

## 2024-08-20 RX ORDER — METOPROLOL TARTRATE 25 MG/1
37.5 TABLET, FILM COATED ORAL 2 TIMES DAILY WITH MEALS
Status: DISCONTINUED | OUTPATIENT
Start: 2024-08-21 | End: 2024-08-27 | Stop reason: HOSPADM

## 2024-08-20 RX ORDER — ONDANSETRON 2 MG/ML
4 INJECTION INTRAMUSCULAR; INTRAVENOUS ONCE
Status: COMPLETED | OUTPATIENT
Start: 2024-08-20 | End: 2024-08-20

## 2024-08-20 RX ORDER — ACETAMINOPHEN 650 MG/1
650 SUPPOSITORY RECTAL EVERY 6 HOURS PRN
Status: DISCONTINUED | OUTPATIENT
Start: 2024-08-20 | End: 2024-08-27 | Stop reason: HOSPADM

## 2024-08-20 RX ORDER — ASPIRIN 81 MG/1
81 TABLET ORAL DAILY
Status: DISCONTINUED | OUTPATIENT
Start: 2024-08-21 | End: 2024-08-27 | Stop reason: HOSPADM

## 2024-08-20 RX ORDER — ONDANSETRON 4 MG/1
4 TABLET, ORALLY DISINTEGRATING ORAL EVERY 8 HOURS PRN
Status: DISCONTINUED | OUTPATIENT
Start: 2024-08-20 | End: 2024-08-27 | Stop reason: HOSPADM

## 2024-08-20 RX ORDER — RANOLAZINE 500 MG/1
500 TABLET, EXTENDED RELEASE ORAL 2 TIMES DAILY
Status: DISCONTINUED | OUTPATIENT
Start: 2024-08-20 | End: 2024-08-27 | Stop reason: HOSPADM

## 2024-08-20 RX ORDER — ISOSORBIDE MONONITRATE 30 MG/1
30 TABLET, EXTENDED RELEASE ORAL DAILY
Status: DISCONTINUED | OUTPATIENT
Start: 2024-08-21 | End: 2024-08-27 | Stop reason: HOSPADM

## 2024-08-20 RX ORDER — SODIUM CHLORIDE 9 MG/ML
INJECTION, SOLUTION INTRAVENOUS PRN
Status: DISCONTINUED | OUTPATIENT
Start: 2024-08-20 | End: 2024-08-27 | Stop reason: HOSPADM

## 2024-08-20 RX ORDER — ACETAMINOPHEN 325 MG/1
650 TABLET ORAL EVERY 6 HOURS PRN
Status: DISCONTINUED | OUTPATIENT
Start: 2024-08-20 | End: 2024-08-27 | Stop reason: HOSPADM

## 2024-08-20 RX ORDER — 0.9 % SODIUM CHLORIDE 0.9 %
500 INTRAVENOUS SOLUTION INTRAVENOUS ONCE
Status: COMPLETED | OUTPATIENT
Start: 2024-08-20 | End: 2024-08-20

## 2024-08-20 RX ORDER — SODIUM CHLORIDE 0.9 % (FLUSH) 0.9 %
10 SYRINGE (ML) INJECTION PRN
Status: DISCONTINUED | OUTPATIENT
Start: 2024-08-20 | End: 2024-08-27 | Stop reason: HOSPADM

## 2024-08-20 RX ORDER — SENNOSIDES A AND B 8.6 MG/1
1 TABLET, FILM COATED ORAL DAILY PRN
Status: DISCONTINUED | OUTPATIENT
Start: 2024-08-20 | End: 2024-08-27 | Stop reason: HOSPADM

## 2024-08-20 RX ORDER — ONDANSETRON 2 MG/ML
4 INJECTION INTRAMUSCULAR; INTRAVENOUS EVERY 6 HOURS PRN
Status: DISCONTINUED | OUTPATIENT
Start: 2024-08-20 | End: 2024-08-27 | Stop reason: HOSPADM

## 2024-08-20 RX ORDER — ATORVASTATIN CALCIUM 10 MG/1
20 TABLET, FILM COATED ORAL NIGHTLY
Status: DISCONTINUED | OUTPATIENT
Start: 2024-08-20 | End: 2024-08-27 | Stop reason: HOSPADM

## 2024-08-20 RX ORDER — SODIUM CHLORIDE 0.9 % (FLUSH) 0.9 %
10 SYRINGE (ML) INJECTION EVERY 12 HOURS SCHEDULED
Status: DISCONTINUED | OUTPATIENT
Start: 2024-08-20 | End: 2024-08-27 | Stop reason: HOSPADM

## 2024-08-20 RX ORDER — WARFARIN SODIUM 4 MG/1
4 TABLET ORAL DAILY
Status: DISCONTINUED | OUTPATIENT
Start: 2024-08-20 | End: 2024-08-21 | Stop reason: DRUGHIGH

## 2024-08-20 RX ADMIN — SODIUM CHLORIDE 500 ML: 9 INJECTION, SOLUTION INTRAVENOUS at 19:59

## 2024-08-20 RX ADMIN — ONDANSETRON 4 MG: 2 INJECTION INTRAMUSCULAR; INTRAVENOUS at 20:06

## 2024-08-20 ASSESSMENT — PAIN - FUNCTIONAL ASSESSMENT: PAIN_FUNCTIONAL_ASSESSMENT: NONE - DENIES PAIN

## 2024-08-20 NOTE — ED PROVIDER NOTES
16.5 g/dL    Hematocrit 35.7 (L) 37.0 - 54.0 %    MCV 83.2 80.0 - 99.9 fL    MCH 26.8 26.0 - 35.0 pg    MCHC 32.2 32.0 - 34.5 g/dL    RDW 14.6 11.5 - 15.0 %    Platelets 141 130 - 450 k/uL    MPV 11.7 7.0 - 12.0 fL    Neutrophils % 74 43.0 - 80.0 %    Lymphocytes % 13 (L) 20.0 - 42.0 %    Monocytes % 9 2.0 - 12.0 %    Eosinophils % 3 0 - 6 %    Basophils % 1 0.0 - 2.0 %    Immature Granulocytes % 1 0.0 - 5.0 %    Neutrophils Absolute 4.82 1.80 - 7.30 k/uL    Lymphocytes Absolute 0.87 (L) 1.50 - 4.00 k/uL    Monocytes Absolute 0.58 0.10 - 0.95 k/uL    Eosinophils Absolute 0.17 0.05 - 0.50 k/uL    Basophils Absolute 0.05 0.00 - 0.20 k/uL    Immature Granulocytes Absolute 0.03 0.00 - 0.58 k/uL   CMP   Result Value Ref Range    Sodium 123 (L) 132 - 146 mmol/L    Potassium 4.6 3.5 - 5.0 mmol/L    Chloride 92 (L) 98 - 107 mmol/L    CO2 18 (L) 22 - 29 mmol/L    Anion Gap 13 7 - 16 mmol/L    Glucose 104 (H) 74 - 99 mg/dL    BUN 28 (H) 6 - 23 mg/dL    Creatinine 2.2 (H) 0.70 - 1.20 mg/dL    Est, Glom Filt Rate 28 (L) >60 mL/min/1.73m2    Calcium 9.3 8.6 - 10.2 mg/dL    Total Protein 8.0 6.4 - 8.3 g/dL    Albumin 3.8 3.5 - 5.2 g/dL    Total Bilirubin 0.5 0.0 - 1.2 mg/dL    Alkaline Phosphatase 135 (H) 40 - 129 U/L    ALT 17 0 - 40 U/L    AST 44 (H) 0 - 39 U/L   Magnesium   Result Value Ref Range    Magnesium 1.6 1.6 - 2.6 mg/dL   Protime-INR   Result Value Ref Range    Protime 25.0 (H) 9.3 - 12.4 sec    INR 2.3    Troponin   Result Value Ref Range    Troponin, High Sensitivity 29 (H) 0 - 11 ng/L   CK   Result Value Ref Range    Total  (H) 20 - 200 U/L   EKG 12 Lead   Result Value Ref Range    Ventricular Rate 53 BPM    Atrial Rate 357 BPM    QRS Duration 74 ms    Q-T Interval 430 ms    QTc Calculation (Bazett) 403 ms    R Axis 56 degrees    T Axis 83 degrees       RADIOLOGY:  Interpreted by Radiologist.  XR CHEST PORTABLE   Final Result   1. Mild cardiomegaly with pulmonary vascular congestion.   2. Distended stomach.              EKG:  This EKG is signed and interpreted by the EP.    Time: 1935  Rate: 50  Rhythm: Atrial fibrillation  Interpretation: atrial fibrillation (chronic)  Comparison: None      ------------------------- NURSING NOTES AND VITALS REVIEWED ---------------------------   The nursing notes within the ED encounter and vital signs as below have been reviewed by myself.  /72   Pulse 67   Temp 97.9 °F (36.6 °C)   Resp 18   SpO2 99%   Oxygen Saturation Interpretation: Normal    The patient’s available past medical records and past encounters were reviewed.        ------------------------------ ED COURSE/MEDICAL DECISION MAKING----------------------  Medications   warfarin (COUMADIN) tablet 4 mg (has no administration in time range)   ranolazine (RANEXA) extended release tablet 500 mg (has no administration in time range)   Metoprolol Tartrate TABS 37.5 mg (has no administration in time range)   isosorbide mononitrate (IMDUR) extended release tablet 30 mg (has no administration in time range)   insulin regular (HumuLIN R;NovoLIN R) injection 10 Units (has no administration in time range)   atorvastatin (LIPITOR) tablet 20 mg (has no administration in time range)   aspirin EC tablet 81 mg (has no administration in time range)   sodium chloride flush 0.9 % injection 10 mL (has no administration in time range)   sodium chloride flush 0.9 % injection 10 mL (has no administration in time range)   0.9 % sodium chloride infusion (has no administration in time range)   ondansetron (ZOFRAN-ODT) disintegrating tablet 4 mg (has no administration in time range)     Or   ondansetron (ZOFRAN) injection 4 mg (has no administration in time range)   senna (SENOKOT) tablet 8.6 mg (has no administration in time range)   acetaminophen (TYLENOL) tablet 650 mg (has no administration in time range)     Or   acetaminophen (TYLENOL) suppository 650 mg (has no administration in time range)   sodium chloride 0.9 % bolus 500 mL (0 mLs

## 2024-08-21 LAB
ALBUMIN SERPL-MCNC: 3.6 G/DL (ref 3.5–5.2)
ALP SERPL-CCNC: 129 U/L (ref 40–129)
ALT SERPL-CCNC: 16 U/L (ref 0–40)
ANION GAP SERPL CALCULATED.3IONS-SCNC: 12 MMOL/L (ref 7–16)
ANION GAP SERPL CALCULATED.3IONS-SCNC: 13 MMOL/L (ref 7–16)
AST SERPL-CCNC: 38 U/L (ref 0–39)
BACTERIA URNS QL MICRO: ABNORMAL
BASOPHILS # BLD: 0.03 K/UL (ref 0–0.2)
BASOPHILS NFR BLD: 1 % (ref 0–2)
BILIRUB SERPL-MCNC: 0.5 MG/DL (ref 0–1.2)
BILIRUB UR QL STRIP: NEGATIVE
BUN SERPL-MCNC: 26 MG/DL (ref 6–23)
BUN SERPL-MCNC: 28 MG/DL (ref 6–23)
CALCIUM SERPL-MCNC: 8.9 MG/DL (ref 8.6–10.2)
CALCIUM SERPL-MCNC: 9.3 MG/DL (ref 8.6–10.2)
CHLORIDE SERPL-SCNC: 92 MMOL/L (ref 98–107)
CHLORIDE SERPL-SCNC: 95 MMOL/L (ref 98–107)
CLARITY UR: CLEAR
CO2 SERPL-SCNC: 18 MMOL/L (ref 22–29)
CO2 SERPL-SCNC: 18 MMOL/L (ref 22–29)
COLOR UR: YELLOW
CREAT SERPL-MCNC: 2 MG/DL (ref 0.7–1.2)
CREAT SERPL-MCNC: 2.1 MG/DL (ref 0.7–1.2)
CREAT UR-MCNC: 73 MG/DL (ref 40–278)
EKG ATRIAL RATE: 357 BPM
EKG Q-T INTERVAL: 430 MS
EKG QRS DURATION: 74 MS
EKG QTC CALCULATION (BAZETT): 403 MS
EKG R AXIS: 56 DEGREES
EKG T AXIS: 83 DEGREES
EKG VENTRICULAR RATE: 53 BPM
EOSINOPHIL # BLD: 0.16 K/UL (ref 0.05–0.5)
EOSINOPHILS RELATIVE PERCENT: 2 % (ref 0–6)
ERYTHROCYTE [DISTWIDTH] IN BLOOD BY AUTOMATED COUNT: 14.6 % (ref 11.5–15)
GFR, ESTIMATED: 31 ML/MIN/1.73M2
GFR, ESTIMATED: 32 ML/MIN/1.73M2
GLUCOSE BLD-MCNC: 173 MG/DL (ref 74–99)
GLUCOSE BLD-MCNC: 96 MG/DL (ref 74–99)
GLUCOSE SERPL-MCNC: 185 MG/DL (ref 74–99)
GLUCOSE SERPL-MCNC: 80 MG/DL (ref 74–99)
GLUCOSE UR STRIP-MCNC: NEGATIVE MG/DL
HCT VFR BLD AUTO: 33.8 % (ref 37–54)
HGB BLD-MCNC: 11 G/DL (ref 12.5–16.5)
HGB UR QL STRIP.AUTO: NEGATIVE
IMM GRANULOCYTES # BLD AUTO: <0.03 K/UL (ref 0–0.58)
IMM GRANULOCYTES NFR BLD: 0 % (ref 0–5)
INR PPP: 2.2
KETONES UR STRIP-MCNC: NEGATIVE MG/DL
LEUKOCYTE ESTERASE UR QL STRIP: NEGATIVE
LYMPHOCYTES NFR BLD: 1.18 K/UL (ref 1.5–4)
LYMPHOCYTES RELATIVE PERCENT: 18 % (ref 20–42)
MCH RBC QN AUTO: 26.9 PG (ref 26–35)
MCHC RBC AUTO-ENTMCNC: 32.5 G/DL (ref 32–34.5)
MCV RBC AUTO: 82.6 FL (ref 80–99.9)
MONOCYTES NFR BLD: 0.58 K/UL (ref 0.1–0.95)
MONOCYTES NFR BLD: 9 % (ref 2–12)
NEUTROPHILS NFR BLD: 70 % (ref 43–80)
NEUTS SEG NFR BLD: 4.57 K/UL (ref 1.8–7.3)
NITRITE UR QL STRIP: NEGATIVE
OSMOLALITY SERPL: 273 MOSM/KG (ref 285–310)
OSMOLALITY UR: 303 MOSM/KG (ref 300–900)
PH UR STRIP: 5.5 [PH] (ref 5–9)
PLATELET # BLD AUTO: 125 K/UL (ref 130–450)
PMV BLD AUTO: 10.1 FL (ref 7–12)
POTASSIUM SERPL-SCNC: 4.5 MMOL/L (ref 3.5–5)
POTASSIUM SERPL-SCNC: 4.7 MMOL/L (ref 3.5–5)
PROT SERPL-MCNC: 7.6 G/DL (ref 6.4–8.3)
PROT UR STRIP-MCNC: NEGATIVE MG/DL
PROTHROMBIN TIME: 24.6 SEC (ref 9.3–12.4)
RBC # BLD AUTO: 4.09 M/UL (ref 3.8–5.8)
RBC #/AREA URNS HPF: ABNORMAL /HPF
SODIUM SERPL-SCNC: 123 MMOL/L (ref 132–146)
SODIUM SERPL-SCNC: 125 MMOL/L (ref 132–146)
SODIUM UR-SCNC: <20 MMOL/L
SP GR UR STRIP: 1.02 (ref 1–1.03)
TSH SERPL DL<=0.05 MIU/L-ACNC: 0.93 UIU/ML (ref 0.27–4.2)
URATE SERPL-MCNC: 5.4 MG/DL (ref 3.4–7)
UROBILINOGEN UR STRIP-ACNC: 0.2 EU/DL (ref 0–1)
UUN UR-MCNC: 438 MG/DL (ref 800–1666)
WBC #/AREA URNS HPF: ABNORMAL /HPF
WBC OTHER # BLD: 6.5 K/UL (ref 4.5–11.5)

## 2024-08-21 PROCEDURE — 80053 COMPREHEN METABOLIC PANEL: CPT

## 2024-08-21 PROCEDURE — 81001 URINALYSIS AUTO W/SCOPE: CPT

## 2024-08-21 PROCEDURE — 97166 OT EVAL MOD COMPLEX 45 MIN: CPT

## 2024-08-21 PROCEDURE — 97535 SELF CARE MNGMENT TRAINING: CPT

## 2024-08-21 PROCEDURE — 36415 COLL VENOUS BLD VENIPUNCTURE: CPT

## 2024-08-21 PROCEDURE — 2580000003 HC RX 258: Performed by: INTERNAL MEDICINE

## 2024-08-21 PROCEDURE — 6370000000 HC RX 637 (ALT 250 FOR IP): Performed by: PHYSICIAN ASSISTANT

## 2024-08-21 PROCEDURE — 2060000000 HC ICU INTERMEDIATE R&B

## 2024-08-21 PROCEDURE — 85025 COMPLETE CBC W/AUTO DIFF WBC: CPT

## 2024-08-21 PROCEDURE — 85610 PROTHROMBIN TIME: CPT

## 2024-08-21 PROCEDURE — 6370000000 HC RX 637 (ALT 250 FOR IP)

## 2024-08-21 PROCEDURE — 83935 ASSAY OF URINE OSMOLALITY: CPT

## 2024-08-21 PROCEDURE — 2580000003 HC RX 258: Performed by: PHYSICIAN ASSISTANT

## 2024-08-21 PROCEDURE — 84550 ASSAY OF BLOOD/URIC ACID: CPT

## 2024-08-21 PROCEDURE — 84540 ASSAY OF URINE/UREA-N: CPT

## 2024-08-21 PROCEDURE — 97161 PT EVAL LOW COMPLEX 20 MIN: CPT

## 2024-08-21 PROCEDURE — 97530 THERAPEUTIC ACTIVITIES: CPT

## 2024-08-21 PROCEDURE — 80048 BASIC METABOLIC PNL TOTAL CA: CPT

## 2024-08-21 PROCEDURE — 83930 ASSAY OF BLOOD OSMOLALITY: CPT

## 2024-08-21 PROCEDURE — 82570 ASSAY OF URINE CREATININE: CPT

## 2024-08-21 PROCEDURE — 82962 GLUCOSE BLOOD TEST: CPT

## 2024-08-21 PROCEDURE — 93010 ELECTROCARDIOGRAM REPORT: CPT | Performed by: INTERNAL MEDICINE

## 2024-08-21 PROCEDURE — 84443 ASSAY THYROID STIM HORMONE: CPT

## 2024-08-21 PROCEDURE — 84300 ASSAY OF URINE SODIUM: CPT

## 2024-08-21 PROCEDURE — 6370000000 HC RX 637 (ALT 250 FOR IP): Performed by: INTERNAL MEDICINE

## 2024-08-21 RX ORDER — WARFARIN SODIUM 2 MG/1
2 TABLET ORAL
Status: COMPLETED | OUTPATIENT
Start: 2024-08-21 | End: 2024-08-21

## 2024-08-21 RX ORDER — SODIUM CHLORIDE 9 MG/ML
INJECTION, SOLUTION INTRAVENOUS CONTINUOUS
Status: DISCONTINUED | OUTPATIENT
Start: 2024-08-21 | End: 2024-08-24

## 2024-08-21 RX ORDER — SODIUM CHLORIDE, SODIUM LACTATE, POTASSIUM CHLORIDE, CALCIUM CHLORIDE 600; 310; 30; 20 MG/100ML; MG/100ML; MG/100ML; MG/100ML
INJECTION, SOLUTION INTRAVENOUS CONTINUOUS
Status: DISCONTINUED | OUTPATIENT
Start: 2024-08-21 | End: 2024-08-21

## 2024-08-21 RX ORDER — SODIUM BICARBONATE 650 MG/1
650 TABLET ORAL 3 TIMES DAILY
Status: DISCONTINUED | OUTPATIENT
Start: 2024-08-21 | End: 2024-08-27 | Stop reason: HOSPADM

## 2024-08-21 RX ADMIN — ATORVASTATIN CALCIUM 20 MG: 10 TABLET, FILM COATED ORAL at 19:19

## 2024-08-21 RX ADMIN — METOPROLOL TARTRATE 37.5 MG: 25 TABLET, FILM COATED ORAL at 09:18

## 2024-08-21 RX ADMIN — SODIUM CHLORIDE: 9 INJECTION, SOLUTION INTRAVENOUS at 19:19

## 2024-08-21 RX ADMIN — WARFARIN SODIUM 2 MG: 2 TABLET ORAL at 17:30

## 2024-08-21 RX ADMIN — SODIUM BICARBONATE 650 MG: 650 TABLET ORAL at 09:20

## 2024-08-21 RX ADMIN — SODIUM CHLORIDE, POTASSIUM CHLORIDE, SODIUM LACTATE AND CALCIUM CHLORIDE: 600; 310; 30; 20 INJECTION, SOLUTION INTRAVENOUS at 06:20

## 2024-08-21 RX ADMIN — INSULIN HUMAN 10 UNITS: 100 INJECTION, SOLUTION PARENTERAL at 09:18

## 2024-08-21 RX ADMIN — ISOSORBIDE MONONITRATE 30 MG: 30 TABLET, EXTENDED RELEASE ORAL at 09:18

## 2024-08-21 RX ADMIN — INSULIN HUMAN 10 UNITS: 100 INJECTION, SOLUTION PARENTERAL at 17:30

## 2024-08-21 RX ADMIN — RANOLAZINE 500 MG: 500 TABLET, FILM COATED, EXTENDED RELEASE ORAL at 19:19

## 2024-08-21 RX ADMIN — SODIUM BICARBONATE 650 MG: 650 TABLET ORAL at 14:56

## 2024-08-21 RX ADMIN — SODIUM CHLORIDE, PRESERVATIVE FREE 10 ML: 5 INJECTION INTRAVENOUS at 19:20

## 2024-08-21 RX ADMIN — METOPROLOL TARTRATE 37.5 MG: 25 TABLET, FILM COATED ORAL at 17:30

## 2024-08-21 RX ADMIN — SODIUM BICARBONATE 650 MG: 650 TABLET ORAL at 19:19

## 2024-08-21 RX ADMIN — RANOLAZINE 500 MG: 500 TABLET, FILM COATED, EXTENDED RELEASE ORAL at 09:18

## 2024-08-21 RX ADMIN — ASPIRIN 81 MG: 81 TABLET, COATED ORAL at 09:18

## 2024-08-21 NOTE — CARE COORDINATION
Chart reviewed and case reviewed in IDR.  Patient admitted with nausea/vominting and c/o diarrhea.  Na 123, now 125.  LR @ 75.  Consults to general surgery and Nephrology.  Met with the patient and his wife Tia at the bedside to discuss transition of care planning.  Patient lives in a ranch style home with a caregiver 24/7, Joshua Pittman.  Patient has a cane and Life Alert at home, no history of HHC or rehab.  Patient's PCP is Dr Beal and they use [facility] [address] for their medications.  Tia stated he use to have meals on Wheels and is interested in having something like that arranged for the patient again, as well as possibly assistance for changing his ostomy and managing his insulin.  Discussed HHC and both are agreeable.  Reviewed HC provider list and no preference provided at this time.  Also discussed referral to [facility] for assessment for community resources.  Both are agreeable.  Patient asked for Tia to be the contact.  Patient would also like a Rollator for transition of care for when he goes out or has to walk distances.  Referral faxed to [facility].  Orders received for HHC and DME.  Call placed to Mariaelena, liaison with [facility] and referral made via detailed VM.  Await return call for acceptance.  Call also placed to Nohemi, liaison with [facility] for Rollator and referral made for equipment.  She will pull the information and provide DME for discharge.  Will continue to follow for further transition of care planning needs.       Nina Jacobo RN.  P:  [phone]        Case Management Assessment  Initial Evaluation    Date/Time of Evaluation: 8/21/2024 11:36 AM  Assessment Completed by: Nina Jacobo RN    If patient is discharged prior to next notation, then this note serves as note for discharge by case management.    Patient Name: Johny Nichols                   YOB: 1935  Diagnosis: Hyponatremia [E87.1]  Nausea and vomiting, unspecified

## 2024-08-21 NOTE — PROGRESS NOTES
Patient refused all night time medications stating he already took everything he needed for the day. Patient also unaware of home medications, will call wife in morning to confirm home medications. Patient educated on importance of taking prescribed medications.

## 2024-08-21 NOTE — PLAN OF CARE
Problem: Safety - Adult  Goal: Free from fall injury  8/21/2024 1049 by Hair Martines RN  Outcome: Progressing  Flowsheets (Taken 8/21/2024 1048)  Free From Fall Injury:   Instruct family/caregiver on patient safety   Based on caregiver fall risk screen, instruct family/caregiver to ask for assistance with transferring infant if caregiver noted to have fall risk factors  8/20/2024 2322 by Mary Barber RN  Outcome: Progressing     Problem: Chronic Conditions and Co-morbidities  Goal: Patient's chronic conditions and co-morbidity symptoms are monitored and maintained or improved  8/21/2024 1049 by Hair Martines RN  Outcome: Progressing  Flowsheets (Taken 8/21/2024 0918)  Care Plan - Patient's Chronic Conditions and Co-Morbidity Symptoms are Monitored and Maintained or Improved:   Monitor and assess patient's chronic conditions and comorbid symptoms for stability, deterioration, or improvement   Collaborate with multidisciplinary team to address chronic and comorbid conditions and prevent exacerbation or deterioration  8/20/2024 2322 by Mary Barber RN  Outcome: Progressing     Problem: Discharge Planning  Goal: Discharge to home or other facility with appropriate resources  8/21/2024 1049 by Hair Martines RN  Outcome: Progressing  Flowsheets (Taken 8/21/2024 0918)  Discharge to home or other facility with appropriate resources:   Identify barriers to discharge with patient and caregiver   Arrange for needed discharge resources and transportation as appropriate   Identify discharge learning needs (meds, wound care, etc)  8/20/2024 2322 by Mary Barber RN  Outcome: Progressing     Problem: ABCDS Injury Assessment  Goal: Absence of physical injury  8/21/2024 1049 by Hair Martines RN  Outcome: Progressing  Flowsheets (Taken 8/21/2024 1048)  Absence of Physical Injury: Implement safety measures based on patient assessment  8/20/2024 2322 by Mary Barber RN  Outcome: Progressing

## 2024-08-21 NOTE — PROGRESS NOTES
Physical Therapy  Physical Therapy Initial Assessment     Name: Johny Nichols  : 1935  MRN: [id number]      Date of Service: 2024    Evaluating PT:  Rika Calderon PT, DPT [id number]    Room #:  8517/8517-B  Diagnosis:  Hyponatremia [E87.1]  Nausea and vomiting, unspecified vomiting type [R11.2]  PMHx/PSHx:   has a past medical history of A-fib (HCC), Chronic kidney disease, Chronic renal impairment, stage 3 (moderate) (HCC), Colitis, ulcerative chronic (HCC), Diabetes mellitus (HCC), DM (diabetes mellitus) (HCC), Encounter for therapeutic drug monitoring, Hyperlipidemia, Hyperlipidemia associated with type 2 diabetes mellitus (HCC), Hypertension, Long term (current) use of anticoagulants, Prostate enlargement, Stroke (cerebrum) (HCC), and Unspecified cerebral artery occlusion with cerebral infarction.  Precautions:  Fall risk, alarms, ostomy  Equipment Needs:  TBD    SUBJECTIVE:    Pt lives with his wife in a 1 story home with 1 step to enter and no rail(s). Pt has a caregiver who lives in his home and who assists with dressing, bathing, etc. Pt ambulated with a SPC (Emily) PTA.    OBJECTIVE:   Initial Evaluation  Date: 24 Treatment Date:  Short Term/ Long Term   Goals   AM-PAC 6 Clicks      Was pt agreeable to Eval/treatment? Yes     Does pt have pain? Denied pain     Bed Mobility  Rolling: NT  Supine to sit: SBA  Sit to supine: NT  Scooting: SBA  Rolling: Independent  Supine to sit: Independent  Sit to supine: Independent  Scooting: Independent   Transfers Sit to stand: Yusra  Stand to sit: Yusra  Stand pivot: Yusra with FWW  Sit to stand: Independent  Stand to sit: Independent  Stand pivot: Mod I with AAD   Ambulation    40 feet x2 with FWW and Yusra  >200 feet with AAD and Mod I   Stair negotiation: ascended and descended NT  >1 steps with no rail and Mod I   ROM BUE:  Refer to OT  BLE:  WFL     Strength BUE:  Refer to OT  BLE:  Grossly 4+/5     Balance Sitting EOB:  SBA  Dynamic Standing:  Yusra

## 2024-08-21 NOTE — PROGRESS NOTES
4 Eyes Skin Assessment     NAME:  Johny Nichols  YOB: 1935  MEDICAL RECORD NUMBER:  [id number]    The patient is being assessed for  Admission    I agree that at least one RN has performed a thorough Head to Toe Skin Assessment on the patient. ALL assessment sites listed below have been assessed.      Areas assessed by both nurses:    Head, Face, Ears, Shoulders, Back, Chest, Arms, Elbows, Hands, Sacrum. Buttock, Coccyx, Ischium, Legs. Feet and Heels, and Under Medical Devices         Does the Patient have a Wound? No noted wound(s)       Clark Prevention initiated by RN: No  Wound Care Orders initiated by RN: No    Pressure Injury (Stage 3,4, Unstageable, DTI, NWPT, and Complex wounds) if present, place Wound referral order by RN under : No    New Ostomies, if present place, Ostomy referral order under : No     Nurse 1 eSignature: Electronically signed by Mary Barber RN on 8/20/24 at 11:52 PM EDT    **SHARE this note so that the co-signing nurse can place an eSignature**    Nurse 2 eSignature: {Esignature:[id number]}

## 2024-08-21 NOTE — PROGRESS NOTES
OCCUPATIONAL THERAPY INITIAL EVALUATION    [facility] [address], [place]       Date:2024                                                  Patient Name: Johny Nichols  MRN: [id number]  : 1935  Room: [facility]    Evaluating OT: Moise Singh OTR/L [id number]    Referring Provider: Nathen Keller PA-C      Specific Provider Orders/Date: OT evaluation and treatment 24    Diagnosis:  Hyponatremia [E87.1]  Nausea and vomiting, unspecified vomiting type [R11.2]      Pertinent Medical History:  has a past medical history of A-fib (HCC), Chronic kidney disease, Chronic renal impairment, stage 3 (moderate) (HCC), Colitis, ulcerative chronic (HCC), Diabetes mellitus (HCC), DM (diabetes mellitus) (HCC), Encounter for therapeutic drug monitoring, Hyperlipidemia, Hyperlipidemia associated with type 2 diabetes mellitus (HCC), Hypertension, Long term (current) use of anticoagulants, Prostate enlargement, Stroke (cerebrum) (HCC), and Unspecified cerebral artery occlusion with cerebral infarction.   Past Surgical History:   Procedure Laterality Date    ABDOMEN SURGERY          COLONOSCOPY      COLOSTOMY      CYSTOSCOPY  more than 5 yrs    ECHO COMPL W DOP COLOR FLOW  2013         ECHOCARDIOGRAM TRANSESOPHAGEAL  2013         ENDOSCOPY, COLON, DIAGNOSTIC  12/10/15    ileoscopy    TURP  14    cystoscopy retrogrades    UPPER GASTROINTESTINAL ENDOSCOPY N/A 3/11/2022    EGD ESOPHAGOGASTRODUODENOSCOPY ULTRASOUND performed by Conrad Gerber MD at [facility] ENDOSCOPY       Pt admitted to the hospital  with nausea and vomiting     Orders received, chart reviewed, eval complete.     Precautions:  Fall Risk, cognition, +alarms, LUE weakness at baseline     Assessment of current deficits   [x] Functional mobility   [x]ADLs  [x] Strength               [x]Cognition   [x] Functional transfers   [] IADLs         [x] Safety
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
## 2024-08-21 NOTE — CONSULTS
GENERAL SURGERY  CONSULT NOTE    Patient's Name/Date of Birth: Johny Nichols / 1935    Date: August 21, 2024     PCP: Nicholas Beal MD     Chief Complaint:   Chief Complaint   Patient presents with    Emesis     Patient c/o nausea and vomiting , states he was seen 5 days ago for same with no relief. Hx DM        Physician Consulted: Dr. Conner  Reason for Consult: Nausea, vomiting, abdominal distension  Referring Physician: Dr. Beal    HPI  Johny Nichols is a 88 y.o. male who we are being consulted for nausea, vomiting, abdominal distention.  Patient presented to the ED yesterday with complaints of the same he was admitted to medicine and is currently being treated in conjunction with nephrology for hyponatremia and electrolyte abnormalities.  Patient history is as follows: Patient had a 1 day history of nausea with 1 large episode of emesis followed by a syncopal episode. Pt recently seems to have questionable PO intake. Notes that his urine has been darker in color and he has increased diarrhea in his colostomy but states this consistency is his baseline. Patient was also found to have some degree of altered mental status.  Patient was seen and discharged on 8/15 for similar circumstances of one episode of emesis. At that time he was discharged from the ED after imaging and labs were unremarkable. Labs this admission were remarkable for Na 125 up from 123, Cl 95, BUN 26, Cr 2.1, GFR 31 up from 28.    Pt has atrial fibrillation, on warfarin. INR 2.2. 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 Most recent scopes were by  in 2015 notable for gastritis.       Past Medical History:   Diagnosis Date    A-fib (HCC)     Chronic kidney disease     Chronic renal impairment, stage 3 (moderate) (HCC) 10/10/2016    Colitis, ulcerative chronic (HCC)     Diabetes mellitus (HCC)     DM (diabetes mellitus) (HCC) 11/25/2013    Encounter for therapeutic drug monitoring 4/5/2016    Hyperlipidemia      Mother     COPD Sister     Coronary Art Dis Sister     Diabetes Other     ; No family history of problems with anesthesia     Social History     Tobacco Use    Smoking status: Former     Current packs/day: 0.10     Types: Cigarettes     Passive exposure: Never    Smokeless tobacco: Never   Vaping Use    Vaping status: Never Used   Substance Use Topics    Alcohol use: Not Currently    Drug use: Never         Review of Systems   Review of Systems   Constitutional:  Negative for appetite change.   Gastrointestinal:  Positive for diarrhea.   Psychiatric/Behavioral:  Positive for confusion.          PHYSICAL EXAM:    Vitals:    08/21/24 1217   BP: 131/70   Pulse: 63   Resp: 16   Temp: 97.1 °F (36.2 °C)   SpO2: 100%       General appearance:  lying in bed, NAD, A&Ox4  Neurologic: GC15, PERRL  Head: NCAT, EOMI, red conjunctiva  Neck: supple, no masses, trachea midline  Lungs: symmetric chest rise, no respiratory distress  Heart: normal rate per peripheral pulse  Abdomen: soft, non-tender, non-distended, ileostomy w/ thin stools   Skin: warm and dry, no cyanosis  Extremities: atraumatic, no focal motor deficits, no open wounds    LABS:  CBC  Recent Labs     08/21/24  0532   WBC 6.5   HGB 11.0*   HCT 33.8*   *     BMP  Recent Labs     08/21/24  0532   *   K 4.7   CL 95*   CO2 18*   BUN 26*   CREATININE 2.1*   CALCIUM 9.3     Liver Function  Recent Labs     08/21/24  0532   BILITOT 0.5   AST 38   ALT 16   ALKPHOS 129     No results for input(s): \"LACTATE\" in the last 72 hours.  Recent Labs     08/21/24  0600   INR 2.2       RADIOLOGY  I have personally reviewed all relevant imaging:      XR CHEST PORTABLE   Final Result   1. Mild cardiomegaly with pulmonary vascular congestion.   2. Distended stomach.                ASSESSMENT:      Patient Active Problem List   Diagnosis    Cerebrovascular accident (CVA) (HCC)    Type 2 diabetes mellitus with kidney complication, with long-term current use of insulin (HCC)
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
## 2024-08-21 NOTE — CARE COORDINATION
Internal Medicine On-call Care Coordination Note    I was called by the ED physician because they recommended admission for this patient and I cover their PCP.  The history as I understand it after discussion with the ED physician is as follows:    Presents with nausea and vomiting, in ED for similar a few days ago  Creatinine 2.2, Na 123  Given 1L IVF bolus  Decision made for admission    I placed admission orders.  Including:    Nephrology consult  Hold additional fluids pending repeat Na  CHRISTAL Jimenez or his coverage will see the patient tomorrow for H&P.    Electronically signed by Nathen Keller PA-C on 8/20/2024 at 9:22 PM

## 2024-08-21 NOTE — PROGRESS NOTES
Pharmacy Consultation Note  (Warfarin Dosing and Monitoring)    Initial consult date: 8/21/24  Consulting Provider: Dr. Emigdio SHERWOOD Nichols is a 88 y.o. male for whom pharmacy has been asked to manage warfarin therapy.     Weight:   Wt Readings from Last 1 Encounters:   08/20/24 65.8 kg (145 lb)       TSH:    Lab Results   Component Value Date/Time    TSH 2.62 08/07/2023 04:02 AM       Hepatic Function Panel:                            Lab Results   Component Value Date/Time    ALKPHOS 129 08/21/2024 05:32 AM    ALT 16 08/21/2024 05:32 AM    AST 38 08/21/2024 05:32 AM    BILITOT 0.5 08/21/2024 05:32 AM    BILIDIR <0.2 10/01/2020 07:50 AM    IBILI see below 10/01/2020 07:50 AM       Current significant warfarin drug-drug interactions include: No significant interactions    Recent Labs     08/20/24  1937 08/21/24  0532   HGB 11.5* 11.0*    125*     Date Warfarin Dose INR Heparin or LMWH Comment   8/21 < 2 mg > 2.2 -                                  Assessment:  Patient is a 88 y.o. male on warfarin for Atrial Fibrillation.  Patient's home warfarin dosing regimen is 2 mg daily per home medication list.  Goal INR 2 - 3  INR 2.2 today    Plan:  Warfarin 2 mg tonight  Daily PT/INR until the INR is stable within the therapeutic range  Pharmacist will follow and monitor/adjust dosing as necessary    Thank you for this consult,    Michelle Finch, PharmD, BCPS 8/21/2024 7:52 AM

## 2024-08-21 NOTE — PROGRESS NOTES
Patients wife, Tia, states she will bring a list of home medications with her when she comes today.

## 2024-08-21 NOTE — ACP (ADVANCE CARE PLANNING)
Advance Care Planning   Healthcare Decision Maker:    Primary Decision Maker: Tia Nichols - [facility] - [phone]    Click here to complete Healthcare Decision Makers including selection of the Healthcare Decision Maker Relationship (ie \"Primary\").               Nina Jacobo RN.

## 2024-08-22 LAB
25(OH)D3 SERPL-MCNC: 13.9 NG/ML (ref 30–100)
ALBUMIN SERPL-MCNC: 3.7 G/DL (ref 3.5–5.2)
ALP SERPL-CCNC: 124 U/L (ref 40–129)
ALT SERPL-CCNC: 14 U/L (ref 0–40)
ANION GAP SERPL CALCULATED.3IONS-SCNC: 11 MMOL/L (ref 7–16)
ANION GAP SERPL CALCULATED.3IONS-SCNC: 12 MMOL/L (ref 7–16)
ANION GAP SERPL CALCULATED.3IONS-SCNC: 12 MMOL/L (ref 7–16)
AST SERPL-CCNC: 30 U/L (ref 0–39)
BASOPHILS # BLD: 0.04 K/UL (ref 0–0.2)
BASOPHILS NFR BLD: 1 % (ref 0–2)
BILIRUB SERPL-MCNC: 0.5 MG/DL (ref 0–1.2)
BUN SERPL-MCNC: 25 MG/DL (ref 6–23)
BUN SERPL-MCNC: 26 MG/DL (ref 6–23)
BUN SERPL-MCNC: 27 MG/DL (ref 6–23)
CALCIUM SERPL-MCNC: 8.7 MG/DL (ref 8.6–10.2)
CALCIUM SERPL-MCNC: 8.9 MG/DL (ref 8.6–10.2)
CALCIUM SERPL-MCNC: 9.2 MG/DL (ref 8.6–10.2)
CHLORIDE SERPL-SCNC: 100 MMOL/L (ref 98–107)
CHLORIDE SERPL-SCNC: 98 MMOL/L (ref 98–107)
CHLORIDE SERPL-SCNC: 99 MMOL/L (ref 98–107)
CO2 SERPL-SCNC: 20 MMOL/L (ref 22–29)
CO2 SERPL-SCNC: 20 MMOL/L (ref 22–29)
CO2 SERPL-SCNC: 21 MMOL/L (ref 22–29)
CORTIS SERPL-MCNC: 14.3 UG/DL (ref 2.7–18.4)
CORTISOL COLLECTION INFO: NORMAL
CREAT SERPL-MCNC: 1.9 MG/DL (ref 0.7–1.2)
EOSINOPHIL # BLD: 0.13 K/UL (ref 0.05–0.5)
EOSINOPHILS RELATIVE PERCENT: 2 % (ref 0–6)
ERYTHROCYTE [DISTWIDTH] IN BLOOD BY AUTOMATED COUNT: 14.6 % (ref 11.5–15)
GFR, ESTIMATED: 33 ML/MIN/1.73M2
GFR, ESTIMATED: 33 ML/MIN/1.73M2
GFR, ESTIMATED: 34 ML/MIN/1.73M2
GLUCOSE BLD-MCNC: 220 MG/DL (ref 74–99)
GLUCOSE BLD-MCNC: 243 MG/DL (ref 74–99)
GLUCOSE BLD-MCNC: 88 MG/DL (ref 74–99)
GLUCOSE SERPL-MCNC: 127 MG/DL (ref 74–99)
GLUCOSE SERPL-MCNC: 206 MG/DL (ref 74–99)
GLUCOSE SERPL-MCNC: 67 MG/DL (ref 74–99)
HCT VFR BLD AUTO: 31.7 % (ref 37–54)
HGB BLD-MCNC: 10.5 G/DL (ref 12.5–16.5)
IMM GRANULOCYTES # BLD AUTO: 0.04 K/UL (ref 0–0.58)
IMM GRANULOCYTES NFR BLD: 1 % (ref 0–5)
INR PPP: 2.4
LYMPHOCYTES NFR BLD: 1.06 K/UL (ref 1.5–4)
LYMPHOCYTES RELATIVE PERCENT: 18 % (ref 20–42)
MAGNESIUM SERPL-MCNC: 1.4 MG/DL (ref 1.6–2.6)
MCH RBC QN AUTO: 27.9 PG (ref 26–35)
MCHC RBC AUTO-ENTMCNC: 33.1 G/DL (ref 32–34.5)
MCV RBC AUTO: 84.1 FL (ref 80–99.9)
MONOCYTES NFR BLD: 0.57 K/UL (ref 0.1–0.95)
MONOCYTES NFR BLD: 10 % (ref 2–12)
NEUTROPHILS NFR BLD: 69 % (ref 43–80)
NEUTS SEG NFR BLD: 4.18 K/UL (ref 1.8–7.3)
PHOSPHATE SERPL-MCNC: 2.6 MG/DL (ref 2.5–4.5)
PLATELET, FLUORESCENCE: 121 K/UL (ref 130–450)
PMV BLD AUTO: 10.6 FL (ref 7–12)
POTASSIUM SERPL-SCNC: 3.9 MMOL/L (ref 3.5–5)
POTASSIUM SERPL-SCNC: 4.2 MMOL/L (ref 3.5–5)
POTASSIUM SERPL-SCNC: 4.3 MMOL/L (ref 3.5–5)
PROT SERPL-MCNC: 7.1 G/DL (ref 6.4–8.3)
PROTHROMBIN TIME: 26.4 SEC (ref 9.3–12.4)
PTH-INTACT SERPL-MCNC: 85.3 PG/ML (ref 15–65)
RBC # BLD AUTO: 3.77 M/UL (ref 3.8–5.8)
SODIUM SERPL-SCNC: 130 MMOL/L (ref 132–146)
SODIUM SERPL-SCNC: 131 MMOL/L (ref 132–146)
SODIUM SERPL-SCNC: 132 MMOL/L (ref 132–146)
WBC OTHER # BLD: 6 K/UL (ref 4.5–11.5)

## 2024-08-22 PROCEDURE — 82962 GLUCOSE BLOOD TEST: CPT

## 2024-08-22 PROCEDURE — 2060000000 HC ICU INTERMEDIATE R&B

## 2024-08-22 PROCEDURE — 2580000003 HC RX 258

## 2024-08-22 PROCEDURE — 80048 BASIC METABOLIC PNL TOTAL CA: CPT

## 2024-08-22 PROCEDURE — 36415 COLL VENOUS BLD VENIPUNCTURE: CPT

## 2024-08-22 PROCEDURE — 83735 ASSAY OF MAGNESIUM: CPT

## 2024-08-22 PROCEDURE — 6370000000 HC RX 637 (ALT 250 FOR IP): Performed by: PHYSICIAN ASSISTANT

## 2024-08-22 PROCEDURE — 6370000000 HC RX 637 (ALT 250 FOR IP)

## 2024-08-22 PROCEDURE — 51702 INSERT TEMP BLADDER CATH: CPT

## 2024-08-22 PROCEDURE — 82533 TOTAL CORTISOL: CPT

## 2024-08-22 PROCEDURE — 82306 VITAMIN D 25 HYDROXY: CPT

## 2024-08-22 PROCEDURE — 6370000000 HC RX 637 (ALT 250 FOR IP): Performed by: INTERNAL MEDICINE

## 2024-08-22 PROCEDURE — 83970 ASSAY OF PARATHORMONE: CPT

## 2024-08-22 PROCEDURE — 2580000003 HC RX 258: Performed by: PHYSICIAN ASSISTANT

## 2024-08-22 PROCEDURE — 84100 ASSAY OF PHOSPHORUS: CPT

## 2024-08-22 PROCEDURE — 6370000000 HC RX 637 (ALT 250 FOR IP): Performed by: NURSE PRACTITIONER

## 2024-08-22 PROCEDURE — 6360000002 HC RX W HCPCS

## 2024-08-22 PROCEDURE — 85610 PROTHROMBIN TIME: CPT

## 2024-08-22 PROCEDURE — 80053 COMPREHEN METABOLIC PANEL: CPT

## 2024-08-22 PROCEDURE — 85025 COMPLETE CBC W/AUTO DIFF WBC: CPT

## 2024-08-22 RX ORDER — MAGNESIUM SULFATE IN WATER 40 MG/ML
2000 INJECTION, SOLUTION INTRAVENOUS ONCE
Status: COMPLETED | OUTPATIENT
Start: 2024-08-22 | End: 2024-08-22

## 2024-08-22 RX ORDER — HYDRALAZINE HYDROCHLORIDE 20 MG/ML
10 INJECTION INTRAMUSCULAR; INTRAVENOUS EVERY 6 HOURS PRN
Status: DISCONTINUED | OUTPATIENT
Start: 2024-08-22 | End: 2024-08-27 | Stop reason: HOSPADM

## 2024-08-22 RX ORDER — TAMSULOSIN HYDROCHLORIDE 0.4 MG/1
0.4 CAPSULE ORAL NIGHTLY
Status: DISCONTINUED | OUTPATIENT
Start: 2024-08-22 | End: 2024-08-27 | Stop reason: HOSPADM

## 2024-08-22 RX ORDER — LIDOCAINE HYDROCHLORIDE 20 MG/ML
JELLY TOPICAL PRN
Status: DISCONTINUED | OUTPATIENT
Start: 2024-08-22 | End: 2024-08-27 | Stop reason: HOSPADM

## 2024-08-22 RX ORDER — WARFARIN SODIUM 2 MG/1
2 TABLET ORAL
Status: COMPLETED | OUTPATIENT
Start: 2024-08-22 | End: 2024-08-22

## 2024-08-22 RX ORDER — VITAMIN B COMPLEX
1000 TABLET ORAL DAILY
Status: DISCONTINUED | OUTPATIENT
Start: 2024-08-22 | End: 2024-08-27 | Stop reason: HOSPADM

## 2024-08-22 RX ADMIN — MAGNESIUM SULFATE HEPTAHYDRATE 2000 MG: 40 INJECTION, SOLUTION INTRAVENOUS at 10:42

## 2024-08-22 RX ADMIN — SODIUM BICARBONATE 650 MG: 650 TABLET ORAL at 15:19

## 2024-08-22 RX ADMIN — ASPIRIN 81 MG: 81 TABLET, COATED ORAL at 10:33

## 2024-08-22 RX ADMIN — RANOLAZINE 500 MG: 500 TABLET, FILM COATED, EXTENDED RELEASE ORAL at 10:34

## 2024-08-22 RX ADMIN — METOPROLOL TARTRATE 37.5 MG: 25 TABLET, FILM COATED ORAL at 10:40

## 2024-08-22 RX ADMIN — INSULIN HUMAN 10 UNITS: 100 INJECTION, SOLUTION PARENTERAL at 16:01

## 2024-08-22 RX ADMIN — ATORVASTATIN CALCIUM 20 MG: 10 TABLET, FILM COATED ORAL at 22:21

## 2024-08-22 RX ADMIN — Medication 1000 UNITS: at 10:40

## 2024-08-22 RX ADMIN — SODIUM CHLORIDE, PRESERVATIVE FREE 10 ML: 5 INJECTION INTRAVENOUS at 22:25

## 2024-08-22 RX ADMIN — TAMSULOSIN HYDROCHLORIDE 0.4 MG: 0.4 CAPSULE ORAL at 22:21

## 2024-08-22 RX ADMIN — WARFARIN SODIUM 2 MG: 2 TABLET ORAL at 18:26

## 2024-08-22 RX ADMIN — SODIUM CHLORIDE: 9 INJECTION, SOLUTION INTRAVENOUS at 10:42

## 2024-08-22 RX ADMIN — SODIUM BICARBONATE 650 MG: 650 TABLET ORAL at 10:33

## 2024-08-22 RX ADMIN — METOPROLOL TARTRATE 37.5 MG: 25 TABLET, FILM COATED ORAL at 18:27

## 2024-08-22 RX ADMIN — RANOLAZINE 500 MG: 500 TABLET, FILM COATED, EXTENDED RELEASE ORAL at 22:21

## 2024-08-22 RX ADMIN — ISOSORBIDE MONONITRATE 30 MG: 30 TABLET, EXTENDED RELEASE ORAL at 10:35

## 2024-08-22 RX ADMIN — SODIUM BICARBONATE 650 MG: 650 TABLET ORAL at 22:21

## 2024-08-22 NOTE — PROGRESS NOTES
Jorge serve Dr. Valencia for urology consult.    Orders given to consult urology per Dr. Valencia.

## 2024-08-22 NOTE — PROGRESS NOTES
Notified Dr. Cesar Valencia unable to place salmeron.    Per Dr. Valencia ordering urology consult now.

## 2024-08-22 NOTE — CONSULTS
8/22/2024 12:57 PM  Johny Nichols  [id number]     Chief Complaint:    Urinary retention, unable to place salmeron      History of Present Illness:      The patient is a 89 y.o. male patient who presented to the hospital with complaints of emesis. He was found to have MISAEL. Nephrology is following. He was bladder scanned for >500ml and attempts to place a salmeron were unsuccessful.   He is known to our practice and is a patient of Dr. Merritt Dhillon.   He has a history of BPH and elevated PSA  He has never been found to have prostate cancer      Past Medical History:   Diagnosis Date    A-fib (HCC)     Chronic kidney disease     Chronic renal impairment, stage 3 (moderate) (HCC) 10/10/2016    Colitis, ulcerative chronic (HCC)     Diabetes mellitus (HCC)     DM (diabetes mellitus) ([facility]) 11/25/2013    Encounter for therapeutic drug monitoring 4/5/2016    Hyperlipidemia     Hyperlipidemia associated with type 2 diabetes mellitus ([facility]) 1/17/2016    Hypertension     Long term (current) use of anticoagulants 4/5/2016    Prostate enlargement     Stroke (cerebrum) ([facility]) 2013    Unspecified cerebral artery occlusion with cerebral infarction 1973         Past Surgical History:   Procedure Laterality Date    ABDOMEN SURGERY      1978    COLONOSCOPY      COLOSTOMY  1978    CYSTOSCOPY  more than 5 yrs    ECHO COMPL W DOP COLOR FLOW  11/26/2013         ECHOCARDIOGRAM TRANSESOPHAGEAL  11/27/2013         ENDOSCOPY, COLON, DIAGNOSTIC  12/10/15    ileoscopy    TURP  6/16/14    cystoscopy retrogrades    UPPER GASTROINTESTINAL ENDOSCOPY N/A 3/11/2022    EGD ESOPHAGOGASTRODUODENOSCOPY ULTRASOUND [place] [facility] [place]       Medications Prior to Admission:    Medications Prior to Admission: aspirin 81 MG EC tablet, Take 1 tablet by mouth daily  isosorbide mononitrate (IMDUR) 30 MG extended release tablet, Take 1 tablet by mouth daily  atorvastatin (LIPITOR) 20 MG tablet, Take 1 tablet by mouth nightly  loperamide

## 2024-08-22 NOTE — CARE COORDINATION
Chart reviewed and case reviewed in IDR.  Na improved today after fluids changed to NS @ 100.  Na 131 today.  Creat 1.9.  Urology consult for urinary retention and salmeron catheter placement.  Patient to discharge with the catheter and follow-up for voiding trial in the office.  Patient to transition to home with [facility] with [facility].  Referral to [facility] for rollator made as well.  Patient accepted for [facility] per Mariaelena, liaison with [facility].  Patient to return home with his caregiver and his wife Tia will provide transportation when medically stable to discharge.  Will continue to follow for further transition of care planning needs.         Nina Jacobo RN.  P:  [phone]

## 2024-08-22 NOTE — PROGRESS NOTES
Diet:    ADULT DIET; Regular    Meds:     magnesium sulfate  2,000 mg IntraVENous Once    Vitamin D  1,000 Units Oral Daily    warfarin placeholder: dosing by pharmacy   Other RX Placeholder    sodium bicarbonate  650 mg Oral TID    ranolazine  500 mg Oral BID    metoprolol tartrate  37.5 mg Oral BID WC    isosorbide mononitrate  30 mg Oral Daily    insulin regular  10 Units SubCUTAneous BID AC    atorvastatin  20 mg Oral Nightly    aspirin  81 mg Oral Daily    sodium chloride flush  10 mL IntraVENous 2 times per day        sodium chloride 100 mL/hr at 08/21/24 1919    sodium chloride         Meds prn:     sodium chloride flush, sodium chloride, ondansetron **OR** ondansetron, senna, acetaminophen **OR** acetaminophen    Meds prior to admission:     No current facility-administered medications on file prior to encounter.     Current Outpatient Medications on File Prior to Encounter   Medication Sig Dispense Refill    aspirin 81 MG EC tablet Take 1 tablet by mouth daily 90 tablet 3    isosorbide mononitrate (IMDUR) 30 MG extended release tablet Take 1 tablet by mouth daily 90 tablet 3    atorvastatin (LIPITOR) 20 MG tablet Take 1 tablet by mouth nightly 90 tablet 3    loperamide (IMODIUM) 2 MG capsule Take 1 capsule by mouth as needed for Diarrhea      ranolazine (RANEXA) 500 MG extended release tablet Take 1 tablet by mouth 2 times daily 180 tablet 3    insulin regular (HUMULIN R;NOVOLIN R) 100 UNIT/ML injection Inject 10 Units into the skin 2 times daily (before meals)      warfarin (COUMADIN) 4 MG tablet Take by mouth Currently  2mg daily, per Dr Beal      Metoprolol Tartrate 37.5 MG TABS Take 37.5 mg by mouth 2 times daily (with meals) 180 tablet 2       Allergies:    Patient has no known allergies.    Social History:     reports that he has quit smoking. His smoking use included cigarettes. He has never been exposed to tobacco smoke. He has never used smokeless tobacco. He reports that he does not  currently use alcohol. He reports that he does not use drugs.    Family History:         Problem Relation Age of Onset    Hypotension Mother     COPD Sister     Coronary Art Dis Sister     Diabetes Other        Review of Systems:   Pertinent items are noted in HPI.    Physical Exam:      Patient Vitals for the past 24 hrs:   BP Temp Temp src Pulse Resp SpO2   08/22/24 0737 (!) 179/91 98 °F (36.7 °C) Oral 84 16 100 %   08/22/24 0534 (!) 189/96 96.8 °F (36 °C) Temporal 77 17 98 %   08/22/24 0003 122/67 97.6 °F (36.4 °C) Temporal 60 18 100 %   08/21/24 1854 106/78 97 °F (36.1 °C) Temporal 77 16 100 %   08/21/24 1541 (!) 140/74 97 °F (36.1 °C) Temporal 63 17 94 %   08/21/24 1217 131/70 97.1 °F (36.2 °C) Temporal 63 16 100 %         Intake/Output Summary (Last 24 hours) at 8/22/2024 0921  Last data filed at 8/22/2024 0530  Gross per 24 hour   Intake --   Output 1200 ml   Net -1200 ml       General: Awake, alert, no acute distress  Neck: No JVD noted  Lungs: Clear bilaterally upper, diminished to the bases bilaterally.  Unlabored  CV: Regular rate and rhythm.  No rub  Abd: Soft, nontender, nondistended.  Active bowel sounds; ostomy  Skin: Warm and dry.  No rash on exposed extremities  Ext: No edema   Neuro: Awake, answers questions appropriately    Data:    Recent Labs     08/20/24 1937 08/21/24  0532 08/22/24  0645   WBC 6.5 6.5 6.0   HGB 11.5* 11.0* 10.5*   HCT 35.7* 33.8* 31.7*   MCV 83.2 82.6 84.1    125*  --        Recent Labs     08/20/24 1937 08/21/24  0532 08/21/24  1708 08/22/24  0645   * 125* 123* 132   K 4.6 4.7 4.5 4.2   CL 92* 95* 92* 100   CO2 18* 18* 18* 20*   CREATININE 2.2* 2.1* 2.0* 1.9*   BUN 28* 26* 28* 26*   LABGLOM 28* 31* 32* 34*   GLUCOSE 104* 80 185* 67*   CALCIUM 9.3 9.3 8.9 8.9   PHOS  --   --   --  2.6   MG 1.6  --   --  1.4*       Vit D, 25-Hydroxy   Date Value Ref Range Status   08/22/2024 13.9 (L) 30.0 - 100.0 ng/mL Final       No results found for: \"PTH\"    Recent Labs

## 2024-08-22 NOTE — DISCHARGE INSTRUCTIONS
bag.  Use an alcohol wipe to clean the tip of the tubing attached to the bedside bag.  To stop the flow of urine, pinch the catheter with your fingers just above the tubing connection.  Use a twisting motion to disconnect the leg bag tubing from the catheter.  Then securely connect the catheter to the tubing from the bedside bag.  How do you clean a bedside bag?  Many people clean their bedside bag in the morning if they switch to a leg bag.  To clean a bedside drainage bag:  Remove the bedside bag and attach the leg bag.  Fill the bedside bag with 2 parts vinegar and 3 parts water. Let it stand for 20 minutes.  Empty the bag, and let it air dry.  When should you call for help?   Call your doctor now or seek immediate medical care if:    You have symptoms of a urinary infection. These may include:  Pain or burning when you urinate.  A frequent need to urinate without being able to pass much urine.  Pain in the flank, which is just below the rib cage and above the waist on either side of the back.  Blood in your urine.  A fever.     Your urine smells bad.     You see large blood clots in your urine.     No urine or very little urine is flowing into the bag for 4 or more hours.   Watch closely for changes in your health, and be sure to contact your doctor if:    The area around the catheter becomes irritated, swollen, red, or tender, or there is pus draining from it.     Urine is leaking from the place where the catheter enters your body.   Follow-up care is a key part of your treatment and safety. Be sure to make and go to all appointments, and call your doctor if you are having problems. It's also a good idea to know your test results and keep a list of the medicines you take.  Where can you learn more?  Go to [email or web address] and sign in to your Purple account. Enter U010 in the Search Health Information box to learn more about \"Learning About Indwelling Urinary Catheter Care to Prevent  or lotion to the skin around the catheter.  Do not tug or pull on the catheter.  Sexual intercourse may still be possible for people who wear a catheter. It's best to talk with a doctor about options.  How do you empty the bag?  The urine collection bag needs to be emptied regularly. It's best to empty the bag when it's about half full or at bedtime. If the doctor has asked you to measure the amount of urine, do that before you empty the urine into the toilet.  When you are ready to empty the bag, follow these steps:  Put on disposable gloves.  Remove the drain spout from its sleeve at the bottom of the collection bag. Open the valve on the spout.  Let the urine flow out of the bag and into the toilet or a container. Do not let the tubing or drain spout touch anything.  After you empty the bag, close the valve and put the drain spout back into its sleeve.  Remove your gloves, and throw them away.  Wash your hands with soap and water.  How do you care for someone after the catheter is removed?  After the catheter is taken out, the person may have trouble urinating. If this happens, try helping them sit in a few inches of warm water (sitz bath). If the urge to urinate comes during the sitz bath, it may be easier for them to urinate while still in the bath.  Some burning may happen the first few times the person urinates. If the burning lasts longer, it may be a sign of an infection.  Watch closely for changes in the person's health. Be sure to contact the doctor if you notice any problems or if the person is unable to urinate at all.  Where can you learn more?  Go to https://www.LLamasoft.net/patientEd and enter X535 to learn more about \"Learning About How to Care for a Person's Indwelling Urinary Catheter.\"  Current as of: November 15, 2023  Content Version: 14.1  © 3650-0826 Healthwise, Incorporated.   Care instructions adapted under license by [employer]. If you have questions about a medical condition or this

## 2024-08-22 NOTE — PROGRESS NOTES
Pharmacy Consultation Note  (Warfarin Dosing and Monitoring)    Initial consult date: 8/21/24  Consulting Provider: Dr. Emigdio SHERWOOD Nichols is a 89 y.o. male for whom pharmacy has been asked to manage warfarin therapy.     Weight:   Wt Readings from Last 1 Encounters:   08/20/24 65.8 kg (145 lb)       TSH:    Lab Results   Component Value Date/Time    TSH 0.93 08/21/2024 05:08 PM       Hepatic Function Panel:                            Lab Results   Component Value Date/Time    ALKPHOS 124 08/22/2024 06:45 AM    ALT 14 08/22/2024 06:45 AM    AST 30 08/22/2024 06:45 AM    BILITOT 0.5 08/22/2024 06:45 AM    BILIDIR <0.2 10/01/2020 07:50 AM    IBILI see below 10/01/2020 07:50 AM       Current significant warfarin drug-drug interactions include: No significant interactions    Recent Labs     08/20/24  1937 08/21/24  0532 08/22/24  0645   HGB 11.5* 11.0* 10.5*    125*  --      Date Warfarin Dose INR Heparin or LMWH Comment   8/21 2 mg  2.2 -    8/22 <2 mg> 2.4 -                           Assessment:  Patient is a 89 y.o. male on warfarin for Atrial Fibrillation.  Patient's home warfarin dosing regimen is 2 mg daily per home medication list.  Goal INR 2 - 3  INR 2.4 today    Plan:  Warfarin 2 mg tonight  Daily PT/INR until the INR is stable within the therapeutic range  Pharmacist will follow and monitor/adjust dosing as necessary    Thank you for this consult,    Lissa KelloggD, BCPS 8/22/2024 1:35 PM  [phone]

## 2024-08-22 NOTE — PROGRESS NOTES
GENERAL SURGERY  DAILY PROGRESS NOTE  8/22/2024    Subjective:  No new complaints or overnight events.  Patient denies increased ostomy output, states that his ostomy output is at baseline watery and thin.  Complains of increase in flatus.  150 cc of output from ostomy, thin in consistency.  In: 500   Out: 1600 [Urine:1450]       Objective:  BP (!) 189/96   Pulse 77   Temp 96.8 °F (36 °C) (Temporal)   Resp 17   Ht 1.753 m (5' 9\")   Wt 65.8 kg (145 lb)   SpO2 98%   BMI 21.41 kg/m²     General appearance:  lying in bed, NAD, A&Ox4  Neurologic: GC15, PERRL  Head: NCAT, EOMI, red conjunctiva  Neck: supple, no masses, trachea midline  Lungs: symmetric chest rise, no respiratory distress  Heart: normal rate per peripheral pulse  Abdomen: soft, non-tender, non-distended, ileostomy w/ thin brown stool and gas   Skin: warm and dry, no cyanosis  Extremities: atraumatic, no focal motor deficits, no open wounds    I have personally reviewed all relevant labs and imaging.    Assessment/Plan:  89 y.o. male with signs of dehydration secondary to diarrhea and emesis    -strict I&O's; will monitor ostomy output  -no acute surgical intervention currently indicated  -Continue regular diet as tolerated  -serial abdominal exams  -rehydration and electrolyte repletion    Electronically signed by Stella Torrez DO on 8/22/2024 at 6:35 AM     Seen/examined  Agree with above  Diagnostic and therapeutic plan discussed with the residents  Иван

## 2024-08-22 NOTE — PROGRESS NOTES
Messaged Dr. Cesar Valencia pt voided 100mL with PVR 521mL. Have already straight cathed patient once at 0105.    Per Dr. Valencia, okay to place a salmeron.

## 2024-08-22 NOTE — PROGRESS NOTES
Internal Medicine Progress Note    Patient's name: Johny Nichols  : 1935  Chief complaints (on day of admission): Emesis (Patient c/o nausea and vomiting , states he was seen 5 days ago for same with no relief. Hx DM )  Admission date: 2024  Date of service: 2024   Room: [address] IMCU/NEURO  Primary care physician: Nicholas Beal MD  Reason for visit: Follow-up for  MISAEL, HYPONATREMIA    Subjective  Johny was seen and examined. FEELS OK.  NO N/V.   Review of Systems NEG x 10  There are no new complaints of chest pain, shortness of breath, abdominal pain, nausea, vomiting, diarrhea, constipation.    Hospital Medications  Current Facility-Administered Medications   Medication Dose Route Frequency Provider Last Rate Last Admin    magnesium sulfate 2000 mg in 50 mL IVPB premix  2,000 mg IntraVENous Once Mouna Segovia APRN - CNP        Vitamin D (CHOLECALCIFEROL) tablet 1,000 Units  1,000 Units Oral Daily Mouna Segovia APRN - CNP        warfarin placeholder: dosing by pharmacy   Other RX Placeholder Nicholas Beal MD        sodium bicarbonate tablet 650 mg  650 mg Oral TID Mouna Segovia APRN - CNP   650 mg at 24    0.9 % sodium chloride infusion   IntraVENous Continuous Mouna Segovia APRN -  mL/hr at 24 New Bag at 24    ranolazine (RANEXA) extended release tablet 500 mg  500 mg Oral BID Nathen Keller PA-C   500 mg at 24    metoprolol tartrate (LOPRESSOR) tablet 37.5 mg  37.5 mg Oral BID  Nathen Keller PA-C   37.5 mg at 24 173    isosorbide mononitrate (IMDUR) extended release tablet 30 mg  30 mg Oral Daily Nathen Keller PA-C   30 mg at 24 0918    insulin regular (HumuLIN R;NovoLIN R) injection 10 Units  10 Units SubCUTAneous BID AC Nathen Keller PA-C   10 Units at 24 1730    atorvastatin (LIPITOR) tablet 20 mg  20 mg Oral Nightly Nathen Keller PA-LAZARA   20 mg at 24    aspirin EC tablet 81 mg

## 2024-08-23 PROBLEM — N17.9 ACUTE KIDNEY INJURY (HCC): Status: RESOLVED | Noted: 2018-07-28 | Resolved: 2024-08-23

## 2024-08-23 PROBLEM — E87.1 HYPONATREMIA: Status: RESOLVED | Noted: 2024-08-20 | Resolved: 2024-08-23

## 2024-08-23 LAB
ALBUMIN SERPL-MCNC: 3.6 G/DL (ref 3.5–5.2)
ALP SERPL-CCNC: 127 U/L (ref 40–129)
ALT SERPL-CCNC: 13 U/L (ref 0–40)
ANION GAP SERPL CALCULATED.3IONS-SCNC: 12 MMOL/L (ref 7–16)
AST SERPL-CCNC: 27 U/L (ref 0–39)
BASOPHILS # BLD: 0.03 K/UL (ref 0–0.2)
BASOPHILS NFR BLD: 1 % (ref 0–2)
BILIRUB SERPL-MCNC: 0.6 MG/DL (ref 0–1.2)
BUN SERPL-MCNC: 24 MG/DL (ref 6–23)
CALCIUM SERPL-MCNC: 9.2 MG/DL (ref 8.6–10.2)
CHLORIDE SERPL-SCNC: 98 MMOL/L (ref 98–107)
CO2 SERPL-SCNC: 21 MMOL/L (ref 22–29)
CREAT SERPL-MCNC: 1.8 MG/DL (ref 0.7–1.2)
EOSINOPHIL # BLD: 0.09 K/UL (ref 0.05–0.5)
EOSINOPHILS RELATIVE PERCENT: 2 % (ref 0–6)
ERYTHROCYTE [DISTWIDTH] IN BLOOD BY AUTOMATED COUNT: 14.6 % (ref 11.5–15)
GFR, ESTIMATED: 36 ML/MIN/1.73M2
GLUCOSE BLD-MCNC: 147 MG/DL (ref 74–99)
GLUCOSE BLD-MCNC: 202 MG/DL (ref 74–99)
GLUCOSE BLD-MCNC: 231 MG/DL (ref 74–99)
GLUCOSE BLD-MCNC: 252 MG/DL (ref 74–99)
GLUCOSE SERPL-MCNC: 145 MG/DL (ref 74–99)
HCT VFR BLD AUTO: 31.4 % (ref 37–54)
HGB BLD-MCNC: 10.5 G/DL (ref 12.5–16.5)
IMM GRANULOCYTES # BLD AUTO: 0.04 K/UL (ref 0–0.58)
IMM GRANULOCYTES NFR BLD: 1 % (ref 0–5)
INR PPP: 2.2
LYMPHOCYTES NFR BLD: 0.8 K/UL (ref 1.5–4)
LYMPHOCYTES RELATIVE PERCENT: 14 % (ref 20–42)
MAGNESIUM SERPL-MCNC: 1.8 MG/DL (ref 1.6–2.6)
MCH RBC QN AUTO: 28 PG (ref 26–35)
MCHC RBC AUTO-ENTMCNC: 33.4 G/DL (ref 32–34.5)
MCV RBC AUTO: 83.7 FL (ref 80–99.9)
MONOCYTES NFR BLD: 0.57 K/UL (ref 0.1–0.95)
MONOCYTES NFR BLD: 10 % (ref 2–12)
NEUTROPHILS NFR BLD: 74 % (ref 43–80)
NEUTS SEG NFR BLD: 4.31 K/UL (ref 1.8–7.3)
PHOSPHATE SERPL-MCNC: 2.6 MG/DL (ref 2.5–4.5)
PLATELET # BLD AUTO: 122 K/UL (ref 130–450)
PMV BLD AUTO: 10.7 FL (ref 7–12)
POTASSIUM SERPL-SCNC: 4.3 MMOL/L (ref 3.5–5)
PROT SERPL-MCNC: 7.4 G/DL (ref 6.4–8.3)
PROTHROMBIN TIME: 24.3 SEC (ref 9.3–12.4)
RBC # BLD AUTO: 3.75 M/UL (ref 3.8–5.8)
SODIUM SERPL-SCNC: 131 MMOL/L (ref 132–146)
WBC OTHER # BLD: 5.8 K/UL (ref 4.5–11.5)

## 2024-08-23 PROCEDURE — 2580000003 HC RX 258: Performed by: PHYSICIAN ASSISTANT

## 2024-08-23 PROCEDURE — 6370000000 HC RX 637 (ALT 250 FOR IP)

## 2024-08-23 PROCEDURE — 97530 THERAPEUTIC ACTIVITIES: CPT

## 2024-08-23 PROCEDURE — 36415 COLL VENOUS BLD VENIPUNCTURE: CPT

## 2024-08-23 PROCEDURE — 82962 GLUCOSE BLOOD TEST: CPT

## 2024-08-23 PROCEDURE — 6370000000 HC RX 637 (ALT 250 FOR IP): Performed by: PHYSICIAN ASSISTANT

## 2024-08-23 PROCEDURE — 85025 COMPLETE CBC W/AUTO DIFF WBC: CPT

## 2024-08-23 PROCEDURE — 6360000002 HC RX W HCPCS: Performed by: INTERNAL MEDICINE

## 2024-08-23 PROCEDURE — 83735 ASSAY OF MAGNESIUM: CPT

## 2024-08-23 PROCEDURE — 85610 PROTHROMBIN TIME: CPT

## 2024-08-23 PROCEDURE — 6370000000 HC RX 637 (ALT 250 FOR IP): Performed by: NURSE PRACTITIONER

## 2024-08-23 PROCEDURE — 84100 ASSAY OF PHOSPHORUS: CPT

## 2024-08-23 PROCEDURE — 2060000000 HC ICU INTERMEDIATE R&B

## 2024-08-23 PROCEDURE — 6370000000 HC RX 637 (ALT 250 FOR IP): Performed by: INTERNAL MEDICINE

## 2024-08-23 PROCEDURE — 80053 COMPREHEN METABOLIC PANEL: CPT

## 2024-08-23 RX ORDER — SODIUM BICARBONATE 650 MG/1
650 TABLET ORAL 3 TIMES DAILY
Qty: 90 TABLET | Refills: 2 | Status: SHIPPED | OUTPATIENT
Start: 2024-08-23

## 2024-08-23 RX ORDER — GLUCAGON 1 MG/ML
1 KIT INJECTION PRN
Status: DISCONTINUED | OUTPATIENT
Start: 2024-08-23 | End: 2024-08-27 | Stop reason: HOSPADM

## 2024-08-23 RX ORDER — CHOLECALCIFEROL (VITAMIN D3) 25 MCG
1000 TABLET ORAL DAILY
Qty: 60 TABLET | Refills: 0 | Status: SHIPPED | OUTPATIENT
Start: 2024-08-23

## 2024-08-23 RX ORDER — WARFARIN SODIUM 2 MG/1
2 TABLET ORAL
Status: COMPLETED | OUTPATIENT
Start: 2024-08-23 | End: 2024-08-23

## 2024-08-23 RX ORDER — DEXTROSE MONOHYDRATE 100 MG/ML
INJECTION, SOLUTION INTRAVENOUS CONTINUOUS PRN
Status: DISCONTINUED | OUTPATIENT
Start: 2024-08-23 | End: 2024-08-27 | Stop reason: HOSPADM

## 2024-08-23 RX ORDER — INSULIN HUMAN 100 [IU]/ML
5 INJECTION, SUSPENSION SUBCUTANEOUS 2 TIMES DAILY
Qty: 9 ML | Refills: 3 | Status: SHIPPED | OUTPATIENT
Start: 2024-08-23

## 2024-08-23 RX ORDER — TAMSULOSIN HYDROCHLORIDE 0.4 MG/1
0.4 CAPSULE ORAL NIGHTLY
Qty: 30 CAPSULE | Refills: 3 | Status: SHIPPED | OUTPATIENT
Start: 2024-08-23

## 2024-08-23 RX ADMIN — INSULIN HUMAN 10 UNITS: 100 INJECTION, SOLUTION PARENTERAL at 17:41

## 2024-08-23 RX ADMIN — TAMSULOSIN HYDROCHLORIDE 0.4 MG: 0.4 CAPSULE ORAL at 20:59

## 2024-08-23 RX ADMIN — INSULIN HUMAN 10 UNITS: 100 INJECTION, SOLUTION PARENTERAL at 09:26

## 2024-08-23 RX ADMIN — METOPROLOL TARTRATE 37.5 MG: 25 TABLET, FILM COATED ORAL at 09:26

## 2024-08-23 RX ADMIN — ATORVASTATIN CALCIUM 20 MG: 10 TABLET, FILM COATED ORAL at 20:59

## 2024-08-23 RX ADMIN — RANOLAZINE 500 MG: 500 TABLET, FILM COATED, EXTENDED RELEASE ORAL at 09:26

## 2024-08-23 RX ADMIN — Medication 1000 UNITS: at 09:26

## 2024-08-23 RX ADMIN — HYDRALAZINE HYDROCHLORIDE 10 MG: 20 INJECTION INTRAMUSCULAR; INTRAVENOUS at 23:14

## 2024-08-23 RX ADMIN — ASPIRIN 81 MG: 81 TABLET, COATED ORAL at 09:26

## 2024-08-23 RX ADMIN — SODIUM BICARBONATE 650 MG: 650 TABLET ORAL at 20:58

## 2024-08-23 RX ADMIN — SODIUM CHLORIDE, PRESERVATIVE FREE 10 ML: 5 INJECTION INTRAVENOUS at 20:57

## 2024-08-23 RX ADMIN — SODIUM BICARBONATE 650 MG: 650 TABLET ORAL at 14:17

## 2024-08-23 RX ADMIN — RANOLAZINE 500 MG: 500 TABLET, FILM COATED, EXTENDED RELEASE ORAL at 20:58

## 2024-08-23 RX ADMIN — SODIUM CHLORIDE, PRESERVATIVE FREE 10 ML: 5 INJECTION INTRAVENOUS at 09:28

## 2024-08-23 RX ADMIN — ISOSORBIDE MONONITRATE 30 MG: 30 TABLET, EXTENDED RELEASE ORAL at 09:30

## 2024-08-23 RX ADMIN — SODIUM BICARBONATE 650 MG: 650 TABLET ORAL at 09:26

## 2024-08-23 RX ADMIN — METOPROLOL TARTRATE 37.5 MG: 25 TABLET, FILM COATED ORAL at 17:41

## 2024-08-23 RX ADMIN — WARFARIN SODIUM 2 MG: 2 TABLET ORAL at 17:41

## 2024-08-23 NOTE — PROGRESS NOTES
8/23/2024 11:17 AM  Service: Urology  Group: [facility] (Ricky/Jacquie)    Johny Nichols  [id number]    Subjective: Afebrile the patient is tolerating the Slaughter catheter no abdominal pain no gross hematuria    Review of Systems  Respiratory: negative  Cardiovascular: negative  Gastrointestinal: negative  Hematologic/lymphatic: negative  Musculoskeletal:negative  Neurological: negative  Endocrine: negative    Scheduled Meds:   Vitamin D  1,000 Units Oral Daily    tamsulosin  0.4 mg Oral Nightly    warfarin placeholder: dosing by pharmacy   Other RX Placeholder    sodium bicarbonate  650 mg Oral TID    ranolazine  500 mg Oral BID    metoprolol tartrate  37.5 mg Oral BID WC    isosorbide mononitrate  30 mg Oral Daily    insulin regular  10 Units SubCUTAneous BID AC    atorvastatin  20 mg Oral Nightly    aspirin  81 mg Oral Daily    sodium chloride flush  10 mL IntraVENous 2 times per day       Objective:  Vitals:    08/23/24 0744   BP: (!) 167/62   Pulse: 69   Resp: 16   Temp: 97.7 °F (36.5 °C)   SpO2: 97%         Allergies: Patient has no known allergies.    General Appearance: alert and oriented to person, place and time and in no acute distress  Skin: no rash or erythema  Head: normocephalic and atraumatic  Pulmonary/Chest: clear to auscultation bilaterally- no wheezes, rales or rhonchi, normal air movement, no respiratory distress and no chest wall tenderness  Abdomen: soft, non-tender, non-distended, normal bowel sounds, no masses or organomegaly and no inguinal adenopathy  Genitalia: No penile or scrotal swelling or masses. Extremities: no cyanosis, clubbing or edema and Vinh's sign negative bilaterally      Labs:     Recent Labs     08/23/24  0550   *   K 4.3   CL 98   CO2 21*   BUN 24*   CREATININE 1.8*   GLUCOSE 145*   CALCIUM 9.2       Lab Results   Component Value Date/Time    HGB 10.5 08/23/2024 05:50 AM    HCT 31.4 08/23/2024 05:50 AM         Assessment:  Johny Nichols 89 y.o. male     Principal

## 2024-08-23 NOTE — PROGRESS NOTES
Internal Medicine Progress Note    Patient's name: Johny Nichols  : 1935  Chief complaints (on day of admission): Emesis (Patient c/o nausea and vomiting , states he was seen 5 days ago for same with no relief. Hx DM )  Admission date: 2024  Date of service: 2024   Room: [address] IMCU/NEURO  Primary care physician: Nicholas Beal MD  Reason for visit: Follow-up for  MISAEL, HYPONATREMIA    Subjective  Johny was seen and examined. FEELS OK.  NO N/V. HAS ENGEL- PLACED BY UROL  Review of Systems NEG x 10  There are no new complaints of chest pain, shortness of breath, abdominal pain, nausea, vomiting, diarrhea, constipation.    Hospital Medications  Current Facility-Administered Medications   Medication Dose Route Frequency Provider Last Rate Last Admin    Vitamin D (CHOLECALCIFEROL) tablet 1,000 Units  1,000 Units Oral Daily Mouna Segovia APRN - CNP   1,000 Units at 24 1040    lidocaine (XYLOCAINE) 2 % uro-jet   Topical PRN Nasrin Riojas APRN - CNP        hydrALAZINE (APRESOLINE) injection 10 mg  10 mg IntraVENous Q6H PRN Nicholas Beal MD        tamsulosin (FLOMAX) capsule 0.4 mg  0.4 mg Oral Nightly Nasrin Riojas APRN - CNP   0.4 mg at 24 2221    warfarin placeholder: dosing by pharmacy   Other RX Placeholder Nicholas Beal MD        sodium bicarbonate tablet 650 mg  650 mg Oral TID Mouna Segovia APRN - CNP   650 mg at 24 2221    0.9 % sodium chloride infusion   IntraVENous Continuous Mouna Segovia APRN - CNP 50 mL/hr at 24 1042 New Bag at 24 1042    ranolazine (RANEXA) extended release tablet 500 mg  500 mg Oral BID Nathen Kleler PA-C   500 mg at 24 2221    metoprolol tartrate (LOPRESSOR) tablet 37.5 mg  37.5 mg Oral BID  Nathen Keller PA-C   37.5 mg at 24 1827    isosorbide mononitrate (IMDUR) extended release tablet 30 mg  30 mg Oral Daily Nathen Keller PA-LAZARA   30 mg at 24 1035    insulin regular (HumuLIN

## 2024-08-23 NOTE — PROGRESS NOTES
Patient's wife arrived to pick take the patient home but now feels that she is unable to care for the patient at home, empty the patient's catheter, or take him to his follow-up appointments. This RN educated the patient and his wife, however they are still not agreeable to going home. Notified Dr. Beal, discharge canceled.

## 2024-08-23 NOTE — PROGRESS NOTES
[facility]  Nephrology Progress Note    Patient's Name: Johny Nichols    History of Present Illness from 8/21 consult note:    \"Johny Nichols is a 89 y.o. male with a past medical history of CKD, diabetes mellitus, hypertension, and hyperlipidemia.  He presented to the ED on 8/20 for concerns of nausea and vomiting.  Vital signs on 8/20 includes temperature 97.9, respirations 18, pulse 67, /72, and he was 99% SpO2.  Lab data on 8/20 includes sodium 123, CO2 18, BUN 28, creatinine 2.2, alk phos 135, and hemoglobin 11.5.  He had a UA on 8/21 which showed specific gravity 1.025, negative proteins, trace bacteria, negative nitrites.  He had a chest x-ray on 8/20 which showed mild cardiomegaly with pulmonary vascular congestion, distended stomach.  Nephrology has been consulted to see the patient for MISAEL and hyponatremia.  He has a baseline creatinine of 1.9-2.2.  At present, patient was seen and examined.  He came in due to vomiting and nausea.  He also notes weakness.  He reports coughing prior to admission.  He denies any dysuria.  He denies any headache.  He notes intermittent dizziness.  His appetite has been normal.  He denies any fevers.\"    Subjective:    8/23: Patient was seen and examined.  He reports that he feels good and notes that his appetite is good.  He is for possible discharge today.    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  Nonanion gap metabolic acidosis, chronic  Likely with CKD  Anion gap 13 and CO2 18 on 8/20--> CO2 21 today  Sodium bicarbonate 650 mg oral 3 times daily  Monitor labs    4.  Hypertension with CKD I-IV  BP goal<130/80  BP above goal  Monitor BPs on current regimen    5.  Urinary retention  Slaughter   Urology consulted    6.  Secondary hyperparathyroidism of renal origin  With vitamin D deficiency  PTH 85.3 and vitamin D 13.9 on 8/22  vitamin D supplement  Monitor labs    7.  Hypomagnesemia  Supplement magnesium as needed  Monitor labs    Follow-up with [facility] as an outpatient-appointment 9/11/2024 at 1:40 PM    FILIPPO Ibanez - CNP      Patient seen and examined all key components of the physical performed independently , case discussed with NP, all pertinent labs and radiologic tests personally reviewed agree with above.      Cesar Valencia MD

## 2024-08-23 NOTE — PROGRESS NOTES
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     Weight:   Wt Readings from Last 1 Encounters:   08/23/24 65.3 kg (143 lb 15.4 oz)       TSH:    Lab Results   Component Value Date/Time    TSH 0.93 08/21/2024 05:08 PM       Hepatic Function Panel:                            Lab Results   Component Value Date/Time    ALKPHOS 127 08/23/2024 05:50 AM    ALT 13 08/23/2024 05:50 AM    AST 27 08/23/2024 05:50 AM    BILITOT 0.6 08/23/2024 05:50 AM    BILIDIR <0.2 10/01/2020 07:50 AM    IBILI see below 10/01/2020 07:50 AM       Current significant warfarin drug-drug interactions include: No significant interactions    Recent Labs     08/20/24  1937 08/21/24  0532 08/22/24  0645 08/23/24  0550   HGB 11.5* 11.0* 10.5* 10.5*    125*  --  122*     Date Warfarin Dose INR Heparin or LMWH Comment   8/21 2 mg  2.2 -    8/22 2 mg 2.4 -    8/23 < 2 mg > 2.2 -                    Assessment:  Patient is a 89 y.o. male on warfarin for Atrial Fibrillation.  Patient's home warfarin dosing regimen is 2 mg daily per home medication list.  Goal INR 2 - 3  INR 2.2 today    Plan:  Warfarin 2 mg tonight  Daily PT/INR until the INR is stable within the therapeutic range  Pharmacist will follow and monitor/adjust dosing as necessary    Phillip Art, PharmD  PGY-1 Pharmacy Practice Resident   8/23/2024 2:52 PM

## 2024-08-23 NOTE — CARE COORDINATION
Chart reviewed and case reviewed in IDR.  Patient worked with therapy and OK to return home with [facility].  Nephrology OK to discharge to home today.  Per Urology Voiding trial outpatient in the office, flomax started.  AVS updated with the above information.  Discharge instructions updated.  Spoke with Mariaelena, liaison with [facility] and notified of the patient's discharge to home today.  They will arranged for HC to be initiated.  Spoke with Nohemi, liaison with [facility] and rollator delivered to the bedside.  Call placed to the patient's wife Tia and notified of above.  Per Tia, she will be up between 3 and 4 to pick the patient up to transfer home.  Bedside nurse Helen notified of above and that the patient and family will need leg bag training for transition of care as the patient will be discharged with his catheter.  Will continue to follow for further transition of care planning needs.         Nina Jacobo RN.  P:  [phone]

## 2024-08-23 NOTE — PROGRESS NOTES
Physical Therapy  Physical Therapy Treatment Note    Name: Johny Nichols  : 1935  MRN: [id number]      Date of Service: 2024    Evaluating PT:  Rika Calderon PT, DPT [id number]    Room #:  8517/8517-B  Diagnosis:  Hyponatremia [E87.1]  Nausea and vomiting, unspecified vomiting type [R11.2]  PMHx/PSHx:   has a past medical history of A-fib (HCC), Chronic kidney disease, Chronic renal impairment, stage 3 (moderate) (HCC), Colitis, ulcerative chronic (HCC), Diabetes mellitus (HCC), DM (diabetes mellitus) (HCC), Encounter for therapeutic drug monitoring, Hyperlipidemia, Hyperlipidemia associated with type 2 diabetes mellitus (HCC), Hypertension, Long term (current) use of anticoagulants, Prostate enlargement, Stroke (cerebrum) (HCC), and Unspecified cerebral artery occlusion with cerebral infarction.  Precautions:  Fall risk, alarms, ostomy, Slaughter  Equipment Needs:  TBD    SUBJECTIVE:    Pt lives with his wife in a 1 story home with 1 step to enter and no rail(s). Pt has a caregiver who lives in his home and who assists with dressing, bathing, etc. Pt ambulated with a SPC (Emily) PTA.    OBJECTIVE:   Initial Evaluation  Date: 24 Treatment Date: 24 Short Term/ Long Term   Goals   AM-PAC 6 Clicks     Was pt agreeable to Eval/treatment? Yes Yes    Does pt have pain? Denied pain No c/o pain    Bed Mobility  Rolling: NT  Supine to sit: SBA  Sit to supine: NT  Scooting: SBA Rolling: NT  Supine to sit: NT  Sit to supine: NT  Scooting: SBA Rolling: Independent  Supine to sit: Independent  Sit to supine: Independent  Scooting: Independent   Transfers Sit to stand: Yusra  Stand to sit: Yusra  Stand pivot: Yusra with FWW Sit to stand: ModA  Stand to sit: Yusra  Stand pivot: Yusra with Rollator Sit to stand: Independent  Stand to sit: Independent  Stand pivot: Mod I with AAD   Ambulation    40 feet x2 with FWW and Yusra 50 feet x2 with Rollator and Yusra  *see below >200 feet with AAD and Mod I   Stair

## 2024-08-23 NOTE — PROGRESS NOTES
[facility]  Nephrology Progress Note    Patient's Name: Johny Nichols    History of Present Illness from 8/21 consult note:    \"Johny Nichols is a 89 y.o. male with a past medical history of CKD, diabetes mellitus, hypertension, and hyperlipidemia.  He presented to the ED on 8/20 for concerns of nausea and vomiting.  Vital signs on 8/20 includes temperature 97.9, respirations 18, pulse 67, /72, and he was 99% SpO2.  Lab data on 8/20 includes sodium 123, CO2 18, BUN 28, creatinine 2.2, alk phos 135, and hemoglobin 11.5.  He had a UA on 8/21 which showed specific gravity 1.025, negative proteins, trace bacteria, negative nitrites.  He had a chest x-ray on 8/20 which showed mild cardiomegaly with pulmonary vascular congestion, distended stomach.  Nephrology has been consulted to see the patient for MISAEL and hyponatremia.  He has a baseline creatinine of 1.9-2.2.  At present, patient was seen and examined.  He came in due to vomiting and nausea.  He also notes weakness.  He reports coughing prior to admission.  He denies any dysuria.  He denies any headache.  He notes intermittent dizziness.  His appetite has been normal.  He denies any fevers.\"    Subjective:    8/24: Sitting up in bed. States he feels \"pretty\" good overall but needs some extra help. He is to be discharged to a rehab center on Monday.     PMH:    Past Medical History:   Diagnosis Date    A-fib ([facility])     Chronic kidney disease     Chronic renal impairment, stage 3 (moderate) ([facility]) 10/10/2016    Colitis, ulcerative chronic ([facility])     Diabetes mellitus (HCC)     DM (diabetes mellitus) ([facility]) 11/25/2013    Encounter for therapeutic drug monitoring 4/5/2016    Hyperlipidemia     Hyperlipidemia associated with type 2 diabetes mellitus ([facility]) 1/17/2016    Hypertension     Long term (current) use of anticoagulants 4/5/2016    Prostate enlargement     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  Nonanion gap metabolic acidosis, chronic  Likely with CKD  Anion gap 13 and CO2 18 on 8/20--> CO2 19 today  Sodium bicarbonate 650 mg oral 3 times daily  Monitor labs    4.  Hypertension with CKD I-IV  BP goal<130/80  BP at/neargoal  Monitor BPs on current regimen    5.  Urinary retention  Slaughter   Seen by Urology  Catheter placed ; now with leg bag    6.  Secondary hyperparathyroidism of renal origin  With vitamin D deficiency  PTH 85.3 and vitamin D 13.9 on 8/22  vitamin D supplement  Monitor labs    7.  Hypomagnesemia, hypophosphatemia  Mg 1.4, IV MG today  PO4 2.0, IV PO4 today  Suppment as needed  Monitor labs    Follow-up with [facility] as an outpatient-appointment 9/11/2024 at 1:40 PM    FILIPPO Winters - CNS    Patient seen and examined all key components of the physical performed independently , case discussed with NP, all pertinent labs and radiologic tests personally reviewed agree with above.   Cr remains at baseline ; UO 750cc recorded x 1 shift  Supplement lytes( Mg, Phos )  Continue to monitor      Cesar Valencia MD

## 2024-08-23 NOTE — PROGRESS NOTES
GENERAL SURGERY  DAILY PROGRESS NOTE  8/23/2024    Subjective:  No new complaints or overnight events. Denies nausea/ vomiting. Denies pain.   950 cc of output from ostomy, thin in consistency.  In: 760   Out: 4100 [Urine:3150]       Objective:  BP (!) 153/65   Pulse 72   Temp 97 °F (36.1 °C) (Temporal)   Resp 18   Ht 1.753 m (5' 9\")   Wt 65.8 kg (145 lb)   SpO2 100%   BMI 21.41 kg/m²     General appearance:  lying in bed, NAD, A&Ox4  Neurologic: GC15, PERRL  Head: NCAT, EOMI, red conjunctiva  Neck: supple, no masses, trachea midline  Lungs: symmetric chest rise, no respiratory distress  Heart: normal rate per peripheral pulse  Abdomen: soft, non-tender, non-distended, ileostomy w/ thin brown stool and gas   Skin: warm and dry, no cyanosis  Extremities: atraumatic, no focal motor deficits, no open wounds    I have personally reviewed all relevant labs and imaging.    Assessment/Plan:  89 y.o. male with signs of dehydration secondary to diarrhea and emesis    -strict I&O's; will monitor ostomy output  -no acute surgical intervention currently indicated  -Continue regular diet as tolerated  -serial abdominal exams  -rehydration and electrolyte repletion    Thank you for allowing our team to participate in the care of this patient. Please contact with any further questions or concerns.     Electronically signed by Stella Torrez DO on 8/23/2024 at 6:17 AM

## 2024-08-23 NOTE — DISCHARGE SUMMARY
Internal Medicine Discharge Summary    NAME: Johny Nichols :  1935  MRN:  [id number] PCP:Nicholas Beal MD    ADMITTED: 2024   DISCHARGED:   ADMITTING PHYSICIAN: Nicholas Beal MD    PCP: Nicholas Beal MD    CONSULTANT(S):   IP CONSULT TO INTERNAL MEDICINE  IP CONSULT TO NEPHROLOGY  IP CONSULT TO GENERAL SURGERY  IP CONSULT TO PHARMACY  IP CONSULT TO HOME CARE NEEDS  IP CONSULT TO UROLOGY     ADMITTING DIAGNOSIS:   Hyponatremia [E87.1]  Nausea and vomiting, unspecified vomiting type [R11.2]     Please see H&P for further details    DISCHARGE DIAGNOSES:   Active Hospital Problems    Diagnosis     Pure hypercholesterolemia [E78.00]      Priority: Medium    History of cerebrovascular accident [Z86.73]     Coronary artery disease involving native coronary artery of native heart without angina pectoris [I25.10]     Chronic anticoagulation [Z79.01]     Prostate cancer (HCC) [C61]     Hyperlipidemia [E78.5]     Primary hypertension [I10]     Alcoholic cirrhosis (HCC) [K70.30]     IPMN (intraductal papillary mucinous neoplasm) [D49.0]     Cerebrovascular accident (CVA) (HCC) [I63.9]     Type 2 diabetes mellitus with kidney complication, with long-term current use of insulin (HCC) [E11.29, Z79.4]     Permanent atrial fibrillation (HCC) [I48.21]        BRIEF HISTORY OF PRESENT ILLNESS: Johny Nichols is a 89 y.o. male patient of Nicholas Beal MD who  has a past medical history of A-fib (HCC), Chronic kidney disease, Chronic renal impairment, stage 3 (moderate) (HCC), Colitis, ulcerative chronic (HCC), Diabetes mellitus (HCC), DM (diabetes mellitus) (HCC), Encounter for therapeutic drug monitoring, Hyperlipidemia, Hyperlipidemia associated with type 2 diabetes mellitus (HCC), Hypertension, Long term (current) use of anticoagulants, Prostate enlargement, Stroke (cerebrum) (HCC), and Unspecified cerebral artery occlusion with cerebral infarction. who originally had concerns including Emesis (Patient c/o nausea  adrenals, abdominal aorta, and urinary bladder have an unremarkable appearance. Multiple surgical clips are noted in the inferior pelvis and prostate gland. Prostate gland remains mildly enlarged.  There is no evidence of hydronephrosis, hydroureter or urolithiasis.  No gross renal lesion is identified. Pancreatic ductal dilation with multiple cystic lesions in the pancreatic head, uncinate process, body and tail are again seen and are not significantly changed.  These are consistent with IPMNs and follow-up CT or MRI in 1-2 years could ensure further stability.  No abnormal peripancreatic fluid collection is identified. Oral contrast has been administered and there is opacification of small bowel in the right abdominal ileostomy bag.  No dilated loops of large or small bowel are seen.  There is no gross evidence of small bowel inflammation. Total colectomy changes are again seen. There is a small right inguinal hernia containing a small amount of ascites but no evidence of bowel within the hernia sac.  Bone windows reveal no evidence of acute fracture or osteolytic/osteoblastic lesion.  Degenerative changes are noted throughout the lumbar spine.     There is no evidence of small-bowel obstruction or inflammation. Total colectomy changes with right abdominal ileostomy.  No peristomal hernia is identified. Stable multiple cystic pancreatic lesions a since 02/19/2023.  IPMNs are favored the head follow-up CT or MRI in 1-2 years is recommended. Stable pancreatic ductal dilation. Stable right inguinal hernia containing fat and a small amount of ascites.         ECHO:      DISPOSITION:  The patient's condition is good.   The patient is being discharged to home    DISCHARGE MEDICATIONS:      Medication List        START taking these medications      HumuLIN 70/30 KwikPen (70-30) 100 UNIT/ML injection pen  Generic drug: Insulin NPH Isophane & Regular  Inject 5 Units into the skin 2 times daily     sodium bicarbonate 650 MG

## 2024-08-24 ENCOUNTER — APPOINTMENT (OUTPATIENT)
Dept: GENERAL RADIOLOGY | Age: 89
DRG: 641 | End: 2024-08-24
Payer: MEDICARE

## 2024-08-24 LAB
ALBUMIN SERPL-MCNC: 3 G/DL (ref 3.5–5.2)
ALP SERPL-CCNC: 112 U/L (ref 40–129)
ALT SERPL-CCNC: 12 U/L (ref 0–40)
ANION GAP SERPL CALCULATED.3IONS-SCNC: 12 MMOL/L (ref 7–16)
AST SERPL-CCNC: 24 U/L (ref 0–39)
BASOPHILS # BLD: 0.02 K/UL (ref 0–0.2)
BASOPHILS NFR BLD: 0 % (ref 0–2)
BILIRUB SERPL-MCNC: 0.4 MG/DL (ref 0–1.2)
BUN SERPL-MCNC: 30 MG/DL (ref 6–23)
CALCIUM SERPL-MCNC: 8.5 MG/DL (ref 8.6–10.2)
CHLORIDE SERPL-SCNC: 100 MMOL/L (ref 98–107)
CO2 SERPL-SCNC: 19 MMOL/L (ref 22–29)
CREAT SERPL-MCNC: 1.9 MG/DL (ref 0.7–1.2)
EOSINOPHIL # BLD: 0.09 K/UL (ref 0.05–0.5)
EOSINOPHILS RELATIVE PERCENT: 2 % (ref 0–6)
ERYTHROCYTE [DISTWIDTH] IN BLOOD BY AUTOMATED COUNT: 14.6 % (ref 11.5–15)
GFR, ESTIMATED: 33 ML/MIN/1.73M2
GLUCOSE BLD-MCNC: 182 MG/DL (ref 74–99)
GLUCOSE BLD-MCNC: 188 MG/DL (ref 74–99)
GLUCOSE BLD-MCNC: 398 MG/DL (ref 74–99)
GLUCOSE SERPL-MCNC: 170 MG/DL (ref 74–99)
HCT VFR BLD AUTO: 26.9 % (ref 37–54)
HGB BLD-MCNC: 9.1 G/DL (ref 12.5–16.5)
IMM GRANULOCYTES # BLD AUTO: 0.04 K/UL (ref 0–0.58)
IMM GRANULOCYTES NFR BLD: 1 % (ref 0–5)
INR PPP: 2.3
LYMPHOCYTES NFR BLD: 0.69 K/UL (ref 1.5–4)
LYMPHOCYTES RELATIVE PERCENT: 12 % (ref 20–42)
MAGNESIUM SERPL-MCNC: 1.4 MG/DL (ref 1.6–2.6)
MCH RBC QN AUTO: 28.1 PG (ref 26–35)
MCHC RBC AUTO-ENTMCNC: 33.8 G/DL (ref 32–34.5)
MCV RBC AUTO: 83 FL (ref 80–99.9)
MONOCYTES NFR BLD: 0.56 K/UL (ref 0.1–0.95)
MONOCYTES NFR BLD: 9 % (ref 2–12)
NEUTROPHILS NFR BLD: 77 % (ref 43–80)
NEUTS SEG NFR BLD: 4.56 K/UL (ref 1.8–7.3)
PHOSPHATE SERPL-MCNC: 2 MG/DL (ref 2.5–4.5)
PLATELET # BLD AUTO: 110 K/UL (ref 130–450)
PMV BLD AUTO: 10.3 FL (ref 7–12)
POTASSIUM SERPL-SCNC: 4.2 MMOL/L (ref 3.5–5)
PROT SERPL-MCNC: 6.3 G/DL (ref 6.4–8.3)
PROTHROMBIN TIME: 25.5 SEC (ref 9.3–12.4)
RBC # BLD AUTO: 3.24 M/UL (ref 3.8–5.8)
SODIUM SERPL-SCNC: 131 MMOL/L (ref 132–146)
WBC OTHER # BLD: 6 K/UL (ref 4.5–11.5)

## 2024-08-24 PROCEDURE — 36415 COLL VENOUS BLD VENIPUNCTURE: CPT

## 2024-08-24 PROCEDURE — 2060000000 HC ICU INTERMEDIATE R&B

## 2024-08-24 PROCEDURE — 6370000000 HC RX 637 (ALT 250 FOR IP)

## 2024-08-24 PROCEDURE — 6370000000 HC RX 637 (ALT 250 FOR IP): Performed by: NURSE PRACTITIONER

## 2024-08-24 PROCEDURE — 85610 PROTHROMBIN TIME: CPT

## 2024-08-24 PROCEDURE — 6370000000 HC RX 637 (ALT 250 FOR IP): Performed by: PHYSICIAN ASSISTANT

## 2024-08-24 PROCEDURE — 82962 GLUCOSE BLOOD TEST: CPT

## 2024-08-24 PROCEDURE — 85025 COMPLETE CBC W/AUTO DIFF WBC: CPT

## 2024-08-24 PROCEDURE — 6370000000 HC RX 637 (ALT 250 FOR IP): Performed by: INTERNAL MEDICINE

## 2024-08-24 PROCEDURE — 2580000003 HC RX 258: Performed by: CLINICAL NURSE SPECIALIST

## 2024-08-24 PROCEDURE — 2500000003 HC RX 250 WO HCPCS: Performed by: CLINICAL NURSE SPECIALIST

## 2024-08-24 PROCEDURE — 83735 ASSAY OF MAGNESIUM: CPT

## 2024-08-24 PROCEDURE — 2580000003 HC RX 258: Performed by: PHYSICIAN ASSISTANT

## 2024-08-24 PROCEDURE — 80053 COMPREHEN METABOLIC PANEL: CPT

## 2024-08-24 PROCEDURE — 71045 X-RAY EXAM CHEST 1 VIEW: CPT

## 2024-08-24 PROCEDURE — 84100 ASSAY OF PHOSPHORUS: CPT

## 2024-08-24 PROCEDURE — 6360000002 HC RX W HCPCS: Performed by: CLINICAL NURSE SPECIALIST

## 2024-08-24 RX ORDER — MAGNESIUM SULFATE IN WATER 40 MG/ML
4000 INJECTION, SOLUTION INTRAVENOUS ONCE
Status: COMPLETED | OUTPATIENT
Start: 2024-08-24 | End: 2024-08-24

## 2024-08-24 RX ORDER — WARFARIN SODIUM 2 MG/1
2 TABLET ORAL
Status: COMPLETED | OUTPATIENT
Start: 2024-08-24 | End: 2024-08-24

## 2024-08-24 RX ORDER — BENZONATATE 100 MG/1
100 CAPSULE ORAL 3 TIMES DAILY
Status: DISCONTINUED | OUTPATIENT
Start: 2024-08-24 | End: 2024-08-27 | Stop reason: HOSPADM

## 2024-08-24 RX ADMIN — SODIUM BICARBONATE 650 MG: 650 TABLET ORAL at 09:08

## 2024-08-24 RX ADMIN — RANOLAZINE 500 MG: 500 TABLET, FILM COATED, EXTENDED RELEASE ORAL at 19:56

## 2024-08-24 RX ADMIN — ASPIRIN 81 MG: 81 TABLET, COATED ORAL at 09:08

## 2024-08-24 RX ADMIN — WARFARIN SODIUM 2 MG: 2 TABLET ORAL at 19:56

## 2024-08-24 RX ADMIN — MAGNESIUM SULFATE HEPTAHYDRATE 4000 MG: 40 INJECTION, SOLUTION INTRAVENOUS at 15:29

## 2024-08-24 RX ADMIN — ISOSORBIDE MONONITRATE 30 MG: 30 TABLET, EXTENDED RELEASE ORAL at 09:08

## 2024-08-24 RX ADMIN — METOPROLOL TARTRATE 37.5 MG: 25 TABLET, FILM COATED ORAL at 17:34

## 2024-08-24 RX ADMIN — TAMSULOSIN HYDROCHLORIDE 0.4 MG: 0.4 CAPSULE ORAL at 19:56

## 2024-08-24 RX ADMIN — SODIUM BICARBONATE 650 MG: 650 TABLET ORAL at 15:29

## 2024-08-24 RX ADMIN — BENZONATATE 100 MG: 100 CAPSULE ORAL at 15:29

## 2024-08-24 RX ADMIN — SODIUM CHLORIDE, PRESERVATIVE FREE 10 ML: 5 INJECTION INTRAVENOUS at 09:08

## 2024-08-24 RX ADMIN — INSULIN HUMAN 10 UNITS: 100 INJECTION, SOLUTION PARENTERAL at 06:37

## 2024-08-24 RX ADMIN — METOPROLOL TARTRATE 37.5 MG: 25 TABLET, FILM COATED ORAL at 09:07

## 2024-08-24 RX ADMIN — SODIUM PHOSPHATE, MONOBASIC, MONOHYDRATE AND SODIUM PHOSPHATE, DIBASIC, ANHYDROUS 20 MMOL: 142; 276 INJECTION, SOLUTION INTRAVENOUS at 09:57

## 2024-08-24 RX ADMIN — BENZONATATE 100 MG: 100 CAPSULE ORAL at 19:56

## 2024-08-24 RX ADMIN — BENZONATATE 100 MG: 100 CAPSULE ORAL at 12:18

## 2024-08-24 RX ADMIN — RANOLAZINE 500 MG: 500 TABLET, FILM COATED, EXTENDED RELEASE ORAL at 09:08

## 2024-08-24 RX ADMIN — Medication 1000 UNITS: at 09:08

## 2024-08-24 RX ADMIN — SODIUM CHLORIDE, PRESERVATIVE FREE 10 ML: 5 INJECTION INTRAVENOUS at 19:56

## 2024-08-24 RX ADMIN — SODIUM BICARBONATE 650 MG: 650 TABLET ORAL at 19:56

## 2024-08-24 RX ADMIN — INSULIN HUMAN 10 UNITS: 100 INJECTION, SOLUTION PARENTERAL at 17:34

## 2024-08-24 RX ADMIN — ATORVASTATIN CALCIUM 20 MG: 10 TABLET, FILM COATED ORAL at 19:56

## 2024-08-24 NOTE — PROGRESS NOTES
Internal Medicine Progress Note    Patient's name: Johny Nichols  : 1935  Chief complaints (on day of admission): Emesis (Patient c/o nausea and vomiting , states he was seen 5 days ago for same with no relief. Hx DM )  Admission date: 2024  Date of service: 2024   Room: [address] IMCU/NEURO  Primary care physician: Nicholas Beal MD  Reason for visit: Follow-up for  MISAEL, HYPONATREMIA    Subjective  Johny was seen and examined. Complaining of cough  discharge Held  for possible placement to [facility]  NO N/V. HAS ENGEL- PLACED BY UROL  Review of Systems NEG x 10  There are no new complaints of chest pain, shortness of breath, abdominal pain, nausea, vomiting, diarrhea, constipation.    Hospital Medications  Current Facility-Administered Medications   Medication Dose Route Frequency Provider Last Rate Last Admin    magnesium sulfate 4000 mg in 100 mL IVPB premix  4,000 mg IntraVENous Once Soanl Rebolledo, APRN - CNS        sodium phosphate 20 mmol in sodium chloride 0.9 % 500 mL IVPB  20 mmol IntraVENous Once Sonal Rebolledo, APRN -  mL/hr at 24 0957 20 mmol at 24 0957    benzonatate (TESSALON) capsule 100 mg  100 mg Oral TID Nicholas Beal MD        glucose chewable tablet 16 g  4 tablet Oral PRN Nicholas Beal MD        dextrose bolus 10% 125 mL  125 mL IntraVENous PRN Nicholas Beal MD        Or    dextrose bolus 10% 250 mL  250 mL IntraVENous PRN Nicholas Beal MD        glucagon injection 1 mg  1 mg SubCUTAneous PRN Nicholas Beal MD        dextrose 10 % infusion   IntraVENous Continuous PRN Nicholas Beal MD        Vitamin D (CHOLECALCIFEROL) tablet 1,000 Units  1,000 Units Oral Daily Mouna Segovia APRN - CNP   1,000 Units at 24 0908    lidocaine (XYLOCAINE) 2 % uro-jet   Topical PRN Nasrin Riojas APRN - CNP        hydrALAZINE (APRESOLINE) injection 10 mg  10 mg IntraVENous Q6H PRN Nicholas Beal MD   10 mg at 24 2314    tamsulosin (FLOMAX) capsule 0.4  mg  0.4 mg Oral Nightly Nasrin Riojas APRN - CNP   0.4 mg at 08/23/24 2059    warfarin placeholder: dosing by pharmacy   Other RX Placeholder Nicholas Beal MD        sodium bicarbonate tablet 650 mg  650 mg Oral TID Mouna Segovia APRN - CNP   650 mg at 08/24/24 0908    0.9 % sodium chloride infusion   IntraVENous Continuous Mouna Segovia APRN - CNP   Paused at 08/23/24 1231    ranolazine (RANEXA) extended release tablet 500 mg  500 mg Oral BID Nathen Keller PA-C   500 mg at 08/24/24 0908    metoprolol tartrate (LOPRESSOR) tablet 37.5 mg  37.5 mg Oral BID WC Nathen Keller PA-C   37.5 mg at 08/24/24 0907    isosorbide mononitrate (IMDUR) extended release tablet 30 mg  30 mg Oral Daily Nathen Keller PA-C   30 mg at 08/24/24 0908    insulin regular (HumuLIN R;NovoLIN R) injection 10 Units  10 Units SubCUTAneous BID AC Nathen Keller PA-C   10 Units at 08/24/24 0637    atorvastatin (LIPITOR) tablet 20 mg  20 mg Oral Nightly Nathen Keller PA-C   20 mg at 08/23/24 2059    aspirin EC tablet 81 mg  81 mg Oral Daily Nathen Keller, PA-C   81 mg at 08/24/24 0908    sodium chloride flush 0.9 % injection 10 mL  10 mL IntraVENous 2 times per day Nathen Keller PA-C   10 mL at 08/24/24 0908    sodium chloride flush 0.9 % injection 10 mL  10 mL IntraVENous PRN Nathen Keller, PA-C        0.9 % sodium chloride infusion   IntraVENous PRN Nathen Keller, PA-C        ondansetron (ZOFRAN-ODT) disintegrating tablet 4 mg  4 mg Oral Q8H PRN Nathen Keller PA-C        Or    ondansetron (ZOFRAN) injection 4 mg  4 mg IntraVENous Q6H PRN Nathen Keller, PA-C        senna (SENOKOT) tablet 8.6 mg  1 tablet Oral Daily PRN Nathen Keller PA-C        acetaminophen (TYLENOL) tablet 650 mg  650 mg Oral Q6H PRN Nathen Keller PA-C        Or    acetaminophen (TYLENOL) suppository 650 mg  650 mg Rectal Q6H PRN Nathen Keller PA-LAZARA           PRN Medications  glucose, dextrose bolus **OR** dextrose bolus, glucagon (rDNA),

## 2024-08-24 NOTE — PLAN OF CARE
Problem: Safety - Adult  Goal: Free from fall injury  8/24/2024 0117 by Matilde Rowland RN  Outcome: Progressing  8/23/2024 1835 by Helen Centeno RN  Outcome: Progressing     Problem: Chronic Conditions and Co-morbidities  Goal: Patient's chronic conditions and co-morbidity symptoms are monitored and maintained or improved  8/24/2024 0117 by Matilde Rowland RN  Outcome: Progressing  8/23/2024 1835 by Helen Centeno RN  Outcome: Progressing     Problem: Discharge Planning  Goal: Discharge to home or other facility with appropriate resources  8/24/2024 0117 by Matilde Rowland RN  Outcome: Progressing  8/23/2024 1835 by Helen Centeno RN  Outcome: Progressing     Problem: ABCDS Injury Assessment  Goal: Absence of physical injury  8/24/2024 0117 by Matilde Rowland RN  Outcome: Progressing  8/23/2024 1835 by Helen Centeno RN  Outcome: Progressing

## 2024-08-24 NOTE — PROGRESS NOTES
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     Weight:   Wt Readings from Last 1 Encounters:   08/24/24 66.2 kg (145 lb 15.1 oz)       TSH:    Lab Results   Component Value Date/Time    TSH 0.93 08/21/2024 05:08 PM       Hepatic Function Panel:                            Lab Results   Component Value Date/Time    ALKPHOS 112 08/24/2024 06:11 AM    ALT 12 08/24/2024 06:11 AM    AST 24 08/24/2024 06:11 AM    BILITOT 0.4 08/24/2024 06:11 AM    BILIDIR <0.2 10/01/2020 07:50 AM    IBILI see below 10/01/2020 07:50 AM       Current significant warfarin drug-drug interactions include: No significant interactions    Recent Labs     08/22/24  0645 08/23/24  0550 08/24/24  0611   HGB 10.5* 10.5* 9.1*   PLT  --  122* 110*     Date Warfarin Dose INR Heparin or LMWH Comment   8/21 2 mg  2.2 -    8/22 2 mg 2.4 -    8/23 2 mg 2.2 -    8/24 < 2 mg > 2.3 -             Assessment:  Patient is a 89 y.o. male on warfarin for Atrial Fibrillation.  Patient's home warfarin dosing regimen is 2 mg daily per home medication list.  Goal INR 2 - 3  INR 2.3 today    Plan:  Warfarin 2 mg tonight  Daily PT/INR until the INR is stable within the therapeutic range  Pharmacist will follow and monitor/adjust dosing as necessary    Phillip Art, PharmD  PGY-1 Pharmacy Practice Resident   8/24/2024 3:06 PM

## 2024-08-25 ENCOUNTER — APPOINTMENT (OUTPATIENT)
Dept: CT IMAGING | Age: 89
DRG: 641 | End: 2024-08-25
Attending: INTERNAL MEDICINE
Payer: MEDICARE

## 2024-08-25 PROBLEM — R53.81 DEBILITY: Status: ACTIVE | Noted: 2024-08-25

## 2024-08-25 PROBLEM — R06.02 SOB (SHORTNESS OF BREATH): Status: RESOLVED | Noted: 2024-03-05 | Resolved: 2024-08-25

## 2024-08-25 PROBLEM — I63.9 ACUTE CVA (CEREBROVASCULAR ACCIDENT) (HCC): Status: RESOLVED | Noted: 2021-03-06 | Resolved: 2024-08-25

## 2024-08-25 PROBLEM — G31.84 MCI (MILD COGNITIVE IMPAIRMENT): Status: ACTIVE | Noted: 2024-08-25

## 2024-08-25 PROBLEM — R79.1 SUPRATHERAPEUTIC INR: Status: RESOLVED | Noted: 2023-08-07 | Resolved: 2024-08-25

## 2024-08-25 PROBLEM — K92.2 GI BLEEDING: Status: RESOLVED | Noted: 2020-10-01 | Resolved: 2024-08-25

## 2024-08-25 PROBLEM — R07.9 ACUTE CHEST PAIN: Status: RESOLVED | Noted: 2023-02-19 | Resolved: 2024-08-25

## 2024-08-25 PROBLEM — E86.0 DEHYDRATION: Status: ACTIVE | Noted: 2024-08-25

## 2024-08-25 PROBLEM — R07.9 CHEST PAIN: Status: RESOLVED | Noted: 2023-08-07 | Resolved: 2024-08-25

## 2024-08-25 LAB
ALBUMIN SERPL-MCNC: 3.1 G/DL (ref 3.5–5.2)
ALP SERPL-CCNC: 112 U/L (ref 40–129)
ALT SERPL-CCNC: 12 U/L (ref 0–40)
ANION GAP SERPL CALCULATED.3IONS-SCNC: 9 MMOL/L (ref 7–16)
AST SERPL-CCNC: 22 U/L (ref 0–39)
BASOPHILS # BLD: 0.02 K/UL (ref 0–0.2)
BASOPHILS NFR BLD: 0 % (ref 0–2)
BILIRUB SERPL-MCNC: 0.5 MG/DL (ref 0–1.2)
BUN SERPL-MCNC: 25 MG/DL (ref 6–23)
CALCIUM SERPL-MCNC: 8.7 MG/DL (ref 8.6–10.2)
CHLORIDE SERPL-SCNC: 99 MMOL/L (ref 98–107)
CO2 SERPL-SCNC: 22 MMOL/L (ref 22–29)
CREAT SERPL-MCNC: 1.9 MG/DL (ref 0.7–1.2)
EOSINOPHIL # BLD: 0.11 K/UL (ref 0.05–0.5)
EOSINOPHILS RELATIVE PERCENT: 2 % (ref 0–6)
ERYTHROCYTE [DISTWIDTH] IN BLOOD BY AUTOMATED COUNT: 14.6 % (ref 11.5–15)
GFR, ESTIMATED: 33 ML/MIN/1.73M2
GLUCOSE BLD-MCNC: 148 MG/DL (ref 74–99)
GLUCOSE BLD-MCNC: 207 MG/DL (ref 74–99)
GLUCOSE BLD-MCNC: 208 MG/DL (ref 74–99)
GLUCOSE SERPL-MCNC: 132 MG/DL (ref 74–99)
HCT VFR BLD AUTO: 28.7 % (ref 37–54)
HGB BLD-MCNC: 9.6 G/DL (ref 12.5–16.5)
IMM GRANULOCYTES # BLD AUTO: 0.04 K/UL (ref 0–0.58)
IMM GRANULOCYTES NFR BLD: 1 % (ref 0–5)
INR PPP: 2.1
LYMPHOCYTES NFR BLD: 0.91 K/UL (ref 1.5–4)
LYMPHOCYTES RELATIVE PERCENT: 14 % (ref 20–42)
MAGNESIUM SERPL-MCNC: 2.2 MG/DL (ref 1.6–2.6)
MCH RBC QN AUTO: 27.7 PG (ref 26–35)
MCHC RBC AUTO-ENTMCNC: 33.4 G/DL (ref 32–34.5)
MCV RBC AUTO: 82.9 FL (ref 80–99.9)
MONOCYTES NFR BLD: 0.43 K/UL (ref 0.1–0.95)
MONOCYTES NFR BLD: 7 % (ref 2–12)
NEUTROPHILS NFR BLD: 77 % (ref 43–80)
NEUTS SEG NFR BLD: 5.01 K/UL (ref 1.8–7.3)
PHOSPHATE SERPL-MCNC: 3.1 MG/DL (ref 2.5–4.5)
PLATELET # BLD AUTO: 131 K/UL (ref 130–450)
PMV BLD AUTO: 9.8 FL (ref 7–12)
POTASSIUM SERPL-SCNC: 4.2 MMOL/L (ref 3.5–5)
PROT SERPL-MCNC: 6.5 G/DL (ref 6.4–8.3)
PROTHROMBIN TIME: 22.8 SEC (ref 9.3–12.4)
RBC # BLD AUTO: 3.46 M/UL (ref 3.8–5.8)
SODIUM SERPL-SCNC: 130 MMOL/L (ref 132–146)
WBC OTHER # BLD: 6.5 K/UL (ref 4.5–11.5)

## 2024-08-25 PROCEDURE — 6360000002 HC RX W HCPCS: Performed by: INTERNAL MEDICINE

## 2024-08-25 PROCEDURE — 6370000000 HC RX 637 (ALT 250 FOR IP): Performed by: NURSE PRACTITIONER

## 2024-08-25 PROCEDURE — 83735 ASSAY OF MAGNESIUM: CPT

## 2024-08-25 PROCEDURE — 80053 COMPREHEN METABOLIC PANEL: CPT

## 2024-08-25 PROCEDURE — 2060000000 HC ICU INTERMEDIATE R&B

## 2024-08-25 PROCEDURE — 85610 PROTHROMBIN TIME: CPT

## 2024-08-25 PROCEDURE — 82962 GLUCOSE BLOOD TEST: CPT

## 2024-08-25 PROCEDURE — 6370000000 HC RX 637 (ALT 250 FOR IP): Performed by: PHYSICIAN ASSISTANT

## 2024-08-25 PROCEDURE — 85025 COMPLETE CBC W/AUTO DIFF WBC: CPT

## 2024-08-25 PROCEDURE — 70450 CT HEAD/BRAIN W/O DYE: CPT

## 2024-08-25 PROCEDURE — 2580000003 HC RX 258: Performed by: PHYSICIAN ASSISTANT

## 2024-08-25 PROCEDURE — 84100 ASSAY OF PHOSPHORUS: CPT

## 2024-08-25 PROCEDURE — 6370000000 HC RX 637 (ALT 250 FOR IP): Performed by: INTERNAL MEDICINE

## 2024-08-25 PROCEDURE — 6370000000 HC RX 637 (ALT 250 FOR IP)

## 2024-08-25 PROCEDURE — 36415 COLL VENOUS BLD VENIPUNCTURE: CPT

## 2024-08-25 RX ORDER — WARFARIN SODIUM 2 MG/1
2 TABLET ORAL
Status: COMPLETED | OUTPATIENT
Start: 2024-08-25 | End: 2024-08-25

## 2024-08-25 RX ADMIN — ISOSORBIDE MONONITRATE 30 MG: 30 TABLET, EXTENDED RELEASE ORAL at 08:08

## 2024-08-25 RX ADMIN — SODIUM BICARBONATE 650 MG: 650 TABLET ORAL at 08:09

## 2024-08-25 RX ADMIN — HYDRALAZINE HYDROCHLORIDE 10 MG: 20 INJECTION INTRAMUSCULAR; INTRAVENOUS at 23:06

## 2024-08-25 RX ADMIN — RANOLAZINE 500 MG: 500 TABLET, FILM COATED, EXTENDED RELEASE ORAL at 08:09

## 2024-08-25 RX ADMIN — Medication 1000 UNITS: at 08:09

## 2024-08-25 RX ADMIN — RANOLAZINE 500 MG: 500 TABLET, FILM COATED, EXTENDED RELEASE ORAL at 19:42

## 2024-08-25 RX ADMIN — SODIUM CHLORIDE, PRESERVATIVE FREE 10 ML: 5 INJECTION INTRAVENOUS at 08:10

## 2024-08-25 RX ADMIN — SODIUM BICARBONATE 650 MG: 650 TABLET ORAL at 19:42

## 2024-08-25 RX ADMIN — BENZONATATE 100 MG: 100 CAPSULE ORAL at 08:10

## 2024-08-25 RX ADMIN — ASPIRIN 81 MG: 81 TABLET, COATED ORAL at 08:09

## 2024-08-25 RX ADMIN — BENZONATATE 100 MG: 100 CAPSULE ORAL at 19:42

## 2024-08-25 RX ADMIN — BENZONATATE 100 MG: 100 CAPSULE ORAL at 14:49

## 2024-08-25 RX ADMIN — WARFARIN SODIUM 2 MG: 2 TABLET ORAL at 18:09

## 2024-08-25 RX ADMIN — ATORVASTATIN CALCIUM 20 MG: 10 TABLET, FILM COATED ORAL at 19:41

## 2024-08-25 RX ADMIN — INSULIN HUMAN 10 UNITS: 100 INJECTION, SOLUTION PARENTERAL at 06:30

## 2024-08-25 RX ADMIN — SODIUM CHLORIDE, PRESERVATIVE FREE 10 ML: 5 INJECTION INTRAVENOUS at 19:41

## 2024-08-25 RX ADMIN — TAMSULOSIN HYDROCHLORIDE 0.4 MG: 0.4 CAPSULE ORAL at 19:42

## 2024-08-25 RX ADMIN — METOPROLOL TARTRATE 37.5 MG: 25 TABLET, FILM COATED ORAL at 08:09

## 2024-08-25 RX ADMIN — METOPROLOL TARTRATE 37.5 MG: 25 TABLET, FILM COATED ORAL at 18:08

## 2024-08-25 RX ADMIN — SODIUM BICARBONATE 650 MG: 650 TABLET ORAL at 14:49

## 2024-08-25 RX ADMIN — INSULIN HUMAN 10 UNITS: 100 INJECTION, SOLUTION PARENTERAL at 18:09

## 2024-08-25 NOTE — PROGRESS NOTES
[facility]  Nephrology Progress Note    Patient's Name: Johny Nichols    History of Present Illness from 8/21 consult note:    \"Johny Nichols is a 89 y.o. male with a past medical history of CKD, diabetes mellitus, hypertension, and hyperlipidemia.  He presented to the ED on 8/20 for concerns of nausea and vomiting.  Vital signs on 8/20 includes temperature 97.9, respirations 18, pulse 67, /72, and he was 99% SpO2.  Lab data on 8/20 includes sodium 123, CO2 18, BUN 28, creatinine 2.2, alk phos 135, and hemoglobin 11.5.  He had a UA on 8/21 which showed specific gravity 1.025, negative proteins, trace bacteria, negative nitrites.  He had a chest x-ray on 8/20 which showed mild cardiomegaly with pulmonary vascular congestion, distended stomach.  Nephrology has been consulted to see the patient for MISAEL and hyponatremia.  He has a baseline creatinine of 1.9-2.2.  At present, patient was seen and examined.  He came in due to vomiting and nausea.  He also notes weakness.  He reports coughing prior to admission.  He denies any dysuria.  He denies any headache.  He notes intermittent dizziness.  His appetite has been normal.  He denies any fevers.\"    Subjective:    8/24: Sitting up in bed. States he feels \"pretty\" good overall but needs some extra help. He is to be discharged to a rehab center on Monday.     8/25: Sitting up in bed. Reports he feels good. No shortness of breath, chest pain. Appetite is good.     PMH:    Past Medical History:   Diagnosis Date    A-fib ([facility])     Cerebrovascular accident (CVA) ([facility]) 11/25/2013    Chronic kidney disease     Chronic renal impairment, stage 3 (moderate) ([facility]) 10/10/2016    Colitis, ulcerative chronic ([facility])     Diabetes mellitus (HCC)     DM (diabetes mellitus) ([facility]) 11/25/2013    Encounter for therapeutic drug monitoring 04/05/2016    Hyperlipidemia     Hyperlipidemia associated with type 2 diabetes mellitus ([facility]) 01/17/2016    Hypertension     Long term (current) use  60 years:    13 - 150 ng/mL  Adults greater than 60 years:   no established reference range  Pediatrics:                     no established reference range       Iron   Date Value Ref Range Status   12/17/2021 132 59 - 158 mcg/dL Final     TIBC   Date Value Ref Range Status   12/17/2021 296 250 - 450 mcg/dL Final       Vitamin B-12   Date Value Ref Range Status   12/17/2021 573 211 - 946 pg/mL Final       Folate   Date Value Ref Range Status   12/17/2021 16.9 4.8 - 24.2 ng/mL Final       Lab Results   Component Value Date/Time    COLORU Yellow 08/21/2024 01:49 AM    NITRU NEGATIVE 08/21/2024 01:49 AM    GLUCOSEU NEGATIVE 08/21/2024 01:49 AM    KETUA NEGATIVE 08/21/2024 01:49 AM    UROBILINOGEN 0.2 08/21/2024 01:49 AM    BILIRUBINUR NEGATIVE 08/21/2024 01:49 AM       Lab Results   Component Value Date/Time    PROTEIN 6.5 08/25/2024 06:11 AM    OSMOU 303 08/21/2024 01:00 PM       No components found for: \"URIC\"    No results found for: \"LIPIDPAN\"    Assessment and Plans:    CKD 3B  History of DM, HTN, hyperlipidemia  Baseline creatinine 1.9-2.2  Creatinine 1.9 today-at baseline  No aggressive workup as creatinine at baseline  Avoid nephrotoxins/NSAIDs  Strict I&O, daily weights  On IV fluids  Monitor labs    2.  Hyponatremia, hypotonic  Clinically appears hypovolemic  Suspect secondary to nausea/vomiting  Sodium 123 on 8/20--> 123 --> 132--> 130 today  Serum osmolality 273 on 8/21  Uric acid 5.4-WNL, TSH 0.93-WNL  Urine osmolality 303, urine sodium<20  Strict I&O  Discontinue IV fluids  Monitor labs    3.  Nonanion gap metabolic acidosis, chronic  Likely with CKD  Anion gap 13 and CO2 18 on 8/20--> CO2 22today  Sodium bicarbonate 650 mg oral 3 times daily  Monitor labs    4.  Hypertension with CKD I-IV  BP goal<130/80  BP at/neargoal  Monitor BPs on current regimen    5.  Urinary retention  Slaughter   Seen by Urology  Catheter placed ; now with leg bag    6.  Secondary hyperparathyroidism of renal origin  With vitamin D

## 2024-08-25 NOTE — PROGRESS NOTES
Internal Medicine Progress Note    Patient's name: Johny Nichols  : 1935  Chief complaints (on day of admission): Emesis (Patient c/o nausea and vomiting , states he was seen 5 days ago for same with no relief. Hx DM )  Admission date: 2024  Date of service: 2024   Room: [address] IMCU/NEURO  Primary care physician: Nicholas Beal MD  Reason for visit: Follow-up for  nausea, vomiting, weakness. , HYPONATREMIA    Subjective  Johny was seen and examined. Complaining of improvedcough, can't move left arm.   discharge Held  for possible placement to [facility]  NO N/V. HAS ENGEL- PLACED BY UROL  Review of Systems NEG x 10  There are no new complaints of chest pain, shortness of breath, abdominal pain, nausea, vomiting, diarrhea, constipation.    Hospital Medications  Current Facility-Administered Medications   Medication Dose Route Frequency Provider Last Rate Last Admin    benzonatate (TESSALON) capsule 100 mg  100 mg Oral TID Nicholas Beal MD   100 mg at 24 0810    glucose chewable tablet 16 g  4 tablet Oral PRN Nicholas Beal MD        dextrose bolus 10% 125 mL  125 mL IntraVENous PRN Nicholas Beal MD        Or    dextrose bolus 10% 250 mL  250 mL IntraVENous PRN Nicholas Beal MD        glucagon injection 1 mg  1 mg SubCUTAneous PRN Nicholas Beal MD        dextrose 10 % infusion   IntraVENous Continuous PRN Nicholas Beal MD        Vitamin D (CHOLECALCIFEROL) tablet 1,000 Units  1,000 Units Oral Daily Mouna Segovia APRN - CNP   1,000 Units at 24 0809    lidocaine (XYLOCAINE) 2 % uro-jet   Topical PRN Nasrin Riojas APRN - YASMEEN        hydrALAZINE (APRESOLINE) injection 10 mg  10 mg IntraVENous Q6H PRN Nicholas Beal MD   10 mg at 24 2314    tamsulosin (FLOMAX) capsule 0.4 mg  0.4 mg Oral Nightly Nasrin Riojas APRN - CNP   0.4 mg at 24    warfarin placeholder: dosing by pharmacy   Other RX Placeholder Nicholas Beal MD        sodium bicarbonate  15.1 oz)   SpO2 99%   BMI 21.55 kg/m²   I/O last 3 completed shifts:  In: 240 [P.O.:240]  Out: 2875 [Urine:1950; Stool:925]  No intake/output data recorded.    Physical Exam: SEEN IN ROOM   General: AAO to person/place/time/purpose, NAD, no labored breathing  Eyes: conjunctivae/corneas clear, sclera non icteric  Skin: color/texture/turgor normal, no rashes or lesions  Lungs: CTAB, no retractions/use of accessory muscles, no vocal fremitus, no rhonchi, no crackle, no rales  Heart: regular rate, regular rhythm, no murmur  Abdomen: soft, NT, bowel sounds normal  Extremities: atraumatic, no edema  Neurologic: cranial nerves 2-12 grossly intact, no slurred speech    Most Recent Labs  Lab Results   Component Value Date    WBC 6.5 08/25/2024    HGB 9.6 (L) 08/25/2024    HCT 28.7 (L) 08/25/2024     08/25/2024     (L) 08/25/2024    K 4.2 08/25/2024    CL 99 08/25/2024    CREATININE 1.9 (H) 08/25/2024    BUN 25 (H) 08/25/2024    CO2 22 08/25/2024    GLUCOSE 132 (H) 08/25/2024    ALT 12 08/25/2024    AST 22 08/25/2024    INR 2.1 08/25/2024    APTT 38.4 (H) 03/05/2024    TSH 0.93 08/21/2024    LABA1C 7.0 (H) 03/06/2024       XR CHEST PORTABLE   Final Result   No acute cardiopulmonary process.         XR CHEST PORTABLE   Final Result   1. Mild cardiomegaly with pulmonary vascular congestion.   2. Distended stomach.         CT HEAD WO CONTRAST    (Results Pending)       Echocardiogram      Assessment   Active Hospital Problems    Diagnosis     Debility [R53.81]      Priority: High    MCI (mild cognitive impairment) [G31.84]      Priority: High    Permanent atrial fibrillation (HCC) [I48.21]      Priority: High    Pure hypercholesterolemia [E78.00]      Priority: Low    Dehydration [E86.0]     History of cerebrovascular accident [Z86.73]     Coronary artery disease involving native coronary artery of native heart without angina pectoris [I25.10]     Chronic anticoagulation [Z79.01]     Prostate cancer (HCC) [C61]

## 2024-08-25 NOTE — PROGRESS NOTES
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     Weight:   Wt Readings from Last 1 Encounters:   08/24/24 66.2 kg (145 lb 15.1 oz)       TSH:    Lab Results   Component Value Date/Time    TSH 0.93 08/21/2024 05:08 PM       Hepatic Function Panel:                            Lab Results   Component Value Date/Time    ALKPHOS 112 08/25/2024 06:11 AM    ALT 12 08/25/2024 06:11 AM    AST 22 08/25/2024 06:11 AM    BILITOT 0.5 08/25/2024 06:11 AM    BILIDIR <0.2 10/01/2020 07:50 AM    IBILI see below 10/01/2020 07:50 AM       Current significant warfarin drug-drug interactions include: No significant interactions    Recent Labs     08/23/24  0550 08/24/24  0611 08/25/24  0611   HGB 10.5* 9.1* 9.6*   * 110* 131     Date Warfarin Dose INR Heparin or LMWH Comment   8/21 2 mg  2.2 -    8/22 2 mg 2.4 -    8/23 2 mg 2.2 -    8/24 2 mg 2.3 -    8/25 < 2 mg > 2.1 -      Assessment:  Patient is a 89 y.o. male on warfarin for Atrial Fibrillation.  Patient's home warfarin dosing regimen is 2 mg daily per home medication list.  Goal INR 2 - 3  INR 2.1 today    Plan:  Warfarin 2 mg tonight  Daily PT/INR until the INR is stable within the therapeutic range  Pharmacist will follow and monitor/adjust dosing as necessary    Phillip Art, PharmD  PGY-1 Pharmacy Practice Resident   8/25/2024 4:52 PM

## 2024-08-26 LAB
ALBUMIN SERPL-MCNC: 3.3 G/DL (ref 3.5–5.2)
ALP SERPL-CCNC: 107 U/L (ref 40–129)
ALT SERPL-CCNC: 11 U/L (ref 0–40)
ANION GAP SERPL CALCULATED.3IONS-SCNC: 11 MMOL/L (ref 7–16)
AST SERPL-CCNC: 22 U/L (ref 0–39)
BASOPHILS # BLD: 0.02 K/UL (ref 0–0.2)
BASOPHILS NFR BLD: 0 % (ref 0–2)
BILIRUB SERPL-MCNC: 0.6 MG/DL (ref 0–1.2)
BUN SERPL-MCNC: 26 MG/DL (ref 6–23)
CALCIUM SERPL-MCNC: 9 MG/DL (ref 8.6–10.2)
CHLORIDE SERPL-SCNC: 98 MMOL/L (ref 98–107)
CO2 SERPL-SCNC: 23 MMOL/L (ref 22–29)
CREAT SERPL-MCNC: 1.8 MG/DL (ref 0.7–1.2)
EOSINOPHIL # BLD: 0.15 K/UL (ref 0.05–0.5)
EOSINOPHILS RELATIVE PERCENT: 2 % (ref 0–6)
ERYTHROCYTE [DISTWIDTH] IN BLOOD BY AUTOMATED COUNT: 14.6 % (ref 11.5–15)
GFR, ESTIMATED: 35 ML/MIN/1.73M2
GLUCOSE BLD-MCNC: 278 MG/DL (ref 74–99)
GLUCOSE BLD-MCNC: 283 MG/DL (ref 74–99)
GLUCOSE BLD-MCNC: 90 MG/DL (ref 74–99)
GLUCOSE SERPL-MCNC: 101 MG/DL (ref 74–99)
HCT VFR BLD AUTO: 29.3 % (ref 37–54)
HGB BLD-MCNC: 9.9 G/DL (ref 12.5–16.5)
IMM GRANULOCYTES # BLD AUTO: 0.05 K/UL (ref 0–0.58)
IMM GRANULOCYTES NFR BLD: 1 % (ref 0–5)
INR PPP: 2.2
LYMPHOCYTES NFR BLD: 1.01 K/UL (ref 1.5–4)
LYMPHOCYTES RELATIVE PERCENT: 14 % (ref 20–42)
MAGNESIUM SERPL-MCNC: 1.6 MG/DL (ref 1.6–2.6)
MCH RBC QN AUTO: 28.4 PG (ref 26–35)
MCHC RBC AUTO-ENTMCNC: 33.8 G/DL (ref 32–34.5)
MCV RBC AUTO: 84 FL (ref 80–99.9)
MONOCYTES NFR BLD: 0.49 K/UL (ref 0.1–0.95)
MONOCYTES NFR BLD: 7 % (ref 2–12)
NEUTROPHILS NFR BLD: 76 % (ref 43–80)
NEUTS SEG NFR BLD: 5.33 K/UL (ref 1.8–7.3)
PHOSPHATE SERPL-MCNC: 3 MG/DL (ref 2.5–4.5)
PLATELET, FLUORESCENCE: 136 K/UL (ref 130–450)
PMV BLD AUTO: 10.5 FL (ref 7–12)
POTASSIUM SERPL-SCNC: 4.1 MMOL/L (ref 3.5–5)
PROT SERPL-MCNC: 6.6 G/DL (ref 6.4–8.3)
PROTHROMBIN TIME: 23.8 SEC (ref 9.3–12.4)
RBC # BLD AUTO: 3.49 M/UL (ref 3.8–5.8)
SODIUM SERPL-SCNC: 132 MMOL/L (ref 132–146)
WBC OTHER # BLD: 7.1 K/UL (ref 4.5–11.5)

## 2024-08-26 PROCEDURE — 85025 COMPLETE CBC W/AUTO DIFF WBC: CPT

## 2024-08-26 PROCEDURE — 83735 ASSAY OF MAGNESIUM: CPT

## 2024-08-26 PROCEDURE — 82962 GLUCOSE BLOOD TEST: CPT

## 2024-08-26 PROCEDURE — 6370000000 HC RX 637 (ALT 250 FOR IP)

## 2024-08-26 PROCEDURE — 6370000000 HC RX 637 (ALT 250 FOR IP): Performed by: PHYSICIAN ASSISTANT

## 2024-08-26 PROCEDURE — 6370000000 HC RX 637 (ALT 250 FOR IP): Performed by: NURSE PRACTITIONER

## 2024-08-26 PROCEDURE — 97535 SELF CARE MNGMENT TRAINING: CPT

## 2024-08-26 PROCEDURE — 97530 THERAPEUTIC ACTIVITIES: CPT

## 2024-08-26 PROCEDURE — 80053 COMPREHEN METABOLIC PANEL: CPT

## 2024-08-26 PROCEDURE — 85610 PROTHROMBIN TIME: CPT

## 2024-08-26 PROCEDURE — 36415 COLL VENOUS BLD VENIPUNCTURE: CPT

## 2024-08-26 PROCEDURE — 6370000000 HC RX 637 (ALT 250 FOR IP): Performed by: INTERNAL MEDICINE

## 2024-08-26 PROCEDURE — 1200000000 HC SEMI PRIVATE

## 2024-08-26 PROCEDURE — 2580000003 HC RX 258: Performed by: PHYSICIAN ASSISTANT

## 2024-08-26 PROCEDURE — 84100 ASSAY OF PHOSPHORUS: CPT

## 2024-08-26 RX ORDER — WARFARIN SODIUM 2 MG/1
2 TABLET ORAL
Status: COMPLETED | OUTPATIENT
Start: 2024-08-26 | End: 2024-08-26

## 2024-08-26 RX ADMIN — ISOSORBIDE MONONITRATE 30 MG: 30 TABLET, EXTENDED RELEASE ORAL at 09:23

## 2024-08-26 RX ADMIN — TAMSULOSIN HYDROCHLORIDE 0.4 MG: 0.4 CAPSULE ORAL at 20:50

## 2024-08-26 RX ADMIN — BENZONATATE 100 MG: 100 CAPSULE ORAL at 09:23

## 2024-08-26 RX ADMIN — METOPROLOL TARTRATE 37.5 MG: 25 TABLET, FILM COATED ORAL at 17:17

## 2024-08-26 RX ADMIN — ASPIRIN 81 MG: 81 TABLET, COATED ORAL at 09:23

## 2024-08-26 RX ADMIN — Medication 1000 UNITS: at 09:23

## 2024-08-26 RX ADMIN — RANOLAZINE 500 MG: 500 TABLET, FILM COATED, EXTENDED RELEASE ORAL at 20:50

## 2024-08-26 RX ADMIN — INSULIN HUMAN 10 UNITS: 100 INJECTION, SOLUTION PARENTERAL at 06:39

## 2024-08-26 RX ADMIN — ATORVASTATIN CALCIUM 20 MG: 10 TABLET, FILM COATED ORAL at 20:50

## 2024-08-26 RX ADMIN — SODIUM BICARBONATE 650 MG: 650 TABLET ORAL at 20:50

## 2024-08-26 RX ADMIN — SODIUM CHLORIDE, PRESERVATIVE FREE 10 ML: 5 INJECTION INTRAVENOUS at 20:50

## 2024-08-26 RX ADMIN — BENZONATATE 100 MG: 100 CAPSULE ORAL at 13:57

## 2024-08-26 RX ADMIN — WARFARIN SODIUM 2 MG: 2 TABLET ORAL at 17:18

## 2024-08-26 RX ADMIN — RANOLAZINE 500 MG: 500 TABLET, FILM COATED, EXTENDED RELEASE ORAL at 09:22

## 2024-08-26 RX ADMIN — SODIUM BICARBONATE 650 MG: 650 TABLET ORAL at 09:22

## 2024-08-26 RX ADMIN — INSULIN HUMAN 10 UNITS: 100 INJECTION, SOLUTION PARENTERAL at 17:18

## 2024-08-26 RX ADMIN — SODIUM CHLORIDE, PRESERVATIVE FREE 10 ML: 5 INJECTION INTRAVENOUS at 09:23

## 2024-08-26 RX ADMIN — SODIUM BICARBONATE 650 MG: 650 TABLET ORAL at 13:57

## 2024-08-26 RX ADMIN — METOPROLOL TARTRATE 37.5 MG: 25 TABLET, FILM COATED ORAL at 09:23

## 2024-08-26 RX ADMIN — BENZONATATE 100 MG: 100 CAPSULE ORAL at 20:50

## 2024-08-26 ASSESSMENT — PAIN SCALES - GENERAL
PAINLEVEL_OUTOF10: 0
PAINLEVEL_OUTOF10: 0

## 2024-08-26 NOTE — PROGRESS NOTES
Perfect serve Dr. Beal if pt can downgrade to medsurWomply.    Okay to downgrade to medusrg per Dr. Beal.

## 2024-08-26 NOTE — PROGRESS NOTES
Occupational Therapy  OT BEDSIDE TREATMENT NOTE   [facility]  [address], [place]       Date:2024  Patient Name: Johny Nichols  MRN: [id number]  : 1935  Room: [address] [place] [address]A     Per OT Eval:    Evaluating OT: Moise Singh OTR/L [id number]     Referring Provider: Nathen Keller PA-C                               Specific Provider Orders/Date: OT evaluation and treatment 24     Diagnosis:  Hyponatremia [E87.1]  Nausea and vomiting, unspecified vomiting type [R11.2]       Pertinent Medical History:  has a past medical history of A-fib (HCC), Chronic kidney disease, Chronic renal impairment, stage 3 (moderate) (HCC), Colitis, ulcerative chronic (HCC), Diabetes mellitus (HCC), DM (diabetes mellitus) (HCC), Encounter for therapeutic drug monitoring, Hyperlipidemia, Hyperlipidemia associated with type 2 diabetes mellitus (HCC), Hypertension, Long term (current) use of anticoagulants, Prostate enlargement, Stroke (cerebrum) (HCC), and Unspecified cerebral artery occlusion with cerebral infarction.   Past Surgical History         Past Surgical History:   Procedure Laterality Date    ABDOMEN SURGERY             COLONOSCOPY        COLOSTOMY       CYSTOSCOPY   more than 5 yrs    ECHO COMPL W DOP COLOR FLOW   2013          ECHOCARDIOGRAM TRANSESOPHAGEAL   2013          ENDOSCOPY, COLON, DIAGNOSTIC   12/10/15     ileoscopy    TURP   14     cystoscopy retrogrades    UPPER GASTROINTESTINAL ENDOSCOPY N/A 3/11/2022     EGD ESOPHAGOGASTRODUODENOSCOPY ULTRASOUND performed by Conrad Gerber MD at [facility] ENDOSCOPY         Pt admitted to the hospital  with nausea and vomiting      Orders received, chart reviewed, eval complete.      Precautions:  Fall Risk, cognition, +alarms, LUE weakness at baseline, colostomy      Assessment of current deficits   [x] Functional mobility             [x]ADLs           [x] Strength

## 2024-08-26 NOTE — PROGRESS NOTES
Occupational Therapy  OT BEDSIDE TREATMENT NOTE   [facility]  [address], [place]       Date:2024  Patient Name: Johny Nichols  MRN: [id number]  : 1935  Room: [address] [place] [address]A     Per OT Eval:    Evaluating OT: Moise Singh OTR/L [id number]     Referring Provider: Nathen Keller PA-C                               Specific Provider Orders/Date: OT evaluation and treatment 24     Diagnosis:  Hyponatremia [E87.1]  Nausea and vomiting, unspecified vomiting type [R11.2]       Pertinent Medical History:  has a past medical history of A-fib (HCC), Chronic kidney disease, Chronic renal impairment, stage 3 (moderate) (HCC), Colitis, ulcerative chronic (HCC), Diabetes mellitus (HCC), DM (diabetes mellitus) (HCC), Encounter for therapeutic drug monitoring, Hyperlipidemia, Hyperlipidemia associated with type 2 diabetes mellitus (HCC), Hypertension, Long term (current) use of anticoagulants, Prostate enlargement, Stroke (cerebrum) (HCC), and Unspecified cerebral artery occlusion with cerebral infarction.   Past Surgical History         Past Surgical History:   Procedure Laterality Date    ABDOMEN SURGERY             COLONOSCOPY        COLOSTOMY       CYSTOSCOPY   more than 5 yrs    ECHO COMPL W DOP COLOR FLOW   2013          ECHOCARDIOGRAM TRANSESOPHAGEAL   2013          ENDOSCOPY, COLON, DIAGNOSTIC   12/10/15     ileoscopy    TURP   14     cystoscopy retrogrades    UPPER GASTROINTESTINAL ENDOSCOPY N/A 3/11/2022     EGD ESOPHAGOGASTRODUODENOSCOPY ULTRASOUND performed by Conrad Gerber MD at [facility]COPY         Pt admitted to the hospital  with nausea and vomiting      Orders received, chart reviewed, eval complete.      Precautions:  Fall Risk, cognition, +alarms, LUE weakness at baseline, colotomy      Assessment of current deficits   [x] Functional mobility             [x]ADLs           [x] Strength           BUE  ROM/Strength/  Fine motor Coordination Hand dominance: right      RUE: ROM WFL      Strength: grossly 4+/5      Strength: WFL      Coordination:  WFL      LUE: ROM WFL      Strength: grossly 3+/5      Strength: WFL      Coordination: impaired   Pt will participate in BUE exercise with supervision to increase strength, ROM, and coordination for increased independence in functional mobility and ADLs    Safety   Fair  Fair Good during functional activity       Education:  Pt was educated through out treatment regarding proper technique & safety with bed mobility, functional transfers & mobility, walker management, importance of OOB activity, sitting up in chair & ADL compensatory strategies to ease tasks, improve safety & prevent falls to return home safely.      Comments: Upon arrival pt was in bed & agreeable for therapy, nsg approved. At end of session pt was seated in chair, chair alarm set, nsg informed, all lines and tubes intact, call light within reach.     Pt has made Fair progress towards set goals.   Continue with current plan of care    Treatment Time In: 9:45            Treatment Time Out: 10:15           Treatment Charges: Mins Units   Ther Ex  [id number]     Manual Therapy 58721     Thera Activities 87122 15 1   ADL/Home Mgt 79951 15 1   Neuro Re-ed 86102     Group Therapy      Orthotic manage/training  41939     Non-Billable Time     Total Timed Treatment 30 2       Elenita Reinoso, CARA/DEIRDRE [id number]

## 2024-08-26 NOTE — PROGRESS NOTES
[facility]  Nephrology Progress Note    Patient's Name: Johny Nichols    History of Present Illness from 8/21 consult note:    \"Johny Nichols is a 89 y.o. male with a past medical history of CKD, diabetes mellitus, hypertension, and hyperlipidemia.  He presented to the ED on 8/20 for concerns of nausea and vomiting.  Vital signs on 8/20 includes temperature 97.9, respirations 18, pulse 67, /72, and he was 99% SpO2.  Lab data on 8/20 includes sodium 123, CO2 18, BUN 28, creatinine 2.2, alk phos 135, and hemoglobin 11.5.  He had a UA on 8/21 which showed specific gravity 1.025, negative proteins, trace bacteria, negative nitrites.  He had a chest x-ray on 8/20 which showed mild cardiomegaly with pulmonary vascular congestion, distended stomach.  Nephrology has been consulted to see the patient for MISAEL and hyponatremia.  He has a baseline creatinine of 1.9-2.2.  At present, patient was seen and examined.  He came in due to vomiting and nausea.  He also notes weakness.  He reports coughing prior to admission.  He denies any dysuria.  He denies any headache.  He notes intermittent dizziness.  His appetite has been normal.  He denies any fevers.\"    Subjective:    8/26: Patient was seen and examined.  He reports that he feels a little better.  He notes that his appetite is good.  He denies any nausea or shortness of breath.    PMH:    Past Medical History:   Diagnosis Date    A-fib ([facility])     Cerebrovascular accident (CVA) ([facility]) 11/25/2013    Chronic kidney disease     Chronic renal impairment, stage 3 (moderate) ([facility]) 10/10/2016    Colitis, ulcerative chronic ([facility])     Diabetes mellitus (HCC)     DM (diabetes mellitus) ([facility]) 11/25/2013    Encounter for therapeutic drug monitoring 04/05/2016    Hyperlipidemia     Hyperlipidemia associated with type 2 diabetes mellitus ([facility]) 01/17/2016    Hypertension     Long term (current) use of anticoagulants 04/05/2016    Prostate enlargement     Stroke (cerebrum) ([facility]) 2013

## 2024-08-26 NOTE — PROGRESS NOTES
Physical Therapy  Physical Therapy Treatment Note    Name: Johny Nichols  : 1935  MRN: [id number]      Date of Service: 2024    Evaluating PT:  Rika Calderon PT, DPT [id number]    Room #:  8514/8514-A  Diagnosis:  Hyponatremia [E87.1]  Nausea and vomiting, unspecified vomiting type [R11.2]  PMHx/PSHx:   has a past medical history of A-fib (HCC), Cerebrovascular accident (CVA) (HCC), Chronic kidney disease, Chronic renal impairment, stage 3 (moderate) (HCC), Colitis, ulcerative chronic (HCC), Diabetes mellitus (HCC), DM (diabetes mellitus) (HCC), Encounter for therapeutic drug monitoring, Hyperlipidemia, Hyperlipidemia associated with type 2 diabetes mellitus (HCC), Hypertension, Long term (current) use of anticoagulants, Prostate enlargement, Stroke (cerebrum) (HCC), and Unspecified cerebral artery occlusion with cerebral infarction.  Precautions:  Fall risk, alarms, ostomy, Slaughter  Equipment Needs:  TBD    SUBJECTIVE:    Pt lives with his wife in a 1 story home with 1 step to enter and no rail(s). Pt has a caregiver who lives in his home and who assists with dressing, bathing, etc. Pt ambulated with a  (Emily) PTA.    OBJECTIVE:   Initial Evaluation  Date: 24 Treatment Date: 24 Short Term/ Long Term   Goals   AM-PAC 6 Clicks 16/24 15/24    Was pt agreeable to Eval/treatment? Yes Yes    Does pt have pain? Denied pain No c/o pain    Bed Mobility  Rolling: NT  Supine to sit: SBA  Sit to supine: NT  Scooting: SBA Rolling: NT  Supine to sit: CGA  Sit to supine: NT  Scooting: SBA Rolling: Independent  Supine to sit: Independent  Sit to supine: Independent  Scooting: Independent   Transfers Sit to stand: Yusra  Stand to sit: Yusra  Stand pivot: Yusra with FWW Sit to stand: ModA  Stand to sit: Yusra  Stand pivot: Yusra with FWW Sit to stand: Independent  Stand to sit: Independent  Stand pivot: Mod I with AAD   Ambulation    40 feet x2 with FWW and Yusra 75' x2 with FWW and Yusra >200 feet with AAD and Mod I

## 2024-08-26 NOTE — DISCHARGE INSTR - COC
Continuity of Care Form    Patient Name: Johny Nichols   :  1935  MRN:  [id number]    Admit date:  2024  Discharge date:  24    Code Status Order: Full Code   Advance Directives:   Advance Care Flowsheet Documentation             Admitting Physician:  Nicholas Beal MD  PCP: Nichloas Beal MD    Discharging Nurse: [facility]Room[facility] 8514/8514-A  Discharging Unit Phone Number: [phone]    Emergency Contact:   Extended Emergency Contact Information  Primary Emergency Contact: Tia Nichols  Address: [address] [place] [address]  Home Phone: [phone]  Relation: Spouse  Secondary Emergency Contact: Schuyler Cortés  Home Phone: [phone]  Relation: Step Child    Past Surgical History:  Past Surgical History:   Procedure Laterality Date    ABDOMEN SURGERY          COLONOSCOPY      COLOSTOMY      CYSTOSCOPY  more than 5 yrs    ECHO COMPL W DOP COLOR FLOW  2013         ECHOCARDIOGRAM TRANSESOPHAGEAL  2013         ENDOSCOPY, COLON, DIAGNOSTIC  12/10/15    ileoscopy    TURP  14    cystoscopy retrogrades    UPPER GASTROINTESTINAL ENDOSCOPY N/A 3/11/2022    EGD ESOPHAGOGASTRODUODENOSCOPY ULTRASOUND performed by Conrad Gerber MD at [facility]OSCOPY       Immunization History:   Immunization History   Administered Date(s) Administered    COVID-19, MODERNA BLUE border, Primary or Immunocompromised, (age 12y+), IM, 100 mcg/0.5mL 2021, 2021    COVID-19, MODERNA Bivalent, (age 12y+), IM, 50 mcg/0.5 mL 2022    COVID-19, MODERNA, ( formula), (age 12y+), IM, 50mcg/0.5mL 2023    Influenza Virus Vaccine 2014, 2020    Influenza, FLUZONE High Dose, (age 65 y+), IM, Trivalent PF, 0.5mL 10/10/2016, 10/11/2017, 10/03/2018, 2019    Pneumococcal Conjugate Vaccine 2000    Pneumococcal, PPSV23, PNEUMOVAX 23, (age 2y+), SC/IM, 0.5mL 10/11/2017    TD 5LF, TENIVAC, (age 7y+), IM,

## 2024-08-26 NOTE — PROGRESS NOTES
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     Weight:   Wt Readings from Last 1 Encounters:   08/26/24 67.2 kg (148 lb 2.4 oz)       TSH:    Lab Results   Component Value Date/Time    TSH 0.93 08/21/2024 05:08 PM       Hepatic Function Panel:                            Lab Results   Component Value Date/Time    ALKPHOS 107 08/26/2024 05:53 AM    ALT 11 08/26/2024 05:53 AM    AST 22 08/26/2024 05:53 AM    BILITOT 0.6 08/26/2024 05:53 AM    BILIDIR <0.2 10/01/2020 07:50 AM    IBILI see below 10/01/2020 07:50 AM       Current significant warfarin drug-drug interactions include: No significant interactions    Recent Labs     08/24/24  0611 08/25/24  0611 08/26/24  0553   HGB 9.1* 9.6* 9.9*   * 131  --      Date Warfarin Dose INR Heparin or LMWH Comment   8/21 2 mg  2.2 -    8/22 2 mg 2.4 -    8/23 2 mg 2.2 -    8/24 2 mg 2.3 -    8/25 2 mg 2.1 -    8/26 < 2 mg > 2.2 -      Assessment:  Patient is a 89 y.o. male on warfarin for Atrial Fibrillation.  Patient's home warfarin dosing regimen is 2 mg daily per home medication list.  Goal INR 2 - 3  INR 2.2 today    Plan:  Warfarin 2 mg x1 tonight  Daily PT/INR until the INR is stable within the therapeutic range  Pharmacist will follow and monitor/adjust dosing as necessary    Phillip Art, PharmD  PGY-1 Pharmacy Practice Resident   8/26/2024 9:26 AM

## 2024-08-26 NOTE — PROGRESS NOTES
Internal Medicine Progress Note    Patient's name: Johny Nichols  : 1935  Chief complaints (on day of admission): Emesis (Patient c/o nausea and vomiting , states he was seen 5 days ago for same with no relief. Hx DM )  Admission date: 2024  Date of service: 2024   Room: [address] IMCU/NEURO  Primary care physician: Nicholas Beal MD  Reason for visit: Follow-up for  nausea, vomiting, weakness. , HYPONATREMIA    Subjective  Johny was seen and examined. IMPROVEDleft armSxs. Dw pt on cat scan  discharge Held  for possible placement to [facility]  NO N/V. HAS ENGEL- PLACED BY UROL  Review of Systems NEG x 10  There are no new complaints of chest pain, shortness of breath, abdominal pain, nausea, vomiting, diarrhea, constipation.    Hospital Medications  Current Facility-Administered Medications   Medication Dose Route Frequency Provider Last Rate Last Admin    benzonatate (TESSALON) capsule 100 mg  100 mg Oral TID Nicholas Beal MD   100 mg at 24    glucose chewable tablet 16 g  4 tablet Oral PRN Nicholas Beal MD        dextrose bolus 10% 125 mL  125 mL IntraVENous PRN Nicholas Beal MD        Or    dextrose bolus 10% 250 mL  250 mL IntraVENous PRN Nicholas Beal MD        glucagon injection 1 mg  1 mg SubCUTAneous PRN Nicholas Beal MD        dextrose 10 % infusion   IntraVENous Continuous PRN Nicholas Beal MD        Vitamin D (CHOLECALCIFEROL) tablet 1,000 Units  1,000 Units Oral Daily Mouna Segovia APRN - CNP   1,000 Units at 24 0809    lidocaine (XYLOCAINE) 2 % uro-jet   Topical PRN Nasrin Riojas APRN - YASMEEN        hydrALAZINE (APRESOLINE) injection 10 mg  10 mg IntraVENous Q6H PRN Nicholas Beal MD   10 mg at 24 2306    tamsulosin (FLOMAX) capsule 0.4 mg  0.4 mg Oral Nightly Nasrin Riojas APRN - CNP   0.4 mg at 24    warfarin placeholder: dosing by pharmacy   Other RX Placeholder Nicholas Beal MD        sodium bicarbonate tablet 650 mg  650  cancer (HCC) [C61]     Hyperlipidemia [E78.5]     Primary hypertension [I10]     Alcoholic cirrhosis (HCC) [K70.30]     IPMN (intraductal papillary mucinous neoplasm) [D49.0]     Cerebrovascular accident (CVA) (HCC) [I63.9]     Type 2 diabetes mellitus with kidney complication, with long-term current use of insulin (HCC) [E11.29, Z79.4]    MISAEL- IMPROVED  HYPONATREMIA- RESOLVED ON IVFS  cough- neg.chest x-ray and cont tessalon  Left arm weakness. Good ROM- cdw pt on nl head ct, r/o cva. ?ortho consult. Sxs are improving  Plan  DC When [facility] is available. OP UROL F/U    PT AM-PAC-- 16  OT AM- PAC--     Follow labs   DVT prophylaxis  Please see orders for further management and care.  Discharge plan: TBD [facility]      Electronically signed by Nicholas Beal MD on 8/26/2024 at 8:09 AM    I can be reached through [employer].

## 2024-08-26 NOTE — CARE COORDINATION
Chart reviewed and updated therapy notes received.  Patient would benefit from rehab.  Call placed to the patient's wife Tia to discuss transition of care planning.  [facility] list reviewed and Tia would like.  1.) [facility], 2.) [facility], or 3.) [facility].  Call placed to pavel Begum with [facility] and referral made for rehab.  She will review the case and let this CM know if they can accept.  Call also placed to pavel Moran with both [facility] and [facility].  [facility] is out of network, but [facility] can review.  Information provided and she will let this Cm know if they can accept.  Patient will need authorization for transition of care planning.  Will continue to follow for further transition of care planning needs.     Nina Jacobo RN.  P:  [phone]      Return call received from pavel Begum with [facility] and they are able to accept the patient for transition of care planning.  They will submit for authorization today for transition of care planning.  Luisa with [facility] notified.  PASRR completed.  Envelope and ambulette form in the soft chart.  Will continue to follow.     Nina Jacobo RN.        The Plan for Transition of Care is related to the following treatment goals: improve functional mobility to return home with his roommate.     The Patient and/or patient representative, wife Tia, was provided with a choice of provider and agrees with the discharge plan. [x] Yes [] No    Freedom of choice list was provided with basic dialogue that supports the patient's individualized plan of care/goals, treatment preferences and shares the quality data associated with the providers. [x] Yes [] No

## 2024-08-27 VITALS
TEMPERATURE: 97.9 F | OXYGEN SATURATION: 100 % | SYSTOLIC BLOOD PRESSURE: 132 MMHG | DIASTOLIC BLOOD PRESSURE: 72 MMHG | WEIGHT: 146.9 LBS | BODY MASS INDEX: 21.76 KG/M2 | HEART RATE: 87 BPM | RESPIRATION RATE: 18 BRPM | HEIGHT: 69 IN

## 2024-08-27 LAB
ALBUMIN SERPL-MCNC: 3.3 G/DL (ref 3.5–5.2)
ALP SERPL-CCNC: 108 U/L (ref 40–129)
ALT SERPL-CCNC: 12 U/L (ref 0–40)
ANION GAP SERPL CALCULATED.3IONS-SCNC: 12 MMOL/L (ref 7–16)
AST SERPL-CCNC: 26 U/L (ref 0–39)
BASOPHILS # BLD: 0.02 K/UL (ref 0–0.2)
BASOPHILS NFR BLD: 0 % (ref 0–2)
BILIRUB SERPL-MCNC: 0.6 MG/DL (ref 0–1.2)
BUN SERPL-MCNC: 27 MG/DL (ref 6–23)
CALCIUM SERPL-MCNC: 9.1 MG/DL (ref 8.6–10.2)
CHLORIDE SERPL-SCNC: 102 MMOL/L (ref 98–107)
CO2 SERPL-SCNC: 23 MMOL/L (ref 22–29)
CREAT SERPL-MCNC: 1.9 MG/DL (ref 0.7–1.2)
EOSINOPHIL # BLD: 0.17 K/UL (ref 0.05–0.5)
EOSINOPHILS RELATIVE PERCENT: 3 % (ref 0–6)
ERYTHROCYTE [DISTWIDTH] IN BLOOD BY AUTOMATED COUNT: 14.6 % (ref 11.5–15)
GFR, ESTIMATED: 33 ML/MIN/1.73M2
GLUCOSE BLD-MCNC: 124 MG/DL (ref 74–99)
GLUCOSE BLD-MCNC: 244 MG/DL (ref 74–99)
GLUCOSE SERPL-MCNC: 111 MG/DL (ref 74–99)
HCT VFR BLD AUTO: 30.4 % (ref 37–54)
HGB BLD-MCNC: 10 G/DL (ref 12.5–16.5)
IMM GRANULOCYTES # BLD AUTO: 0.03 K/UL (ref 0–0.58)
IMM GRANULOCYTES NFR BLD: 1 % (ref 0–5)
INR PPP: 2
LYMPHOCYTES NFR BLD: 0.92 K/UL (ref 1.5–4)
LYMPHOCYTES RELATIVE PERCENT: 14 % (ref 20–42)
MAGNESIUM SERPL-MCNC: 1.6 MG/DL (ref 1.6–2.6)
MCH RBC QN AUTO: 27.8 PG (ref 26–35)
MCHC RBC AUTO-ENTMCNC: 32.9 G/DL (ref 32–34.5)
MCV RBC AUTO: 84.4 FL (ref 80–99.9)
MONOCYTES NFR BLD: 0.51 K/UL (ref 0.1–0.95)
MONOCYTES NFR BLD: 8 % (ref 2–12)
NEUTROPHILS NFR BLD: 75 % (ref 43–80)
NEUTS SEG NFR BLD: 4.86 K/UL (ref 1.8–7.3)
PHOSPHATE SERPL-MCNC: 2.8 MG/DL (ref 2.5–4.5)
PLATELET # BLD AUTO: 146 K/UL (ref 130–450)
PMV BLD AUTO: 10.8 FL (ref 7–12)
POTASSIUM SERPL-SCNC: 4.3 MMOL/L (ref 3.5–5)
PROT SERPL-MCNC: 6.7 G/DL (ref 6.4–8.3)
PROTHROMBIN TIME: 22.6 SEC (ref 9.3–12.4)
RBC # BLD AUTO: 3.6 M/UL (ref 3.8–5.8)
SODIUM SERPL-SCNC: 137 MMOL/L (ref 132–146)
WBC OTHER # BLD: 6.5 K/UL (ref 4.5–11.5)

## 2024-08-27 PROCEDURE — 85610 PROTHROMBIN TIME: CPT

## 2024-08-27 PROCEDURE — 85025 COMPLETE CBC W/AUTO DIFF WBC: CPT

## 2024-08-27 PROCEDURE — 36415 COLL VENOUS BLD VENIPUNCTURE: CPT

## 2024-08-27 PROCEDURE — 6370000000 HC RX 637 (ALT 250 FOR IP)

## 2024-08-27 PROCEDURE — 83735 ASSAY OF MAGNESIUM: CPT

## 2024-08-27 PROCEDURE — 6370000000 HC RX 637 (ALT 250 FOR IP): Performed by: INTERNAL MEDICINE

## 2024-08-27 PROCEDURE — 82962 GLUCOSE BLOOD TEST: CPT

## 2024-08-27 PROCEDURE — 80053 COMPREHEN METABOLIC PANEL: CPT

## 2024-08-27 PROCEDURE — 2580000003 HC RX 258: Performed by: PHYSICIAN ASSISTANT

## 2024-08-27 PROCEDURE — 84100 ASSAY OF PHOSPHORUS: CPT

## 2024-08-27 PROCEDURE — 6370000000 HC RX 637 (ALT 250 FOR IP): Performed by: PHYSICIAN ASSISTANT

## 2024-08-27 RX ORDER — WARFARIN SODIUM 2 MG/1
2 TABLET ORAL
Status: COMPLETED | OUTPATIENT
Start: 2024-08-27 | End: 2024-08-27

## 2024-08-27 RX ADMIN — SODIUM BICARBONATE 650 MG: 650 TABLET ORAL at 14:04

## 2024-08-27 RX ADMIN — Medication 1000 UNITS: at 09:33

## 2024-08-27 RX ADMIN — ASPIRIN 81 MG: 81 TABLET, COATED ORAL at 09:33

## 2024-08-27 RX ADMIN — BENZONATATE 100 MG: 100 CAPSULE ORAL at 14:04

## 2024-08-27 RX ADMIN — INSULIN HUMAN 10 UNITS: 100 INJECTION, SOLUTION PARENTERAL at 16:43

## 2024-08-27 RX ADMIN — SODIUM BICARBONATE 650 MG: 650 TABLET ORAL at 09:32

## 2024-08-27 RX ADMIN — INSULIN HUMAN 10 UNITS: 100 INJECTION, SOLUTION PARENTERAL at 05:19

## 2024-08-27 RX ADMIN — METOPROLOL TARTRATE 37.5 MG: 25 TABLET, FILM COATED ORAL at 16:43

## 2024-08-27 RX ADMIN — BENZONATATE 100 MG: 100 CAPSULE ORAL at 09:32

## 2024-08-27 RX ADMIN — METOPROLOL TARTRATE 37.5 MG: 25 TABLET, FILM COATED ORAL at 09:32

## 2024-08-27 RX ADMIN — RANOLAZINE 500 MG: 500 TABLET, FILM COATED, EXTENDED RELEASE ORAL at 09:33

## 2024-08-27 RX ADMIN — WARFARIN SODIUM 2 MG: 2 TABLET ORAL at 14:04

## 2024-08-27 RX ADMIN — ISOSORBIDE MONONITRATE 30 MG: 30 TABLET, EXTENDED RELEASE ORAL at 09:32

## 2024-08-27 RX ADMIN — SODIUM CHLORIDE, PRESERVATIVE FREE 10 ML: 5 INJECTION INTRAVENOUS at 09:31

## 2024-08-27 ASSESSMENT — PAIN SCALES - GENERAL
PAINLEVEL_OUTOF10: 0
PAINLEVEL_OUTOF10: 0

## 2024-08-27 NOTE — PROGRESS NOTES
Exam:      Patient Vitals for the past 24 hrs:   BP Temp Temp src Pulse Resp SpO2 Weight   08/27/24 0725 (!) 152/84 97.7 °F (36.5 °C) Temporal 80 18 100 % --   08/27/24 0530 -- -- -- -- -- -- 66.6 kg (146 lb 14.4 oz)   08/27/24 0200 (!) 140/77 -- -- 83 18 98 % --   08/26/24 2015 (!) 151/76 97.7 °F (36.5 °C) Temporal 70 20 98 % --   08/26/24 1717 (!) 160/77 -- -- 77 -- -- --   08/26/24 1145 (!) 107/55 97 °F (36.1 °C) Temporal 87 16 100 % --         Intake/Output Summary (Last 24 hours) at 8/27/2024 0844  Last data filed at 8/27/2024 0521  Gross per 24 hour   Intake 720 ml   Output 1425 ml   Net -705 ml       General: Awake, alert, no acute distress  Neck: No JVD noted  Lungs: Clear bilaterally upper, diminished to the bases bilaterally.  Unlabored  CV: Regular rate and rhythm.  No rub  Abd: Soft, nontender, nondistended.  Active bowel sounds; ostomy  Skin: Warm and dry.  No rash on exposed extremities  Ext: No edema   Neuro: Awake, answers questions appropriately    Data:    Recent Labs     08/25/24  0611 08/26/24  0553 08/27/24  0659   WBC 6.5 7.1 6.5   HGB 9.6* 9.9* 10.0*   HCT 28.7* 29.3* 30.4*   MCV 82.9 84.0 84.4     --  146       Recent Labs     08/25/24  0611 08/26/24  0553   * 132   K 4.2 4.1   CL 99 98   CO2 22 23   CREATININE 1.9* 1.8*   BUN 25* 26*   LABGLOM 33* 35*   GLUCOSE 132* 101*   CALCIUM 8.7 9.0   PHOS 3.1 3.0   MG 2.2 1.6       Vit D, 25-Hydroxy   Date Value Ref Range Status   08/22/2024 13.9 (L) 30.0 - 100.0 ng/mL Final       No results found for: \"PTH\"    Recent Labs     08/25/24  0611 08/26/24  0553   ALT 12 11   AST 22 22   ALKPHOS 112 107   BILITOT 0.5 0.6       No results for input(s): \"LABALBU\" in the last 72 hours.    Ferritin   Date Value Ref Range Status   12/17/2021 499 ng/mL Final     Comment:     FERRITIN Reference Ranges:  Adult Males   20 - 60 years:    30 - 400 ng/mL  Adult females 17 - 60 years:    13 - 150 ng/mL  Adults greater than 60 years:   no established  hypophosphatemia  Supplement magnesium and phosphorus as needed   Monitor labs    Follow-up with [facility] as an outpatient-appointment 9/11/2024 at 1:40 PM    FILIPPO Ibanez - CNP  Pt see and examined on rounds with justina camargo  Agree with above  Cr stable at baseline  Allen Bautista MD

## 2024-08-27 NOTE — PROGRESS NOTES
Internal Medicine Progress Note    Patient's name: Johny Nichols  : 1935  Chief complaints (on day of admission): Emesis (Patient c/o nausea and vomiting , states he was seen 5 days ago for same with no relief. Hx DM )  Admission date: 2024  Date of service: 2024   Room: [address] IMCU/NEURO  Primary care physician: Nicholas Beal MD  Reason for visit: Follow-up for  nausea, vomiting, weakness. , HYPONATREMIA    Subjective  Johny was seen and examined. Min eft armSxs. Dw pt on cat scan  discharge Held  for possible placement to [facility]-- waitng for cert to [facility]  NO N/V. HAS ENGEL- PLACED BY UROL  Review of Systems NEG x 10  There are no new complaints of chest pain, shortness of breath, abdominal pain, nausea, vomiting, diarrhea, constipation.    Hospital Medications  Current Facility-Administered Medications   Medication Dose Route Frequency Provider Last Rate Last Admin    benzonatate (TESSALON) capsule 100 mg  100 mg Oral TID Nicholas Beal MD   100 mg at 24    glucose chewable tablet 16 g  4 tablet Oral PRN Nicholas Beal MD        dextrose bolus 10% 125 mL  125 mL IntraVENous PRN Nicholas Beal MD        Or    dextrose bolus 10% 250 mL  250 mL IntraVENous PRN Nicholas Beal MD        glucagon injection 1 mg  1 mg SubCUTAneous PRN Nicholas Beal MD        dextrose 10 % infusion   IntraVENous Continuous PRN Nicholas Beal MD        Vitamin D (CHOLECALCIFEROL) tablet 1,000 Units  1,000 Units Oral Daily Mouna Segovia APRN - CNP   1,000 Units at 24 0923    lidocaine (XYLOCAINE) 2 % uro-jet   Topical PRN Nasrin Riojas APRN - CNP        hydrALAZINE (APRESOLINE) injection 10 mg  10 mg IntraVENous Q6H PRN Nicholas Beal MD   10 mg at 24    tamsulosin (FLOMAX) capsule 0.4 mg  0.4 mg Oral Nightly Nasrin Riojas APRN - CNP   0.4 mg at 24    warfarin placeholder: dosing by pharmacy   Other RX Placeholder Nicholas Beal MD        sodium

## 2024-08-27 NOTE — PROGRESS NOTES
Pharmacy Consultation Note  (Warfarin Dosing and Monitoring)    Initial consult date: 8/21/24  Consulting Provider: Dr. Emigdio SHERWOOD Simone is a 89 y.o. male for whom pharmacy has been asked to manage warfarin therapy.     Weight:   Wt Readings from Last 1 Encounters:   08/27/24 66.6 kg (146 lb 14.4 oz)       TSH:    Lab Results   Component Value Date/Time    TSH 0.93 08/21/2024 05:08 PM       Hepatic Function Panel:                            Lab Results   Component Value Date/Time    ALKPHOS 107 08/26/2024 05:53 AM    ALT 11 08/26/2024 05:53 AM    AST 22 08/26/2024 05:53 AM    BILITOT 0.6 08/26/2024 05:53 AM    BILIDIR <0.2 10/01/2020 07:50 AM    IBILI see below 10/01/2020 07:50 AM       Current significant warfarin drug-drug interactions include: No significant interactions    Recent Labs     08/25/24  0611 08/26/24  0553 08/27/24  0659   HGB 9.6* 9.9* 10.0*     --  146     Date Warfarin Dose INR Heparin or LMWH Comment   8/21 2 mg  2.2 -    8/22 2 mg 2.4 -    8/23 2 mg 2.2 -    8/24 2 mg 2.3 -    8/25 2 mg 2.1 -    8/26 2 mg 2.2 -    8/27 < 2 mg > 2.0 -      Assessment:  Patient is a 89 y.o. male on warfarin for Atrial Fibrillation.  Patient's home warfarin dosing regimen is 2 mg daily per home medication list.  Goal INR 2 - 3  INR 2.0 today    Plan:  Warfarin 2 mg x1 tonight  Daily PT/INR until the INR is stable within the therapeutic range  Pharmacist will follow and monitor/adjust dosing as necessary    Phillip Art, PharmD  PGY-1 Pharmacy Practice Resident   8/27/2024 8:35 AM

## 2024-08-27 NOTE — CARE COORDINATION
Chart reviewed and case reviewed in IDR.  Patient was admitted for hyponatremia and hypovolemia.  Slaughter catheter placed for retention.  Referral made yesterday to [facility] for rehab at the request of the patient and wife Tia.  Call receive today from Shy, liaison with [facility] that authorization has been received for transition of care planning.  Call placed to [facility] and spoke with Kemi.  [facility] pick-up scheduled for a 4:30 pm transport time.  Bedside nurse Jomar notified of above.  Met with the patient at the bedside and reviewed above.  Perfect Serve to Dr Beal re: receipt of authorization for possible discharge to rehab today and orders received.  Call placed to the patient's wife Tia to notify of above.  She is in agreement with transition of care plans.  Envelope and transfer paperwork completed.  EAMON completed.  PASRR completed.  WIll continue to follow for further transition of care planning needs.       Nina Jacobo RN.  P:  [phone]

## 2024-08-27 NOTE — PLAN OF CARE
Problem: Safety - Adult  Goal: Free from fall injury  Outcome: Adequate for Discharge     Problem: Chronic Conditions and Co-morbidities  Goal: Patient's chronic conditions and co-morbidity symptoms are monitored and maintained or improved  Outcome: Adequate for Discharge  Flowsheets (Taken 8/27/2024 0800 by Helen Cano, RN)  Care Plan - Patient's Chronic Conditions and Co-Morbidity Symptoms are Monitored and Maintained or Improved: Monitor and assess patient's chronic conditions and comorbid symptoms for stability, deterioration, or improvement     Problem: Discharge Planning  Goal: Discharge to home or other facility with appropriate resources  Outcome: Adequate for Discharge  Flowsheets (Taken 8/27/2024 0800 by Helen Cano, RN)  Discharge to home or other facility with appropriate resources:   Identify barriers to discharge with patient and caregiver   Identify discharge learning needs (meds, wound care, etc)     Problem: ABCDS Injury Assessment  Goal: Absence of physical injury  Outcome: Adequate for Discharge     Problem: Skin/Tissue Integrity  Goal: Absence of new skin breakdown  Description: 1.  Monitor for areas of redness and/or skin breakdown  2.  Assess vascular access sites hourly  3.  Every 4-6 hours minimum:  Change oxygen saturation probe site  4.  Every 4-6 hours:  If on nasal continuous positive airway pressure, respiratory therapy assess nares and determine need for appliance change or resting period.  Outcome: Adequate for Discharge

## 2024-08-27 NOTE — PROGRESS NOTES
Comprehensive Nutrition Assessment    Type and Reason for Visit:  Initial, RD Nutrition Re-Screen/LOS    Nutrition Assessment:    Pt assessed per LOS protocol. Chart reviewed. Pt. tolerating diet currently eating  % at meals this admit and appears stable from a nutritional standpoint. No nutrition intervention indicated at this time. Will follow per policy. Consult RD if needed.    Brendon Bustos RD  Contact: ext [phone]

## 2024-09-24 PROBLEM — E86.0 DEHYDRATION: Status: RESOLVED | Noted: 2024-08-25 | Resolved: 2024-09-24

## 2024-09-30 RX ORDER — RANOLAZINE 500 MG/1
500 TABLET, EXTENDED RELEASE ORAL 2 TIMES DAILY
Qty: 120 TABLET | Refills: 3 | Status: SHIPPED | OUTPATIENT
Start: 2024-09-30

## 2024-11-04 ENCOUNTER — HOSPITAL ENCOUNTER (INPATIENT)
Age: 89
LOS: 2 days | Discharge: HOME HEALTH | DRG: 690 | End: 2024-11-07
Attending: STUDENT IN AN ORGANIZED HEALTH CARE EDUCATION/TRAINING PROGRAM | Admitting: INTERNAL MEDICINE
Payer: MEDICARE

## 2024-11-04 ENCOUNTER — APPOINTMENT (OUTPATIENT)
Dept: GENERAL RADIOLOGY | Age: 89
DRG: 690 | End: 2024-11-04
Payer: MEDICARE

## 2024-11-04 DIAGNOSIS — N18.9 ACUTE RENAL FAILURE SUPERIMPOSED ON CHRONIC KIDNEY DISEASE, UNSPECIFIED ACUTE RENAL FAILURE TYPE, UNSPECIFIED CKD STAGE (HCC): Primary | ICD-10-CM

## 2024-11-04 DIAGNOSIS — N17.9 ACUTE RENAL FAILURE SUPERIMPOSED ON CHRONIC KIDNEY DISEASE, UNSPECIFIED ACUTE RENAL FAILURE TYPE, UNSPECIFIED CKD STAGE (HCC): Primary | ICD-10-CM

## 2024-11-04 DIAGNOSIS — E87.5 HYPERKALEMIA: ICD-10-CM

## 2024-11-04 DIAGNOSIS — D64.9 ANEMIA, UNSPECIFIED TYPE: ICD-10-CM

## 2024-11-04 LAB
ALBUMIN SERPL-MCNC: 3.6 G/DL (ref 3.5–5.2)
ALP SERPL-CCNC: 133 U/L (ref 40–129)
ALT SERPL-CCNC: 13 U/L (ref 0–40)
ANION GAP SERPL CALCULATED.3IONS-SCNC: 9 MMOL/L (ref 7–16)
AST SERPL-CCNC: 29 U/L (ref 0–39)
BASOPHILS # BLD: 0.02 K/UL (ref 0–0.2)
BASOPHILS NFR BLD: 0 % (ref 0–2)
BILIRUB SERPL-MCNC: 0.4 MG/DL (ref 0–1.2)
BUN SERPL-MCNC: 41 MG/DL (ref 6–23)
CALCIUM SERPL-MCNC: 9 MG/DL (ref 8.6–10.2)
CHLORIDE SERPL-SCNC: 106 MMOL/L (ref 98–107)
CO2 SERPL-SCNC: 19 MMOL/L (ref 22–29)
CREAT SERPL-MCNC: 2.8 MG/DL (ref 0.7–1.2)
EOSINOPHIL # BLD: 0.13 K/UL (ref 0.05–0.5)
EOSINOPHILS RELATIVE PERCENT: 2 % (ref 0–6)
ERYTHROCYTE [DISTWIDTH] IN BLOOD BY AUTOMATED COUNT: 19.5 % (ref 11.5–15)
GFR, ESTIMATED: 21 ML/MIN/1.73M2
GLUCOSE SERPL-MCNC: 84 MG/DL (ref 74–99)
HCT VFR BLD AUTO: 31 % (ref 37–54)
HGB BLD-MCNC: 9.7 G/DL (ref 12.5–16.5)
IMM GRANULOCYTES # BLD AUTO: <0.03 K/UL (ref 0–0.58)
IMM GRANULOCYTES NFR BLD: 0 % (ref 0–5)
LYMPHOCYTES NFR BLD: 1.18 K/UL (ref 1.5–4)
LYMPHOCYTES RELATIVE PERCENT: 21 % (ref 20–42)
MCH RBC QN AUTO: 26.6 PG (ref 26–35)
MCHC RBC AUTO-ENTMCNC: 31.3 G/DL (ref 32–34.5)
MCV RBC AUTO: 85.2 FL (ref 80–99.9)
MONOCYTES NFR BLD: 0.6 K/UL (ref 0.1–0.95)
MONOCYTES NFR BLD: 11 % (ref 2–12)
NEUTROPHILS NFR BLD: 65 % (ref 43–80)
NEUTS SEG NFR BLD: 3.69 K/UL (ref 1.8–7.3)
PLATELET # BLD AUTO: 106 K/UL (ref 130–450)
PMV BLD AUTO: 10.5 FL (ref 7–12)
POTASSIUM SERPL-SCNC: 5.4 MMOL/L (ref 3.5–5)
PROT SERPL-MCNC: 7.7 G/DL (ref 6.4–8.3)
RBC # BLD AUTO: 3.64 M/UL (ref 3.8–5.8)
SARS-COV-2 RDRP RESP QL NAA+PROBE: NOT DETECTED
SODIUM SERPL-SCNC: 134 MMOL/L (ref 132–146)
SPECIMEN DESCRIPTION: NORMAL
TROPONIN I SERPL HS-MCNC: 32 NG/L (ref 0–11)
WBC OTHER # BLD: 5.6 K/UL (ref 4.5–11.5)

## 2024-11-04 PROCEDURE — 85025 COMPLETE CBC W/AUTO DIFF WBC: CPT

## 2024-11-04 PROCEDURE — 87635 SARS-COV-2 COVID-19 AMP PRB: CPT

## 2024-11-04 PROCEDURE — 99285 EMERGENCY DEPT VISIT HI MDM: CPT

## 2024-11-04 PROCEDURE — 81001 URINALYSIS AUTO W/SCOPE: CPT

## 2024-11-04 PROCEDURE — 84484 ASSAY OF TROPONIN QUANT: CPT

## 2024-11-04 PROCEDURE — 80053 COMPREHEN METABOLIC PANEL: CPT

## 2024-11-04 PROCEDURE — 71045 X-RAY EXAM CHEST 1 VIEW: CPT

## 2024-11-04 PROCEDURE — 93005 ELECTROCARDIOGRAM TRACING: CPT | Performed by: STUDENT IN AN ORGANIZED HEALTH CARE EDUCATION/TRAINING PROGRAM

## 2024-11-04 RX ORDER — 0.9 % SODIUM CHLORIDE 0.9 %
1000 INTRAVENOUS SOLUTION INTRAVENOUS ONCE
Status: COMPLETED | OUTPATIENT
Start: 2024-11-04 | End: 2024-11-05

## 2024-11-04 ASSESSMENT — PAIN - FUNCTIONAL ASSESSMENT: PAIN_FUNCTIONAL_ASSESSMENT: NONE - DENIES PAIN

## 2024-11-05 ENCOUNTER — APPOINTMENT (OUTPATIENT)
Dept: GENERAL RADIOLOGY | Age: 89
DRG: 690 | End: 2024-11-05
Payer: MEDICARE

## 2024-11-05 PROBLEM — N17.9 AKI (ACUTE KIDNEY INJURY) (HCC): Status: RESOLVED | Noted: 2018-07-28 | Resolved: 2020-03-24

## 2024-11-05 PROBLEM — N18.9 ACUTE RENAL FAILURE SUPERIMPOSED ON CHRONIC KIDNEY DISEASE (HCC): Status: ACTIVE | Noted: 2024-11-05

## 2024-11-05 PROBLEM — N17.9 AKI (ACUTE KIDNEY INJURY) (HCC): Status: ACTIVE | Noted: 2024-11-05

## 2024-11-05 LAB
ALBUMIN SERPL-MCNC: 3.3 G/DL (ref 3.5–5.2)
ALP SERPL-CCNC: 129 U/L (ref 40–129)
ALT SERPL-CCNC: 13 U/L (ref 0–40)
ANION GAP SERPL CALCULATED.3IONS-SCNC: 12 MMOL/L (ref 7–16)
AST SERPL-CCNC: 28 U/L (ref 0–39)
BACTERIA URNS QL MICRO: ABNORMAL
BASOPHILS # BLD: 0.02 K/UL (ref 0–0.2)
BASOPHILS NFR BLD: 0 % (ref 0–2)
BILIRUB SERPL-MCNC: 0.4 MG/DL (ref 0–1.2)
BILIRUB UR QL STRIP: NEGATIVE
BUN SERPL-MCNC: 36 MG/DL (ref 6–23)
CALCIUM SERPL-MCNC: 8.7 MG/DL (ref 8.6–10.2)
CHLORIDE SERPL-SCNC: 107 MMOL/L (ref 98–107)
CLARITY UR: ABNORMAL
CO2 SERPL-SCNC: 15 MMOL/L (ref 22–29)
COLOR UR: YELLOW
CREAT SERPL-MCNC: 2.5 MG/DL (ref 0.7–1.2)
CREAT UR-MCNC: 27.4 MG/DL (ref 40–278)
EOSINOPHIL # BLD: 0.09 K/UL (ref 0.05–0.5)
EOSINOPHILS RELATIVE PERCENT: 2 % (ref 0–6)
ERYTHROCYTE [DISTWIDTH] IN BLOOD BY AUTOMATED COUNT: 19.4 % (ref 11.5–15)
GFR, ESTIMATED: 24 ML/MIN/1.73M2
GLUCOSE BLD-MCNC: 130 MG/DL (ref 74–99)
GLUCOSE BLD-MCNC: 204 MG/DL (ref 74–99)
GLUCOSE BLD-MCNC: 260 MG/DL (ref 74–99)
GLUCOSE BLD-MCNC: 66 MG/DL (ref 74–99)
GLUCOSE SERPL-MCNC: 72 MG/DL (ref 74–99)
GLUCOSE UR STRIP-MCNC: NEGATIVE MG/DL
HCT VFR BLD AUTO: 30.7 % (ref 37–54)
HGB BLD-MCNC: 9.7 G/DL (ref 12.5–16.5)
HGB UR QL STRIP.AUTO: ABNORMAL
IMM GRANULOCYTES # BLD AUTO: <0.03 K/UL (ref 0–0.58)
IMM GRANULOCYTES NFR BLD: 0 % (ref 0–5)
INR PPP: 4.7
KETONES UR STRIP-MCNC: NEGATIVE MG/DL
LEUKOCYTE ESTERASE UR QL STRIP: ABNORMAL
LYMPHOCYTES NFR BLD: 1.24 K/UL (ref 1.5–4)
LYMPHOCYTES RELATIVE PERCENT: 25 % (ref 20–42)
MCH RBC QN AUTO: 26.9 PG (ref 26–35)
MCHC RBC AUTO-ENTMCNC: 31.6 G/DL (ref 32–34.5)
MCV RBC AUTO: 85 FL (ref 80–99.9)
MONOCYTES NFR BLD: 0.44 K/UL (ref 0.1–0.95)
MONOCYTES NFR BLD: 9 % (ref 2–12)
NEUTROPHILS NFR BLD: 64 % (ref 43–80)
NEUTS SEG NFR BLD: 3.24 K/UL (ref 1.8–7.3)
NITRITE UR QL STRIP: POSITIVE
PH UR STRIP: 5.5 [PH] (ref 5–9)
PLATELET # BLD AUTO: 109 K/UL (ref 130–450)
PMV BLD AUTO: 11.2 FL (ref 7–12)
POTASSIUM SERPL-SCNC: 4.7 MMOL/L (ref 3.5–5)
PROT SERPL-MCNC: 7.8 G/DL (ref 6.4–8.3)
PROT UR STRIP-MCNC: NEGATIVE MG/DL
PROTHROMBIN TIME: 51.7 SEC (ref 9.3–12.4)
RBC # BLD AUTO: 3.61 M/UL (ref 3.8–5.8)
RBC #/AREA URNS HPF: ABNORMAL /HPF
SODIUM SERPL-SCNC: 134 MMOL/L (ref 132–146)
SODIUM UR-SCNC: 67 MMOL/L
SP GR UR STRIP: 1.02 (ref 1–1.03)
TROPONIN I SERPL HS-MCNC: 33 NG/L (ref 0–11)
UROBILINOGEN UR STRIP-ACNC: 0.2 EU/DL (ref 0–1)
UUN UR-MCNC: 218 MG/DL (ref 800–1666)
WBC #/AREA URNS HPF: ABNORMAL /HPF
WBC OTHER # BLD: 5.1 K/UL (ref 4.5–11.5)

## 2024-11-05 PROCEDURE — 6370000000 HC RX 637 (ALT 250 FOR IP)

## 2024-11-05 PROCEDURE — 6370000000 HC RX 637 (ALT 250 FOR IP): Performed by: FAMILY MEDICINE

## 2024-11-05 PROCEDURE — 2580000003 HC RX 258: Performed by: FAMILY MEDICINE

## 2024-11-05 PROCEDURE — 82570 ASSAY OF URINE CREATININE: CPT

## 2024-11-05 PROCEDURE — 96360 HYDRATION IV INFUSION INIT: CPT

## 2024-11-05 PROCEDURE — 84300 ASSAY OF URINE SODIUM: CPT

## 2024-11-05 PROCEDURE — 99222 1ST HOSP IP/OBS MODERATE 55: CPT | Performed by: FAMILY MEDICINE

## 2024-11-05 PROCEDURE — APPSS60 APP SPLIT SHARED TIME 46-60 MINUTES: Performed by: CLINICAL NURSE SPECIALIST

## 2024-11-05 PROCEDURE — 2580000003 HC RX 258: Performed by: STUDENT IN AN ORGANIZED HEALTH CARE EDUCATION/TRAINING PROGRAM

## 2024-11-05 PROCEDURE — 73030 X-RAY EXAM OF SHOULDER: CPT

## 2024-11-05 PROCEDURE — 82962 GLUCOSE BLOOD TEST: CPT

## 2024-11-05 PROCEDURE — 6360000002 HC RX W HCPCS: Performed by: FAMILY MEDICINE

## 2024-11-05 PROCEDURE — 6370000000 HC RX 637 (ALT 250 FOR IP): Performed by: INTERNAL MEDICINE

## 2024-11-05 PROCEDURE — 99223 1ST HOSP IP/OBS HIGH 75: CPT | Performed by: INTERNAL MEDICINE

## 2024-11-05 PROCEDURE — 85025 COMPLETE CBC W/AUTO DIFF WBC: CPT

## 2024-11-05 PROCEDURE — 85610 PROTHROMBIN TIME: CPT

## 2024-11-05 PROCEDURE — 84540 ASSAY OF URINE/UREA-N: CPT

## 2024-11-05 PROCEDURE — 80053 COMPREHEN METABOLIC PANEL: CPT

## 2024-11-05 PROCEDURE — 1200000000 HC SEMI PRIVATE

## 2024-11-05 RX ORDER — RANOLAZINE 500 MG/1
500 TABLET, EXTENDED RELEASE ORAL 2 TIMES DAILY
Status: DISCONTINUED | OUTPATIENT
Start: 2024-11-05 | End: 2024-11-07 | Stop reason: HOSPADM

## 2024-11-05 RX ORDER — METOPROLOL TARTRATE 25 MG/1
37.5 TABLET, FILM COATED ORAL 2 TIMES DAILY WITH MEALS
Status: DISCONTINUED | OUTPATIENT
Start: 2024-11-05 | End: 2024-11-05

## 2024-11-05 RX ORDER — ONDANSETRON 4 MG/1
4 TABLET, ORALLY DISINTEGRATING ORAL EVERY 8 HOURS PRN
Status: DISCONTINUED | OUTPATIENT
Start: 2024-11-05 | End: 2024-11-07 | Stop reason: HOSPADM

## 2024-11-05 RX ORDER — ONDANSETRON 2 MG/ML
4 INJECTION INTRAMUSCULAR; INTRAVENOUS EVERY 6 HOURS PRN
Status: DISCONTINUED | OUTPATIENT
Start: 2024-11-05 | End: 2024-11-07 | Stop reason: HOSPADM

## 2024-11-05 RX ORDER — VITAMIN B COMPLEX
1000 TABLET ORAL DAILY
Status: DISCONTINUED | OUTPATIENT
Start: 2024-11-05 | End: 2024-11-07 | Stop reason: HOSPADM

## 2024-11-05 RX ORDER — SODIUM CHLORIDE 0.9 % (FLUSH) 0.9 %
5-40 SYRINGE (ML) INJECTION EVERY 12 HOURS SCHEDULED
Status: DISCONTINUED | OUTPATIENT
Start: 2024-11-05 | End: 2024-11-07 | Stop reason: HOSPADM

## 2024-11-05 RX ORDER — INSULIN LISPRO 100 [IU]/ML
0-4 INJECTION, SOLUTION INTRAVENOUS; SUBCUTANEOUS
Status: DISCONTINUED | OUTPATIENT
Start: 2024-11-05 | End: 2024-11-07 | Stop reason: HOSPADM

## 2024-11-05 RX ORDER — SODIUM CHLORIDE 9 MG/ML
INJECTION, SOLUTION INTRAVENOUS CONTINUOUS
Status: DISCONTINUED | OUTPATIENT
Start: 2024-11-05 | End: 2024-11-06

## 2024-11-05 RX ORDER — SODIUM CHLORIDE 0.9 % (FLUSH) 0.9 %
5-40 SYRINGE (ML) INJECTION PRN
Status: DISCONTINUED | OUTPATIENT
Start: 2024-11-05 | End: 2024-11-07 | Stop reason: HOSPADM

## 2024-11-05 RX ORDER — TAMSULOSIN HYDROCHLORIDE 0.4 MG/1
0.4 CAPSULE ORAL NIGHTLY
Status: DISCONTINUED | OUTPATIENT
Start: 2024-11-05 | End: 2024-11-07 | Stop reason: HOSPADM

## 2024-11-05 RX ORDER — WARFARIN SODIUM 2.5 MG/1
2.5 TABLET ORAL DAILY
Status: DISCONTINUED | OUTPATIENT
Start: 2024-11-05 | End: 2024-11-05

## 2024-11-05 RX ORDER — POLYETHYLENE GLYCOL 3350 17 G/17G
17 POWDER, FOR SOLUTION ORAL DAILY PRN
Status: DISCONTINUED | OUTPATIENT
Start: 2024-11-05 | End: 2024-11-07 | Stop reason: HOSPADM

## 2024-11-05 RX ORDER — HYDRALAZINE HYDROCHLORIDE 20 MG/ML
10 INJECTION INTRAMUSCULAR; INTRAVENOUS EVERY 6 HOURS PRN
Status: DISCONTINUED | OUTPATIENT
Start: 2024-11-05 | End: 2024-11-07 | Stop reason: HOSPADM

## 2024-11-05 RX ORDER — METOPROLOL TARTRATE 50 MG
50 TABLET ORAL 2 TIMES DAILY WITH MEALS
Status: DISCONTINUED | OUTPATIENT
Start: 2024-11-05 | End: 2024-11-07 | Stop reason: HOSPADM

## 2024-11-05 RX ORDER — INSULIN GLARGINE 100 [IU]/ML
5 INJECTION, SOLUTION SUBCUTANEOUS NIGHTLY
Status: DISCONTINUED | OUTPATIENT
Start: 2024-11-05 | End: 2024-11-07 | Stop reason: HOSPADM

## 2024-11-05 RX ORDER — DEXTROSE MONOHYDRATE 100 MG/ML
INJECTION, SOLUTION INTRAVENOUS CONTINUOUS PRN
Status: DISCONTINUED | OUTPATIENT
Start: 2024-11-05 | End: 2024-11-07 | Stop reason: HOSPADM

## 2024-11-05 RX ORDER — ISOSORBIDE MONONITRATE 30 MG/1
30 TABLET, EXTENDED RELEASE ORAL DAILY
Status: DISCONTINUED | OUTPATIENT
Start: 2024-11-05 | End: 2024-11-07 | Stop reason: HOSPADM

## 2024-11-05 RX ORDER — SODIUM BICARBONATE 650 MG/1
1300 TABLET ORAL 3 TIMES DAILY
Status: DISCONTINUED | OUTPATIENT
Start: 2024-11-05 | End: 2024-11-07 | Stop reason: HOSPADM

## 2024-11-05 RX ORDER — SODIUM CHLORIDE 9 MG/ML
INJECTION, SOLUTION INTRAVENOUS PRN
Status: DISCONTINUED | OUTPATIENT
Start: 2024-11-05 | End: 2024-11-07 | Stop reason: HOSPADM

## 2024-11-05 RX ORDER — ATORVASTATIN CALCIUM 20 MG/1
20 TABLET, FILM COATED ORAL NIGHTLY
Status: DISCONTINUED | OUTPATIENT
Start: 2024-11-05 | End: 2024-11-07 | Stop reason: HOSPADM

## 2024-11-05 RX ORDER — LOPERAMIDE HYDROCHLORIDE 2 MG/1
2 CAPSULE ORAL 4 TIMES DAILY PRN
Status: DISCONTINUED | OUTPATIENT
Start: 2024-11-05 | End: 2024-11-07 | Stop reason: HOSPADM

## 2024-11-05 RX ORDER — SODIUM BICARBONATE 650 MG/1
650 TABLET ORAL 3 TIMES DAILY
Status: DISCONTINUED | OUTPATIENT
Start: 2024-11-05 | End: 2024-11-05

## 2024-11-05 RX ORDER — ASPIRIN 81 MG/1
81 TABLET ORAL DAILY
Status: DISCONTINUED | OUTPATIENT
Start: 2024-11-05 | End: 2024-11-07 | Stop reason: HOSPADM

## 2024-11-05 RX ORDER — GLUCAGON 1 MG/ML
1 KIT INJECTION PRN
Status: DISCONTINUED | OUTPATIENT
Start: 2024-11-05 | End: 2024-11-07 | Stop reason: HOSPADM

## 2024-11-05 RX ORDER — ACETAMINOPHEN 500 MG
500 TABLET ORAL EVERY 6 HOURS PRN
Status: DISCONTINUED | OUTPATIENT
Start: 2024-11-05 | End: 2024-11-07 | Stop reason: HOSPADM

## 2024-11-05 RX ADMIN — INSULIN LISPRO 4 UNITS: 100 INJECTION, SOLUTION INTRAVENOUS; SUBCUTANEOUS at 20:23

## 2024-11-05 RX ADMIN — RANOLAZINE 500 MG: 500 TABLET, FILM COATED, EXTENDED RELEASE ORAL at 20:24

## 2024-11-05 RX ADMIN — RANOLAZINE 500 MG: 500 TABLET, FILM COATED, EXTENDED RELEASE ORAL at 10:36

## 2024-11-05 RX ADMIN — SODIUM BICARBONATE 650 MG: 650 TABLET ORAL at 08:46

## 2024-11-05 RX ADMIN — Medication 1000 UNITS: at 08:45

## 2024-11-05 RX ADMIN — Medication 3 MG: at 03:07

## 2024-11-05 RX ADMIN — SODIUM CHLORIDE: 9 INJECTION, SOLUTION INTRAVENOUS at 16:19

## 2024-11-05 RX ADMIN — Medication 3 MG: at 20:24

## 2024-11-05 RX ADMIN — ASPIRIN 81 MG: 81 TABLET, COATED ORAL at 08:45

## 2024-11-05 RX ADMIN — SODIUM CHLORIDE: 9 INJECTION, SOLUTION INTRAVENOUS at 02:53

## 2024-11-05 RX ADMIN — TAMSULOSIN HYDROCHLORIDE 0.4 MG: 0.4 CAPSULE ORAL at 20:24

## 2024-11-05 RX ADMIN — METOPROLOL TARTRATE 37.5 MG: 25 TABLET, FILM COATED ORAL at 08:46

## 2024-11-05 RX ADMIN — METOPROLOL TARTRATE 50 MG: 50 TABLET, FILM COATED ORAL at 16:33

## 2024-11-05 RX ADMIN — CEFTRIAXONE SODIUM 1000 MG: 1 INJECTION, POWDER, FOR SOLUTION INTRAMUSCULAR; INTRAVENOUS at 23:05

## 2024-11-05 RX ADMIN — SODIUM BICARBONATE 1300 MG: 650 TABLET ORAL at 20:24

## 2024-11-05 RX ADMIN — SODIUM CHLORIDE, PRESERVATIVE FREE 10 ML: 5 INJECTION INTRAVENOUS at 16:18

## 2024-11-05 RX ADMIN — ATORVASTATIN CALCIUM 20 MG: 20 TABLET, FILM COATED ORAL at 20:24

## 2024-11-05 RX ADMIN — SODIUM ZIRCONIUM CYCLOSILICATE 10 G: 10 POWDER, FOR SUSPENSION ORAL at 03:07

## 2024-11-05 RX ADMIN — CEFTRIAXONE SODIUM 2000 MG: 2 INJECTION, POWDER, FOR SOLUTION INTRAMUSCULAR; INTRAVENOUS at 03:07

## 2024-11-05 RX ADMIN — SODIUM CHLORIDE 1000 ML: 9 INJECTION, SOLUTION INTRAVENOUS at 00:03

## 2024-11-05 RX ADMIN — ISOSORBIDE MONONITRATE 30 MG: 30 TABLET, EXTENDED RELEASE ORAL at 10:36

## 2024-11-05 RX ADMIN — SODIUM BICARBONATE 1300 MG: 650 TABLET ORAL at 14:05

## 2024-11-05 RX ADMIN — INSULIN GLARGINE 5 UNITS: 100 INJECTION, SOLUTION SUBCUTANEOUS at 20:23

## 2024-11-05 ASSESSMENT — PAIN SCALES - GENERAL
PAINLEVEL_OUTOF10: 0
PAINLEVEL_OUTOF10: 0

## 2024-11-05 NOTE — PROGRESS NOTES
Pharmacy Consultation Note  (Warfarin Dosing and Monitoring)    Initial consult date: 11/05/2024  Consulting Provider: Shay SHERWOOD Simone is a 89 y.o. male for whom pharmacy has been asked to manage warfarin therapy.     Weight:   Wt Readings from Last 1 Encounters:   11/05/24 73 kg (161 lb)         Warfarin Indication Target   INR Range Home Dose  (if applicable) Diet/Feeding Tube   (Enteral feeds, nutritional drinks, increased Vitamin K in diet can decrease INR)   Atrial Fibrillation 2 - 3 2 mg daily No obvious interaction  ADULT DIET; Regular; 5 carb choices (75 gm/meal)       Comments regarding medication interactions: No significant interactions          TSH:    Lab Results   Component Value Date/Time    TSH 0.93 08/21/2024 05:08 PM        Hepatic Function Panel:                            Lab Results   Component Value Date/Time    ALKPHOS 129 11/05/2024 03:48 AM    ALT 13 11/05/2024 03:48 AM    AST 28 11/05/2024 03:48 AM    BILITOT 0.4 11/05/2024 03:48 AM    BILIDIR <0.2 10/01/2020 07:50 AM    IBILI see below 10/01/2020 07:50 AM       Recent Labs     11/04/24  2158 11/05/24  0348   HGB 9.7* 9.7*   * 109*       Date Warfarin Dose INR Heparin or LMWH Comment   11/5 Hold 4.7 --                                  Assessment:  Patient is a 89 y.o. male on warfarin for Atrial Fibrillation.  Patient's home warfarin dosing regimen is 2 mg daily.   Goal INR 2 - 3  INR 4.7 today    Plan:  No warfarin today  Daily PT/INR until the INR is stable within the therapeutic range  Pharmacist will follow and monitor/adjust dosing as necessary    Thank you for this consult,  Omayra Bernal, PharmD  PGY1 Pharmacy Practice Resident [phone]  11/5/2024 8:30 AM

## 2024-11-05 NOTE — CONSULTS
[facility]  Nephrology Consult Note    Patient's Name: Johny Nichols    Reason for Consult: MISAEL    Chief Complaint: Sweats  History Obtained From:  patient, past medical records, and EMR    History of Present Illness:    Johny Nichols is a 89 y.o. male with a past medical history of diabetes mellitus, hyperlipidemia, hypertension, stroke, A-fib, and colitis.  He presented to the ED on 11/4 with concerns for sweats.  Vital signs on 11/4 includes temperature 97.6, respirations 12, pulse 73, /105, and he was 100% SpO2.  Lab data on 11/4 includes potassium 5.4, CO2 19, BUN 41, creatinine 2.8, alk phos 133, and hemoglobin 9.7.  He had a UA which showed specific gravity 1.02, positive nitrites, 3+ bacteria, moderate urine hemoglobin.  He had a chest x-ray on 11/5 which showed atherosclerotic disease, no additional evidence of active cardiopulmonary pathology.  Nephrology has been consulted to see the patient for MISAEL.  He has a baseline creatinine of 1.9-2.2.  At present, patient was seen and examined.  He notes that he was experiencing left shoulder pain, shortness of breath, and sweats prior to admission.  He denies the use of NSAIDs.  He denies any dysuria or hematuria.  He notes that his appetite is good.    PMH:    Past Medical History:   Diagnosis Date    A-fib ([facility])     Cerebrovascular accident (CVA) ([facility]ly [facility]) 11/25/2013    Chronic kidney disease     Chronic renal impairment, stage 3 (moderate) ([facility]ly [facility]) 10/10/2016    Colitis, ulcerative chronic ([facility]ly [facility])     Diabetes mellitus ([facility])     DM (diabetes mellitus) ([facility]ly [facility]) 11/25/2013    Encounter for therapeutic drug monitoring 04/05/2016    Hyperlipidemia     Hyperlipidemia associated with type 2 diabetes mellitus ([facility]) 01/17/2016    Hypertension     Long term (current) use of anticoagulants 04/05/2016    Prostate enlargement     Stroke (cerebrum) ([facility]) 2013    Unspecified cerebral artery occlusion with cerebral infarction 1973       Past Surgical History:   Procedure  smoking. His smoking use included cigarettes. He has never been exposed to tobacco smoke. He has never used smokeless tobacco. He reports that he does not currently use alcohol. He reports that he does not use drugs.    Family History:         Problem Relation Age of Onset    Hypotension Mother     COPD Sister     Coronary Art Dis Sister     Diabetes Other        Review of Systems:   Pertinent items are noted in HPI.    Physical Exam:      Patient Vitals for the past 24 hrs:   BP Temp Temp src Pulse Resp SpO2 Height Weight   11/05/24 0846 129/71 -- -- 76 -- -- -- --   11/05/24 0800 129/71 (!) 96 °F (35.6 °C) Temporal 76 16 99 % -- --   11/05/24 0615 (!) 181/89 97.2 °F (36.2 °C) Temporal 94 18 99 % -- --   11/05/24 0251 (!) 167/83 98.1 °F (36.7 °C) Oral 72 16 100 % 1.753 m (5' 9\") 73 kg (161 lb)   11/05/24 0217 (!) 154/91 96.8 °F (36 °C) Temporal 73 -- 99 % -- --   11/04/24 2200 (!) 146/79 -- -- 83 14 99 % -- --   11/04/24 2120 (!) 152/105 97.6 °F (36.4 °C) Oral 73 12 100 % 1.753 m (5' 9\") 73 kg (161 lb)         Intake/Output Summary (Last 24 hours) at 11/5/2024 1059  Last data filed at 11/5/2024 0846  Gross per 24 hour   Intake 120 ml   Output --   Net 120 ml       General: Awake, alert, no acute distress  Neck: No JVD noted  Lungs: Clear bilaterally upper, diminished to the bases bilaterally.  Unlabored  CV: Regular rate and rhythm.  No rub  Abd: Soft, nontender, nondistended.  Active bowel sounds; ostomy  Skin: Warm and dry.  No rash on exposed extremities  Ext: No edema   Neuro: Awake, answers questions appropriately    Data:    Recent Labs     11/04/24  2158 11/05/24  0348   WBC 5.6 5.1   HGB 9.7* 9.7*   HCT 31.0* 30.7*   MCV 85.2 85.0   * 109*       Recent Labs     11/04/24  2158 11/05/24  0348    134   K 5.4* 4.7    107   CO2 19* 15*   CREATININE 2.8* 2.5*   BUN 41* 36*   LABGLOM 21* 24*   GLUCOSE 84 72*   CALCIUM 9.0 8.7       Vit D, 25-Hydroxy   Date Value Ref Range Status   08/22/2024  bicarbonate 1300 mg oral 3 times daily  Monitor labs    3.  Anemia  Likely in part with CKD  Hemoglobin target 10-12  Hemoglobin 9.7-just below target  Transfuse for hemoglobin<7 as per primary service  Monitor H&H    4.  Hyperkalemia  With MISAEL/CKD and acidosis  K+ 5.4 on 11/4--> 4.7 today  Monitor labs    5.  Hypertension with CKD  BP goal<130/80  BP at goal  Monitor BPs    Mouna Segovia, APRN - CNP    Patient seen and examined all key components of the physical performed independently , case discussed with NP, all pertinent labs and radiologic tests personally reviewed agree with above.      Cesar Valencia MD

## 2024-11-05 NOTE — PROGRESS NOTES
Spiritual Health History and Assessment/Progress Note  [facility]    Initial Encounter, Spiritual/Emotional Needs (High Readmission),  ,  ,      Name: Johny Nichols MRN: [id number]    Age: 89 y.o.     Sex: male   Language: English   Rastafarian: Denominational   MISAEL (acute kidney injury) (HCC)     Date: 11/5/2024                           Spiritual Assessment began in [facility]        Referral/Consult From: Rounding   Encounter Overview/Reason: Initial Encounter, Spiritual/Emotional Needs (High Readmission)  Service Provided For: Patient    Vy, Belief, Meaning:   Patient identifies as spiritual, is connected with a vy tradition or spiritual practice, has beliefs or practices that help with coping during difficult times, and Other: Mr. Nichols is a faithful Adventism from a Denominational denomination.  He is a regular member of a local [employer].    Family/Friends No family/friends present      Importance and Influence:  Patient has spiritual/personal beliefs that influence decisions regarding their health and Other: His vy has been instrumental throughout his life.  He states that he lives by the principle \"do unto others as you would have them do unto you.\"  We talked about how being kind to others is a juarez part of his spiritual and vy understanding.   Family/Friends No family/friends present    Community:  Patient is connected with a spiritual community, feels well-supported. Support system includes: Spouse/Partner and Extended family, and Other: He spoke to me about his siblings and how he did not have any children of his own. He did not mention that he was  nor about his step children.  Family/Friends No family/friends present    Assessment and Plan of Care:     Patient Interventions include: Facilitated expression of thoughts and feelings, Explored spiritual coping/struggle/distress, Engaged in theological reflection, Affirmed coping skills/support systems, Facilitated life

## 2024-11-05 NOTE — ED PROVIDER NOTES
[facility] EMERGENCY DEPARTMENT  EMERGENCY DEPARTMENT ENCOUNTER        Pt Name: Johny Nichols  MRN: [id number]  Birthdate 1935  Date of evaluation: 11/4/2024  Provider: Cristina Briscoe DO  PCP: Nicholas Beal MD  Note Started: 9:48 PM EST 11/4/24    CHIEF COMPLAINT       Chief Complaint   Patient presents with    Sweats     (Patient states he had a shot 3 days ago in L shoulder, now having sweats, no other complaints)       HISTORY OF PRESENT ILLNESS: 1 or more Elements   History From: patient    Limitations to history : None    Johny Nichols is a 89 y.o. male with a history of prior CVA, type 2 diabetes, hypertension, hyperlipidemia, atrial fibrillation anticoagulated on Coumadin presenting to the emergency department complaining of swelling.  He arrives to the emergency department from home via EMS.  He states that he had a shot in his left shoulder 3 years ago and has pain in that shoulder at times.  He states that before he came here today he noted some pain in his left shoulder that was rating down to his left chest wall.  He states that he was having sweating episodes as well.  Does not have any documented fever.  Patient states that he is not having any chest pain now.  States that he just does not feel well.  Just recently discharged from rehab about a week ago.  He states that he has been living at home by himself and has been also feeling weak in general.  Patient denies any lightheadedness, dizziness, syncope, shortness of breath abdominal pain nausea, vomiting, diarrhea, dysuria, hematuria, recent position, recent was, or other acute symptoms or concerns.     Nursing Notes were all reviewed and agreed with or any disagreements were addressed in the HPI.    REVIEW OF SYSTEMS :      Review of Systems    Positives and Pertinent negatives as per HPI.     SURGICAL HISTORY     Past Surgical History:   Procedure Laterality Date    ABDOMEN SURGERY      1978     % injection 5-40 mL (has no administration in time range)   sodium chloride flush 0.9 % injection 5-40 mL (has no administration in time range)   0.9 % sodium chloride infusion (has no administration in time range)   ondansetron (ZOFRAN-ODT) disintegrating tablet 4 mg (has no administration in time range)     Or   ondansetron (ZOFRAN) injection 4 mg (has no administration in time range)   polyethylene glycol (GLYCOLAX) packet 17 g (has no administration in time range)   acetaminophen (TYLENOL) tablet 500 mg (has no administration in time range)     Or   acetaminophen (TYLENOL) suppository 505 mg (has no administration in time range)   melatonin tablet 3 mg (3 mg Oral Given 11/5/24 0307)   aspirin EC tablet 81 mg (has no administration in time range)   atorvastatin (LIPITOR) tablet 20 mg (has no administration in time range)   Vitamin D (CHOLECALCIFEROL) tablet 1,000 Units (has no administration in time range)   Insulin NPH Isophane & Regular (HUMULIN;NOVOLIN) (70-30) 100 UNIT per ML injection pen 5 Units ( SubCUTAneous Automatically Held 11/8/24 1600)   isosorbide mononitrate (IMDUR) extended release tablet 30 mg (has no administration in time range)   loperamide (IMODIUM) capsule 2 mg (has no administration in time range)   metoprolol tartrate (LOPRESSOR) tablet 37.5 mg (has no administration in time range)   ranolazine (RANEXA) extended release tablet 500 mg (500 mg Oral Not Given 11/5/24 0255)   sodium bicarbonate tablet 650 mg (has no administration in time range)   tamsulosin (FLOMAX) capsule 0.4 mg (0.4 mg Oral Not Given 11/5/24 0255)   warfarin (COUMADIN) tablet 2.5 mg (2.5 mg Oral Not Given 11/5/24 0255)   sodium chloride 0.9 % bolus 1,000 mL (0 mLs IntraVENous Stopped 11/5/24 0232)   cefTRIAXone (ROCEPHIN) 2,000 mg in sterile water 20 mL IV syringe (2,000 mg IntraVENous Given 11/5/24 0307)   sodium zirconium cyclosilicate (LOKELMA) oral suspension 10 g (10 g Oral Given 11/5/24 0307)       ED Course as of  Farhat.  Case discussed with hospitalist.  As her paperwork on here says that patient is going to be admitted to the emergency team based upon his PCP.  However, did also speak with Dr. Beal, who states that this patient should actually be admitted him so admission was transferred to him.  Patient admitted in stable condition. Updated on plan of care.       CONSULTS: (Who and What was discussed)  IP CONSULT TO HOSPITALIST    Spoke with Dr. Day (Medicine).  Discussed case.  They will admit this patient.    Spoke with Dr. Beal (Medicine).  Discussed case.  They will admit this patient.          I am the Primary Clinician of Record.    FINAL IMPRESSION      1. Acute renal failure superimposed on chronic kidney disease, unspecified acute renal failure type, unspecified CKD stage (HCC)    2. Anemia, unspecified type    3. Hyperkalemia          DISPOSITION/PLAN     DISPOSITION Admitted 11/05/2024 05:05:53 AM      PATIENT REFERRED TO:  No follow-up provider specified.    DISCHARGE MEDICATIONS:  New Prescriptions    No medications on file       DISCONTINUED MEDICATIONS:  Discontinued Medications    No medications on file              (Please note that portions of this note were completed with a voice recognition program.  Efforts were made to edit the dictations but occasionally words are mis-transcribed.)    Cristina Briscoe DO (electronically signed)           Cristina Briscoe DO  11/05/24 0575

## 2024-11-05 NOTE — CONSULTS
Inpatient Cardiology Consultation      Reason for Consult:  CP    Consulting Physician: Dr Mccormick     Requesting Physician:  Dr Beal     Date of Consultation: 11/5/2024    HISTORY OF PRESENT ILLNESS:   Johny Nichols  is a 89 y.o.  male known to Dr Arechiga  seen in office 3/19/24     PMH: see below    Admit 8/20/2024 - 8/27/2024: Nausea, vomiting, hyponatremia sodium 123 on 8/20 130 8/26--urinary retention requiring Slaughter.  No cardiology consult.    ED 11/4/2024 2113 via EMS for L shoulder & CP  Arrival vitals: /105 HR 73 afebrile SpO2 100% on room air  Significant Labs: Troponin 32-33 BUN 41 CR 2.8>> BUN 36 CR 2.5 K5.4>> 4.7 sodium 134 albumin 3.3 LFT WNL WBC 5.1 Hgb 9.7  INR 4.7  Rapid COVID-negative  UA positive for nitrates and a large amount of leukocytes, moderate urine hemoglobin  RAD: Portable chest x-ray atherosclerotic disease with no acute CP pathology.  Left shoulder x-ray pending  ED treatment: Lokelma 10 g, normal saline bolus 1 L, Rocephin, melatonin    Patient seen and examined.  Recent vitals: T96 HR 76 /71 SpO2 99% on room air weight 161 pounds BMI 24  Patient ambulates with a cane - lives with his wife - 1 step to enter home, does not climb basement steps anymore.    Patient is a difficult historian.  He states he has had intermittent but frequent, L shoulder pain that radiates into his L chest since  his Lupron injection 3 years ago -- he feels his symptoms are all attributed to this.      Yesterday, at rest - he had his usual L shoulder / L chest pain, however, this time - he felt it was worse in severity 10/10. Also new were the associated symptoms of SOB at rest, diaphoresis, palpitations, light headed, headache.  No N/V/D/ indigestion.  Pain lasted 30 min then resolved on own.  It has not returned.  Sleeps in bed on 2 pillows no orthopnea or PND no abdominal or leg swelling no weight changes, no coughing.  No recent infection or fever.  Denies black bloody or tarry  1.5 mm vessel.  Mid diffuse 95% stenosis.  OM2: Mid diffuse mild luminal irregularities.  OM 3: Tiny vessel.  OM 4: Angiographically significant stenosis.  OM 5: (PDA) no angiographically significant stenosis.  Ramus: Absent  RCA: Non-dominant.  Proximal diffuse mild luminal irregularities.  Mid diffuse 50% stenosis.     Monitor Testing      7/2023  - Ozzieo XT 3 days - A Fib 100% burden rate ranging from 40-2 131 with average heart rate of 71 bpm.       5/2023 - 3 days - difficult to read - see image below - A Fib 100%           Past Surgical History:    Past Surgical History:   Procedure Laterality Date    ABDOMEN SURGERY      1978    COLONOSCOPY      COLOSTOMY  1978    CYSTOSCOPY  more than 5 yrs    ECHO COMPL W DOP COLOR FLOW  11/26/2013         ECHOCARDIOGRAM TRANSESOPHAGEAL  11/27/2013         ENDOSCOPY, COLON, DIAGNOSTIC  12/10/15    ileoscopy    TURP  6/16/14    cystoscopy retrogrades    UPPER GASTROINTESTINAL ENDOSCOPY N/A 3/11/2022    EGD ESOPHAGOGASTRODUODENOSCOPY ULTRASOUND performed by Conrad Gerber MD at [facility]       Medications Prior to admit:  Prior to Admission medications    Medication Sig Start Date End Date Taking? Authorizing Provider   ranolazine (RANEXA) 500 MG extended release tablet TAKE 1 TABLET BY MOUTH TWICE DAILY 9/30/24  Yes Brendon Arechiga MD   Insulin NPH Isophane & Regular (HUMULIN 70/30 KWIKPEN) (70-30) 100 UNIT per ML injection pen Inject 5 Units into the skin 2 times daily 8/23/24  Yes Nicholas Beal MD   sodium bicarbonate 650 MG tablet Take 1 tablet by mouth 3 times daily 8/23/24  Yes Nicholas Beal MD   tamsulosin (FLOMAX) 0.4 MG capsule Take 1 capsule by mouth at bedtime 8/23/24  Yes Nicholas Beal MD   Cholecalciferol (VITAMIN D) 25 MCG TABS Take 1 tablet by mouth daily 8/23/24  Yes Nicholas Beal MD   aspirin 81 MG EC tablet Take 1 tablet by mouth daily 8/15/24  Yes Brendon Arechiga MD   isosorbide mononitrate (IMDUR) 30 MG extended release tablet  Take 1 tablet by mouth daily 7/29/24  Yes Brendon Arechiga MD   atorvastatin (LIPITOR) 20 MG tablet Take 1 tablet by mouth nightly 7/29/24  Yes Brendon Arechiga MD   loperamide (IMODIUM) 2 MG capsule Take 1 capsule by mouth as needed for Diarrhea   Yes Provider, MD Nicolas   warfarin (COUMADIN) 4 MG tablet Take by mouth Currently  2mg daily, per Dr Beal   Yes Provider, MD Nicolas   Metoprolol Tartrate 37.5 MG TABS Take 37.5 mg by mouth 2 times daily (with meals) 10/16/23 5/9/24  Brendon Arechiga MD       Current Medications:    Current Facility-Administered Medications: [START ON 11/6/2024] cefTRIAXone (ROCEPHIN) 1,000 mg in sterile water 10 mL IV syringe, 1,000 mg, IntraVENous, Q24H  0.9 % sodium chloride infusion, , IntraVENous, Continuous  sodium chloride flush 0.9 % injection 5-40 mL, 5-40 mL, IntraVENous, 2 times per day  sodium chloride flush 0.9 % injection 5-40 mL, 5-40 mL, IntraVENous, PRN  0.9 % sodium chloride infusion, , IntraVENous, PRN  ondansetron (ZOFRAN-ODT) disintegrating tablet 4 mg, 4 mg, Oral, Q8H PRN **OR** ondansetron (ZOFRAN) injection 4 mg, 4 mg, IntraVENous, Q6H PRN  polyethylene glycol (GLYCOLAX) packet 17 g, 17 g, Oral, Daily PRN  acetaminophen (TYLENOL) tablet 500 mg, 500 mg, Oral, Q6H PRN **OR** acetaminophen (TYLENOL) suppository 505 mg, 505 mg, Rectal, Q6H PRN  melatonin tablet 3 mg, 3 mg, Oral, Nightly  aspirin EC tablet 81 mg, 81 mg, Oral, Daily  atorvastatin (LIPITOR) tablet 20 mg, 20 mg, Oral, Nightly  Vitamin D (CHOLECALCIFEROL) tablet 1,000 Units, 1,000 Units, Oral, Daily  [Held by provider] Insulin NPH Isophane & Regular (HUMULIN;NOVOLIN) (70-30) 100 UNIT per ML injection pen 5 Units, 5 Units, SubCUTAneous, BID AC  isosorbide mononitrate (IMDUR) extended release tablet 30 mg, 30 mg, Oral, Daily  loperamide (IMODIUM) capsule 2 mg, 2 mg, Oral, 4x Daily PRN  metoprolol tartrate (LOPRESSOR) tablet 37.5 mg, 37.5 mg, Oral, BID WC  ranolazine (RANEXA) extended release

## 2024-11-05 NOTE — H&P
Hospital Medicine History & Physical      PCP: Nicholas Beal MD    Date of Admission: 11/4/2024    Date of Service: Pt seen/examined on 11/5/24 and Admitted to Inpatient with expected LOS greater than two midnights due to medical therapy.      Chief Complaint: Weakness      History Of Present Illness:    89 y.o. male who presented to [facility] with complaints of sweats and feeling weak.  Lab work reviewed showed potassium of 5.4, creatinine of 2.8 compared to baseline of 1.9.  Troponin of 32 and 33. Blood pressure 152/105.  Urinalysis was concerning for UTI with  white blood cells, positive nitrites and large leukocyte esterase with 3+ bacteria.  He was given Rocephin. Patient mentions that he has sweats and shaky. He denies diarrhea.    Past Medical History:          Diagnosis Date    A-fib ([facility])     Cerebrovascular accident (CVA) ([facility]) 11/25/2013    Chronic kidney disease     Chronic renal impairment, stage 3 (moderate) ([facility]) 10/10/2016    Colitis, ulcerative chronic ([facility])     Diabetes mellitus ([facility])     DM (diabetes mellitus) ([facility]) 11/25/2013    Encounter for therapeutic drug monitoring 04/05/2016    Hyperlipidemia     Hyperlipidemia associated with type 2 diabetes mellitus ([facility]) 01/17/2016    Hypertension     Long term (current) use of anticoagulants 04/05/2016    Prostate enlargement     Stroke (cerebrum) ([facility]) 2013    Unspecified cerebral artery occlusion with cerebral infarction 1973       Past Surgical History:          Procedure Laterality Date    ABDOMEN SURGERY      1978    COLONOSCOPY      COLOSTOMY  1978    CYSTOSCOPY  more than 5 yrs    ECHO COMPL W DOP COLOR FLOW  11/26/2013         ECHOCARDIOGRAM TRANSESOPHAGEAL  11/27/2013         ENDOSCOPY, COLON, DIAGNOSTIC  12/10/15    ileoscopy    TURP  6/16/14    cystoscopy retrogrades    UPPER GASTROINTESTINAL ENDOSCOPY N/A 3/11/2022    EGD ESOPHAGOGASTRODUODENOSCOPY ULTRASOUND performed by Conrad Gerber MD at [facility]  09:58 PM    WBCUA 51  11/04/2024 09:58 PM    BACTERIA 3+ 11/04/2024 09:58 PM    RBCUA 10 TO 20 11/04/2024 09:58 PM    BLOODU LARGE 08/13/2022 05:04 PM    GLUCOSEU NEGATIVE 11/04/2024 09:58 PM         ASSESSMENT:  MISAEL on CKD  Urinary tract infection  Weakness  Supratherapeutic INR  Hyperkalemia  Atrial fibrillation on coumadin  Insulin-dependent diabetes  CVA  Hypertension      PLAN:  IV fluids for now.  Avoid nephrotoxins.  PT OT.  Rocephin.  Follow-up on urine culture.  Hold off insulin for now as blood sugar is on the low side.  Lokelma.  Monitor BMP.  Hold Coumadin. This is a Dr Beal patient. Patient was already seen and orders placed. Will transfer back to his service. Discussed with Dr Beal.           Tree Day MD    Thank you Nicholas Beal MD for the opportunity to be involved in this patient's care. If you have any questions or concerns please feel free to contact me through [employer].

## 2024-11-05 NOTE — PROGRESS NOTES
Colostomy leaked - large amount of green, liquid stool. Skin care performed and colostomy changed. Full linen change completed.

## 2024-11-05 NOTE — PROGRESS NOTES
4 Eyes Skin Assessment     NAME:  Johny Nichols  YOB: 1935  MEDICAL RECORD NUMBER:  [id number]    The patient is being assessed for  Admission    I agree that at least one RN has performed a thorough Head to Toe Skin Assessment on the patient. ALL assessment sites listed below have been assessed.      Areas assessed by both nurses:    Head, Face, Ears, Shoulders, Back, Chest, Arms, Elbows, Hands, Sacrum. Buttock, Coccyx, Ischium, and Legs. Feet and Heels        Does the Patient have a Wound? No noted wound(s)       Clark Prevention initiated by RN: Yes  Wound Care Orders initiated by RN: No    Pressure Injury (Stage 3,4, Unstageable, DTI, NWPT, and Complex wounds) if present, place Wound referral order by RN under : No    New Ostomies, if present place, Ostomy referral order under : No     Nurse 1 eSignature: Electronically signed by Vy Huff RN on 11/5/24 at 10:09 AM EST    **SHARE this note so that the co-signing nurse can place an eSignature**    Nurse 2 eSignature: {Esignature:[id number]}

## 2024-11-06 LAB
ANION GAP SERPL CALCULATED.3IONS-SCNC: 11 MMOL/L (ref 7–16)
BASOPHILS # BLD: 0.02 K/UL (ref 0–0.2)
BASOPHILS NFR BLD: 0 % (ref 0–2)
BUN SERPL-MCNC: 32 MG/DL (ref 6–23)
CALCIUM SERPL-MCNC: 8.9 MG/DL (ref 8.6–10.2)
CHLORIDE SERPL-SCNC: 114 MMOL/L (ref 98–107)
CO2 SERPL-SCNC: 20 MMOL/L (ref 22–29)
CREAT SERPL-MCNC: 2.3 MG/DL (ref 0.7–1.2)
EKG ATRIAL RATE: 68 BPM
EKG Q-T INTERVAL: 370 MS
EKG QRS DURATION: 72 MS
EKG QTC CALCULATION (BAZETT): 381 MS
EKG R AXIS: 47 DEGREES
EKG T AXIS: 58 DEGREES
EKG VENTRICULAR RATE: 64 BPM
EOSINOPHIL # BLD: 0.09 K/UL (ref 0.05–0.5)
EOSINOPHILS RELATIVE PERCENT: 2 % (ref 0–6)
ERYTHROCYTE [DISTWIDTH] IN BLOOD BY AUTOMATED COUNT: 19.5 % (ref 11.5–15)
GFR, ESTIMATED: 27 ML/MIN/1.73M2
GLUCOSE BLD-MCNC: 205 MG/DL (ref 74–99)
GLUCOSE BLD-MCNC: 213 MG/DL (ref 74–99)
GLUCOSE BLD-MCNC: 232 MG/DL (ref 74–99)
GLUCOSE BLD-MCNC: 98 MG/DL (ref 74–99)
GLUCOSE SERPL-MCNC: 99 MG/DL (ref 74–99)
HCT VFR BLD AUTO: 27.5 % (ref 37–54)
HGB BLD-MCNC: 8.6 G/DL (ref 12.5–16.5)
IMM GRANULOCYTES # BLD AUTO: <0.03 K/UL (ref 0–0.58)
IMM GRANULOCYTES NFR BLD: 0 % (ref 0–5)
INR PPP: 4.7
LACTATE BLDV-SCNC: 1.2 MMOL/L (ref 0.5–2.2)
LYMPHOCYTES NFR BLD: 0.82 K/UL (ref 1.5–4)
LYMPHOCYTES RELATIVE PERCENT: 18 % (ref 20–42)
MAGNESIUM SERPL-MCNC: 1.3 MG/DL (ref 1.6–2.6)
MCH RBC QN AUTO: 26.5 PG (ref 26–35)
MCHC RBC AUTO-ENTMCNC: 31.3 G/DL (ref 32–34.5)
MCV RBC AUTO: 84.9 FL (ref 80–99.9)
MONOCYTES NFR BLD: 0.42 K/UL (ref 0.1–0.95)
MONOCYTES NFR BLD: 9 % (ref 2–12)
NEUTROPHILS NFR BLD: 71 % (ref 43–80)
NEUTS SEG NFR BLD: 3.32 K/UL (ref 1.8–7.3)
PHOSPHATE SERPL-MCNC: 2.9 MG/DL (ref 2.5–4.5)
PLATELET # BLD AUTO: 75 K/UL (ref 130–450)
PLATELET CONFIRMATION: NORMAL
PMV BLD AUTO: 10.6 FL (ref 7–12)
POTASSIUM SERPL-SCNC: 4.8 MMOL/L (ref 3.5–5)
PROTHROMBIN TIME: 51.9 SEC (ref 9.3–12.4)
RBC # BLD AUTO: 3.24 M/UL (ref 3.8–5.8)
SODIUM SERPL-SCNC: 145 MMOL/L (ref 132–146)
WBC OTHER # BLD: 4.7 K/UL (ref 4.5–11.5)

## 2024-11-06 PROCEDURE — 85610 PROTHROMBIN TIME: CPT

## 2024-11-06 PROCEDURE — 6370000000 HC RX 637 (ALT 250 FOR IP)

## 2024-11-06 PROCEDURE — 83605 ASSAY OF LACTIC ACID: CPT

## 2024-11-06 PROCEDURE — 83735 ASSAY OF MAGNESIUM: CPT

## 2024-11-06 PROCEDURE — 6370000000 HC RX 637 (ALT 250 FOR IP): Performed by: FAMILY MEDICINE

## 2024-11-06 PROCEDURE — 93010 ELECTROCARDIOGRAM REPORT: CPT | Performed by: INTERNAL MEDICINE

## 2024-11-06 PROCEDURE — 36415 COLL VENOUS BLD VENIPUNCTURE: CPT

## 2024-11-06 PROCEDURE — 84100 ASSAY OF PHOSPHORUS: CPT

## 2024-11-06 PROCEDURE — 1200000000 HC SEMI PRIVATE

## 2024-11-06 PROCEDURE — 85025 COMPLETE CBC W/AUTO DIFF WBC: CPT

## 2024-11-06 PROCEDURE — 6370000000 HC RX 637 (ALT 250 FOR IP): Performed by: INTERNAL MEDICINE

## 2024-11-06 PROCEDURE — 2580000003 HC RX 258: Performed by: FAMILY MEDICINE

## 2024-11-06 PROCEDURE — 82962 GLUCOSE BLOOD TEST: CPT

## 2024-11-06 PROCEDURE — 80048 BASIC METABOLIC PNL TOTAL CA: CPT

## 2024-11-06 PROCEDURE — 6360000002 HC RX W HCPCS

## 2024-11-06 RX ORDER — MAGNESIUM SULFATE IN WATER 40 MG/ML
2000 INJECTION, SOLUTION INTRAVENOUS ONCE
Status: COMPLETED | OUTPATIENT
Start: 2024-11-06 | End: 2024-11-06

## 2024-11-06 RX ADMIN — SODIUM BICARBONATE 1300 MG: 650 TABLET ORAL at 20:40

## 2024-11-06 RX ADMIN — ATORVASTATIN CALCIUM 20 MG: 20 TABLET, FILM COATED ORAL at 20:40

## 2024-11-06 RX ADMIN — SODIUM BICARBONATE 1300 MG: 650 TABLET ORAL at 08:51

## 2024-11-06 RX ADMIN — DICLOFENAC SODIUM 2 G: 10 GEL TOPICAL at 17:06

## 2024-11-06 RX ADMIN — TAMSULOSIN HYDROCHLORIDE 0.4 MG: 0.4 CAPSULE ORAL at 20:40

## 2024-11-06 RX ADMIN — SODIUM CHLORIDE, PRESERVATIVE FREE 10 ML: 5 INJECTION INTRAVENOUS at 08:51

## 2024-11-06 RX ADMIN — SODIUM BICARBONATE 1300 MG: 650 TABLET ORAL at 13:32

## 2024-11-06 RX ADMIN — METOPROLOL TARTRATE 50 MG: 50 TABLET, FILM COATED ORAL at 16:59

## 2024-11-06 RX ADMIN — INSULIN LISPRO 1 UNITS: 100 INJECTION, SOLUTION INTRAVENOUS; SUBCUTANEOUS at 16:59

## 2024-11-06 RX ADMIN — Medication 1000 UNITS: at 08:52

## 2024-11-06 RX ADMIN — ASPIRIN 81 MG: 81 TABLET, COATED ORAL at 08:52

## 2024-11-06 RX ADMIN — RANOLAZINE 500 MG: 500 TABLET, FILM COATED, EXTENDED RELEASE ORAL at 08:52

## 2024-11-06 RX ADMIN — DICLOFENAC SODIUM 2 G: 10 GEL TOPICAL at 08:50

## 2024-11-06 RX ADMIN — DICLOFENAC SODIUM 2 G: 10 GEL TOPICAL at 13:32

## 2024-11-06 RX ADMIN — INSULIN GLARGINE 5 UNITS: 100 INJECTION, SOLUTION SUBCUTANEOUS at 20:41

## 2024-11-06 RX ADMIN — RANOLAZINE 500 MG: 500 TABLET, FILM COATED, EXTENDED RELEASE ORAL at 20:40

## 2024-11-06 RX ADMIN — MAGNESIUM SULFATE HEPTAHYDRATE 2000 MG: 40 INJECTION, SOLUTION INTRAVENOUS at 10:47

## 2024-11-06 RX ADMIN — Medication 3 MG: at 20:40

## 2024-11-06 RX ADMIN — METOPROLOL TARTRATE 50 MG: 50 TABLET, FILM COATED ORAL at 08:52

## 2024-11-06 RX ADMIN — DICLOFENAC SODIUM 2 G: 10 GEL TOPICAL at 20:48

## 2024-11-06 RX ADMIN — SODIUM CHLORIDE, PRESERVATIVE FREE 10 ML: 5 INJECTION INTRAVENOUS at 20:41

## 2024-11-06 RX ADMIN — INSULIN LISPRO 1 UNITS: 100 INJECTION, SOLUTION INTRAVENOUS; SUBCUTANEOUS at 11:06

## 2024-11-06 RX ADMIN — INSULIN LISPRO 1 UNITS: 100 INJECTION, SOLUTION INTRAVENOUS; SUBCUTANEOUS at 20:40

## 2024-11-06 RX ADMIN — ISOSORBIDE MONONITRATE 30 MG: 30 TABLET, EXTENDED RELEASE ORAL at 08:51

## 2024-11-06 ASSESSMENT — PAIN SCALES - GENERAL: PAINLEVEL_OUTOF10: 2

## 2024-11-06 ASSESSMENT — PAIN DESCRIPTION - ORIENTATION: ORIENTATION: RIGHT;LEFT

## 2024-11-06 ASSESSMENT — PAIN DESCRIPTION - DESCRIPTORS: DESCRIPTORS: ACHING;DISCOMFORT;SORE;SPASM

## 2024-11-06 ASSESSMENT — PAIN DESCRIPTION - LOCATION: LOCATION: ARM

## 2024-11-06 NOTE — PROGRESS NOTES
Internal Medicine Progress Note    Patient's name: Johny Nichols  : 1935  Chief complaints (on day of admission): Sweats ((Patient states he had a shot 3 days ago in L shoulder, now having sweats, no other complaints))  Admission date: 2024  Date of service: 2024   Room: [address] ORTHO-TRAUMA  Primary care physician: Nicholas Beal MD  Reason for visit: Follow-up for  cp, shoulder pain    Subjective  Johny was seen and examined.  Feels ok. Left shoulder hurts. Xray, +OA. Dw pt on consultant input  Review of Systems NEG x 10  There are no new complaints of chest pain, shortness of breath, abdominal pain, nausea, vomiting, diarrhea, constipation.    Hospital Medications  Current Facility-Administered Medications   Medication Dose Route Frequency Provider Last Rate Last Admin    cefTRIAXone (ROCEPHIN) 1,000 mg in sterile water 10 mL IV syringe  1,000 mg IntraVENous Q24H Tree Day MD   1,000 mg at 24 2305    0.9 % sodium chloride infusion   IntraVENous Continuous Tree Day  mL/hr at 24 1619 New Bag at 24 1619    sodium chloride flush 0.9 % injection 5-40 mL  5-40 mL IntraVENous 2 times per day Tree Day MD        sodium chloride flush 0.9 % injection 5-40 mL  5-40 mL IntraVENous PRN Tree Day MD   10 mL at 24 1618    0.9 % sodium chloride infusion   IntraVENous PRN Tree Day MD        ondansetron (ZOFRAN-ODT) disintegrating tablet 4 mg  4 mg Oral Q8H PRN Tree Day MD        Or    ondansetron (ZOFRAN) injection 4 mg  4 mg IntraVENous Q6H PRN Tree Day MD        polyethylene glycol (GLYCOLAX) packet 17 g  17 g Oral Daily PRN Tree Day MD        acetaminophen (TYLENOL) tablet 500 mg  500 mg Oral Q6H PRN Tree Day MD        Or    acetaminophen (TYLENOL) suppository 505 mg  505 mg Rectal Q6H PRN OtomewTree keys MD        melatonin tablet 3 mg  3 mg Oral Nightly Otomewo,

## 2024-11-06 NOTE — PROGRESS NOTES
Pharmacy Consultation Note  (Warfarin Dosing and Monitoring)    Initial consult date: 11/05/2024  Consulting Provider: Shay SHERWOOD Simone is a 89 y.o. male for whom pharmacy has been asked to manage warfarin therapy.     Weight:   Wt Readings from Last 1 Encounters:   11/05/24 73 kg (161 lb)         Warfarin Indication Target   INR [place] Dose  (if applicable) Diet/Feeding Tube   (Enteral feeds, nutritional drinks, increased Vitamin K in diet can decrease INR)   Atrial Fibrillation 2 - 3 2 mg daily No obvious interaction  ADULT DIET; Regular; 5 carb choices (75 gm/meal)       Comments regarding medication interactions: No significant interactions          TSH:    Lab Results   Component Value Date/Time    TSH 0.93 08/21/2024 05:08 PM        Hepatic Function Panel:                            Lab Results   Component Value Date/Time    ALKPHOS 129 11/05/2024 03:48 AM    ALT 13 11/05/2024 03:48 AM    AST 28 11/05/2024 03:48 AM    BILITOT 0.4 11/05/2024 03:48 AM    BILIDIR <0.2 10/01/2020 07:50 AM    IBILI see below 10/01/2020 07:50 AM       Recent Labs     11/04/24  2158 11/05/24  0348 11/06/24  0541   HGB 9.7* 9.7* 8.6*   * 109* 75*       Date Warfarin Dose INR Heparin or LMWH Comment   11/5 Hold 4.7 --    11/6 Hold 4.7 --                           Assessment:  Patient is a 89 y.o. male on warfarin for Atrial Fibrillation.  Patient's home warfarin dosing regimen is 2 mg daily.   Goal INR 2 - 3  INR 4.7 today    Plan:  No warfarin today  Daily PT/INR until the INR is stable within the therapeutic range  Pharmacist will follow and monitor/adjust dosing as necessary    Thank you for this consult,  Omayra Bernal, PharmD  PGY1 Pharmacy Practice Resident [phone]  11/6/2024 8:12 AM

## 2024-11-06 NOTE — CARE COORDINATION
11/6. Met with the pt at the bedside to discuss transition of care. The pt lives in a one floor home. He lives alone. He uses either at walker or 4 pt cane. He was admitted with weakness and MISAEL. Cr 2.3 BUN 32. He would like to return praveen, if possible. Will need PT/OT to assist with discharge planning. Mine Leslie RN

## 2024-11-06 NOTE — PROGRESS NOTES
08/22/2024 13.9 (L) 30.0 - 100.0 ng/mL Final       No results found for: \"PTH\"    Recent Labs     11/04/24  2158 11/05/24  0348   ALT 13 13   AST 29 28   ALKPHOS 133* 129   BILITOT 0.4 0.4       No results for input(s): \"LABALBU\" in the last 72 hours.    Ferritin   Date Value Ref Range Status   12/17/2021 499 ng/mL Final     Comment:     FERRITIN Reference Ranges:  Adult Males   20 - 60 years:    30 - 400 ng/mL  Adult females 17 - 60 years:    13 - 150 ng/mL  Adults greater than 60 years:   no established reference range  Pediatrics:                     no established reference range       Iron   Date Value Ref Range Status   12/17/2021 132 59 - 158 mcg/dL Final     TIBC   Date Value Ref Range Status   12/17/2021 296 250 - 450 mcg/dL Final       Vitamin B-12   Date Value Ref Range Status   12/17/2021 573 211 - 946 pg/mL Final       Folate   Date Value Ref Range Status   12/17/2021 16.9 4.8 - 24.2 ng/mL Final       Lab Results   Component Value Date/Time    COLORU Yellow 11/04/2024 09:58 PM    NITRU POSITIVE 11/04/2024 09:58 PM    GLUCOSEU NEGATIVE 11/04/2024 09:58 PM    KETUA NEGATIVE 11/04/2024 09:58 PM    UROBILINOGEN 0.2 11/04/2024 09:58 PM    BILIRUBINUR NEGATIVE 11/04/2024 09:58 PM       Lab Results   Component Value Date/Time    PROTEIN 7.8 11/05/2024 03:48 AM    OSMOU 303 08/21/2024 01:00 PM       No components found for: \"URIC\"    No results found for: \"LIPIDPAN\"    Assessment and Plans:    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  Metabolic acidosis  With MISAEL/CKD  Anion gap 12 with CO2 15 on 11/5--> CO2 20 today  lactic acid  1.2  Sodium bicarbonate 1300 mg oral 3 times daily  Monitor labs    3.  Anemia  Likely in part with CKD  Hemoglobin target 10-12  Hemoglobin 8.6-below target  Transfuse for hemoglobin<7 as per primary service  Monitor H&H    4.  Hyperkalemia  With MISAEL/CKD and acidosis  K+ 5.4 on 11/4--> 4.8 today  Monitor labs    5.  Hypertension with CKD  BP goal<130/80  BP at goal  Monitor BPs    6.  Hypomagnesemia  Magnesium 1.3 today  Supplement magnesium today  Monitor labs    Mouna Segovia APRN - CNP    Patient seen and examined all key components of the physical performed independently , case discussed with NP, all pertinent labs and radiologic tests personally reviewed agree with above.      Cesar Valencia MD

## 2024-11-07 VITALS
DIASTOLIC BLOOD PRESSURE: 76 MMHG | OXYGEN SATURATION: 100 % | HEART RATE: 91 BPM | SYSTOLIC BLOOD PRESSURE: 113 MMHG | HEIGHT: 69 IN | WEIGHT: 161 LBS | RESPIRATION RATE: 18 BRPM | BODY MASS INDEX: 23.85 KG/M2 | TEMPERATURE: 96 F

## 2024-11-07 LAB
ANION GAP SERPL CALCULATED.3IONS-SCNC: 9 MMOL/L (ref 7–16)
BASOPHILS # BLD: 0.02 K/UL (ref 0–0.2)
BASOPHILS NFR BLD: 0 % (ref 0–2)
BUN SERPL-MCNC: 31 MG/DL (ref 6–23)
CALCIUM SERPL-MCNC: 9.1 MG/DL (ref 8.6–10.2)
CHLORIDE SERPL-SCNC: 113 MMOL/L (ref 98–107)
CO2 SERPL-SCNC: 21 MMOL/L (ref 22–29)
CREAT SERPL-MCNC: 2.2 MG/DL (ref 0.7–1.2)
EOSINOPHIL # BLD: 0.08 K/UL (ref 0.05–0.5)
EOSINOPHILS RELATIVE PERCENT: 2 % (ref 0–6)
ERYTHROCYTE [DISTWIDTH] IN BLOOD BY AUTOMATED COUNT: 19.8 % (ref 11.5–15)
GFR, ESTIMATED: 29 ML/MIN/1.73M2
GLUCOSE BLD-MCNC: 103 MG/DL (ref 74–99)
GLUCOSE BLD-MCNC: 135 MG/DL (ref 74–99)
GLUCOSE BLD-MCNC: 226 MG/DL (ref 74–99)
GLUCOSE BLD-MCNC: 237 MG/DL (ref 74–99)
GLUCOSE SERPL-MCNC: 109 MG/DL (ref 74–99)
HCT VFR BLD AUTO: 26.7 % (ref 37–54)
HGB BLD-MCNC: 8.4 G/DL (ref 12.5–16.5)
IMM GRANULOCYTES # BLD AUTO: <0.03 K/UL (ref 0–0.58)
IMM GRANULOCYTES NFR BLD: 0 % (ref 0–5)
INR PPP: 3
LYMPHOCYTES NFR BLD: 0.89 K/UL (ref 1.5–4)
LYMPHOCYTES RELATIVE PERCENT: 19 % (ref 20–42)
MAGNESIUM SERPL-MCNC: 1.6 MG/DL (ref 1.6–2.6)
MCH RBC QN AUTO: 26.8 PG (ref 26–35)
MCHC RBC AUTO-ENTMCNC: 31.5 G/DL (ref 32–34.5)
MCV RBC AUTO: 85 FL (ref 80–99.9)
MONOCYTES NFR BLD: 0.41 K/UL (ref 0.1–0.95)
MONOCYTES NFR BLD: 9 % (ref 2–12)
NEUTROPHILS NFR BLD: 69 % (ref 43–80)
NEUTS SEG NFR BLD: 3.18 K/UL (ref 1.8–7.3)
PHOSPHATE SERPL-MCNC: 2.8 MG/DL (ref 2.5–4.5)
PLATELET # BLD AUTO: 74 K/UL (ref 130–450)
PLATELET CONFIRMATION: NORMAL
PMV BLD AUTO: 10.7 FL (ref 7–12)
POTASSIUM SERPL-SCNC: 4.6 MMOL/L (ref 3.5–5)
PROTHROMBIN TIME: 33.5 SEC (ref 9.3–12.4)
RBC # BLD AUTO: 3.14 M/UL (ref 3.8–5.8)
SODIUM SERPL-SCNC: 143 MMOL/L (ref 132–146)
WBC OTHER # BLD: 4.6 K/UL (ref 4.5–11.5)

## 2024-11-07 PROCEDURE — 6360000002 HC RX W HCPCS: Performed by: FAMILY MEDICINE

## 2024-11-07 PROCEDURE — 97161 PT EVAL LOW COMPLEX 20 MIN: CPT

## 2024-11-07 PROCEDURE — 84100 ASSAY OF PHOSPHORUS: CPT

## 2024-11-07 PROCEDURE — 85025 COMPLETE CBC W/AUTO DIFF WBC: CPT

## 2024-11-07 PROCEDURE — 2580000003 HC RX 258: Performed by: FAMILY MEDICINE

## 2024-11-07 PROCEDURE — 6370000000 HC RX 637 (ALT 250 FOR IP): Performed by: INTERNAL MEDICINE

## 2024-11-07 PROCEDURE — 97530 THERAPEUTIC ACTIVITIES: CPT

## 2024-11-07 PROCEDURE — 82962 GLUCOSE BLOOD TEST: CPT

## 2024-11-07 PROCEDURE — 80048 BASIC METABOLIC PNL TOTAL CA: CPT

## 2024-11-07 PROCEDURE — 36415 COLL VENOUS BLD VENIPUNCTURE: CPT

## 2024-11-07 PROCEDURE — 6370000000 HC RX 637 (ALT 250 FOR IP): Performed by: FAMILY MEDICINE

## 2024-11-07 PROCEDURE — 85610 PROTHROMBIN TIME: CPT

## 2024-11-07 PROCEDURE — 6370000000 HC RX 637 (ALT 250 FOR IP)

## 2024-11-07 PROCEDURE — 97165 OT EVAL LOW COMPLEX 30 MIN: CPT

## 2024-11-07 PROCEDURE — 97535 SELF CARE MNGMENT TRAINING: CPT

## 2024-11-07 PROCEDURE — 83735 ASSAY OF MAGNESIUM: CPT

## 2024-11-07 RX ORDER — METOPROLOL TARTRATE 50 MG
50 TABLET ORAL 2 TIMES DAILY WITH MEALS
Qty: 60 TABLET | Refills: 3 | Status: SHIPPED | OUTPATIENT
Start: 2024-11-07

## 2024-11-07 RX ORDER — INSULIN GLARGINE 100 [IU]/ML
8 INJECTION, SOLUTION SUBCUTANEOUS NIGHTLY
Qty: 5 ADJUSTABLE DOSE PRE-FILLED PEN SYRINGE | Refills: 3 | Status: SHIPPED | OUTPATIENT
Start: 2024-11-07

## 2024-11-07 RX ORDER — WARFARIN SODIUM 2 MG/1
TABLET ORAL
Qty: 30 TABLET | Refills: 0 | Status: SHIPPED | OUTPATIENT
Start: 2024-11-07

## 2024-11-07 RX ORDER — WARFARIN SODIUM 2 MG/1
2 TABLET ORAL
Status: DISCONTINUED | OUTPATIENT
Start: 2024-11-07 | End: 2024-11-07 | Stop reason: HOSPADM

## 2024-11-07 RX ADMIN — SODIUM BICARBONATE 1300 MG: 650 TABLET ORAL at 09:38

## 2024-11-07 RX ADMIN — DICLOFENAC SODIUM 2 G: 10 GEL TOPICAL at 09:39

## 2024-11-07 RX ADMIN — METOPROLOL TARTRATE 50 MG: 50 TABLET, FILM COATED ORAL at 09:38

## 2024-11-07 RX ADMIN — SODIUM CHLORIDE, PRESERVATIVE FREE 10 ML: 5 INJECTION INTRAVENOUS at 00:17

## 2024-11-07 RX ADMIN — CEFTRIAXONE SODIUM 1000 MG: 1 INJECTION, POWDER, FOR SOLUTION INTRAMUSCULAR; INTRAVENOUS at 00:16

## 2024-11-07 RX ADMIN — INSULIN LISPRO 1 UNITS: 100 INJECTION, SOLUTION INTRAVENOUS; SUBCUTANEOUS at 12:01

## 2024-11-07 RX ADMIN — Medication 1000 UNITS: at 09:38

## 2024-11-07 RX ADMIN — SODIUM CHLORIDE, PRESERVATIVE FREE 10 ML: 5 INJECTION INTRAVENOUS at 09:39

## 2024-11-07 RX ADMIN — ISOSORBIDE MONONITRATE 30 MG: 30 TABLET, EXTENDED RELEASE ORAL at 09:39

## 2024-11-07 RX ADMIN — SODIUM BICARBONATE 1300 MG: 650 TABLET ORAL at 14:42

## 2024-11-07 RX ADMIN — ASPIRIN 81 MG: 81 TABLET, COATED ORAL at 09:38

## 2024-11-07 RX ADMIN — DICLOFENAC SODIUM 2 G: 10 GEL TOPICAL at 12:03

## 2024-11-07 RX ADMIN — RANOLAZINE 500 MG: 500 TABLET, FILM COATED, EXTENDED RELEASE ORAL at 09:38

## 2024-11-07 NOTE — DISCHARGE SUMMARY
11/07/24  0530   MG 1.3* 1.6       No results for input(s): \"CKTOTAL\", \"CKMB\", \"TROPONINI\" in the last 72 hours.   Recent Labs     11/06/24  0541   LACTA 1.2       IMAGING:  XR SHOULDER LEFT (MIN 2 VIEWS)    Result Date: 11/5/2024  EXAMINATION: THREE XRAY VIEWS OF THE LEFT SHOULDER 11/5/2024 9:19 am COMPARISON: None. HISTORY: ORDERING SYSTEM PROVIDED HISTORY: pain TECHNOLOGIST PROVIDED HISTORY: Reason for exam:->pain FINDINGS: Three views of the left shoulder demonstrate normal alignment without fracture or subluxation.  There is moderate irregularity of the left acromioclavicular joint and the glenohumeral joint space consistent with osteoarthritic changes and the patient's age.  The left hemithorax appears unremarkable.  The left scapula appears in tact.     1. No acute fracture or subluxation. 2. 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     XR CHEST PORTABLE    Result Date: 11/5/2024  EXAMINATION: ONE XRAY VIEW OF THE CHEST 11/4/2024 11:43 pm COMPARISON: Chest series from August 24, 2024 HISTORY: ORDERING SYSTEM PROVIDED HISTORY: sweating TECHNOLOGIST PROVIDED HISTORY: Reason for exam:->sweating FINDINGS: Adequate and symmetric aeration of the lungs. There are no formed consolidations, pleural effusions, or pneumothoraces. Trachea and central mainstem bronchi appear clear.  Atherosclerotic disease in the aortic arch. The remaining cardiomediastinal silhouette and pulmonary vascularity appear within normal limits. Osseous and thoracic soft tissue structures demonstrate no acute findings.     Atherosclerotic disease. No additional evidence of active cardiopulmonary pathology.         ECHO:      DISPOSITION:  The patient's condition is good.   The patient is being discharged to home- refused GIANCARLO    DISCHARGE MEDICATIONS:      Medication List        START taking these medications      diclofenac sodium 1 % Gel  Commonly known as: VOLTAREN  Apply 2 g  topically in the morning, at noon, in the evening, and at bedtime     Lantus SoloStar 100 UNIT/ML injection pen  Generic drug: insulin glargine  Inject 8 Units into the skin nightly            CHANGE how you take these medications      metoprolol tartrate 50 MG tablet  Commonly known as: LOPRESSOR  Take 1 tablet by mouth 2 times daily (with meals)  What changed:   medication strength  how much to take     warfarin 2 MG tablet  Commonly known as: COUMADIN  Take as directed. If you are unsure how to take this medication, talk to your nurse or doctor.  Original instructions: 1 po qd  What changed:   medication strength  how to take this  additional instructions            CONTINUE taking these medications      aspirin 81 MG EC tablet  Take 1 tablet by mouth daily     atorvastatin 20 MG tablet  Commonly known as: LIPITOR  Take 1 tablet by mouth nightly     isosorbide mononitrate 30 MG extended release tablet  Commonly known as: IMDUR  Take 1 tablet by mouth daily     loperamide 2 MG capsule  Commonly known as: IMODIUM     ranolazine 500 MG extended release tablet  Commonly known as: RANEXA  TAKE 1 TABLET BY MOUTH TWICE DAILY     sodium bicarbonate 650 MG tablet  Take 1 tablet by mouth 3 times daily     tamsulosin 0.4 MG capsule  Commonly known as: FLOMAX  Take 1 capsule by mouth at bedtime     vitamin D 25 MCG (1000 UT) Tabs tablet  Commonly known as: CHOLECALCIFEROL  Take 1 tablet by mouth daily            STOP taking these medications      HumuLIN 70/30 KwikPen (70-30) 100 UNIT/ML injection pen  Generic drug: Insulin NPH Isophane & Regular               Where to Get Your Medications        These medications were sent to [facility] - [place] [address] [address] - P [phone] - F [phone]  [facility] [place] [id number]      Phone: [phone]   metoprolol tartrate 50 MG tablet  warfarin 2 MG tablet       These medications were sent to [id number] - [facility]  [address] - P [phone] - F [phone]  [address] [facility] [address]      Phone: [phone]   diclofenac sodium 1 % Gel  Lantus SoloStar 100 UNIT/ML injection pen         Follow up with your Primary care provider within 1-2 weeks. Call their office as soon as you can    BRING THIS SHEET WITH YOU TO YOUR PCP OFFICE.      YOU WERE DIAGNOSED WITH ______      Follow up with any Specialists that specify they want to see you.      You or your PCP can access your chart notes, labs, x-rays and other results through the [facility] MY CHART portal.    Your Primary Care Provider can access   your chart notes, labs, radiographic studies (x-rays, CAT scans, etc), DISCHARGE SUMMARY and other results  though EPIC EMR systems. If they cannot, you, or they can call Medical Records at [facility] ([phone]). You may want to call your PCP before your appt, and if they don't have access, you can call Medical records and have at least your Discharge Summary sent there. Just ask your PCP office their FAX number, so you can tell the Medical Records dept.     Discuss all labs/radiographic studies and other tests with your Primary Care Provider, especially ______.    If they access my Discharge summary, there is a section at the end for them to follow up issues with you.       Start on new med(s) _________      Ask your Dr when to resume any medicines on hold, such as, _________    Ask your Dr. If you need any further outpatient testing, or followup, such as,  ________.    MISC TOPICS_______    Your PCP can call me at my office: [phone]      Nicholas Beal MD.     [facility]          I  PCP and pt to review results of Labs and imaging studies together  If recurrence or worsening of symptoms go to the ED or call primary care physician.  Diet: ADULT DIET; Regular; 5 carb choices (75 gm/meal)    Preparing for this patient's discharge, including paperwork, orders, instructions, and meeting with patient did

## 2024-11-07 NOTE — CARE COORDINATION
reached out to patients PCP office Dr Nicholas Beal at [phone] to schedule follow up D/C appointment.  spoke to Shemar and scheduled an appointment on 11/18 at 1:30 PM in office.      spoke to patient on room phone and provided an update on scheduled appointment.     Patient requested for  to update his wife Tia at [phone] on scheduled appointment.     Information added to D/C summary.

## 2024-11-07 NOTE — PROGRESS NOTES
Internal Medicine Progress Note    Patient's name: Johny Nichols  : 1935  Chief complaints (on day of admission): Sweats ((Patient states he had a shot 3 days ago in L shoulder, now having sweats, no other complaints))  Admission date: 2024  Date of service: 2024   Room: [address] ORTHO-TRAUMA  Primary care physician: Nicholas Beal MD  Reason for visit: Follow-up for  cp, shoulder pain- gel helps  Refusing GIANCARLO    Subjective  Johny was seen and examined.  Feels ok. Left shoulder hurts. Xray, +OA. Dw pt on consultant input  Review of Systems NEG x 10  There are no new complaints of chest pain, shortness of breath, abdominal pain, nausea, vomiting, diarrhea, constipation.    Hospital Medications  Current Facility-Administered Medications   Medication Dose Route Frequency Provider Last Rate Last Admin    diclofenac sodium (VOLTAREN) 1 % gel 2 g  2 g Topical 4x daily Nicholas Beal MD   2 g at 24    cefTRIAXone (ROCEPHIN) 1,000 mg in sterile water 10 mL IV syringe  1,000 mg IntraVENous Q24H Tree Day MD   1,000 mg at 24 0016    sodium chloride flush 0.9 % injection 5-40 mL  5-40 mL IntraVENous 2 times per day Tree Day MD   10 mL at 24    sodium chloride flush 0.9 % injection 5-40 mL  5-40 mL IntraVENous PRN Tree Day MD   10 mL at 24 0017    0.9 % sodium chloride infusion   IntraVENous PRN Tree Day MD        ondansetron (ZOFRAN-ODT) disintegrating tablet 4 mg  4 mg Oral Q8H PRN Tree Day MD        Or    ondansetron (ZOFRAN) injection 4 mg  4 mg IntraVENous Q6H PRN Tree Day MD        polyethylene glycol (GLYCOLAX) packet 17 g  17 g Oral Daily PRN Tree Day MD        acetaminophen (TYLENOL) tablet 500 mg  500 mg Oral Q6H PRN Tree Day MD        Or    acetaminophen (TYLENOL) suppository 505 mg  505 mg Rectal Q6H PRN Tree Day MD        melatonin tablet 3 mg  3 mg Oral

## 2024-11-07 NOTE — PROGRESS NOTES
Discharge instructions reviewed with patient and spouse at bedside. All questions answered. Both patient and spouse aware of follow up appointment with PCP. IV removed.

## 2024-11-07 NOTE — PLAN OF CARE
Problem: ABCDS Injury Assessment  Goal: Absence of physical injury  11/7/2024 1308 by Adia Broderick RN  Outcome: Adequate for Discharge  11/7/2024 0006 by Nick Cavazos RN  Outcome: Progressing     Problem: Discharge Planning  Goal: Discharge to home or other facility with appropriate resources  11/7/2024 1308 by Adia Broderick RN  Outcome: Adequate for Discharge  11/7/2024 0006 by Nick Cavazos RN  Outcome: Progressing     Problem: Pain  Goal: Verbalizes/displays adequate comfort level or baseline comfort level  11/7/2024 1308 by Adia Broderick RN  Outcome: Adequate for Discharge  11/7/2024 0006 by Nick Cavazos RN  Outcome: Progressing     Problem: Safety - Adult  Goal: Free from fall injury  11/7/2024 1308 by Adia Broderick RN  Outcome: Adequate for Discharge  11/7/2024 0006 by Nick Cavazos RN  Outcome: Progressing     Problem: Chronic Conditions and Co-morbidities  Goal: Patient's chronic conditions and co-morbidity symptoms are monitored and maintained or improved  11/7/2024 1308 by Adia Broderick RN  Outcome: Adequate for Discharge  11/7/2024 0006 by Nick Cavazos RN  Outcome: Progressing

## 2024-11-07 NOTE — PROGRESS NOTES
CLINICAL PHARMACY NOTE: MEDS TO BEDS    Total # of Prescriptions Filled: 2   The following medications were delivered to the patient:  Metoprolol tart 50mg  Warfarin 2mg    Additional Documentation:    Patient's wife picked up in pharmacy 11-7-24 and 2 more meds were sent to [facility] [address] (patient and wife aware).

## 2024-11-07 NOTE — PROGRESS NOTES
Pharmacy Consultation Note  (Warfarin Dosing and Monitoring)    Initial consult date: 11/05/2024  Consulting Provider: Shay Mcclain KAELA Nichols is a 89 y.o. male for whom pharmacy has been asked to manage warfarin therapy.     Weight:   Wt Readings from Last 1 Encounters:   11/05/24 73 kg (161 lb)         Warfarin Indication Target   INR [place] Dose  (if applicable) Diet/Feeding Tube   (Enteral feeds, nutritional drinks, increased Vitamin K in diet can decrease INR)   Atrial Fibrillation 2 - 3 2 mg daily No obvious interaction  ADULT DIET; Regular; 5 carb choices (75 gm/meal)       Comments regarding medication interactions: No significant interactions          TSH:    Lab Results   Component Value Date/Time    TSH 0.93 08/21/2024 05:08 PM        Hepatic Function Panel:                            Lab Results   Component Value Date/Time    ALKPHOS 129 11/05/2024 03:48 AM    ALT 13 11/05/2024 03:48 AM    AST 28 11/05/2024 03:48 AM    BILITOT 0.4 11/05/2024 03:48 AM    BILIDIR <0.2 10/01/2020 07:50 AM    IBILI see below 10/01/2020 07:50 AM       Recent Labs     11/05/24  0348 11/06/24  0541 11/07/24  0530   HGB 9.7* 8.6* 8.4*   * 75* 74*       Date Warfarin Dose INR Heparin or LMWH Comment   11/5 Hold 4.7 --    11/6 Hold 4.7 --    11/7 2 mg 3.0 --                    Assessment:  Patient is a 89 y.o. male on warfarin for Atrial Fibrillation.  Patient's home warfarin dosing regimen is 2 mg daily.   Goal INR 2 - 3  INR 3.0 today    Plan:  Resume warfarin 2 mg today  Daily PT/INR until the INR is stable within the therapeutic range  Pharmacist will follow and monitor/adjust dosing as necessary    Thank you for this consult,  Omayra Bernal, PharmD  PGY1 Pharmacy Practice Resident [phone]  11/7/2024 10:36 AM

## 2024-11-07 NOTE — PROGRESS NOTES
Physical Therapy  Physical Therapy Initial Assessment     Name: Johny Nichols  : 1935  MRN: [id number]      Date of Service: 2024    Evaluating PT:  Drea Gonzalez PT, DPT [id number]    Room #:  5404/5404-A  Diagnosis:  Hyperkalemia [E87.5]  MISAEL (acute kidney injury) ([facility]) [N17.9]  Anemia, unspecified type [D64.9]  Acute renal failure superimposed on chronic kidney disease, unspecified acute renal failure type, unspecified CKD stage ([facility]) [N17.9, N18.9]  PMHx/PSHx:    Past Medical History:   Diagnosis Date    A-fib ([facility])     Cerebrovascular accident (CVA) ([facility]) 2013    Chronic kidney disease     Chronic renal impairment, stage 3 (moderate) ([facility]) 10/10/2016    Colitis, ulcerative chronic ([facility])     Diabetes mellitus ([facility])     DM (diabetes mellitus) ([facility]) 2013    Encounter for therapeutic drug monitoring 2016    Hyperlipidemia     Hyperlipidemia associated with type 2 diabetes mellitus ([facility]) 2016    Hypertension     Long term (current) use of anticoagulants 2016    Prostate enlargement     Stroke (cerebrum) ([facility])     Unspecified cerebral artery occlusion with cerebral infarction       Procedure/Surgery:  none this admission  Precautions:  Falls  Equipment Needs:  TBD, pt has WW and QC    SUBJECTIVE:    Pt lives with 52yo family member in a 1 story home with 1 stairs to enter and 1 rail.  Bed is on 1st floor and bath is on 1st floor.  Pt ambulated with QC for shorter distances and WW for longer distances PTA.    OBJECTIVE:   Initial Evaluation  Date: 24 Treatment Short Term/ Long Term   Goals   AM-PAC 6 Clicks      Was pt agreeable to Eval/treatment? yes     Does pt have pain? No c/o pain     Bed Mobility  Rolling: Yusra  Supine to sit: Yusra  Sit to supine: NT  Scooting: Yusra  Rolling: Independent   Supine to sit: Independent .  Sit to supine: Independent   Scooting: Independent    Transfers Sit to stand: SBA  Stand to sit: SBA  Stand pivot: SBA with WW  Sit to  stand: Independent   Stand to sit: Independent   Stand pivot: Mod I with AAD   Ambulation    70 feet with WW SBA  >150 feet with AAD Mod I    Stair negotiation: ascended and descended  NT  1 steps with 1 rail Mod I    ROM BUE:  Defer to OT note  BLE:  WFL     Strength BUE:  Defer to OT note  BLE:  3+/5  WFL   Balance Sitting EOB:  SBA  Dynamic Standing:  SBA with WW  Sitting EOB:  Independent   Dynamic Standing:  Mod I with AAD     Pt is A & O x 4  Sensation:  denies abnormalities  Edema:  none noted    Patient education  Pt educated on role of PT, safety during mobility    Patient response to education:   Pt verbalized understanding Pt demonstrated skill Pt requires further education in this area   yes yes yes     ASSESSMENT:    Conditions Requiring Skilled Therapeutic Intervention:    [x]Decreased strength     [x]Decreased ROM  [x]Decreased functional mobility  [x]Decreased balance   [x]Decreased endurance   []Decreased posture  []Decreased sensation  []Decreased coordination   []Decreased vision  []Decreased safety awareness   []Increased pain       Comments:  pt semi-supine in bed upon entry and agreeable to PT evaluation. Pt able to complete bed mobility with light assist to trunk. Pt sat EOB for approximately 8 minutes during session with no assist for sitting balance. Pt able to stand and ambulate into nelson with WW and slow, but steady gait. Pt positioned in chair with chair alarm at end of session.  All needs met and call light in reach. All lines remained intact.     Patient to benefit from home skilled PT at ID to improve functional mobility and decrease fall risk.     Treatment:  Patient practiced and was instructed in the following treatment:    Bed Mobility: VCs provided for sequencing and safety during mobility. Manual assist provided for completion of task.  Transfer Training: Verbal and tactile cueing provided for sequencing and safety during mobility.   Gait Training: Ambulation with WW and verbal

## 2024-11-07 NOTE — CARE COORDINATION
11/7. Discharge order noted. Awaiting PT/OT evals. Pt is active with [facility]. SANTINO orders written. Mine Leslie RN

## 2024-11-07 NOTE — PROGRESS NOTES
[facility]  Nephrology Progress Note    Patient's Name: Johny Nichols    History of Present Illness from 11/5 Consult Note:    \"Johny Nichols is a 89 y.o. male with a past medical history of diabetes mellitus, hyperlipidemia, hypertension, stroke, A-fib, and colitis.  He presented to the ED on 11/4 with concerns for sweats.  Vital signs on 11/4 includes temperature 97.6, respirations 12, pulse 73, /105, and he was 100% SpO2.  Lab data on 11/4 includes potassium 5.4, CO2 19, BUN 41, creatinine 2.8, alk phos 133, and hemoglobin 9.7.  He had a UA which showed specific gravity 1.02, positive nitrites, 3+ bacteria, moderate urine hemoglobin.  He had a chest x-ray on 11/5 which showed atherosclerotic disease, no additional evidence of active cardiopulmonary pathology.  Nephrology has been consulted to see the patient for MISAEL.  He has a baseline creatinine of 1.9-2.2.  At present, patient was seen and examined.  He notes that he was experiencing left shoulder pain, shortness of breath, and sweats prior to admission.  He denies the use of NSAIDs.  He denies any dysuria or hematuria.  He notes that his appetite is good.\"    Subjective:    11/7/2024: no new c/o; denies abdominal pain    PMH:    Past Medical History:   Diagnosis Date    A-fib (HCC)     Cerebrovascular accident (CVA) ([facility]) 11/25/2013    Chronic kidney disease     Chronic renal impairment, stage 3 (moderate) ([facility]) 10/10/2016    Colitis, ulcerative chronic ([facility])     Diabetes mellitus ([facility])     DM (diabetes mellitus) ([facility]) 11/25/2013    Encounter for therapeutic drug monitoring 04/05/2016    Hyperlipidemia     Hyperlipidemia associated with type 2 diabetes mellitus ([facility]) 01/17/2016    Hypertension     Long term (current) use of anticoagulants 04/05/2016    Prostate enlargement     Stroke (cerebrum) ([facility]) 2013    Unspecified cerebral artery occlusion with cerebral infarction 1973       Past Surgical History:   Procedure Laterality Date    ABDOMEN SURGERY   aspirin  81 mg Oral Daily    atorvastatin  20 mg Oral Nightly    Vitamin D  1,000 Units Oral Daily    isosorbide mononitrate  30 mg Oral Daily    ranolazine  500 mg Oral BID    tamsulosin  0.4 mg Oral Nightly    warfarin placeholder: dosing by pharmacy   Other RX Placeholder    sodium bicarbonate  1,300 mg Oral TID    metoprolol tartrate  50 mg Oral BID WC    insulin lispro  0-4 Units SubCUTAneous 4x Daily AC & HS    insulin glargine  5 Units SubCUTAneous Nightly        sodium chloride      dextrose         Meds prn:     sodium chloride flush, sodium chloride, ondansetron **OR** ondansetron, polyethylene glycol, acetaminophen **OR** acetaminophen, loperamide, glucose, dextrose bolus **OR** dextrose bolus, glucagon (rDNA), dextrose, hydrALAZINE    Meds prior to admission:     No current facility-administered medications on file prior to encounter.     Current Outpatient Medications on File Prior to Encounter   Medication Sig Dispense Refill    ranolazine (RANEXA) 500 MG extended release tablet TAKE 1 TABLET BY MOUTH TWICE DAILY 120 tablet 3    sodium bicarbonate 650 MG tablet Take 1 tablet by mouth 3 times daily 90 tablet 2    tamsulosin (FLOMAX) 0.4 MG capsule Take 1 capsule by mouth at bedtime 30 capsule 3    Cholecalciferol (VITAMIN D) 25 MCG TABS Take 1 tablet by mouth daily 60 tablet 0    aspirin 81 MG EC tablet Take 1 tablet by mouth daily 90 tablet 3    isosorbide mononitrate (IMDUR) 30 MG extended release tablet Take 1 tablet by mouth daily 90 tablet 3    atorvastatin (LIPITOR) 20 MG tablet Take 1 tablet by mouth nightly 90 tablet 3    loperamide (IMODIUM) 2 MG capsule Take 1 capsule by mouth as needed for Diarrhea      warfarin (COUMADIN) 4 MG tablet Take by mouth Currently  2mg daily, per Dr Beal      Metoprolol Tartrate 37.5 MG TABS Take 37.5 mg by mouth 2 times daily (with meals) 180 tablet 2       Allergies:    Patient has no known allergies.      Patient Vitals for the past 24 hrs:   BP Temp Temp src  Pulse Resp SpO2   11/07/24 0821 113/76 (!) 96 °F (35.6 °C) Temporal 91 18 100 %   11/06/24 1924 (!) 146/80 97.9 °F (36.6 °C) Oral 70 18 97 %   11/06/24 1659 139/81 -- -- 76 -- --   11/06/24 1515 130/70 98.1 °F (36.7 °C) Oral 77 18 99 %         Intake/Output Summary (Last 24 hours) at 11/7/2024 1108  Last data filed at 11/7/2024 0938  Gross per 24 hour   Intake 175 ml   Output 1500 ml   Net -1325 ml       General: Awake, alert, no acute distress  Neck: No JVD noted  Lungs: Clear bilaterally upper, diminished to the bases bilaterally.  Unlabored  CV: Regular rate and rhythm.  No rub  Abd: Soft, nontender, nondistended.  Active bowel sounds; ostomy  Skin: Warm and dry.  No rash on exposed extremities  Ext: No edema   Neuro: Awake, answers questions appropriately    Data:    Recent Labs     11/05/24  0348 11/06/24  0541 11/07/24  0530   WBC 5.1 4.7 4.6   HGB 9.7* 8.6* 8.4*   HCT 30.7* 27.5* 26.7*   MCV 85.0 84.9 85.0   * 75* 74*       Recent Labs     11/05/24  0348 11/06/24  0541 11/07/24  0530    145 143   K 4.7 4.8 4.6    114* 113*   CO2 15* 20* 21*   CREATININE 2.5* 2.3* 2.2*   BUN 36* 32* 31*   LABGLOM 24* 27* 29*   GLUCOSE 72* 99 109*   CALCIUM 8.7 8.9 9.1   PHOS  --  2.9 2.8   MG  --  1.3* 1.6       Vit D, 25-Hydroxy   Date Value Ref Range Status   08/22/2024 13.9 (L) 30.0 - 100.0 ng/mL Final       No results found for: \"PTH\"    Recent Labs     11/04/24  2158 11/05/24 0348   ALT 13 13   AST 29 28   ALKPHOS 133* 129   BILITOT 0.4 0.4       No results for input(s): \"LABALBU\" in the last 72 hours.    Ferritin   Date Value Ref Range Status   12/17/2021 499 ng/mL Final     Comment:     FERRITIN Reference Ranges:  Adult Males   20 - 60 years:    30 - 400 ng/mL  Adult females 17 - 60 years:    13 - 150 ng/mL  Adults greater than 60 years:   no established reference range  Pediatrics:                     no established reference range       Iron   Date Value Ref Range Status   12/17/2021 132 59 - 158

## 2024-11-07 NOTE — PROGRESS NOTES
OCCUPATIONAL THERAPY INITIAL EVALUATION    [facility] [address] [place], [place]      Date:2024                                                Patient Name: Johny Nichols  MRN: [id number]  : 1935  Room: [facility]     Evaluating OT:Latisha Fang, OTR/L   License #  [id number]       Referring Provider: Nicholas Beal MD     Specific Provider Orders/Date: OT evaluation & treatment        Diagnosis: Hyperkalemia [E87.5]  MISAEL (acute kidney injury) ([facility]) [N17.9]  Anemia, unspecified type [D64.9]  Acute renal failure superimposed on chronic kidney disease, unspecified acute renal failure type, unspecified CKD stage ([facility]) [N17.9, N18.9]      Pertinent Medical History:  has a past medical history of A-fib (HCC), Cerebrovascular accident (CVA) (HCC), Chronic kidney disease, Chronic renal impairment, stage 3 (moderate) (HCC), Colitis, ulcerative chronic (HCC), Diabetes mellitus (HCC), DM (diabetes mellitus) (HCC), Encounter for therapeutic drug monitoring, Hyperlipidemia, Hyperlipidemia associated with type 2 diabetes mellitus (HCC), Hypertension, Long term (current) use of anticoagulants, Prostate enlargement, Stroke (cerebrum) (HCC), and Unspecified cerebral artery occlusion with cerebral infarction.    Surgery: none this admit    Past Surgical History:  has a past surgical history that includes ECHO Compl W Dop Color Flow (2013); ECHO Transesophageal (2013); colostomy (); Cystoscopy (more than 5 yrs); TURP (14); Colonoscopy; Abdomen surgery; Endoscopy, colon, diagnostic (12/10/15); and Upper gastrointestinal endoscopy (N/A, 3/11/2022).       Precautions:  Fall Risk      Assessment of current deficits    [x] Functional mobility            [x]ADLs           [x] Strength                  [x]Cognition    [x] Functional transfers          [x] IADLs         [x] Safety Awareness   [x]Endurance    [x] Fine Coordination

## 2024-11-07 NOTE — DISCHARGE INSTR - COC
Continuity of Care Form    Patient Name: Johny Nichols   :  1935  MRN:  [id number]    Admit date:  2024  Discharge date:      Code Status Order: Full Code   Advance Directives:   Advance Care Flowsheet Documentation             Admitting Physician:  Nicholas Beal MD  PCP: Nicholas Beal MD    Discharging Nurse: ***  Discharging Hospital Unit/Room#: 5404/5404-A  Discharging Unit Phone Number: [phone]    Emergency Contact:   Extended Emergency Contact Information  Primary Emergency Contact: Tia Nichols  Address: [address] [place] [address]  Home Phone: [phone]  Relation: Spouse  Secondary Emergency Contact: Schuyler Cortés  Home Phone: [phone]  Relation: Step Child    Past Surgical History:  Past Surgical History:   Procedure Laterality Date    ABDOMEN SURGERY          COLONOSCOPY      COLOSTOMY      CYSTOSCOPY  more than 5 yrs    ECHO COMPL W DOP COLOR FLOW  2013         ECHOCARDIOGRAM TRANSESOPHAGEAL  2013         ENDOSCOPY, COLON, DIAGNOSTIC  12/10/15    ileoscopy    TURP  14    cystoscopy retrogrades    UPPER GASTROINTESTINAL ENDOSCOPY N/A 3/11/2022    EGD ESOPHAGOGASTRODUODENOSCOPY ULTRASOUND performed by Conrad Gerber MD at [facility] END[facility]COPY       Immunization History:   Immunization History   Administered Date(s) Administered    COVID-19, MODERNA BLUE border, Primary or Immunocompromised, (age 12y+), IM, 100 mcg/0.5mL 2021, 2021    COVID-19, MODERNA Bivalent, (age 12y+), IM, 50 mcg/0.5 mL 2022    COVID-19, MODERNA, (age 12y+), IM, 50mcg/0.5mL 2023    Influenza Virus Vaccine 2014, 2020    Influenza, FLUZONE High Dose, (age 65 y+), IM, Trivalent PF, 0.5mL 10/10/2016, 10/11/2017, 10/03/2018, 2019    Pneumococcal Conjugate Vaccine 2000    Pneumococcal, PPSV23, PNEUMOVAX 23, (age 2y+), SC/IM, 0.5mL 10/11/2017    TD 5LF, TENIVAC, (age 7y+), IM, 0.5mL 03/15/2023       Active

## 2024-11-07 NOTE — PLAN OF CARE
Problem: ABCDS Injury Assessment  Goal: Absence of physical injury  11/7/2024 0006 by Nick Cavazos RN  Outcome: Progressing     Problem: Discharge Planning  Goal: Discharge to home or other facility with appropriate resources  11/7/2024 0006 by Nick Cavazos RN  Outcome: Progressing     Problem: Pain  Goal: Verbalizes/displays adequate comfort level or baseline comfort level  11/7/2024 0006 by Nick Cavazos RN  Outcome: Progressing     Problem: Safety - Adult  Goal: Free from fall injury  11/7/2024 0006 by Nick Cavazos RN  Outcome: Progressing     Problem: Chronic Conditions and Co-morbidities  Goal: Patient's chronic conditions and co-morbidity symptoms are monitored and maintained or improved  11/7/2024 0006 by Nick Cavazos RN  Outcome: Progressing

## 2024-11-08 NOTE — PROGRESS NOTES
Physician Progress Note      PATIENT:               SIDNEY BANSAL  [facility] #:                  [id number]  :                       1935  ADMIT DATE:       2024 9:18 PM  DISCH DATE:        2024 5:17 PM  RESPONDING  PROVIDER #:        Nicholas Beal MD          QUERY TEXT:    Patient admitted with MISAEL.  Noted documentation of Acute Kidney Injury in   Nephrology PN on .  In order to support the diagnosis of MISAEL, please   include additional clinical indicators in your documentation.? Or please   document if the diagnosis of MISAEL has been ruled out after further study.    The medical record reflects the following:  Risk Factors: CKD 3B, HTN,  Clinical Indicators:  Nephrology PN on  MISAEL stage I on CKD 3B, MISAEL   presumed likely in the setting of decreased effective renal perfusion  Baseline creatinine 1.9-2.2 mg/dL Creatinine peaked at 2.8 on --> Cr 2.3   mg/dL today  On  Cr 2.8, BUN 41, GFR 21,  On  Cr 2.5, BUN 36, GFR 24,  On  Cr 2.3, BUN 32, GFR 27,    Treatment: IVF, monitoring lab, Nephrology consult,    Thank you,  BERNY Tang CDS      Defined by Kidney Disease Improving Global Outcomes (KDIGO) clinical practice   guideline for acute kidney injury:  -Increase in SCr by greater than or equal to 0.3 mg/dl within 48 hours; or  -Increase or decrease in SCr to greater than or equal to 1.5 times baseline,   which is known or presumed to have occurred within the prior 7 days; or  -Urine volume < 0.5ml/kg/h for 6 hours.  Options provided:  -- Acute kidney injury evidenced by, Please document evidence as well as a   numerical baseline creatinine, if known.  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    Query created by: Alfreda Carreon on 2024 4:16

## 2024-11-15 NOTE — PROGRESS NOTES
Physician Progress Note      PATIENT:               SIDNEY BANSAL  [facility] #:                  [id number]  :                       1935  ADMIT DATE:       2024 9:18 PM  DISCH DATE:        2024 5:17 PM  RESPONDING  PROVIDER #:        Nicholas Beal MD          QUERY TEXT:    Patient admitted with Weakness. Noted documentation of Urinary tract infection   in Discharge Summary on . In order to support the diagnosis of UTI,   please include additional clinical indicators in your documentation.  Or   please document if the diagnosis of UTI has been ruled out after further   study.    The medical record reflects the following:  Risk Factors: 89 yr male, HTN, Acidosis    Clinical Indicators: H&P note on  Urinary tract infection, Rocephin.    Follow-up on urine culture.  IMPN on  UTI,  WBC Count 4.6, 4.7, 5.1, 5.6  Temp (!) 96 ?F (35.6 ?C)    Urinalysis w/Micro -  Bacteria - 3+  Blood Urine - Moderate  Clarity - Slightly yellow  Leukocytes Esterase - Large    Treatment: IV ceftriaxone, urine culture, IV fluids, Nephrology consult,  Options provided:  -- UTI present as evidenced by, Please document evidence.  -- UTI was ruled out  -- Other - I will add my own diagnosis  -- Disagree - Not applicable / Not valid  -- Disagree - Clinically unable to determine / Unknown  -- Refer to Clinical Documentation Reviewer    PROVIDER RESPONSE TEXT:    UTI was ruled out after study.    Query created by: Alfreda Carreon on 11/15/2024 6:28 AM      Electronically signed by:  Nicholas Beal MD 11/15/2024 6:43 AM

## 2024-12-09 ENCOUNTER — HOSPITAL ENCOUNTER (INPATIENT)
Age: 88
LOS: 7 days | Discharge: NURSING, REHAB OR LONG-TERM CARE | DRG: 065 | End: 2024-12-16
Attending: STUDENT IN AN ORGANIZED HEALTH CARE EDUCATION/TRAINING PROGRAM | Admitting: INTERNAL MEDICINE
Payer: MEDICARE

## 2024-12-09 ENCOUNTER — APPOINTMENT (OUTPATIENT)
Dept: CT IMAGING | Age: 88
DRG: 065 | End: 2024-12-09
Payer: MEDICARE

## 2024-12-09 DIAGNOSIS — I65.21 STENOSIS OF RIGHT INTERNAL CAROTID ARTERY: Primary | ICD-10-CM

## 2024-12-09 DIAGNOSIS — R29.90 STROKE-LIKE SYMPTOM: ICD-10-CM

## 2024-12-09 DIAGNOSIS — I63.9 CEREBROVASCULAR ACCIDENT (CVA), UNSPECIFIED MECHANISM (HCC): ICD-10-CM

## 2024-12-09 DIAGNOSIS — R29.90 STROKE-LIKE SYMPTOMS: ICD-10-CM

## 2024-12-09 LAB
ALBUMIN SERPL-MCNC: 3.7 G/DL (ref 3.5–5.2)
ALP SERPL-CCNC: 134 U/L (ref 40–129)
ALT SERPL-CCNC: 11 U/L (ref 0–40)
ANION GAP SERPL CALCULATED.3IONS-SCNC: 10 MMOL/L (ref 7–16)
AST SERPL-CCNC: 22 U/L (ref 0–39)
BASOPHILS # BLD: 0.02 K/UL (ref 0–0.2)
BASOPHILS NFR BLD: 0 % (ref 0–2)
BILIRUB SERPL-MCNC: 0.6 MG/DL (ref 0–1.2)
BUN SERPL-MCNC: 35 MG/DL (ref 6–23)
CALCIUM SERPL-MCNC: 9.6 MG/DL (ref 8.6–10.2)
CHLORIDE SERPL-SCNC: 105 MMOL/L (ref 98–107)
CO2 SERPL-SCNC: 21 MMOL/L (ref 22–29)
CREAT SERPL-MCNC: 2.5 MG/DL (ref 0.7–1.2)
EOSINOPHIL # BLD: 0.1 K/UL (ref 0.05–0.5)
EOSINOPHILS RELATIVE PERCENT: 2 % (ref 0–6)
ERYTHROCYTE [DISTWIDTH] IN BLOOD BY AUTOMATED COUNT: 17.2 % (ref 11.5–15)
GFR, ESTIMATED: 24 ML/MIN/1.73M2
GLUCOSE BLD-MCNC: 140 MG/DL (ref 74–99)
GLUCOSE BLD-MCNC: 154 MG/DL (ref 74–99)
GLUCOSE SERPL-MCNC: 196 MG/DL (ref 74–99)
HCT VFR BLD AUTO: 33.9 % (ref 37–54)
HGB BLD-MCNC: 10.6 G/DL (ref 12.5–16.5)
IMM GRANULOCYTES # BLD AUTO: <0.03 K/UL (ref 0–0.58)
IMM GRANULOCYTES NFR BLD: 0 % (ref 0–5)
INR PPP: 3.3
LYMPHOCYTES NFR BLD: 1.39 K/UL (ref 1.5–4)
LYMPHOCYTES RELATIVE PERCENT: 22 % (ref 20–42)
MCH RBC QN AUTO: 27 PG (ref 26–35)
MCHC RBC AUTO-ENTMCNC: 31.3 G/DL (ref 32–34.5)
MCV RBC AUTO: 86.5 FL (ref 80–99.9)
MONOCYTES NFR BLD: 0.49 K/UL (ref 0.1–0.95)
MONOCYTES NFR BLD: 8 % (ref 2–12)
NEUTROPHILS NFR BLD: 68 % (ref 43–80)
NEUTS SEG NFR BLD: 4.22 K/UL (ref 1.8–7.3)
PLATELET # BLD AUTO: 75 K/UL (ref 130–450)
PLATELET CONFIRMATION: NORMAL
PMV BLD AUTO: 11.1 FL (ref 7–12)
POTASSIUM SERPL-SCNC: 4.6 MMOL/L (ref 3.5–5)
PROT SERPL-MCNC: 7.5 G/DL (ref 6.4–8.3)
PROTHROMBIN TIME: 36.8 SEC (ref 9.3–12.4)
RBC # BLD AUTO: 3.92 M/UL (ref 3.8–5.8)
SODIUM SERPL-SCNC: 136 MMOL/L (ref 132–146)
TROPONIN I SERPL HS-MCNC: 28 NG/L (ref 0–11)
TROPONIN I SERPL HS-MCNC: 30 NG/L (ref 0–11)
WBC OTHER # BLD: 6.2 K/UL (ref 4.5–11.5)

## 2024-12-09 PROCEDURE — 6370000000 HC RX 637 (ALT 250 FOR IP)

## 2024-12-09 PROCEDURE — 80053 COMPREHEN METABOLIC PANEL: CPT

## 2024-12-09 PROCEDURE — 84484 ASSAY OF TROPONIN QUANT: CPT

## 2024-12-09 PROCEDURE — 85610 PROTHROMBIN TIME: CPT

## 2024-12-09 PROCEDURE — 70498 CT ANGIOGRAPHY NECK: CPT

## 2024-12-09 PROCEDURE — 70450 CT HEAD/BRAIN W/O DYE: CPT

## 2024-12-09 PROCEDURE — 82947 ASSAY GLUCOSE BLOOD QUANT: CPT

## 2024-12-09 PROCEDURE — 99285 EMERGENCY DEPT VISIT HI MDM: CPT

## 2024-12-09 PROCEDURE — 2060000000 HC ICU INTERMEDIATE R&B

## 2024-12-09 PROCEDURE — 6370000000 HC RX 637 (ALT 250 FOR IP): Performed by: INTERNAL MEDICINE

## 2024-12-09 PROCEDURE — 70496 CT ANGIOGRAPHY HEAD: CPT

## 2024-12-09 PROCEDURE — 6360000004 HC RX CONTRAST MEDICATION: Performed by: RADIOLOGY

## 2024-12-09 PROCEDURE — 85025 COMPLETE CBC W/AUTO DIFF WBC: CPT

## 2024-12-09 PROCEDURE — 93005 ELECTROCARDIOGRAM TRACING: CPT

## 2024-12-09 RX ORDER — POLYETHYLENE GLYCOL 3350 17 G/17G
17 POWDER, FOR SOLUTION ORAL DAILY PRN
Status: DISCONTINUED | OUTPATIENT
Start: 2024-12-09 | End: 2024-12-16 | Stop reason: HOSPADM

## 2024-12-09 RX ORDER — ATORVASTATIN CALCIUM 20 MG/1
20 TABLET, FILM COATED ORAL NIGHTLY
Status: DISCONTINUED | OUTPATIENT
Start: 2024-12-09 | End: 2024-12-10

## 2024-12-09 RX ORDER — SODIUM CHLORIDE 9 MG/ML
INJECTION, SOLUTION INTRAVENOUS PRN
Status: DISCONTINUED | OUTPATIENT
Start: 2024-12-09 | End: 2024-12-16 | Stop reason: HOSPADM

## 2024-12-09 RX ORDER — ENOXAPARIN SODIUM 100 MG/ML
30 INJECTION SUBCUTANEOUS DAILY
Status: DISCONTINUED | OUTPATIENT
Start: 2024-12-09 | End: 2024-12-12

## 2024-12-09 RX ORDER — TAMSULOSIN HYDROCHLORIDE 0.4 MG/1
0.4 CAPSULE ORAL NIGHTLY
Status: DISCONTINUED | OUTPATIENT
Start: 2024-12-09 | End: 2024-12-16 | Stop reason: HOSPADM

## 2024-12-09 RX ORDER — INSULIN LISPRO 100 [IU]/ML
0-4 INJECTION, SOLUTION INTRAVENOUS; SUBCUTANEOUS
Status: DISCONTINUED | OUTPATIENT
Start: 2024-12-09 | End: 2024-12-16 | Stop reason: HOSPADM

## 2024-12-09 RX ORDER — INSULIN GLARGINE 100 [IU]/ML
8 INJECTION, SOLUTION SUBCUTANEOUS NIGHTLY
Status: DISCONTINUED | OUTPATIENT
Start: 2024-12-09 | End: 2024-12-16 | Stop reason: HOSPADM

## 2024-12-09 RX ORDER — METOPROLOL TARTRATE 50 MG
50 TABLET ORAL 2 TIMES DAILY WITH MEALS
Status: DISCONTINUED | OUTPATIENT
Start: 2024-12-09 | End: 2024-12-16 | Stop reason: HOSPADM

## 2024-12-09 RX ORDER — ISOSORBIDE MONONITRATE 30 MG/1
30 TABLET, EXTENDED RELEASE ORAL DAILY
Status: DISCONTINUED | OUTPATIENT
Start: 2024-12-10 | End: 2024-12-16 | Stop reason: HOSPADM

## 2024-12-09 RX ORDER — ONDANSETRON 2 MG/ML
4 INJECTION INTRAMUSCULAR; INTRAVENOUS EVERY 6 HOURS PRN
Status: DISCONTINUED | OUTPATIENT
Start: 2024-12-09 | End: 2024-12-16 | Stop reason: HOSPADM

## 2024-12-09 RX ORDER — GLUCAGON 1 MG/ML
1 KIT INJECTION PRN
Status: DISCONTINUED | OUTPATIENT
Start: 2024-12-09 | End: 2024-12-16 | Stop reason: HOSPADM

## 2024-12-09 RX ORDER — IOPAMIDOL 755 MG/ML
60 INJECTION, SOLUTION INTRAVASCULAR
Status: COMPLETED | OUTPATIENT
Start: 2024-12-09 | End: 2024-12-09

## 2024-12-09 RX ORDER — SODIUM BICARBONATE 650 MG/1
650 TABLET ORAL 3 TIMES DAILY
Status: DISCONTINUED | OUTPATIENT
Start: 2024-12-09 | End: 2024-12-16 | Stop reason: HOSPADM

## 2024-12-09 RX ORDER — ONDANSETRON 4 MG/1
4 TABLET, ORALLY DISINTEGRATING ORAL EVERY 8 HOURS PRN
Status: DISCONTINUED | OUTPATIENT
Start: 2024-12-09 | End: 2024-12-16 | Stop reason: HOSPADM

## 2024-12-09 RX ORDER — ASPIRIN 81 MG/1
81 TABLET ORAL DAILY
Status: DISCONTINUED | OUTPATIENT
Start: 2024-12-09 | End: 2024-12-11

## 2024-12-09 RX ORDER — SODIUM CHLORIDE 0.9 % (FLUSH) 0.9 %
5-40 SYRINGE (ML) INJECTION EVERY 12 HOURS SCHEDULED
Status: DISCONTINUED | OUTPATIENT
Start: 2024-12-09 | End: 2024-12-16 | Stop reason: HOSPADM

## 2024-12-09 RX ORDER — SODIUM CHLORIDE 0.9 % (FLUSH) 0.9 %
5-40 SYRINGE (ML) INJECTION PRN
Status: DISCONTINUED | OUTPATIENT
Start: 2024-12-09 | End: 2024-12-16 | Stop reason: HOSPADM

## 2024-12-09 RX ORDER — ASPIRIN 325 MG
325 TABLET, DELAYED RELEASE (ENTERIC COATED) ORAL ONCE
Status: COMPLETED | OUTPATIENT
Start: 2024-12-09 | End: 2024-12-09

## 2024-12-09 RX ORDER — RANOLAZINE 500 MG/1
500 TABLET, EXTENDED RELEASE ORAL 2 TIMES DAILY
Status: DISCONTINUED | OUTPATIENT
Start: 2024-12-09 | End: 2024-12-16 | Stop reason: HOSPADM

## 2024-12-09 RX ORDER — DEXTROSE MONOHYDRATE 100 MG/ML
INJECTION, SOLUTION INTRAVENOUS CONTINUOUS PRN
Status: DISCONTINUED | OUTPATIENT
Start: 2024-12-09 | End: 2024-12-16 | Stop reason: HOSPADM

## 2024-12-09 RX ADMIN — SODIUM BICARBONATE 650 MG: 650 TABLET ORAL at 21:34

## 2024-12-09 RX ADMIN — RANOLAZINE 500 MG: 500 TABLET, FILM COATED, EXTENDED RELEASE ORAL at 21:35

## 2024-12-09 RX ADMIN — ASPIRIN 325 MG: 325 TABLET, COATED ORAL at 12:56

## 2024-12-09 RX ADMIN — INSULIN GLARGINE 8 UNITS: 100 INJECTION, SOLUTION SUBCUTANEOUS at 21:48

## 2024-12-09 RX ADMIN — ATORVASTATIN CALCIUM 20 MG: 20 TABLET, FILM COATED ORAL at 21:35

## 2024-12-09 RX ADMIN — ASPIRIN 81 MG: 81 TABLET, COATED ORAL at 21:34

## 2024-12-09 RX ADMIN — METOPROLOL TARTRATE 50 MG: 50 TABLET, FILM COATED ORAL at 21:34

## 2024-12-09 RX ADMIN — TAMSULOSIN HYDROCHLORIDE 0.4 MG: 0.4 CAPSULE ORAL at 21:34

## 2024-12-09 RX ADMIN — IOPAMIDOL 60 ML: 755 INJECTION, SOLUTION INTRAVENOUS at 12:32

## 2024-12-09 ASSESSMENT — PAIN - FUNCTIONAL ASSESSMENT: PAIN_FUNCTIONAL_ASSESSMENT: NONE - DENIES PAIN

## 2024-12-09 NOTE — ED NOTES
Pts ostomy was open and spilled inside his pants/underwear. Complete bed change and pt cleaned with new depends put on. Pt requested this nurse throw away his soiled clothing.

## 2024-12-09 NOTE — ED PROVIDER NOTES
High Sensitivity 30 (*)     All other components within normal limits   PROTIME-INR - Abnormal; Notable for the following components:    Protime 36.8 (*)     All other components within normal limits   POCT GLUCOSE - Abnormal; Notable for the following components:    POC Glucose 140 (*)     All other components within normal limits   PLATELET CONFIRMATION   TROPONIN       As interpreted by me, the above displayed labs are abnormal. All other labs obtained during this visit were within normal range or not returned as of this dictation.    EKG Interpretation  Interpreted by emergency department resident physician, Samir Phillips DO  *Please see ED Course for EKG interpretation*    RADIOLOGY:   Non-plain film images such as CT, Ultrasound and MRI are read by the radiologist. Plain radiographic images are visualized and preliminarily interpreted by the ED Provider with the below findings:      Interpretation per the Radiologist below, if available at the time of this note:    CTA HEAD W CONTRAST   Final Result   CTA NECK:      1. Approximately 80% maximal stenosis of the origin of the right internal   carotid artery due to a prominent, predominantly noncalcified atherosclerotic   plaque.   2. No significant stenosis of the left carotid arteries.   3. Patent bilateral vertebral arteries.   CTA HEAD:      No large vessel occlusion or significant stenosis.         CTA NECK W CONTRAST   Final Result   CTA NECK:      1. Approximately 80% maximal stenosis of the origin of the right internal   carotid artery due to a prominent, predominantly noncalcified atherosclerotic   plaque.   2. No significant stenosis of the left carotid arteries.   3. Patent bilateral vertebral arteries.   CTA HEAD:      No large vessel occlusion or significant stenosis.         CT HEAD WO CONTRAST   Final Result   1.  There is no acute intracranial abnormality.  Specifically, there is no   intracranial hemorrhage.   2. Significant atrophy and

## 2024-12-10 ENCOUNTER — APPOINTMENT (OUTPATIENT)
Dept: ULTRASOUND IMAGING | Age: 88
DRG: 065 | End: 2024-12-10
Payer: MEDICARE

## 2024-12-10 LAB
ANION GAP SERPL CALCULATED.3IONS-SCNC: 12 MMOL/L (ref 7–16)
BUN SERPL-MCNC: 34 MG/DL (ref 6–23)
CALCIUM SERPL-MCNC: 9.4 MG/DL (ref 8.6–10.2)
CHLORIDE SERPL-SCNC: 110 MMOL/L (ref 98–107)
CHOLEST SERPL-MCNC: 140 MG/DL
CO2 SERPL-SCNC: 20 MMOL/L (ref 22–29)
CREAT SERPL-MCNC: 2.3 MG/DL (ref 0.7–1.2)
EKG ATRIAL RATE: 100 BPM
EKG Q-T INTERVAL: 352 MS
EKG QRS DURATION: 70 MS
EKG QTC CALCULATION (BAZETT): 428 MS
EKG R AXIS: 48 DEGREES
EKG T AXIS: 42 DEGREES
EKG VENTRICULAR RATE: 89 BPM
GFR, ESTIMATED: 27 ML/MIN/1.73M2
GLUCOSE BLD-MCNC: 138 MG/DL (ref 74–99)
GLUCOSE BLD-MCNC: 170 MG/DL
GLUCOSE BLD-MCNC: 170 MG/DL (ref 74–99)
GLUCOSE BLD-MCNC: 173 MG/DL (ref 74–99)
GLUCOSE BLD-MCNC: 182 MG/DL (ref 74–99)
GLUCOSE SERPL-MCNC: 186 MG/DL (ref 74–99)
HBA1C MFR BLD: 6.8 % (ref 4–5.6)
HDLC SERPL-MCNC: 68 MG/DL
LDLC SERPL CALC-MCNC: 48 MG/DL
POTASSIUM SERPL-SCNC: 4.5 MMOL/L (ref 3.5–5)
SODIUM SERPL-SCNC: 142 MMOL/L (ref 132–146)
TRIGL SERPL-MCNC: 118 MG/DL
VLDLC SERPL CALC-MCNC: 24 MG/DL

## 2024-12-10 PROCEDURE — 93880 EXTRACRANIAL BILAT STUDY: CPT

## 2024-12-10 PROCEDURE — 83036 HEMOGLOBIN GLYCOSYLATED A1C: CPT

## 2024-12-10 PROCEDURE — 36415 COLL VENOUS BLD VENIPUNCTURE: CPT

## 2024-12-10 PROCEDURE — 80048 BASIC METABOLIC PNL TOTAL CA: CPT

## 2024-12-10 PROCEDURE — 6370000000 HC RX 637 (ALT 250 FOR IP): Performed by: PHYSICIAN ASSISTANT

## 2024-12-10 PROCEDURE — 80061 LIPID PANEL: CPT

## 2024-12-10 PROCEDURE — 99222 1ST HOSP IP/OBS MODERATE 55: CPT | Performed by: PHYSICIAN ASSISTANT

## 2024-12-10 PROCEDURE — 6370000000 HC RX 637 (ALT 250 FOR IP): Performed by: INTERNAL MEDICINE

## 2024-12-10 PROCEDURE — 2060000000 HC ICU INTERMEDIATE R&B

## 2024-12-10 PROCEDURE — 6360000002 HC RX W HCPCS: Performed by: INTERNAL MEDICINE

## 2024-12-10 PROCEDURE — 93010 ELECTROCARDIOGRAM REPORT: CPT | Performed by: INTERNAL MEDICINE

## 2024-12-10 PROCEDURE — 2580000003 HC RX 258: Performed by: INTERNAL MEDICINE

## 2024-12-10 PROCEDURE — 82947 ASSAY GLUCOSE BLOOD QUANT: CPT

## 2024-12-10 RX ORDER — ATORVASTATIN CALCIUM 40 MG/1
40 TABLET, FILM COATED ORAL NIGHTLY
Status: DISCONTINUED | OUTPATIENT
Start: 2024-12-10 | End: 2024-12-16 | Stop reason: HOSPADM

## 2024-12-10 RX ADMIN — INSULIN GLARGINE 8 UNITS: 100 INJECTION, SOLUTION SUBCUTANEOUS at 20:18

## 2024-12-10 RX ADMIN — SODIUM CHLORIDE, PRESERVATIVE FREE 10 ML: 5 INJECTION INTRAVENOUS at 20:25

## 2024-12-10 RX ADMIN — INSULIN LISPRO 1 UNITS: 100 INJECTION, SOLUTION INTRAVENOUS; SUBCUTANEOUS at 16:24

## 2024-12-10 RX ADMIN — ENOXAPARIN SODIUM 30 MG: 100 INJECTION SUBCUTANEOUS at 09:55

## 2024-12-10 RX ADMIN — SODIUM BICARBONATE 650 MG: 650 TABLET ORAL at 20:17

## 2024-12-10 RX ADMIN — TAMSULOSIN HYDROCHLORIDE 0.4 MG: 0.4 CAPSULE ORAL at 20:18

## 2024-12-10 RX ADMIN — ASPIRIN 81 MG: 81 TABLET, COATED ORAL at 09:55

## 2024-12-10 RX ADMIN — RANOLAZINE 500 MG: 500 TABLET, FILM COATED, EXTENDED RELEASE ORAL at 20:18

## 2024-12-10 RX ADMIN — METOPROLOL TARTRATE 50 MG: 50 TABLET, FILM COATED ORAL at 16:24

## 2024-12-10 RX ADMIN — SODIUM BICARBONATE 650 MG: 650 TABLET ORAL at 12:46

## 2024-12-10 RX ADMIN — METOPROLOL TARTRATE 50 MG: 50 TABLET, FILM COATED ORAL at 09:55

## 2024-12-10 RX ADMIN — SODIUM CHLORIDE, PRESERVATIVE FREE 10 ML: 5 INJECTION INTRAVENOUS at 09:56

## 2024-12-10 RX ADMIN — SODIUM BICARBONATE 650 MG: 650 TABLET ORAL at 09:55

## 2024-12-10 RX ADMIN — ATORVASTATIN CALCIUM 40 MG: 40 TABLET, FILM COATED ORAL at 20:17

## 2024-12-10 ASSESSMENT — PAIN SCALES - GENERAL: PAINLEVEL_OUTOF10: 0

## 2024-12-10 NOTE — CARE COORDINATION
03/07/24 CM update:  Pt admitted for SOB ECHO cancelled by cardiology.  No definitive plan from cardiology at this time Pt may be d/c later today if no intervention from cardiology.  PT/OT evals pending for discharge planning.  Spoke with Ema Lopez they have accepted [facility] orders in chart.  Pt preference is to go home with HHC wife to transport CM/SW to follow Electronically signed by Lobo Bunn RN CM on 3/7/2024 at 12:23 PM    Terbinafine Pregnancy And Lactation Text: This medication is Pregnancy Category B and is considered safe during pregnancy. It is also excreted in breast milk and breast feeding isn't recommended.

## 2024-12-10 NOTE — PLAN OF CARE
Problem: ABCDS Injury Assessment  Goal: Absence of physical injury  Outcome: Progressing     Problem: Skin/Tissue Integrity  Goal: Absence of new skin breakdown  Description: 1.  Monitor for areas of redness and/or skin breakdown  2.  Assess vascular access sites hourly  3.  Every 4-6 hours minimum:  Change oxygen saturation probe site  4.  Every 4-6 hours:  If on nasal continuous positive airway pressure, respiratory therapy assess nares and determine need for appliance change or resting period.  Outcome: Progressing     Problem: Safety - Adult  Goal: Free from fall injury  Outcome: Progressing     Problem: Chronic Conditions and Co-morbidities  Goal: Patient's chronic conditions and co-morbidity symptoms are monitored and maintained or improved  Outcome: Progressing     Problem: Discharge Planning  Goal: Discharge to home or other facility with appropriate resources  Outcome: Progressing  Flowsheets (Taken 12/10/2024 3422)  Discharge to home or other facility with appropriate resources: Identify barriers to discharge with patient and caregiver

## 2024-12-10 NOTE — PLAN OF CARE
I was called about this patient from the emergency department earlier today.  He had presented with some left hemibody symptoms.  He is on Coumadin which currently has been stopped.    There was no evidence of any perfusion abnormality.  He has a complex atherosclerotic plaque at the origin of the right ICA causing at least 80% or more stenosis by diameter criteria.  It could be possible that this right ICA is symptomatic.    I requested the emergency department team to load this patient with aspirin and obtain MRI brain along with other neurovascular workup.  I will evaluate him tomorrow and decide on further management for right ICA stenosis.  Please consult general neurology service as well.    Williams Alejandro M.D.  Vascular Neurology & Neurointerventional Surgery

## 2024-12-10 NOTE — H&P
Internal Medicine History & Physical     Name: Johny Nichols  : 1935  Chief Complaint: Extremity Weakness (Increased arm weakness beginning at 1000 this am. L sided weakness at baseline d/t previous stroke. Facility reported difficulty speaking and right sided facial droop.) and Cerebrovascular Accident  Primary Care Physician: Nicholas Beal MD  Admission date: 2024  Date of service: 12/10/2024     History of Present Illness  Johny is a 89 y.o. year old male with a PMH as below who presented with a chief complaint of  left sided weakness starting in morning of admission. Facility reported difficulty speaking and right sided facial droop.) He has h/o afib, and was recently changed to eliquis from coumadin at last [facility] visit.(Getting inr checked was an issue because of transportation). He has dementia, and is poor historian. He has been in facility most recently.         In the ED Creat was 2.5(which is his baseline- follows with Dr Valencia), his PLTS were 75(h/o etoh abuse). CTA NECK: showed Approximately 80% maximal stenosis of the origin of the right internal        The patient was admitted for symptomatic right carotid stenosis with left hemiparesis and dysarrthria.             Past Medical History:   Diagnosis Date    A-fib ([facility])     Cerebrovascular accident (CVA) ([facility]) 2013    Chronic kidney disease     Chronic renal impairment, stage 3 (moderate) ([facility]) 10/10/2016    Colitis, ulcerative chronic ([facility])     Diabetes mellitus ([facility])     DM (diabetes mellitus) ([facility]) 2013    Encounter for therapeutic drug monitoring 2016    Hyperlipidemia     Hyperlipidemia associated with type 2 diabetes mellitus ([facility]) 2016    Hypertension     Long term (current) use of anticoagulants 2016    Prostate enlargement     Stroke (cerebrum) ([facility])     Unspecified cerebral artery occlusion with cerebral infarction        Past Surgical History:   Procedure Laterality Date    ABDOMEN SURGERY

## 2024-12-11 ENCOUNTER — APPOINTMENT (OUTPATIENT)
Dept: MRI IMAGING | Age: 88
DRG: 065 | End: 2024-12-11
Payer: MEDICARE

## 2024-12-11 ENCOUNTER — APPOINTMENT (OUTPATIENT)
Age: 88
DRG: 065 | End: 2024-12-11
Payer: MEDICARE

## 2024-12-11 LAB
ANION GAP SERPL CALCULATED.3IONS-SCNC: 13 MMOL/L (ref 7–16)
BUN SERPL-MCNC: 34 MG/DL (ref 6–23)
CALCIUM SERPL-MCNC: 9.1 MG/DL (ref 8.6–10.2)
CHLORIDE SERPL-SCNC: 111 MMOL/L (ref 98–107)
CO2 SERPL-SCNC: 18 MMOL/L (ref 22–29)
CREAT SERPL-MCNC: 2 MG/DL (ref 0.7–1.2)
ECHO AO ASC DIAM: 2.8 CM
ECHO AO ASCENDING AORTA INDEX: 1.59 CM/M2
ECHO AR MAX VEL PISA: 4.1 M/S
ECHO AV AREA PEAK VELOCITY: 1.5 CM2
ECHO AV AREA VTI: 1.6 CM2
ECHO AV AREA/BSA PEAK VELOCITY: 0.9 CM2/M2
ECHO AV AREA/BSA VTI: 0.9 CM2/M2
ECHO AV CUSP MM: 1.7 CM
ECHO AV MEAN GRADIENT: 4 MMHG
ECHO AV MEAN VELOCITY: 0.9 M/S
ECHO AV PEAK GRADIENT: 5 MMHG
ECHO AV PEAK VELOCITY: 1.2 M/S
ECHO AV REGURGITANT PHT: 749.3 MILLISECOND
ECHO AV VELOCITY RATIO: 0.5
ECHO AV VTI: 22 CM
ECHO BSA: 1.75 M2
ECHO LA DIAMETER INDEX: 1.93 CM/M2
ECHO LA DIAMETER: 3.4 CM
ECHO LA VOL A-L A2C: 54 ML (ref 18–58)
ECHO LA VOL A-L A4C: 70 ML (ref 18–58)
ECHO LA VOL BP: 61 ML (ref 18–58)
ECHO LA VOL MOD A2C: 52 ML (ref 18–58)
ECHO LA VOL MOD A4C: 62 ML (ref 18–58)
ECHO LA VOL/BSA BIPLANE: 35 ML/M2 (ref 16–34)
ECHO LA VOLUME AREA LENGTH: 66 ML
ECHO LA VOLUME INDEX A-L A2C: 31 ML/M2 (ref 16–34)
ECHO LA VOLUME INDEX A-L A4C: 40 ML/M2 (ref 16–34)
ECHO LA VOLUME INDEX AREA LENGTH: 38 ML/M2 (ref 16–34)
ECHO LA VOLUME INDEX MOD A2C: 30 ML/M2 (ref 16–34)
ECHO LA VOLUME INDEX MOD A4C: 35 ML/M2 (ref 16–34)
ECHO LV EDV A2C: 32 ML
ECHO LV EDV A4C: 43 ML
ECHO LV EDV BP: 37 ML (ref 67–155)
ECHO LV EDV INDEX A4C: 24 ML/M2
ECHO LV EDV INDEX BP: 21 ML/M2
ECHO LV EDV NDEX A2C: 18 ML/M2
ECHO LV EF PHYSICIAN: 60 %
ECHO LV EJECTION FRACTION A2C: 44 %
ECHO LV EJECTION FRACTION A4C: 59 %
ECHO LV ESV A2C: 18 ML
ECHO LV ESV A4C: 17 ML
ECHO LV ESV BP: 18 ML (ref 22–58)
ECHO LV ESV INDEX A2C: 10 ML/M2
ECHO LV ESV INDEX A4C: 10 ML/M2
ECHO LV ESV INDEX BP: 10 ML/M2
ECHO LV FRACTIONAL SHORTENING: 14 % (ref 28–44)
ECHO LV INTERNAL DIMENSION DIASTOLE INDEX: 1.99 CM/M2
ECHO LV INTERNAL DIMENSION DIASTOLIC: 3.5 CM (ref 4.2–5.9)
ECHO LV INTERNAL DIMENSION SYSTOLIC INDEX: 1.7 CM/M2
ECHO LV INTERNAL DIMENSION SYSTOLIC: 3 CM
ECHO LV IVSD: 0.9 CM (ref 0.6–1)
ECHO LV IVSS: 1.1 CM
ECHO LV MASS 2D: 81.9 G (ref 88–224)
ECHO LV MASS INDEX 2D: 46.5 G/M2 (ref 49–115)
ECHO LV POSTERIOR WALL DIASTOLIC: 0.8 CM (ref 0.6–1)
ECHO LV POSTERIOR WALL SYSTOLIC: 1 CM
ECHO LV RELATIVE WALL THICKNESS RATIO: 0.46
ECHO LVOT AREA: 3.1 CM2
ECHO LVOT AV VTI INDEX: 0.54
ECHO LVOT DIAM: 2 CM
ECHO LVOT MEAN GRADIENT: 1 MMHG
ECHO LVOT PEAK GRADIENT: 1 MMHG
ECHO LVOT PEAK VELOCITY: 0.6 M/S
ECHO LVOT STROKE VOLUME INDEX: 21.2 ML/M2
ECHO LVOT SV: 37.4 ML
ECHO LVOT VTI: 11.9 CM
ECHO MV "A" WAVE DURATION: 62.8 MSEC
ECHO MV A VELOCITY: 0.42 M/S
ECHO MV AREA PHT: 3.7 CM2
ECHO MV AREA VTI: 1.8 CM2
ECHO MV E DECELERATION TIME (DT): 200.2 MS
ECHO MV E VELOCITY: 0.81 M/S
ECHO MV E/A RATIO: 1.93
ECHO MV LVOT VTI INDEX: 1.71
ECHO MV MAX VELOCITY: 0.9 M/S
ECHO MV MEAN GRADIENT: 1 MMHG
ECHO MV MEAN VELOCITY: 0.5 M/S
ECHO MV PEAK GRADIENT: 3 MMHG
ECHO MV PRESSURE HALF TIME (PHT): 59.5 MS
ECHO MV VTI: 20.4 CM
ECHO PV MAX VELOCITY: 0.6 M/S
ECHO PV MEAN GRADIENT: 1 MMHG
ECHO PV MEAN VELOCITY: 0.4 M/S
ECHO PV PEAK GRADIENT: 1 MMHG
ECHO PV VTI: 9.8 CM
ECHO RV INTERNAL DIMENSION: 2.8 CM
ECHO RV TAPSE: 1.2 CM (ref 1.7–?)
ECHO TV REGURGITANT MAX VELOCITY: 2.76 M/S
ECHO TV REGURGITANT PEAK GRADIENT: 30 MMHG
GFR, ESTIMATED: 31 ML/MIN/1.73M2
GLUCOSE BLD-MCNC: 103 MG/DL (ref 74–99)
GLUCOSE BLD-MCNC: 217 MG/DL (ref 74–99)
GLUCOSE BLD-MCNC: 262 MG/DL (ref 74–99)
GLUCOSE BLD-MCNC: 284 MG/DL (ref 74–99)
GLUCOSE SERPL-MCNC: 102 MG/DL (ref 74–99)
POTASSIUM SERPL-SCNC: 4.3 MMOL/L (ref 3.5–5)
SODIUM SERPL-SCNC: 142 MMOL/L (ref 132–146)

## 2024-12-11 PROCEDURE — 6370000000 HC RX 637 (ALT 250 FOR IP): Performed by: INTERNAL MEDICINE

## 2024-12-11 PROCEDURE — 6370000000 HC RX 637 (ALT 250 FOR IP): Performed by: PHYSICIAN ASSISTANT

## 2024-12-11 PROCEDURE — 93306 TTE W/DOPPLER COMPLETE: CPT | Performed by: INTERNAL MEDICINE

## 2024-12-11 PROCEDURE — 99232 SBSQ HOSP IP/OBS MODERATE 35: CPT | Performed by: PHYSICIAN ASSISTANT

## 2024-12-11 PROCEDURE — 36415 COLL VENOUS BLD VENIPUNCTURE: CPT

## 2024-12-11 PROCEDURE — 2580000003 HC RX 258: Performed by: INTERNAL MEDICINE

## 2024-12-11 PROCEDURE — 82947 ASSAY GLUCOSE BLOOD QUANT: CPT

## 2024-12-11 PROCEDURE — 80048 BASIC METABOLIC PNL TOTAL CA: CPT

## 2024-12-11 PROCEDURE — 93306 TTE W/DOPPLER COMPLETE: CPT

## 2024-12-11 PROCEDURE — 6360000002 HC RX W HCPCS: Performed by: INTERNAL MEDICINE

## 2024-12-11 PROCEDURE — 2060000000 HC ICU INTERMEDIATE R&B

## 2024-12-11 PROCEDURE — 70551 MRI BRAIN STEM W/O DYE: CPT

## 2024-12-11 RX ORDER — CLOPIDOGREL BISULFATE 75 MG/1
75 TABLET ORAL DAILY
Status: DISCONTINUED | OUTPATIENT
Start: 2024-12-12 | End: 2024-12-16 | Stop reason: HOSPADM

## 2024-12-11 RX ADMIN — RANOLAZINE 500 MG: 500 TABLET, FILM COATED, EXTENDED RELEASE ORAL at 08:09

## 2024-12-11 RX ADMIN — TAMSULOSIN HYDROCHLORIDE 0.4 MG: 0.4 CAPSULE ORAL at 19:47

## 2024-12-11 RX ADMIN — ENOXAPARIN SODIUM 30 MG: 100 INJECTION SUBCUTANEOUS at 08:08

## 2024-12-11 RX ADMIN — RANOLAZINE 500 MG: 500 TABLET, FILM COATED, EXTENDED RELEASE ORAL at 19:46

## 2024-12-11 RX ADMIN — INSULIN GLARGINE 8 UNITS: 100 INJECTION, SOLUTION SUBCUTANEOUS at 19:47

## 2024-12-11 RX ADMIN — ISOSORBIDE MONONITRATE 30 MG: 30 TABLET, EXTENDED RELEASE ORAL at 08:09

## 2024-12-11 RX ADMIN — INSULIN LISPRO 2 UNITS: 100 INJECTION, SOLUTION INTRAVENOUS; SUBCUTANEOUS at 15:58

## 2024-12-11 RX ADMIN — INSULIN LISPRO 1 UNITS: 100 INJECTION, SOLUTION INTRAVENOUS; SUBCUTANEOUS at 19:47

## 2024-12-11 RX ADMIN — INSULIN LISPRO 2 UNITS: 100 INJECTION, SOLUTION INTRAVENOUS; SUBCUTANEOUS at 11:22

## 2024-12-11 RX ADMIN — METOPROLOL TARTRATE 50 MG: 50 TABLET, FILM COATED ORAL at 15:58

## 2024-12-11 RX ADMIN — METOPROLOL TARTRATE 50 MG: 50 TABLET, FILM COATED ORAL at 08:09

## 2024-12-11 RX ADMIN — ASPIRIN 81 MG: 81 TABLET, COATED ORAL at 08:09

## 2024-12-11 RX ADMIN — SODIUM CHLORIDE, PRESERVATIVE FREE 10 ML: 5 INJECTION INTRAVENOUS at 08:10

## 2024-12-11 RX ADMIN — SODIUM BICARBONATE 650 MG: 650 TABLET ORAL at 08:09

## 2024-12-11 RX ADMIN — SODIUM BICARBONATE 650 MG: 650 TABLET ORAL at 19:46

## 2024-12-11 RX ADMIN — SODIUM CHLORIDE, PRESERVATIVE FREE 10 ML: 5 INJECTION INTRAVENOUS at 19:47

## 2024-12-11 RX ADMIN — ATORVASTATIN CALCIUM 40 MG: 40 TABLET, FILM COATED ORAL at 19:46

## 2024-12-11 RX ADMIN — SODIUM BICARBONATE 650 MG: 650 TABLET ORAL at 13:30

## 2024-12-11 ASSESSMENT — PAIN SCALES - GENERAL
PAINLEVEL_OUTOF10: 0
PAINLEVEL_OUTOF10: 0

## 2024-12-11 NOTE — PLAN OF CARE
Problem: ABCDS Injury Assessment  Goal: Absence of physical injury  12/11/2024 0921 by Gilligan, Melissa, RN  Outcome: Progressing  12/10/2024 2314 by Marquez Lopez, RN  Outcome: Progressing     Problem: Skin/Tissue Integrity  Goal: Absence of new skin breakdown  Description: 1.  Monitor for areas of redness and/or skin breakdown  2.  Assess vascular access sites hourly  3.  Every 4-6 hours minimum:  Change oxygen saturation probe site  4.  Every 4-6 hours:  If on nasal continuous positive airway pressure, respiratory therapy assess nares and determine need for appliance change or resting period.  Outcome: Progressing     Problem: Safety - Adult  Goal: Free from fall injury  Outcome: Progressing  Flowsheets (Taken 12/11/2024 0917)  Free From Fall Injury: Instruct family/caregiver on patient safety     Problem: Chronic Conditions and Co-morbidities  Goal: Patient's chronic conditions and co-morbidity symptoms are monitored and maintained or improved  Outcome: Progressing     Problem: Discharge Planning  Goal: Discharge to home or other facility with appropriate resources  Outcome: Progressing  Flowsheets (Taken 12/11/2024 0800)  Discharge to home or other facility with appropriate resources: Identify barriers to discharge with patient and caregiver     Problem: Pain  Goal: Verbalizes/displays adequate comfort level or baseline comfort level  Outcome: Progressing

## 2024-12-11 NOTE — ACP (ADVANCE CARE PLANNING)
Advance Care Planning   Healthcare Decision Maker:    Primary Decision Maker: Tia Nichols - [facility] - [phone]    Click here to complete Healthcare Decision Makers including selection of the Healthcare Decision Maker Relationship (ie \"Primary\").          no documents. Pt is confused to time today. MERY Stacy  .12/11/2024

## 2024-12-12 LAB
ANION GAP SERPL CALCULATED.3IONS-SCNC: 8 MMOL/L (ref 7–16)
BUN SERPL-MCNC: 39 MG/DL (ref 6–23)
CALCIUM SERPL-MCNC: 9.4 MG/DL (ref 8.6–10.2)
CHLORIDE SERPL-SCNC: 109 MMOL/L (ref 98–107)
CO2 SERPL-SCNC: 25 MMOL/L (ref 22–29)
CREAT SERPL-MCNC: 2.4 MG/DL (ref 0.7–1.2)
GFR, ESTIMATED: 25 ML/MIN/1.73M2
GLUCOSE BLD-MCNC: 147 MG/DL (ref 74–99)
GLUCOSE BLD-MCNC: 183 MG/DL (ref 74–99)
GLUCOSE BLD-MCNC: 268 MG/DL (ref 74–99)
GLUCOSE BLD-MCNC: 293 MG/DL (ref 74–99)
GLUCOSE SERPL-MCNC: 137 MG/DL (ref 74–99)
POTASSIUM SERPL-SCNC: 4 MMOL/L (ref 3.5–5)
SODIUM SERPL-SCNC: 142 MMOL/L (ref 132–146)

## 2024-12-12 PROCEDURE — 82947 ASSAY GLUCOSE BLOOD QUANT: CPT

## 2024-12-12 PROCEDURE — 80048 BASIC METABOLIC PNL TOTAL CA: CPT

## 2024-12-12 PROCEDURE — 2060000000 HC ICU INTERMEDIATE R&B

## 2024-12-12 PROCEDURE — 6370000000 HC RX 637 (ALT 250 FOR IP): Performed by: STUDENT IN AN ORGANIZED HEALTH CARE EDUCATION/TRAINING PROGRAM

## 2024-12-12 PROCEDURE — 97530 THERAPEUTIC ACTIVITIES: CPT

## 2024-12-12 PROCEDURE — 6370000000 HC RX 637 (ALT 250 FOR IP): Performed by: PHYSICIAN ASSISTANT

## 2024-12-12 PROCEDURE — 97161 PT EVAL LOW COMPLEX 20 MIN: CPT

## 2024-12-12 PROCEDURE — 97166 OT EVAL MOD COMPLEX 45 MIN: CPT

## 2024-12-12 PROCEDURE — 92610 EVALUATE SWALLOWING FUNCTION: CPT

## 2024-12-12 PROCEDURE — 92523 SPEECH SOUND LANG COMPREHEN: CPT

## 2024-12-12 PROCEDURE — 36415 COLL VENOUS BLD VENIPUNCTURE: CPT

## 2024-12-12 PROCEDURE — 6370000000 HC RX 637 (ALT 250 FOR IP): Performed by: INTERNAL MEDICINE

## 2024-12-12 PROCEDURE — 2580000003 HC RX 258: Performed by: INTERNAL MEDICINE

## 2024-12-12 PROCEDURE — 97535 SELF CARE MNGMENT TRAINING: CPT

## 2024-12-12 RX ORDER — SODIUM CHLORIDE 9 MG/ML
INJECTION, SOLUTION INTRAVENOUS ONCE
Status: COMPLETED | OUTPATIENT
Start: 2024-12-12 | End: 2024-12-12

## 2024-12-12 RX ORDER — MIDODRINE HYDROCHLORIDE 5 MG/1
10 TABLET ORAL ONCE
Status: COMPLETED | OUTPATIENT
Start: 2024-12-12 | End: 2024-12-12

## 2024-12-12 RX ADMIN — RANOLAZINE 500 MG: 500 TABLET, FILM COATED, EXTENDED RELEASE ORAL at 09:29

## 2024-12-12 RX ADMIN — SODIUM BICARBONATE 650 MG: 650 TABLET ORAL at 09:29

## 2024-12-12 RX ADMIN — RANOLAZINE 500 MG: 500 TABLET, FILM COATED, EXTENDED RELEASE ORAL at 21:46

## 2024-12-12 RX ADMIN — INSULIN GLARGINE 8 UNITS: 100 INJECTION, SOLUTION SUBCUTANEOUS at 21:46

## 2024-12-12 RX ADMIN — INSULIN LISPRO 2 UNITS: 100 INJECTION, SOLUTION INTRAVENOUS; SUBCUTANEOUS at 21:46

## 2024-12-12 RX ADMIN — SODIUM CHLORIDE, PRESERVATIVE FREE 10 ML: 5 INJECTION INTRAVENOUS at 09:30

## 2024-12-12 RX ADMIN — SODIUM BICARBONATE 650 MG: 650 TABLET ORAL at 12:44

## 2024-12-12 RX ADMIN — METOPROLOL TARTRATE 50 MG: 50 TABLET, FILM COATED ORAL at 09:29

## 2024-12-12 RX ADMIN — ISOSORBIDE MONONITRATE 30 MG: 30 TABLET, EXTENDED RELEASE ORAL at 09:29

## 2024-12-12 RX ADMIN — APIXABAN 2.5 MG: 5 TABLET, FILM COATED ORAL at 21:45

## 2024-12-12 RX ADMIN — SODIUM BICARBONATE 650 MG: 650 TABLET ORAL at 21:46

## 2024-12-12 RX ADMIN — SODIUM CHLORIDE, PRESERVATIVE FREE 10 ML: 5 INJECTION INTRAVENOUS at 12:44

## 2024-12-12 RX ADMIN — SODIUM CHLORIDE: 9 INJECTION, SOLUTION INTRAVENOUS at 12:48

## 2024-12-12 RX ADMIN — APIXABAN 2.5 MG: 5 TABLET, FILM COATED ORAL at 09:29

## 2024-12-12 RX ADMIN — CLOPIDOGREL BISULFATE 75 MG: 75 TABLET ORAL at 09:29

## 2024-12-12 RX ADMIN — INSULIN LISPRO 2 UNITS: 100 INJECTION, SOLUTION INTRAVENOUS; SUBCUTANEOUS at 18:44

## 2024-12-12 RX ADMIN — INSULIN LISPRO 1 UNITS: 100 INJECTION, SOLUTION INTRAVENOUS; SUBCUTANEOUS at 12:44

## 2024-12-12 RX ADMIN — MIDODRINE HYDROCHLORIDE 10 MG: 5 TABLET ORAL at 12:44

## 2024-12-12 RX ADMIN — TAMSULOSIN HYDROCHLORIDE 0.4 MG: 0.4 CAPSULE ORAL at 21:46

## 2024-12-12 RX ADMIN — ATORVASTATIN CALCIUM 40 MG: 40 TABLET, FILM COATED ORAL at 21:46

## 2024-12-12 RX ADMIN — SODIUM CHLORIDE, PRESERVATIVE FREE 10 ML: 5 INJECTION INTRAVENOUS at 20:00

## 2024-12-12 RX ADMIN — METOPROLOL TARTRATE 50 MG: 50 TABLET, FILM COATED ORAL at 18:47

## 2024-12-12 ASSESSMENT — PAIN SCALES - GENERAL: PAINLEVEL_OUTOF10: 0

## 2024-12-12 NOTE — PLAN OF CARE
MRI brain reviewed.  I had an extensive discussion with the patient and his wife Tia by the bedside.  Clearly the right ICA is symptomatic.  All the options of carotid revascularization were discussed and the patient wants to proceed with carotid artery stenting which will be accomplished as an outpatient in next 2 to 4 weeks to allow the stroke bed to mature.    I am starting him on Plavix 75 mg daily.  No need for aspirin.  In addition, he needs to be on therapeutic anticoagulation for underlying atrial fibrillation.  Apparently, a decision was made as outpatient recently to switch him from Coumadin to Eliquis.  I will defer that decision making to his primary team.    Echocardiogram shows EF of 55 to 60% with mildly dilated left atrium.  He is okay for discharge from my standpoint whenever cleared by therapy and the primary team.  We will make arrangements to bring him back as an outpatient for right ICA angioplasty and stenting via right radial approach.    Please call me with any other questions/concerns.    Williams Alejandro M.D.  Vascular Neurology & Neurointerventional Surgery

## 2024-12-12 NOTE — DISCHARGE INSTR - COC
Continuity of Care Form    Patient Name: Johny Nichols   :  1935  MRN:  [id number]    Admit date:  2024  Discharge date:  24    Code Status Order: Full Code   Advance Directives:   Advance Care Flowsheet Documentation             Admitting Physician:  Nicholas Beal MD  PCP: Nicholas Beal MD    Discharging Nurse: ***  Discharging Hospital Unit/Room#: 8504/8504-B  Discharging Unit Phone Number: [phone]    Emergency Contact:   Extended Emergency Contact Information  Primary Emergency Contact: Tia Nichols  Address: [address] [place] [address]  Home Phone: [phone]  Relation: Spouse  Secondary Emergency Contact: Schuyler Cortés  Home Phone: [phone]  Relation: Step Child    Past Surgical History:  Past Surgical History:   Procedure Laterality Date    ABDOMEN SURGERY          COLONOSCOPY      COLOSTOMY      CYSTOSCOPY  more than 5 yrs    ECHO COMPL W DOP COLOR FLOW  2013         ECHOCARDIOGRAM TRANSESOPHAGEAL  2013         ENDOSCOPY, COLON, DIAGNOSTIC  12/10/15    ileoscopy    TURP  14    cystoscopy retrogrades    UPPER GASTROINTESTINAL ENDOSCOPY N/A 3/11/2022    EGD ESOPHAGOGASTRODUODENOSCOPY ULTRASOUND performed by Conrad Gerber MD at [facility]       Immunization History:   Immunization History   Administered Date(s) Administered    COVID-19, MODERNA BLUE border, Primary or Immunocompromised, (age 12y+), IM, 100 mcg/0.5mL 2021, 2021    COVID-19, MODERNA Bivalent, (age 12y+), IM, 50 mcg/0.5 mL 2022    COVID-19, MODERNA, (age 12y+), IM, 50mcg/0.5mL 2023    Influenza Virus Vaccine 2014, 2020    Influenza, FLUZONE High Dose, (age 65 y+), IM, Trivalent PF, 0.5mL 10/10/2016, 10/11/2017, 10/03/2018, 2019    Pneumococcal Conjugate Vaccine 2000    Pneumococcal, PPSV23, PNEUMOVAX 23, (age 2y+), SC/IM, 0.5mL 10/11/2017    TD 5LF, TENIVAC, (age 7y+), IM, 0.5mL 03/15/2023

## 2024-12-13 LAB
GLUCOSE BLD-MCNC: 163 MG/DL (ref 74–99)
GLUCOSE BLD-MCNC: 184 MG/DL (ref 74–99)
GLUCOSE BLD-MCNC: 201 MG/DL (ref 74–99)
GLUCOSE BLD-MCNC: 232 MG/DL (ref 74–99)

## 2024-12-13 PROCEDURE — 6370000000 HC RX 637 (ALT 250 FOR IP): Performed by: STUDENT IN AN ORGANIZED HEALTH CARE EDUCATION/TRAINING PROGRAM

## 2024-12-13 PROCEDURE — 6370000000 HC RX 637 (ALT 250 FOR IP): Performed by: PHYSICIAN ASSISTANT

## 2024-12-13 PROCEDURE — 6370000000 HC RX 637 (ALT 250 FOR IP): Performed by: INTERNAL MEDICINE

## 2024-12-13 PROCEDURE — 82947 ASSAY GLUCOSE BLOOD QUANT: CPT

## 2024-12-13 PROCEDURE — 2060000000 HC ICU INTERMEDIATE R&B

## 2024-12-13 PROCEDURE — 92611 MOTION FLUOROSCOPY/SWALLOW: CPT

## 2024-12-13 PROCEDURE — 2580000003 HC RX 258: Performed by: INTERNAL MEDICINE

## 2024-12-13 PROCEDURE — 97129 THER IVNTJ 1ST 15 MIN: CPT

## 2024-12-13 RX ORDER — DIVALPROEX SODIUM 125 MG/1
125 CAPSULE, COATED PELLETS ORAL EVERY 8 HOURS SCHEDULED
Status: DISCONTINUED | OUTPATIENT
Start: 2024-12-13 | End: 2024-12-16 | Stop reason: HOSPADM

## 2024-12-13 RX ADMIN — SODIUM BICARBONATE 650 MG: 650 TABLET ORAL at 09:42

## 2024-12-13 RX ADMIN — DIVALPROEX SODIUM 125 MG: 125 CAPSULE, COATED PELLETS ORAL at 14:45

## 2024-12-13 RX ADMIN — INSULIN LISPRO 1 UNITS: 100 INJECTION, SOLUTION INTRAVENOUS; SUBCUTANEOUS at 20:42

## 2024-12-13 RX ADMIN — INSULIN LISPRO 1 UNITS: 100 INJECTION, SOLUTION INTRAVENOUS; SUBCUTANEOUS at 17:33

## 2024-12-13 RX ADMIN — DIVALPROEX SODIUM 125 MG: 125 CAPSULE, COATED PELLETS ORAL at 09:41

## 2024-12-13 RX ADMIN — RANOLAZINE 500 MG: 500 TABLET, FILM COATED, EXTENDED RELEASE ORAL at 09:41

## 2024-12-13 RX ADMIN — APIXABAN 2.5 MG: 5 TABLET, FILM COATED ORAL at 09:41

## 2024-12-13 RX ADMIN — DIVALPROEX SODIUM 125 MG: 125 CAPSULE, COATED PELLETS ORAL at 20:42

## 2024-12-13 RX ADMIN — RANOLAZINE 500 MG: 500 TABLET, FILM COATED, EXTENDED RELEASE ORAL at 20:42

## 2024-12-13 RX ADMIN — ATORVASTATIN CALCIUM 40 MG: 40 TABLET, FILM COATED ORAL at 20:42

## 2024-12-13 RX ADMIN — SODIUM BICARBONATE 650 MG: 650 TABLET ORAL at 14:45

## 2024-12-13 RX ADMIN — INSULIN GLARGINE 8 UNITS: 100 INJECTION, SOLUTION SUBCUTANEOUS at 20:43

## 2024-12-13 RX ADMIN — SODIUM CHLORIDE, PRESERVATIVE FREE 10 ML: 5 INJECTION INTRAVENOUS at 20:43

## 2024-12-13 RX ADMIN — METOPROLOL TARTRATE 50 MG: 50 TABLET, FILM COATED ORAL at 09:42

## 2024-12-13 RX ADMIN — METOPROLOL TARTRATE 50 MG: 50 TABLET, FILM COATED ORAL at 17:34

## 2024-12-13 RX ADMIN — APIXABAN 2.5 MG: 5 TABLET, FILM COATED ORAL at 20:42

## 2024-12-13 RX ADMIN — SODIUM BICARBONATE 650 MG: 650 TABLET ORAL at 20:42

## 2024-12-13 RX ADMIN — CLOPIDOGREL BISULFATE 75 MG: 75 TABLET ORAL at 09:43

## 2024-12-13 RX ADMIN — TAMSULOSIN HYDROCHLORIDE 0.4 MG: 0.4 CAPSULE ORAL at 20:42

## 2024-12-13 NOTE — PLAN OF CARE
Problem: Discharge Planning  Goal: Discharge to home or other facility with appropriate resources  12/13/2024 0152 by Luis Ellis, RN  Outcome: Progressing  12/12/2024 1936 by Marisol Naylor, RN  Outcome: Progressing

## 2024-12-14 LAB
GLUCOSE BLD-MCNC: 187 MG/DL (ref 74–99)
GLUCOSE BLD-MCNC: 191 MG/DL (ref 74–99)
GLUCOSE BLD-MCNC: 277 MG/DL (ref 74–99)
GLUCOSE BLD-MCNC: 96 MG/DL (ref 74–99)

## 2024-12-14 PROCEDURE — 6370000000 HC RX 637 (ALT 250 FOR IP): Performed by: INTERNAL MEDICINE

## 2024-12-14 PROCEDURE — 6370000000 HC RX 637 (ALT 250 FOR IP): Performed by: PHYSICIAN ASSISTANT

## 2024-12-14 PROCEDURE — 2580000003 HC RX 258: Performed by: INTERNAL MEDICINE

## 2024-12-14 PROCEDURE — 82947 ASSAY GLUCOSE BLOOD QUANT: CPT

## 2024-12-14 PROCEDURE — 2060000000 HC ICU INTERMEDIATE R&B

## 2024-12-14 PROCEDURE — 6370000000 HC RX 637 (ALT 250 FOR IP): Performed by: STUDENT IN AN ORGANIZED HEALTH CARE EDUCATION/TRAINING PROGRAM

## 2024-12-14 RX ADMIN — RANOLAZINE 500 MG: 500 TABLET, FILM COATED, EXTENDED RELEASE ORAL at 20:55

## 2024-12-14 RX ADMIN — CLOPIDOGREL BISULFATE 75 MG: 75 TABLET ORAL at 09:30

## 2024-12-14 RX ADMIN — RANOLAZINE 500 MG: 500 TABLET, FILM COATED, EXTENDED RELEASE ORAL at 09:30

## 2024-12-14 RX ADMIN — INSULIN GLARGINE 8 UNITS: 100 INJECTION, SOLUTION SUBCUTANEOUS at 20:55

## 2024-12-14 RX ADMIN — METOPROLOL TARTRATE 50 MG: 50 TABLET, FILM COATED ORAL at 09:30

## 2024-12-14 RX ADMIN — DIVALPROEX SODIUM 125 MG: 125 CAPSULE, COATED PELLETS ORAL at 13:48

## 2024-12-14 RX ADMIN — INSULIN LISPRO 2 UNITS: 100 INJECTION, SOLUTION INTRAVENOUS; SUBCUTANEOUS at 22:06

## 2024-12-14 RX ADMIN — APIXABAN 2.5 MG: 5 TABLET, FILM COATED ORAL at 09:32

## 2024-12-14 RX ADMIN — SODIUM BICARBONATE 650 MG: 650 TABLET ORAL at 20:55

## 2024-12-14 RX ADMIN — DIVALPROEX SODIUM 125 MG: 125 CAPSULE, COATED PELLETS ORAL at 20:55

## 2024-12-14 RX ADMIN — SODIUM BICARBONATE 650 MG: 650 TABLET ORAL at 13:48

## 2024-12-14 RX ADMIN — APIXABAN 2.5 MG: 5 TABLET, FILM COATED ORAL at 20:55

## 2024-12-14 RX ADMIN — INSULIN LISPRO 1 UNITS: 100 INJECTION, SOLUTION INTRAVENOUS; SUBCUTANEOUS at 17:41

## 2024-12-14 RX ADMIN — DIVALPROEX SODIUM 125 MG: 125 CAPSULE, COATED PELLETS ORAL at 06:07

## 2024-12-14 RX ADMIN — TAMSULOSIN HYDROCHLORIDE 0.4 MG: 0.4 CAPSULE ORAL at 20:55

## 2024-12-14 RX ADMIN — SODIUM BICARBONATE 650 MG: 650 TABLET ORAL at 09:30

## 2024-12-14 RX ADMIN — METOPROLOL TARTRATE 50 MG: 50 TABLET, FILM COATED ORAL at 17:42

## 2024-12-14 RX ADMIN — ATORVASTATIN CALCIUM 40 MG: 40 TABLET, FILM COATED ORAL at 20:55

## 2024-12-14 RX ADMIN — SODIUM CHLORIDE, PRESERVATIVE FREE 5 ML: 5 INJECTION INTRAVENOUS at 09:25

## 2024-12-14 RX ADMIN — SODIUM CHLORIDE, PRESERVATIVE FREE 10 ML: 5 INJECTION INTRAVENOUS at 20:55

## 2024-12-14 RX ADMIN — INSULIN LISPRO 1 UNITS: 100 INJECTION, SOLUTION INTRAVENOUS; SUBCUTANEOUS at 12:15

## 2024-12-14 ASSESSMENT — PAIN SCALES - GENERAL: PAINLEVEL_OUTOF10: 0

## 2024-12-15 LAB
GLUCOSE BLD-MCNC: 190 MG/DL (ref 74–99)
GLUCOSE BLD-MCNC: 243 MG/DL (ref 74–99)
GLUCOSE BLD-MCNC: 298 MG/DL (ref 74–99)
GLUCOSE BLD-MCNC: 303 MG/DL (ref 74–99)

## 2024-12-15 PROCEDURE — 2580000003 HC RX 258: Performed by: INTERNAL MEDICINE

## 2024-12-15 PROCEDURE — 6370000000 HC RX 637 (ALT 250 FOR IP): Performed by: STUDENT IN AN ORGANIZED HEALTH CARE EDUCATION/TRAINING PROGRAM

## 2024-12-15 PROCEDURE — 97530 THERAPEUTIC ACTIVITIES: CPT

## 2024-12-15 PROCEDURE — 2060000000 HC ICU INTERMEDIATE R&B

## 2024-12-15 PROCEDURE — 6370000000 HC RX 637 (ALT 250 FOR IP): Performed by: PHYSICIAN ASSISTANT

## 2024-12-15 PROCEDURE — 97535 SELF CARE MNGMENT TRAINING: CPT

## 2024-12-15 PROCEDURE — 82947 ASSAY GLUCOSE BLOOD QUANT: CPT

## 2024-12-15 PROCEDURE — 6370000000 HC RX 637 (ALT 250 FOR IP): Performed by: INTERNAL MEDICINE

## 2024-12-15 RX ORDER — ACETAMINOPHEN 325 MG/1
650 TABLET ORAL EVERY 6 HOURS PRN
Status: DISCONTINUED | OUTPATIENT
Start: 2024-12-15 | End: 2024-12-16 | Stop reason: HOSPADM

## 2024-12-15 RX ADMIN — SODIUM CHLORIDE, PRESERVATIVE FREE 10 ML: 5 INJECTION INTRAVENOUS at 09:00

## 2024-12-15 RX ADMIN — TAMSULOSIN HYDROCHLORIDE 0.4 MG: 0.4 CAPSULE ORAL at 20:03

## 2024-12-15 RX ADMIN — METOPROLOL TARTRATE 50 MG: 50 TABLET, FILM COATED ORAL at 09:03

## 2024-12-15 RX ADMIN — SODIUM CHLORIDE, PRESERVATIVE FREE 10 ML: 5 INJECTION INTRAVENOUS at 20:03

## 2024-12-15 RX ADMIN — APIXABAN 2.5 MG: 5 TABLET, FILM COATED ORAL at 09:04

## 2024-12-15 RX ADMIN — INSULIN LISPRO 3 UNITS: 100 INJECTION, SOLUTION INTRAVENOUS; SUBCUTANEOUS at 09:01

## 2024-12-15 RX ADMIN — RANOLAZINE 500 MG: 500 TABLET, FILM COATED, EXTENDED RELEASE ORAL at 20:03

## 2024-12-15 RX ADMIN — DIVALPROEX SODIUM 125 MG: 125 CAPSULE, COATED PELLETS ORAL at 20:03

## 2024-12-15 RX ADMIN — DIVALPROEX SODIUM 125 MG: 125 CAPSULE, COATED PELLETS ORAL at 13:41

## 2024-12-15 RX ADMIN — SODIUM BICARBONATE 650 MG: 650 TABLET ORAL at 13:42

## 2024-12-15 RX ADMIN — ACETAMINOPHEN 650 MG: 325 TABLET ORAL at 13:38

## 2024-12-15 RX ADMIN — SODIUM CHLORIDE, PRESERVATIVE FREE 10 ML: 5 INJECTION INTRAVENOUS at 20:04

## 2024-12-15 RX ADMIN — SODIUM BICARBONATE 650 MG: 650 TABLET ORAL at 20:03

## 2024-12-15 RX ADMIN — RANOLAZINE 500 MG: 500 TABLET, FILM COATED, EXTENDED RELEASE ORAL at 09:02

## 2024-12-15 RX ADMIN — DIVALPROEX SODIUM 125 MG: 125 CAPSULE, COATED PELLETS ORAL at 06:38

## 2024-12-15 RX ADMIN — SODIUM BICARBONATE 650 MG: 650 TABLET ORAL at 09:03

## 2024-12-15 RX ADMIN — CLOPIDOGREL BISULFATE 75 MG: 75 TABLET ORAL at 09:02

## 2024-12-15 RX ADMIN — APIXABAN 2.5 MG: 5 TABLET, FILM COATED ORAL at 20:03

## 2024-12-15 RX ADMIN — ATORVASTATIN CALCIUM 40 MG: 40 TABLET, FILM COATED ORAL at 20:03

## 2024-12-15 RX ADMIN — INSULIN LISPRO 1 UNITS: 100 INJECTION, SOLUTION INTRAVENOUS; SUBCUTANEOUS at 17:17

## 2024-12-15 RX ADMIN — METOPROLOL TARTRATE 50 MG: 50 TABLET, FILM COATED ORAL at 17:17

## 2024-12-15 RX ADMIN — INSULIN GLARGINE 8 UNITS: 100 INJECTION, SOLUTION SUBCUTANEOUS at 20:01

## 2024-12-15 RX ADMIN — INSULIN LISPRO 1 UNITS: 100 INJECTION, SOLUTION INTRAVENOUS; SUBCUTANEOUS at 20:01

## 2024-12-15 RX ADMIN — INSULIN LISPRO 2 UNITS: 100 INJECTION, SOLUTION INTRAVENOUS; SUBCUTANEOUS at 12:08

## 2024-12-15 ASSESSMENT — PAIN SCALES - GENERAL
PAINLEVEL_OUTOF10: 0
PAINLEVEL_OUTOF10: 4
PAINLEVEL_OUTOF10: 4

## 2024-12-15 ASSESSMENT — PAIN DESCRIPTION - LOCATION
LOCATION: HIP
LOCATION: HIP

## 2024-12-15 ASSESSMENT — PAIN DESCRIPTION - ORIENTATION
ORIENTATION: LEFT
ORIENTATION: LEFT

## 2024-12-16 VITALS
TEMPERATURE: 97.3 F | OXYGEN SATURATION: 99 % | BODY MASS INDEX: 20.44 KG/M2 | RESPIRATION RATE: 16 BRPM | WEIGHT: 138 LBS | DIASTOLIC BLOOD PRESSURE: 77 MMHG | HEIGHT: 69 IN | SYSTOLIC BLOOD PRESSURE: 139 MMHG | HEART RATE: 77 BPM

## 2024-12-16 LAB
GLUCOSE BLD-MCNC: 104 MG/DL (ref 74–99)
GLUCOSE BLD-MCNC: 144 MG/DL (ref 74–99)

## 2024-12-16 PROCEDURE — 82947 ASSAY GLUCOSE BLOOD QUANT: CPT

## 2024-12-16 PROCEDURE — 6370000000 HC RX 637 (ALT 250 FOR IP): Performed by: INTERNAL MEDICINE

## 2024-12-16 PROCEDURE — 6370000000 HC RX 637 (ALT 250 FOR IP): Performed by: STUDENT IN AN ORGANIZED HEALTH CARE EDUCATION/TRAINING PROGRAM

## 2024-12-16 PROCEDURE — 2580000003 HC RX 258: Performed by: INTERNAL MEDICINE

## 2024-12-16 RX ORDER — CLOPIDOGREL BISULFATE 75 MG/1
75 TABLET ORAL DAILY
Qty: 30 TABLET | Refills: 3 | Status: SHIPPED | OUTPATIENT
Start: 2024-12-16

## 2024-12-16 RX ORDER — METOPROLOL TARTRATE 50 MG
50 TABLET ORAL 2 TIMES DAILY WITH MEALS
Qty: 60 TABLET | Refills: 3 | Status: SHIPPED | OUTPATIENT
Start: 2024-12-16

## 2024-12-16 RX ORDER — INSULIN LISPRO 100 [IU]/ML
0-4 INJECTION, SOLUTION INTRAVENOUS; SUBCUTANEOUS
Qty: 15 ML | Refills: 1 | Status: SHIPPED | OUTPATIENT
Start: 2024-12-16

## 2024-12-16 RX ORDER — DIVALPROEX SODIUM 125 MG/1
125 CAPSULE, COATED PELLETS ORAL EVERY 8 HOURS SCHEDULED
Qty: 20 CAPSULE | Refills: 0 | Status: SHIPPED | OUTPATIENT
Start: 2024-12-16 | End: 2024-12-23

## 2024-12-16 RX ADMIN — APIXABAN 2.5 MG: 5 TABLET, FILM COATED ORAL at 08:25

## 2024-12-16 RX ADMIN — CLOPIDOGREL BISULFATE 75 MG: 75 TABLET ORAL at 08:25

## 2024-12-16 RX ADMIN — DIVALPROEX SODIUM 125 MG: 125 CAPSULE, COATED PELLETS ORAL at 05:44

## 2024-12-16 RX ADMIN — METOPROLOL TARTRATE 50 MG: 50 TABLET, FILM COATED ORAL at 08:25

## 2024-12-16 RX ADMIN — RANOLAZINE 500 MG: 500 TABLET, FILM COATED, EXTENDED RELEASE ORAL at 08:25

## 2024-12-16 RX ADMIN — SODIUM BICARBONATE 650 MG: 650 TABLET ORAL at 08:25

## 2024-12-16 RX ADMIN — SODIUM CHLORIDE, PRESERVATIVE FREE 10 ML: 5 INJECTION INTRAVENOUS at 08:26

## 2024-12-16 NOTE — PLAN OF CARE
Problem: Skin/Tissue Integrity  Goal: Absence of new skin breakdown  Description: 1.  Monitor for areas of redness and/or skin breakdown  2.  Assess vascular access sites hourly  3.  Every 4-6 hours minimum:  Change oxygen saturation probe site  4.  Every 4-6 hours:  If on nasal continuous positive airway pressure, respiratory therapy assess nares and determine need for appliance change or resting period.  Outcome: Progressing     Problem: Discharge Planning  Goal: Discharge to home or other facility with appropriate resources  Outcome: Progressing     Problem: Chronic Conditions and Co-morbidities  Goal: Patient's chronic conditions and co-morbidity symptoms are monitored and maintained or improved  Outcome: Progressing

## 2024-12-16 NOTE — DISCHARGE SUMMARY
Internal Medicine Discharge Summary    NAME: Johny Nichols :  1935  MRN:  [id number] PCP:Nicholas Beal MD    ADMITTED: 2024   DISCHARGED:   ADMITTING PHYSICIAN: Nicholas Beal MD    PCP: Nicholas Beal MD    CONSULTANT(S):   IP CONSULT TO INTERNAL MEDICINE  IP CONSULT TO NEUROLOGY  IP CONSULT TO VASCULAR SURGERY     ADMITTING DIAGNOSIS:   Stroke-like symptoms [R29.90]  Stroke-like symptom [R29.90]  Stenosis of right internal carotid artery [I65.21]  Cerebrovascular accident (CVA), unspecified mechanism (HCC) [I63.9]     Please see H&P for further details    DISCHARGE DIAGNOSES:   Active Hospital Problems    Diagnosis     Stroke-like symptoms [R29.90]     Stroke-like symptom [R29.90]        BRIEF HISTORY OF PRESENT ILLNESS: Johny Nichols is a 89 y.o. male patient of Nicholas Beal MD who  has a past medical history of A-fib (HCC), Cerebrovascular accident (CVA) (HCC), Chronic kidney disease, Chronic renal impairment, stage 3 (moderate) (HCC), Colitis, ulcerative chronic (HCC), Diabetes mellitus (HCC), DM (diabetes mellitus) (HCC), Encounter for therapeutic drug monitoring, Hyperlipidemia, Hyperlipidemia associated with type 2 diabetes mellitus (HCC), Hypertension, Long term (current) use of anticoagulants, Prostate enlargement, Stroke (cerebrum) (HCC), and Unspecified cerebral artery occlusion with cerebral infarction. who originally had concerns including Extremity Weakness (Increased arm weakness beginning at 1000 this am. L sided weakness at baseline d/t previous stroke. Facility reported difficulty speaking and right sided facial droop.) and Cerebrovascular Accident. 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    Please see H&P for further details.    HOSPITAL COURSE:   The patient presented to

## 2024-12-16 NOTE — CARE COORDINATION
Came to ER for stroke like [facility].    Has sig left ICA stenosis    H/o afib- coumadin changed to eliquis at last ov    Dm, cri. [facility]  
SOCIAL WORK/CASEMANAGEMENT TRANSITION OF CARE PLANNING( JENNIFER BORREGO, MERY [phone]):  I  met with pt at bedside this a.m. plan [facility] and precert is pending. All discharge paper work is in place. MERY Stacy  12/16/2024      
SOCIAL WORK/CASEMANAGEMENT TRANSITION OF CARE PLANNING( MERY POPE [phone]):  plan is to [facility]. All discharge paper work with neetu is in a place. I called wife and she with pt are in agreement. Precert pending. MERY Pope   
SOCIAL WORK/CASEMANAGEMENT TRANSITION OF CARE PLANNING( SONAL GRIGSBY, -[phone]):  PT and OT recommended aliza. Referral made to [facility] and they have accepted pt and will start precert once therapy notes are in epic. All discharge paper work with neetu is in a place. I called wife and she with pt are in agreement. Sonal Grigsby, MERY  12/12/2024      
SOCIAL WORK/CASEMANAGEMENT TRANSITION OF CARE PLANNING( SONAL GRIGSBY, -[phone]): PT and OT to eval and speech.  If aliza is needed pt and wife both want [facility]. Precert is needed. Vascular surgery and neuro IR signed  off. PulMultiple small acute embolic nonhemorrhagic infarcts. Sonal Grigsby, MERY  12/12/2024      
SOCIAL WORK/[facility] TRANSITION OF CARE PLANNING( SONAL ELMO, -[phone]): precert obtained for [facility]  for 1 p.m. I sent a perfect serve to Dr. Beal who is in agreement. I called wife and she with pt are in agreement.Sonal Grigsby, EDISONW  12/16/2024      
Admission Status: Inpatient   Readmission Risk (Low < 19, Mod (19-27), High > 27): Readmission Risk Score: 20.6    Current PCP: Nicholas Beal MD  PCP verified by CM? Yes    Chart Reviewed: Yes      History Provided by: Spouse  Patient Orientation: Person, Place, Situation    Patient Cognition: Other (see comment) (confused to time.)    Hospitalization in the last 30 days (Readmission):  No    If yes, Readmission Assessment in  Navigator will be completed.    Advance Directives:      Code Status: Full Code   Patient's Primary Decision Maker is: Legal Next of Kin    Primary Decision Maker: Tia Nichols - Spouse - [phone]    Discharge Planning:    Patient lives with: Family Members Type of Home: House  Primary Care Giver: Spouse  Patient Support Systems include: Spouse/Significant Other, Friends/Neighbors   Current Financial resources:    Current community resources:    Current services prior to admission: None            Current DME:              Type of Home Care services:  Aide Services    ADLS  Prior functional level: Assistance with the following:, Bathing, Dressing, Housework, Cooking, Shopping, Mobility  Current functional level: Other (see comment) (therapy to eval.)    PT AM-PAC:   /24  OT AM-PAC:   /24    Family can provide assistance at DC: Yes  Would you like Case Management to discuss the discharge plan with any other family members/significant others, and if so, who? Yes (wife)  Plans to Return to Present Housing: Unknown at present  Other Identified Issues/Barriers to RETURNING to current housing: therapy to eval.   Potential Assistance needed at discharge: N/A            Potential DME:    Patient expects to discharge to: House  Plan for transportation at discharge:      Financial    Payor: [employer] MEDICARE ADVANTAGE / Plan: [employer] ADVANT[employer] [facility] HMO / Product Type: *No Product type* /     Does insurance require precert for SNF: Yes    Potential assistance Purchasing

## 2024-12-17 NOTE — PROGRESS NOTES
Physician Progress Note      PATIENT:               SIDNEY BANSAL  CSN #:                  [id number]  :                       1935  ADMIT DATE:       2024 12:05 PM  DISCH DATE:  RESPONDING  PROVIDER #:        Nicholas Beal MD          QUERY TEXT:    Progress note Active Problems noting Stroke like symptoms and FREEDOM Stenosis.   Pt noted to have multiple small acute embolic nonhemorrhagic infarcts on 12/10   MRI. If possible, please document in progress notes and discharge summary if   you are evaluating and /or treating any of the following:    The medical record reflects the following:  Risk Factors: acute infarcts, FREEDOM stenosis, hx stroke  Clinical Indicators: Per notes under Active Hospital Problems Diagnosis:   Stroke-like symptoms, FREEDOM Stenosis. 12/10 MRI:  Multiple small acute embolic   nonhemorrhagic infarcts scattered throughout the cortex throughout the entire   right MCA territory. Largest area of infarction is in the cortex of the high   posterior right parietal lobe.  Treatment: Imaging  Options provided:  -- Stroke like symptoms due to Acute Stroke  -- Stroke like symptoms due to FREEDOM Stenosis  -- Stroke like symptoms sequela from previous stroke  -- Other - I will add my own diagnosis  -- Disagree - Not applicable / Not valid  -- Disagree - Clinically unable to determine / Unknown  -- Refer to Clinical Documentation Reviewer    PROVIDER RESPONSE TEXT:    Stroke like symptoms due to Acute Stroke    Query created by: Natalya Corado on 2024 9:57 AM      Electronically signed by:  Nicholas Beal MD 2024 10:31 AM          
  Physician Progress Note      PATIENT:               SIDNEY BANSAL  CSN #:                  [id number]  :                       1935  ADMIT DATE:       2024 12:05 PM  DISCH DATE:        2024 12:53 PM  RESPONDING  PROVIDER #:        Nicholas Beal MD          QUERY TEXT:    Pt with hx dementia admitted with Acute Stroke. Pt noted to be delirious and   confused  night. If possible, please document in the progress notes and   discharge summary if you are evaluating and / or treating any of the   following:    The medical record reflects the following:  Risk Factors: dementia, hx stroke, acute stroke  Clinical Indicators:  PN \"Became delirious, confused  night. Knows   me but thinks he is at home waiting for Police.\" 12/15 and  \"Appears at   baseline mentation\".  032 RN Note \"Chantale NP notified due to pt becoming   very confused and agitated. He was attempting to climb out of bed and calling   out for his wife. No new orders at this time, however pt is now rest   comfortably in bed.\"  Per nursing assessment: initially &Ox3, then A&Ox1 on    0749 and  0230, A&Ox2  into 12/15. A&Ox3 day of DC.  Treatment: monitoring neuro, bed alarm  Options provided:  -- Encephalopathy due to CVA  -- Sunding  -- Dementia with acute confusional state  -- Delirium due to, Please specify cause.  -- Other - I will add my own diagnosis  -- Disagree - Not applicable / Not valid  -- Disagree - Clinically unable to determine / Unknown  -- Refer to Clinical Documentation Reviewer    PROVIDER RESPONSE TEXT:    This patient has encephalopathy due to CVA.    Query created by: Natalya Corado on 2024 7:04 AM      Electronically signed by:  Nicholas Beal MD 2024 7:35 AM          
  Speech Language Pathology  NAME:  Johny Nichols  :  1935  DATE: 2024  ROOM:  8504/8504-B    Pt unavailable at 1020 for Speech/ Language/ Cognitive Evaluation Discussed and Clinical Swallow Evaluation and 1510    REASON:  With other medical staff -- ECHO at bedside, then off the floor     Will re-attempt as appropriate.       Thank You        
4 Eyes Skin Assessment     NAME:  Johny Nichols  YOB: 1935  MEDICAL RECORD NUMBER:  [id number]    The patient is being assessed for  Admission    I agree that at least one RN has performed a thorough Head to Toe Skin Assessment on the patient. ALL assessment sites listed below have been assessed.      Areas assessed by both nurses:    Head, Face, Ears, Shoulders, Back, Chest, Arms, Elbows, Hands, Sacrum. Buttock, Coccyx, Ischium, and Legs. Feet and Heels        Does the Patient have a Wound? No noted wound(s)       Excoriation noted to abdomen, right side of colostomy appliance.      Clark Prevention initiated by RN: Yes  Wound Care Orders initiated by RN: No    Pressure Injury (Stage 3,4, Unstageable, DTI, NWPT, and Complex wounds) if present, place Wound referral order by RN under : No    New Ostomies, if present place, Ostomy referral order under : No     Nurse 1 eSignature: Electronically signed by Melissa Gilligan, RN on 12/10/24 at 5:01 PM EST    **SHARE this note so that the co-signing nurse can place an eSignature**    Nurse 2 eSignature: Electronically signed by Jomar Webber RN on 12/10/24 at 6:57 PM EST   
[facility]  Neurology follow up     Date:  12/11/2024  Patient Name:  Johny Nichols  YOB: 1935  MRN: [id number]     PCP:  Nicholas Beal MD   Referring:  No ref. provider found      Chief Complaint: stroke like symptoms     History obtained from: chart, patient     Assessment  Johny Nichols is a 89 y.o. male with history of dementia, prior stroke with residual L sided weakness presenting with worsening L side weakness and dysarthria. Evidence of complex atherosclerotic plaque in the FREEDOM concerning for potentially symptomatic FREEDOM stenosis. MRI brain, CUS pending to further evaluate. Was loaded with ASA in the ED and placed on ASA 81 mg daily. If MRI negative for new stroke, possible stroke recrudescence    Hx of Afib. INR 3.3 on arrival. Previously on Coumadin, was taken off by PCP several weeks ago due to age and was placed on Eliquis at that time. Unknown if patient had discontinued this medication at home or not per ED note.     Plan  MRI brain-- will need to assess for new area of stroke and stroke burden prior to resuming AC for his AFib  Echocardiogram pending  Statin therapy with goal LDL < 70   Continue ASA  PT/OT/speech  Discussed with HALLE who plans to see the patient following MRI completion to discuss FREEDOM stenosis   Will follow     History of Present Illness:  Johny Nichols is a 89 y.o.  male presenting for evaluation of stroke symptoms.    PMH significant for dementia, HTN, HLD, DM, prior stroke, Afib, CKD    Presented with increased L side weakness and dysarthria-- though some notes report ? Right facial droop. He does have residual L side weakness from prior stroke. HALLE was contacted by ED- noted to have complex atherosclerotic plaque of the FREEDOM of at least 80% or more with concern for symptomatic. Was loaded with ASA.    Hx of Afib- was on Coumadin previously d/c weeks ago by PCP and switched to Eliquis-- though ? If patient was taking at home or not. 
Call placed to MRI to find out a time for pt's MRI-they stated that they can send for him around 1530. Dr. Alejandro would like to be notified after the MRI is completed  
Chantale JEFFERSON notified due to pt becoming very confused and agitated. He was attempting to climb out of bed and calling out for his wife. No new orders at this time, however pt is now rest comfortably in bed.   
Comprehensive Nutrition Assessment    Type and Reason for Visit:  Initial, LOS    Nutrition Assessment:    Pt assessed per LOS protocol. Chart reviewed. Pt. tolerating diet eating  % at most meals this admit and appears stable from a nutritional standpoint. No nutrition intervention indicated at this time. Will follow per policy. Consult RD if needed.    Brendon Bustos RD  Contact: ext [phone]     
Internal Medicine Progress Note    Patient's name: Johny Nichols  : 1935  Chief complaints (on day of admission): Extremity Weakness (Increased arm weakness beginning at 1000 this am. L sided weakness at baseline d/t previous stroke. Facility reported difficulty speaking and right sided facial droop.) and Cerebrovascular Accident  Admission date: 2024  Date of service: 12/15/2024   Room: [address] IMCU/NEURO  Primary care physician: Nicholas Beal MD  Reason for visit: Follow-up for:  left sided weakness    Subjective  Johny was seen and examined.    Became delirious, confused  night.  Family present  Patient pleasant and not confused. Appears at baseline mentation    Review of Systems NEG x 10  There are no new complaints of chest pain, shortness of breath, abdominal pain, nausea, vomiting, diarrhea, constipation.    Hospital Medications  Current Facility-Administered Medications   Medication Dose Route Frequency Provider Last Rate Last Admin    acetaminophen (TYLENOL) tablet 650 mg  650 mg Oral Q6H PRN Aries Stein MD        divalproex (DEPAKOTE SPRINKLE) DR capsule 125 mg  125 mg Oral 3 times per day Nicholas Beal MD   125 mg at 12/15/24 0638    apixaban (ELIQUIS) tablet 2.5 mg  2.5 mg Oral BID Nicholas Beal MD   2.5 mg at 12/15/24 09    clopidogrel (PLAVIX) tablet 75 mg  75 mg Oral Daily Williams Alejandro MD   75 mg at 12/15/24 09    atorvastatin (LIPITOR) tablet 40 mg  40 mg Oral Nightly Elza Walton PA   40 mg at 24    insulin glargine (LANTUS) injection vial 8 Units  8 Units SubCUTAneous Nightly Nicholas Beal MD   8 Units at 24    [Held by provider] isosorbide mononitrate (IMDUR) extended release tablet 30 mg  30 mg Oral Daily Williams Alejandro MD   30 mg at 24 09    metoprolol tartrate (LOPRESSOR) tablet 50 mg  50 mg Oral BID WC Williams Alejandro MD   50 mg at 12/15/24 09    ranolazine (RANEXA) extended release tablet 500 mg  500 mg Oral BID Emigdio 
Internal Medicine Progress Note    Patient's name: Johny Nichols  : 1935  Chief complaints (on day of admission): Extremity Weakness (Increased arm weakness beginning at 1000 this am. L sided weakness at baseline d/t previous stroke. Facility reported difficulty speaking and right sided facial droop.) and Cerebrovascular Accident  Admission date: 2024  Date of service: 2024   Room: [address] IMCU/NEURO  Primary care physician: Nicholas Beal MD  Reason for visit: Follow-up for:      Subjective  Johny was seen and examined.    Review of Systems NEG x 10  There are no new complaints of chest pain, shortness of breath, abdominal pain, nausea, vomiting, diarrhea, constipation.    Hospital Medications  Current Facility-Administered Medications   Medication Dose Route Frequency Provider Last Rate Last Admin    atorvastatin (LIPITOR) tablet 40 mg  40 mg Oral Nightly Elza Walton PA   40 mg at 12/10/24 2017    aspirin EC tablet 81 mg  81 mg Oral Daily Nicholas Beal MD   81 mg at 12/10/24 0955    insulin glargine (LANTUS) injection vial 8 Units  8 Units SubCUTAneous Nightly Nicholas Beal MD   8 Units at 12/10/24 2018    isosorbide mononitrate (IMDUR) extended release tablet 30 mg  30 mg Oral Daily Nicholas Beal MD        metoprolol tartrate (LOPRESSOR) tablet 50 mg  50 mg Oral BID WC Nicholas Beal MD   50 mg at 12/10/24 1624    ranolazine (RANEXA) extended release tablet 500 mg  500 mg Oral BID Nicholas Beal MD   500 mg at 12/10/24 2018    sodium bicarbonate tablet 650 mg  650 mg Oral TID Nicholas Beal MD   650 mg at 12/10/24 2017    tamsulosin (FLOMAX) capsule 0.4 mg  0.4 mg Oral Nightly Nicholas Beal MD   0.4 mg at 12/10/24 2018    sodium chloride flush 0.9 % injection 5-40 mL  5-40 mL IntraVENous 2 times per day Nicholas Beal MD   10 mL at 12/10/24 2025    sodium chloride flush 0.9 % injection 5-40 mL  5-40 mL IntraVENous PRN Nicholas Beal MD        0.9 % sodium chloride infusion   
Internal Medicine Progress Note    Patient's name: Johny Nichols  : 1935  Chief complaints (on day of admission): Extremity Weakness (Increased arm weakness beginning at 1000 this am. L sided weakness at baseline d/t previous stroke. Facility reported difficulty speaking and right sided facial droop.) and Cerebrovascular Accident  Admission date: 2024  Date of service: 2024   Room: [address] IMCU/NEURO  Primary care physician: Nicholas Beal MD  Reason for visit: Follow-up for:  left sided weakness    Subjective  Johny was seen and examined.    Became delirious, confused  night.  Knows me but thinks he is at home waiting for Police.     BP is better- given bolus ivf  and 1 dose of midodrine.   Review of Systems NEG x 10  There are no new complaints of chest pain, shortness of breath, abdominal pain, nausea, vomiting, diarrhea, constipation.    Hospital Medications  Current Facility-Administered Medications   Medication Dose Route Frequency Provider Last Rate Last Admin    divalproex (DEPAKOTE SPRINKLE) DR capsule 125 mg  125 mg Oral 3 times per day Nicholas Beal MD        apixaban (ELIQUIS) tablet 2.5 mg  2.5 mg Oral BID Nicholas Beal MD   2.5 mg at 24    clopidogrel (PLAVIX) tablet 75 mg  75 mg Oral Daily Williams Alejandro MD   75 mg at 24    atorvastatin (LIPITOR) tablet 40 mg  40 mg Oral Nightly Elza Walton PA   40 mg at 24    insulin glargine (LANTUS) injection vial 8 Units  8 Units SubCUTAneous Nightly Nicholas Beal MD   8 Units at 24    [Held by provider] isosorbide mononitrate (IMDUR) extended release tablet 30 mg  30 mg Oral Daily Williams Alejandro MD   30 mg at 24    metoprolol tartrate (LOPRESSOR) tablet 50 mg  50 mg Oral BID WC Williams Alejandro MD   50 mg at 24    ranolazine (RANEXA) extended release tablet 500 mg  500 mg Oral BID Nicholas Beal MD   500 mg at 24    sodium bicarbonate tablet 650 
Internal Medicine Progress Note    Patient's name: Johny Nichols  : 1935  Chief complaints (on day of admission): Extremity Weakness (Increased arm weakness beginning at 1000 this am. L sided weakness at baseline d/t previous stroke. Facility reported difficulty speaking and right sided facial droop.) and Cerebrovascular Accident  Admission date: 2024  Date of service: 2024   Room: [address] IMCU/NEURO  Primary care physician: Nicholas Beal MD  Reason for visit: Follow-up for:  left sided weakness    Subjective  Johny was seen and examined.    Review of Systems NEG x 10  There are no new complaints of chest pain, shortness of breath, abdominal pain, nausea, vomiting, diarrhea, constipation.    Hospital Medications  Current Facility-Administered Medications   Medication Dose Route Frequency Provider Last Rate Last Admin    apixaban (ELIQUIS) tablet 2.5 mg  2.5 mg Oral BID Nicholas Beal MD        clopidogrel (PLAVIX) tablet 75 mg  75 mg Oral Daily Williams Alejandro MD        atorvastatin (LIPITOR) tablet 40 mg  40 mg Oral Nightly Elza Walton PA   40 mg at 24    insulin glargine (LANTUS) injection vial 8 Units  8 Units SubCUTAneous Nightly Nicholas Beal MD   8 Units at 24    isosorbide mononitrate (IMDUR) extended release tablet 30 mg  30 mg Oral Daily Nicholas Beal MD   30 mg at 24 0809    metoprolol tartrate (LOPRESSOR) tablet 50 mg  50 mg Oral BID WC Nicholas Beal MD   50 mg at 24 1558    ranolazine (RANEXA) extended release tablet 500 mg  500 mg Oral BID Nicholas Beal MD   500 mg at 24    sodium bicarbonate tablet 650 mg  650 mg Oral TID Nicholas Beal MD   650 mg at 24    tamsulosin (FLOMAX) capsule 0.4 mg  0.4 mg Oral Nightly Nicholas Beal MD   0.4 mg at 24    sodium chloride flush 0.9 % injection 5-40 mL  5-40 mL IntraVENous 2 times per day Nicholas Beal MD   10 mL at 24    sodium chloride flush 0.9 % 
Kong to discharge per Dr. Alejandro.  
Need MRI screening form filled out to clear patient before the MRI can be scheduled. Thank you.    
Nurse to nurse called to [facility]  
OCCUPATIONAL THERAPY INITIAL EVALUATION    [facility] [address] [place], [place]       Date:2024                                                  Patient Name: Johny Nichols  MRN: [id number]  : 1935  Room: [address] [place] [address]    Evaluating OT: Moise Singh OTR/L [id number]    Referring Provider: Nicholas Beal MD      Specific Provider Orders/Date: OT evaluation and treatment 24 1145    Diagnosis:  Stroke-like symptoms [R29.90]  Stroke-like symptom [R29.90]  Stenosis of right internal carotid artery [I65.21]  Cerebrovascular accident (CVA), unspecified mechanism (HCC) [I63.9]      Pertinent Medical History:  has a past medical history of A-fib (HCC), Cerebrovascular accident (CVA) (HCC), Chronic kidney disease, Chronic renal impairment, stage 3 (moderate) (HCC), Colitis, ulcerative chronic (HCC), Diabetes mellitus (HCC), DM (diabetes mellitus) (HCC), Encounter for therapeutic drug monitoring, Hyperlipidemia, Hyperlipidemia associated with type 2 diabetes mellitus (HCC), Hypertension, Long term (current) use of anticoagulants, Prostate enlargement, Stroke (cerebrum) (HCC), and Unspecified cerebral artery occlusion with cerebral infarction.   Past Surgical History:   Procedure Laterality Date    ABDOMEN SURGERY          COLONOSCOPY      COLOSTOMY      CYSTOSCOPY  more than 5 yrs    ECHO COMPL W DOP COLOR FLOW  2013         ECHOCARDIOGRAM TRANSESOPHAGEAL  2013         ENDOSCOPY, COLON, DIAGNOSTIC  12/10/15    ileoscopy    TURP  14    cystoscopy retrogrades    UPPER GASTROINTESTINAL ENDOSCOPY N/A 3/11/2022    EGD ESOPHAGOGASTRODUODENOSCOPY ULTRASOUND performed by Conrad Gerber MD at [facility] ENDOSCOPY     Pt admitted to the hospital  with R facial droop and slurred speech   MRI brain    IMPRESSION:  1. Multiple small acute embolic nonhemorrhagic infarcts scattered throughout  the cortex throughout the 
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]  Cerebrovascular accident (CVA), unspecified mechanism (HCC) [I63.9]       Pertinent Medical History:  has a past medical history of A-fib (HCC), Cerebrovascular accident (CVA) (HCC), Chronic kidney disease, Chronic renal impairment, stage 3 (moderate) (HCC), Colitis, ulcerative chronic (HCC), Diabetes mellitus (HCC), DM (diabetes mellitus) (HCC), Encounter for therapeutic drug monitoring, Hyperlipidemia, Hyperlipidemia associated with type 2 diabetes mellitus (HCC), Hypertension, Long term (current) use of anticoagulants, Prostate enlargement, Stroke (cerebrum) (HCC), and Unspecified cerebral artery occlusion with cerebral infarction.   Past Surgical History         Past Surgical History:   Procedure Laterality Date    ABDOMEN SURGERY             COLONOSCOPY        COLOSTOMY       CYSTOSCOPY   more than 5 yrs    ECHO COMPL W DOP COLOR FLOW   2013          ECHOCARDIOGRAM TRANSESOPHAGEAL   2013          ENDOSCOPY, COLON, DIAGNOSTIC   12/10/15     ileoscopy    TURP   14     cystoscopy retrogrades    UPPER GASTROINTESTINAL ENDOSCOPY N/A 3/11/2022     EGD ESOPHAGOGASTRODUODENOSCOPY ULTRASOUND performed by Conrad Gerber MD at [facility] ENDOSCOPY         Pt admitted to the hospital  with R facial droop and slurred speech   MRI brain    IMPRESSION:  1. Multiple small acute embolic nonhemorrhagic infarcts scattered 
Patient already evaluated by Dr. Alejandro. Discussed with him and will defer to his service. Vascular available if need be.   
Patient on cart in [place].  Vitals obtained and charted.  Partial assessment completed to promote privacy.  Denies pain.  Waiting for room on unit.  
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]  Cerebrovascular accident (CVA), unspecified mechanism (HCC) [I63.9]  PMHx/PSHx:   has a past medical history of A-fib (HCC), Cerebrovascular accident (CVA) (HCC), Chronic kidney disease, Chronic renal impairment, stage 3 (moderate) (HCC), Colitis, ulcerative chronic (HCC), Diabetes mellitus (HCC), DM (diabetes mellitus) (HCC), Encounter for therapeutic drug monitoring, Hyperlipidemia, Hyperlipidemia associated with type 2 diabetes mellitus (HCC), Hypertension, Long term (current) use of anticoagulants, Prostate enlargement, Stroke (cerebrum) (HCC), and Unspecified cerebral artery occlusion with cerebral infarction.   has a past surgical history that includes ECHO Compl W Dop Color Flow (2013); ECHO Transesophageal (2013); colostomy (); Cystoscopy (more than 5 yrs); TURP (14); Colonoscopy; Abdomen surgery; Endoscopy, colon, diagnostic (12/10/15); and Upper gastrointestinal endoscopy (N/A, 3/11/2022).  Procedure/Surgery:  None  Precautions:  Falls, cognition, Hx of CVA with L sided weakness, external catheter  Equipment Needs:  TBD    SUBJECTIVE:    Pt lives with friend in a 1 story home with 1 stair(s) to enter and 1 rail(s).  Pt ambulated with cane (short distances) and FWW (long distances) prior to admission.    OBJECTIVE:   Initial Evaluation  Date: 2024 Treatment  12/15/24 Short Term/ Long Term   Goals   AM-PAC 6 Clicks 10/24 12/24    Was pt agreeable to Eval/treatment? Yes yes    Does pt have pain? No No c/o pain    Bed Mobility  Rolling: NT  Supine to sit: ModA  Sit to supine: MaxA  Scooting: NT Rolling: NT  Supine to sit: Yusra  Sit to supine: NT  Scooting: 
Platelet level is currently 75 and pt has been initiated on LMH therapy. Please monitor closely.  
SPEECH LANGUAGE PATHOLOGY  DAILY PROGRESS NOTE        PATIENT NAME:  Johny Nichols      ROOM:  8504/8504-B :  1935         TODAY'S DATE:  2024       Patient seen for cognition and dysphagia      DYSPHAGIA  Current Diet Order: ADULT DIET; Dysphagia - Soft and Bite Sized; 3 carb choices (45 gm/meal)  Pt monitored during mealtime.  Tolerating current diet without noted or reported difficulties.    COGNITION  Recall of personal information required min cues  Pt completed STM tasks with 60% accuracy   Thought processing was delayed        Will continue  
SPEECH/LANGUAGE PATHOLOGY  SPEECH/LANGUAGE/COGNITIVE EVALUATION   and PLAN OF CARE      PATIENT NAME:  Johny Nichols  (male)     MRN:  [id number]    :  1935  (89 y.o.)  STATUS:  Inpatient: Room 8504/8504-B    TODAY'S DATE:  2024  ORDER DATE, DESCRIPTION AND REFERRING PROVIDER : 12/10/24 0745    SLP eval and treat  Start:  12/10/24 0745,   End:  12/10/24 0745,   ONE TIME,   Standing Count:  1 Occurrences,   R       Nicholas Beal MD  REASON FOR REFERRAL:  dysarthria. 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     Speech Pathology intervention is recommended up to 6 times per week for LOS or when goals are met with emphasis on the following:      Conditions Requiring Skilled Therapeutic Intervention for speech, language and/or cognition    Dysarthria   Cognitive linguistic impairment  Decreased safety awareness  Decreased short term memory  Decreased problem solving skills   Decreased thought organization    Specific Speech Therapy Interventions to Include:   Therapeutic tasks for Cognition  Therapeutic exercises for dysarthria    Specific instructions for next treatment:     To initiate POC    SHORT/LONG TERM GOALS  Pt will improve orientation to spatial and temporal surroundings with use of external memory aides.  Pt will improve immediate, 
Stop bowel prep and hold off on NG insertion until pt is more stable.   
To ultrasound in bed.  
skilled PT services to address functional deficits and prevent deconditioning.    Treatment:  Patient practiced and was instructed in the following treatment:    Bed mobility - physical assistance provided as needed during activity  Functional transfers - physical assistance provided as needed during activity  Static sitting/standing - performed to promote activity tolerance, postural awareness, balance maintenance  Skilled positioning - patient positioned optimally to protect skin/joint integrity and promote comfort    Pt's/ family goals   1. None stated    Prognosis is good for reaching above PT goals.    Patient and or family understand(s) diagnosis, prognosis, and plan of care.  Yes    PHYSICAL THERAPY PLAN OF CARE:    PT POC is established based on physician order and patient diagnosis     Referring provider/PT Order:    12/11/24 1145  PT eval and treat  Start:  12/11/24 1145,   End:  12/11/24 1145,   ONE TIME,   Standing Count:  1 Occurrences,   R         Nicholas Beal MD     Diagnosis:  Stroke-like symptoms [R29.90]  Stroke-like symptom [R29.90]  Stenosis of right internal carotid artery [I65.21]  Cerebrovascular accident (CVA), unspecified mechanism (HCC) [I63.9]  Specific instructions for next treatment:  Continue to facilitate safe performance of functional mobility; progress as appropriate.    Current Treatment Recommendations:     [x] Strengthening to improve independence with functional mobility   [x] ROM to improve independence with functional mobility   [x] Balance Training to improve static/dynamic balance and to reduce fall risk  [x] Endurance Training to improve activity tolerance during functional mobility   [x] Transfer Training to improve safety and independence with all functional transfers   [x] Gait Training to improve gait mechanics, endurance and assess need for appropriate assistive device  [x] Stair Training in preparation for safe discharge home and/or into the community   [x] 
small left SCA territory infarct.   7. Stable moderate atrophy and severe small vessel ischemia.         Vascular duplex carotid bilateral   Final Result   1. There is a greater than 70% stenosis of the right internal carotid artery.   2. There is a less than 50% stenosis of the left internal carotid artery.   3. The bilateral vertebral arteries are patent and demonstrate antegrade flow.         CTA HEAD W CONTRAST   Final Result   CTA NECK:      1. Approximately 80% maximal stenosis of the origin of the right internal   carotid artery due to a prominent, predominantly noncalcified atherosclerotic   plaque.   2. No significant stenosis of the left carotid arteries.   3. Patent bilateral vertebral arteries.   CTA HEAD:      No large vessel occlusion or significant stenosis.         CTA NECK W CONTRAST   Final Result   CTA NECK:      1. Approximately 80% maximal stenosis of the origin of the right internal   carotid artery due to a prominent, predominantly noncalcified atherosclerotic   plaque.   2. No significant stenosis of the left carotid arteries.   3. Patent bilateral vertebral arteries.   CTA HEAD:      No large vessel occlusion or significant stenosis.         CT HEAD WO CONTRAST   Final Result   1.  There is no acute intracranial abnormality.  Specifically, there is no   intracranial hemorrhage.   2. Significant atrophy and periventricular microvascular ischemic disease,   3. Please see the pending CTA of the head report.   .             Echocardiogram      Assessment   Active Hospital Problems    Diagnosis     Stroke-like symptoms [R29.90]     Stroke-like symptom [R29.90]    [facility]REEDOM STENOSIS  Delirium    CONSULTS:  IP CONSULT TO INTERNAL MEDICINE  IP CONSULT TO NEUROLOGY  IP CONSULT TO VASCULAR SURGERY  Plan  Start depakote x 1 week  Resume beta blocker.   Monitor BP  HALLE to address ICA stenosis(I was not aware that is handled by HALLE)  DW WIFE(12/11)  noted HALLE PLAN  cont eliquis  SLP evaluated and recommended 
SURGERY  Plan  Start depakote x 1 week  Resume beta blocker.   Monitor BP  HALLE to address ICA stenosis(I was not aware that is handled by HALLE)  DW WIFE(12/11)  noted HALLE PLAN  cont eliquis  SLP evaluated and recommended dysphagia diet    Therapy consulted  PT AM-PAC-- 10  OT AM- PAC--     Follow labs   DVT prophylaxis- eliquis  Please see orders for further management and care.  Discharge plan: TBD- [facility] once precert is done then carotid stent in within couple weeks      Electronically signed by Nicholas Beal MD on 12/16/2024 at 7:09 AM    I can be reached through [employer].   
cerebrovascular accident    Debility    MCI (mild cognitive impairment)    Acute renal failure superimposed on chronic kidney disease (HCC)    Stroke-like symptoms    Stroke-like symptom         CPT code:  51973  bedside swallow ellen Shahid M.S., CCC-SLP  Speech-Language Pathologist  SP. [phone]

## 2024-12-23 ENCOUNTER — CLINICAL DOCUMENTATION (OUTPATIENT)
Dept: INTERVENTIONAL RADIOLOGY/VASCULAR | Age: 88
End: 2024-12-23

## 2024-12-23 NOTE — PROGRESS NOTES
I used [employer] Provider portal to check for Prior Authorization needed for CPT code(s): 15603 & 74946    Prior Authorization is required for 95018, clinicals were submitted via portal.  Prior Authorization is not required for CPT 30951    Approved: [id number]  Valid Dates: 1/13/25- 07/12/25    Patient was scheduled thru wife and is currently at [facility].

## 2025-01-08 ENCOUNTER — TELEPHONE (OUTPATIENT)
Dept: INTERVENTIONAL RADIOLOGY/VASCULAR | Age: 89
End: 2025-01-08

## 2025-01-08 NOTE — PRE-PROCEDURE INSTRUCTIONS
NEURO- INTERVENTIONAL RADIOLOGY  NURSE CALL CHECK LIST  ( For intervention cases with anesthesia, please note this does not apply to diagnostic procedures)    ARE YOU TAKING YOUR BLOOD THINNERS? Yes      If no please ask patient to call the physician and or inform the physician of the fact that patient is not taking their blood thinners. Usually the patient will be on Two blood thinners ASA, Plavix, Brilinta, Eliquis, Xarelto, etc.  DO NOT ASK PATIENT TO STOP blood thinners unless specifically indicated!    ARE YOU HAVING INTERVENTION PERFORMED ? Yes    3.  INFORM PATIENT OF NPO STATUS AFTER MIDNIGHT Yes    4. DO YOU HAVE FAMILY WITH YOU TO DRIVE YOU BACK IN CASE NO INTERVENTION IS PERFORMED? Yes    5. ARRIVAL TIME MENTIONED TO PATIENT AS  0700 am    6. PLEASE OBTAIN YOUR LABS PRIOR TO YOUR PROCEDURE DATE AT A [facility].      OTHER NOTES      DISCUSSED WITH PATIENT/OTHER MEMBER Nursing director @ [facility].

## 2025-01-13 ENCOUNTER — ANESTHESIA EVENT (OUTPATIENT)
Dept: INTERVENTIONAL RADIOLOGY/VASCULAR | Age: 89
DRG: 036 | End: 2025-01-13
Payer: MEDICARE

## 2025-01-13 ENCOUNTER — ANESTHESIA (OUTPATIENT)
Dept: INTERVENTIONAL RADIOLOGY/VASCULAR | Age: 89
DRG: 036 | End: 2025-01-13
Payer: MEDICARE

## 2025-01-13 ENCOUNTER — HOSPITAL ENCOUNTER (INPATIENT)
Dept: INTERVENTIONAL RADIOLOGY/VASCULAR | Age: 89
LOS: 1 days | Discharge: NURSING, REHAB OR LONG-TERM CARE | DRG: 036 | End: 2025-01-14
Attending: STUDENT IN AN ORGANIZED HEALTH CARE EDUCATION/TRAINING PROGRAM | Admitting: STUDENT IN AN ORGANIZED HEALTH CARE EDUCATION/TRAINING PROGRAM
Payer: MEDICARE

## 2025-01-13 DIAGNOSIS — I65.21 SYMPTOMATIC STENOSIS OF RIGHT CAROTID ARTERY: ICD-10-CM

## 2025-01-13 LAB
ACTIVATED CLOTTING TIME, LOW RANGE: >400 SEC
ANION GAP SERPL CALCULATED.3IONS-SCNC: 11 MMOL/L (ref 7–16)
BASOPHILS # BLD: 0.03 K/UL (ref 0–0.2)
BASOPHILS NFR BLD: 0 % (ref 0–2)
BUN SERPL-MCNC: 34 MG/DL (ref 6–23)
CALCIUM SERPL-MCNC: 9.4 MG/DL (ref 8.6–10.2)
CHLORIDE SERPL-SCNC: 107 MMOL/L (ref 98–107)
CO2 SERPL-SCNC: 25 MMOL/L (ref 22–29)
CREAT SERPL-MCNC: 2.1 MG/DL (ref 0.7–1.2)
EOSINOPHIL # BLD: 0.15 K/UL (ref 0.05–0.5)
EOSINOPHILS RELATIVE PERCENT: 2 % (ref 0–6)
ERYTHROCYTE [DISTWIDTH] IN BLOOD BY AUTOMATED COUNT: 15 % (ref 11.5–15)
GFR, ESTIMATED: 30 ML/MIN/1.73M2
GLUCOSE BLD-MCNC: 202 MG/DL (ref 74–99)
GLUCOSE BLD-MCNC: 85 MG/DL (ref 74–99)
GLUCOSE BLD-MCNC: 94 MG/DL (ref 74–99)
GLUCOSE SERPL-MCNC: 91 MG/DL (ref 74–99)
HCT VFR BLD AUTO: 34.4 % (ref 37–54)
HGB BLD-MCNC: 10.7 G/DL (ref 12.5–16.5)
IMM GRANULOCYTES # BLD AUTO: <0.03 K/UL (ref 0–0.58)
IMM GRANULOCYTES NFR BLD: 0 % (ref 0–5)
INR PPP: 1.2
LYMPHOCYTES NFR BLD: 2.35 K/UL (ref 1.5–4)
LYMPHOCYTES RELATIVE PERCENT: 35 % (ref 20–42)
MAGNESIUM SERPL-MCNC: 1.6 MG/DL (ref 1.6–2.6)
MCH RBC QN AUTO: 27.3 PG (ref 26–35)
MCHC RBC AUTO-ENTMCNC: 31.1 G/DL (ref 32–34.5)
MCV RBC AUTO: 87.8 FL (ref 80–99.9)
MONOCYTES NFR BLD: 0.52 K/UL (ref 0.1–0.95)
MONOCYTES NFR BLD: 8 % (ref 2–12)
NEUTROPHILS NFR BLD: 54 % (ref 43–80)
NEUTS SEG NFR BLD: 3.63 K/UL (ref 1.8–7.3)
PARTIAL THROMBOPLASTIN TIME: 35.1 SEC (ref 24.5–35.1)
PLATELET # BLD AUTO: 86 K/UL (ref 130–450)
PLATELET CONFIRMATION: NORMAL
PMV BLD AUTO: 11.4 FL (ref 7–12)
POTASSIUM SERPL-SCNC: 4.6 MMOL/L (ref 3.5–5)
PROTHROMBIN TIME: 13.1 SEC (ref 9.3–12.4)
RBC # BLD AUTO: 3.92 M/UL (ref 3.8–5.8)
SODIUM SERPL-SCNC: 143 MMOL/L (ref 132–146)
WBC OTHER # BLD: 6.7 K/UL (ref 4.5–11.5)

## 2025-01-13 PROCEDURE — 82962 GLUCOSE BLOOD TEST: CPT

## 2025-01-13 PROCEDURE — 6360000002 HC RX W HCPCS

## 2025-01-13 PROCEDURE — B313ZZZ FLUOROSCOPY OF RIGHT COMMON CAROTID ARTERY: ICD-10-PCS | Performed by: STUDENT IN AN ORGANIZED HEALTH CARE EDUCATION/TRAINING PROGRAM

## 2025-01-13 PROCEDURE — 85610 PROTHROMBIN TIME: CPT

## 2025-01-13 PROCEDURE — 80048 BASIC METABOLIC PNL TOTAL CA: CPT

## 2025-01-13 PROCEDURE — 85730 THROMBOPLASTIN TIME PARTIAL: CPT

## 2025-01-13 PROCEDURE — 76937 US GUIDE VASCULAR ACCESS: CPT

## 2025-01-13 PROCEDURE — C1876 STENT, NON-COA/NON-COV W/DEL: HCPCS | Performed by: STUDENT IN AN ORGANIZED HEALTH CARE EDUCATION/TRAINING PROGRAM

## 2025-01-13 PROCEDURE — 85025 COMPLETE CBC W/AUTO DIFF WBC: CPT

## 2025-01-13 PROCEDURE — 6370000000 HC RX 637 (ALT 250 FOR IP): Performed by: STUDENT IN AN ORGANIZED HEALTH CARE EDUCATION/TRAINING PROGRAM

## 2025-01-13 PROCEDURE — 3700000000 HC ANESTHESIA ATTENDED CARE: Performed by: STUDENT IN AN ORGANIZED HEALTH CARE EDUCATION/TRAINING PROGRAM

## 2025-01-13 PROCEDURE — 037K3DZ DILATION OF RIGHT INTERNAL CAROTID ARTERY WITH INTRALUMINAL DEVICE, PERCUTANEOUS APPROACH: ICD-10-PCS | Performed by: STUDENT IN AN ORGANIZED HEALTH CARE EDUCATION/TRAINING PROGRAM

## 2025-01-13 PROCEDURE — 2000000000 HC ICU R&B

## 2025-01-13 PROCEDURE — 75710 ARTERY X-RAYS ARM/LEG: CPT

## 2025-01-13 PROCEDURE — 2500000003 HC RX 250 WO HCPCS

## 2025-01-13 PROCEDURE — 3700000001 HC ADD 15 MINUTES (ANESTHESIA): Performed by: STUDENT IN AN ORGANIZED HEALTH CARE EDUCATION/TRAINING PROGRAM

## 2025-01-13 PROCEDURE — C1725 CATH, TRANSLUMIN NON-LASER: HCPCS

## 2025-01-13 PROCEDURE — 37215 TRANSCATH STENT CCA W/EPS: CPT

## 2025-01-13 PROCEDURE — 83735 ASSAY OF MAGNESIUM: CPT

## 2025-01-13 PROCEDURE — 99232 SBSQ HOSP IP/OBS MODERATE 35: CPT | Performed by: STUDENT IN AN ORGANIZED HEALTH CARE EDUCATION/TRAINING PROGRAM

## 2025-01-13 PROCEDURE — 7100000001 HC PACU RECOVERY - ADDTL 15 MIN

## 2025-01-13 PROCEDURE — 6360000004 HC RX CONTRAST MEDICATION: Performed by: STUDENT IN AN ORGANIZED HEALTH CARE EDUCATION/TRAINING PROGRAM

## 2025-01-13 PROCEDURE — 2580000003 HC RX 258

## 2025-01-13 PROCEDURE — B31H1ZZ FLUOROSCOPY OF RIGHT UPPER EXTREMITY ARTERIES USING LOW OSMOLAR CONTRAST: ICD-10-PCS | Performed by: STUDENT IN AN ORGANIZED HEALTH CARE EDUCATION/TRAINING PROGRAM

## 2025-01-13 PROCEDURE — 87081 CULTURE SCREEN ONLY: CPT

## 2025-01-13 PROCEDURE — 2500000003 HC RX 250 WO HCPCS: Performed by: STUDENT IN AN ORGANIZED HEALTH CARE EDUCATION/TRAINING PROGRAM

## 2025-01-13 PROCEDURE — 2580000003 HC RX 258: Performed by: STUDENT IN AN ORGANIZED HEALTH CARE EDUCATION/TRAINING PROGRAM

## 2025-01-13 PROCEDURE — 7100000000 HC PACU RECOVERY - FIRST 15 MIN

## 2025-01-13 PROCEDURE — 85347 COAGULATION TIME ACTIVATED: CPT

## 2025-01-13 PROCEDURE — 6360000002 HC RX W HCPCS: Performed by: STUDENT IN AN ORGANIZED HEALTH CARE EDUCATION/TRAINING PROGRAM

## 2025-01-13 RX ORDER — ATROPINE SULFATE 0.4 MG/ML
INJECTION, SOLUTION INTRAMUSCULAR; INTRAVENOUS; SUBCUTANEOUS
Status: DISCONTINUED | OUTPATIENT
Start: 2025-01-13 | End: 2025-01-13 | Stop reason: SDUPTHER

## 2025-01-13 RX ORDER — SODIUM CHLORIDE 9 MG/ML
INJECTION, SOLUTION INTRAVENOUS
Status: DISCONTINUED | OUTPATIENT
Start: 2025-01-13 | End: 2025-01-13 | Stop reason: SDUPTHER

## 2025-01-13 RX ORDER — FENTANYL CITRATE 50 UG/ML
INJECTION, SOLUTION INTRAMUSCULAR; INTRAVENOUS
Status: DISCONTINUED | OUTPATIENT
Start: 2025-01-13 | End: 2025-01-13 | Stop reason: SDUPTHER

## 2025-01-13 RX ORDER — CEFAZOLIN SODIUM 1 G/3ML
INJECTION, POWDER, FOR SOLUTION INTRAMUSCULAR; INTRAVENOUS
Status: DISCONTINUED | OUTPATIENT
Start: 2025-01-13 | End: 2025-01-13 | Stop reason: SDUPTHER

## 2025-01-13 RX ORDER — INSULIN LISPRO 100 [IU]/ML
0-4 INJECTION, SOLUTION INTRAVENOUS; SUBCUTANEOUS
Status: DISCONTINUED | OUTPATIENT
Start: 2025-01-13 | End: 2025-01-14 | Stop reason: HOSPADM

## 2025-01-13 RX ORDER — NITROGLYCERIN 20 MG/100ML
INJECTION INTRAVENOUS PRN
Status: COMPLETED | OUTPATIENT
Start: 2025-01-13 | End: 2025-01-13

## 2025-01-13 RX ORDER — IOPAMIDOL 612 MG/ML
INJECTION, SOLUTION INTRAVASCULAR PRN
Status: COMPLETED | OUTPATIENT
Start: 2025-01-13 | End: 2025-01-13

## 2025-01-13 RX ORDER — ONDANSETRON 2 MG/ML
INJECTION INTRAMUSCULAR; INTRAVENOUS
Status: DISCONTINUED | OUTPATIENT
Start: 2025-01-13 | End: 2025-01-13 | Stop reason: SDUPTHER

## 2025-01-13 RX ORDER — RANOLAZINE 500 MG/1
500 TABLET, EXTENDED RELEASE ORAL 2 TIMES DAILY
Status: DISCONTINUED | OUTPATIENT
Start: 2025-01-13 | End: 2025-01-14 | Stop reason: HOSPADM

## 2025-01-13 RX ORDER — DEXTROSE MONOHYDRATE 100 MG/ML
INJECTION, SOLUTION INTRAVENOUS CONTINUOUS PRN
Status: DISCONTINUED | OUTPATIENT
Start: 2025-01-13 | End: 2025-01-14 | Stop reason: HOSPADM

## 2025-01-13 RX ORDER — HEPARIN SODIUM 1000 [USP'U]/ML
INJECTION, SOLUTION INTRAVENOUS; SUBCUTANEOUS
Status: DISCONTINUED | OUTPATIENT
Start: 2025-01-13 | End: 2025-01-13 | Stop reason: SDUPTHER

## 2025-01-13 RX ORDER — VERAPAMIL HYDROCHLORIDE 2.5 MG/ML
INJECTION, SOLUTION INTRAVENOUS PRN
Status: COMPLETED | OUTPATIENT
Start: 2025-01-13 | End: 2025-01-13

## 2025-01-13 RX ORDER — SODIUM CHLORIDE 9 MG/ML
INJECTION, SOLUTION INTRAVENOUS CONTINUOUS
Status: ACTIVE | OUTPATIENT
Start: 2025-01-13 | End: 2025-01-14

## 2025-01-13 RX ORDER — GLYCOPYRROLATE 1 MG/5 ML
SYRINGE (ML) INTRAVENOUS
Status: DISCONTINUED | OUTPATIENT
Start: 2025-01-13 | End: 2025-01-13 | Stop reason: SDUPTHER

## 2025-01-13 RX ORDER — INSULIN GLARGINE 100 [IU]/ML
6 INJECTION, SOLUTION SUBCUTANEOUS NIGHTLY
Status: DISCONTINUED | OUTPATIENT
Start: 2025-01-13 | End: 2025-01-14 | Stop reason: HOSPADM

## 2025-01-13 RX ORDER — TAMSULOSIN HYDROCHLORIDE 0.4 MG/1
0.4 CAPSULE ORAL NIGHTLY
Status: DISCONTINUED | OUTPATIENT
Start: 2025-01-13 | End: 2025-01-14 | Stop reason: HOSPADM

## 2025-01-13 RX ORDER — DEXAMETHASONE SODIUM PHOSPHATE 10 MG/ML
INJECTION, SOLUTION INTRAMUSCULAR; INTRAVENOUS
Status: DISCONTINUED | OUTPATIENT
Start: 2025-01-13 | End: 2025-01-13 | Stop reason: SDUPTHER

## 2025-01-13 RX ORDER — CLOPIDOGREL BISULFATE 75 MG/1
75 TABLET ORAL DAILY
Status: DISCONTINUED | OUTPATIENT
Start: 2025-01-14 | End: 2025-01-14 | Stop reason: HOSPADM

## 2025-01-13 RX ORDER — PROPOFOL 10 MG/ML
INJECTION, EMULSION INTRAVENOUS
Status: DISCONTINUED | OUTPATIENT
Start: 2025-01-13 | End: 2025-01-13 | Stop reason: SDUPTHER

## 2025-01-13 RX ORDER — LIDOCAINE/PRILOCAINE 2.5 %-2.5%
CREAM (GRAM) TOPICAL PRN
Status: COMPLETED | OUTPATIENT
Start: 2025-01-13 | End: 2025-01-13

## 2025-01-13 RX ORDER — DEXMEDETOMIDINE HYDROCHLORIDE 100 UG/ML
INJECTION, SOLUTION INTRAVENOUS
Status: DISCONTINUED | OUTPATIENT
Start: 2025-01-13 | End: 2025-01-13 | Stop reason: SDUPTHER

## 2025-01-13 RX ORDER — SODIUM BICARBONATE 650 MG/1
650 TABLET ORAL 3 TIMES DAILY
Status: DISCONTINUED | OUTPATIENT
Start: 2025-01-13 | End: 2025-01-14 | Stop reason: HOSPADM

## 2025-01-13 RX ORDER — METOPROLOL TARTRATE 50 MG
50 TABLET ORAL 2 TIMES DAILY WITH MEALS
Status: DISCONTINUED | OUTPATIENT
Start: 2025-01-13 | End: 2025-01-14 | Stop reason: HOSPADM

## 2025-01-13 RX ORDER — ATORVASTATIN CALCIUM 20 MG/1
20 TABLET, FILM COATED ORAL NIGHTLY
Status: DISCONTINUED | OUTPATIENT
Start: 2025-01-13 | End: 2025-01-14 | Stop reason: HOSPADM

## 2025-01-13 RX ORDER — VITAMIN B COMPLEX
1000 TABLET ORAL DAILY
Status: DISCONTINUED | OUTPATIENT
Start: 2025-01-14 | End: 2025-01-14 | Stop reason: HOSPADM

## 2025-01-13 RX ORDER — LIDOCAINE HYDROCHLORIDE 20 MG/ML
INJECTION, SOLUTION INFILTRATION; PERINEURAL PRN
Status: COMPLETED | OUTPATIENT
Start: 2025-01-13 | End: 2025-01-13

## 2025-01-13 RX ORDER — HEPARIN SODIUM 10000 [USP'U]/ML
INJECTION, SOLUTION INTRAVENOUS; SUBCUTANEOUS PRN
Status: COMPLETED | OUTPATIENT
Start: 2025-01-13 | End: 2025-01-13

## 2025-01-13 RX ADMIN — SODIUM CHLORIDE: 9 INJECTION, SOLUTION INTRAVENOUS at 08:38

## 2025-01-13 RX ADMIN — PHENYLEPHRINE HYDROCHLORIDE 150 MCG: 10 INJECTION INTRAVENOUS at 09:17

## 2025-01-13 RX ADMIN — PROPOFOL 20 MG: 10 INJECTION, EMULSION INTRAVENOUS at 09:02

## 2025-01-13 RX ADMIN — SODIUM CHLORIDE: 9 INJECTION, SOLUTION INTRAVENOUS at 10:28

## 2025-01-13 RX ADMIN — SODIUM BICARBONATE 650 MG: 650 TABLET ORAL at 21:00

## 2025-01-13 RX ADMIN — NITROGLYCERIN 200 MCG: 20 INJECTION INTRAVENOUS at 09:07

## 2025-01-13 RX ADMIN — SODIUM CHLORIDE: 9 INJECTION, SOLUTION INTRAVENOUS at 08:45

## 2025-01-13 RX ADMIN — INSULIN LISPRO 1 UNITS: 100 INJECTION, SOLUTION INTRAVENOUS; SUBCUTANEOUS at 21:00

## 2025-01-13 RX ADMIN — PHENYLEPHRINE HYDROCHLORIDE 100 MCG: 10 INJECTION INTRAVENOUS at 09:06

## 2025-01-13 RX ADMIN — Medication 0.4 MG: at 09:31

## 2025-01-13 RX ADMIN — Medication 5000 UNITS: at 09:08

## 2025-01-13 RX ADMIN — LIDOCAINE AND PRILOCAINE 1 MG: 25; 25 CREAM TOPICAL at 08:30

## 2025-01-13 RX ADMIN — SODIUM BICARBONATE 650 MG: 650 TABLET ORAL at 13:58

## 2025-01-13 RX ADMIN — IOPAMIDOL 45 ML: 612 INJECTION, SOLUTION INTRAVENOUS at 10:11

## 2025-01-13 RX ADMIN — PROPOFOL 30 MCG/KG/MIN: 10 INJECTION, EMULSION INTRAVENOUS at 08:38

## 2025-01-13 RX ADMIN — DEXMEDETOMIDINE HCL 6 MCG: 100 INJECTION INTRAVENOUS at 09:41

## 2025-01-13 RX ADMIN — VERAPAMIL HYDROCHLORIDE 2.5 MG: 2.5 INJECTION, SOLUTION INTRAVENOUS at 09:07

## 2025-01-13 RX ADMIN — TAMSULOSIN HYDROCHLORIDE 0.4 MG: 0.4 CAPSULE ORAL at 21:00

## 2025-01-13 RX ADMIN — DEXMEDETOMIDINE HCL 6 MCG: 100 INJECTION INTRAVENOUS at 08:40

## 2025-01-13 RX ADMIN — PHENYLEPHRINE HYDROCHLORIDE 200 MCG: 10 INJECTION INTRAVENOUS at 09:09

## 2025-01-13 RX ADMIN — HEPARIN SODIUM 5000 UNITS: 1000 INJECTION INTRAVENOUS; SUBCUTANEOUS at 09:09

## 2025-01-13 RX ADMIN — CEFAZOLIN 2 G: 1 INJECTION, POWDER, FOR SOLUTION INTRAMUSCULAR; INTRAVENOUS at 09:52

## 2025-01-13 RX ADMIN — PHENYLEPHRINE HYDROCHLORIDE 200 MCG: 10 INJECTION INTRAVENOUS at 09:15

## 2025-01-13 RX ADMIN — PROPOFOL 20 MG: 10 INJECTION, EMULSION INTRAVENOUS at 08:38

## 2025-01-13 RX ADMIN — INSULIN GLARGINE 6 UNITS: 100 INJECTION, SOLUTION SUBCUTANEOUS at 21:00

## 2025-01-13 RX ADMIN — RANOLAZINE 500 MG: 500 TABLET, FILM COATED, EXTENDED RELEASE ORAL at 21:00

## 2025-01-13 RX ADMIN — ATROPINE SULFATE 0.4 MCG: 0.4 INJECTION, SOLUTION INTRAMUSCULAR; INTRAVENOUS; SUBCUTANEOUS at 09:31

## 2025-01-13 RX ADMIN — Medication 5000 UNITS: at 09:09

## 2025-01-13 RX ADMIN — RANOLAZINE 500 MG: 500 TABLET, FILM COATED, EXTENDED RELEASE ORAL at 13:58

## 2025-01-13 RX ADMIN — Medication 1000 ML: at 09:08

## 2025-01-13 RX ADMIN — METOPROLOL TARTRATE 50 MG: 50 TABLET, FILM COATED ORAL at 17:57

## 2025-01-13 RX ADMIN — FENTANYL CITRATE 25 MCG: 50 INJECTION, SOLUTION INTRAMUSCULAR; INTRAVENOUS at 08:38

## 2025-01-13 RX ADMIN — LIDOCAINE HYDROCHLORIDE 10 ML: 20 INJECTION, SOLUTION INFILTRATION; PERINEURAL at 09:02

## 2025-01-13 RX ADMIN — ATORVASTATIN CALCIUM 20 MG: 20 TABLET, FILM COATED ORAL at 21:00

## 2025-01-13 RX ADMIN — ONDANSETRON HYDROCHLORIDE 4 MG: 2 SOLUTION INTRAMUSCULAR; INTRAVENOUS at 08:38

## 2025-01-13 RX ADMIN — SODIUM CHLORIDE: 9 INJECTION, SOLUTION INTRAVENOUS at 18:03

## 2025-01-13 RX ADMIN — DEXAMETHASONE SODIUM PHOSPHATE 10 MG: 10 INJECTION INTRAMUSCULAR; INTRAVENOUS at 08:44

## 2025-01-13 ASSESSMENT — PAIN SCALES - GENERAL: PAINLEVEL_OUTOF10: 0

## 2025-01-13 NOTE — H&P
Allergies:  No Known Allergies     Review of Systems:     All systems were reviewed and are negative for any complaints at this time.    Current Medications   0.9 % sodium chloride infusion, Continuous  niCARdipine (CARDENE) 25 mg in sodium chloride 0.9 % 250 mL infusion ([id number]), Continuous  [START ON 1/14/2025] apixaban (ELIQUIS) tablet 2.5 mg, BID  atorvastatin (LIPITOR) tablet 20 mg, Nightly  [START ON 1/14/2025] Vitamin D (CHOLECALCIFEROL) tablet 1,000 Units, Daily  [START ON 1/14/2025] clopidogrel (PLAVIX) tablet 75 mg, Daily  metoprolol tartrate (LOPRESSOR) tablet 50 mg, BID WC  ranolazine (RANEXA) extended release tablet 500 mg, BID  sodium bicarbonate tablet 650 mg, TID  tamsulosin (FLOMAX) capsule 0.4 mg, Nightly  phenylephrine (CARLOS-SYNEPHRINE) 50 mg in sodium chloride 0.9 % 250 mL infusion, Continuous PRN  glucose chewable tablet 16 g, PRN  dextrose bolus 10% 125 mL, PRN   Or  dextrose bolus 10% 250 mL, PRN  glucagon injection 1 mg, PRN  dextrose 10 % infusion, Continuous PRN  insulin glargine (LANTUS) injection vial 6 Units, Nightly  insulin lispro (HUMALOG,ADMELOG) injection vial 0-4 Units, 4x Daily AC & HS      Physical Exam  Vitals:    01/13/25 1430 01/13/25 1500 01/13/25 1600 01/13/25 1613   BP: (!) 147/85 134/87 109/72    Pulse: 75 79 77 67   Resp: 13 13 12 12   Temp:   98.2 °F (36.8 °C)    TempSrc:   Oral    SpO2: 100% 100% 100% 100%   Weight:       Height:           I/O this shift:  In: 750 [I.V.:750]  Out: 100 [Stool:100]    Constitutional: Frail elderly gentleman but in no acute distress.  Right carotid bruit.    Neurological:    Good fund of knowledge.  Following commands appropriately.  Mild left nasolabial fold flattening.  No aphasia.  Mild dysarthria.  Full range of extraocular movements.  Visual acuity full to confrontation bilaterally.  Facial sensations intact to light touch in V1, V2 and V3 distributions bilaterally.  Left upper extremity strength is at least 4/5 proximally and

## 2025-01-13 NOTE — PROGRESS NOTES
Patient arrived via wheelchair with wife to Radiology department for procedure. Allergies, home medications, H&P and fasting instructions reviewed with patient. Vital signs taken. 22 IV placed, blood obtained, IV flushed and prn adapter attached. Blood sample sent to lab for ordered tests.  Procedural instructions given, questions answered, understanding expressed and consent signed. Patient given fluoroscopy education, no questions at this time.

## 2025-01-13 NOTE — ANESTHESIA PRE PROCEDURE
04:58 AM    CALCIUM 9.4 12/12/2024 04:58 AM    BILITOT 0.6 12/09/2024 12:15 PM    ALKPHOS 134 12/09/2024 12:15 PM    AST 22 12/09/2024 12:15 PM    ALT 11 12/09/2024 12:15 PM       POC Tests: No results for input(s): \"POCGLU\", \"POCNA\", \"POCK\", \"POCCL\", \"POCBUN\", \"POCHEMO\", \"POCHCT\" in the last 72 hours.    Coags:   Lab Results   Component Value Date/Time    PROTIME 36.8 12/09/2024 12:15 PM    PROTIME 29.1 07/01/2020 08:14 AM    INR 3.3 12/09/2024 12:15 PM    APTT 38.4 03/05/2024 09:33 PM    APTT 35.5 06/13/2022 05:33 PM       HCG (If Applicable): No results found for: \"PREGTESTUR\", \"PREGSERUM\", \"HCG\", \"HCGQUANT\"     ABGs: No results found for: \"PHART\", \"PO2ART\", \"DCM6XLL\", \"SKL5NCF\", \"BEART\", \"J5UDXYKX\"     Type & Screen (If Applicable):  No results found for: \"LABABO\"    Drug/Infectious Status (If Applicable):  No results found for: \"HIV\", \"HEPCAB\"    COVID-19 Screening (If Applicable):   Lab Results   Component Value Date/Time    COVID19 Not Detected 11/04/2024 09:58 PM    COVID19 Not Detected 08/20/2024 06:45 PM    COVID19 Not Detected 03/05/2024 05:32 PM           Anesthesia Evaluation  Patient summary reviewed  Airway: Mallampati: Unable to assess / NA          Dental:    (+) poor dentition      Pulmonary: breath sounds clear to auscultation    Smoker: Former.                        ROS comment:      Cardiovascular:  Exercise tolerance: good (>4 METS)  (+) hypertension:, dysrhythmias: atrial fibrillation, hyperlipidemia      ECG reviewed  Rhythm: irregular  Rate: normal  Echocardiogram reviewed         Beta Blocker:  Not on Beta Blocker      ROS comment: LARRY 12/11/2024    ·  Left Ventricle: Normal left ventricular systolic function with a visually estimated EF of 55 - 60%. Left ventricle size is normal. Normal wall thickness. Normal wall motion. Indeterminate diastolic function due to atrial fibrillation.  ·  Right Ventricle: Moderately reduced systolic function. TAPSE is 1.2 cm.  ·  Aortic Valve: Mild

## 2025-01-13 NOTE — PLAN OF CARE
Problem: Chronic Conditions and Co-morbidities  Goal: Patient's chronic conditions and co-morbidity symptoms are monitored and maintained or improved  Outcome: Progressing     Problem: Discharge Planning  Goal: Discharge to home or other facility with appropriate resources  Outcome: Progressing     Problem: Safety - Adult  Goal: Free from fall injury  Outcome: Progressing     Problem: Skin/Tissue Integrity  Goal: Absence of new skin breakdown  Description: 1.  Monitor for areas of redness and/or skin breakdown  2.  Assess vascular access sites hourly  3.  Every 4-6 hours minimum:  Change oxygen saturation probe site  4.  Every 4-6 hours:  If on nasal continuous positive airway pressure, respiratory therapy assess nares and determine need for appliance change or resting period.  Outcome: Progressing     Problem: ABCDS Injury Assessment  Goal: Absence of physical injury  Outcome: Progressing     Problem: Cardiovascular - Adult  Goal: Maintains optimal cardiac output and hemodynamic stability  Outcome: Progressing  Goal: Absence of cardiac dysrhythmias or at baseline  Outcome: Progressing     Problem: Skin/Tissue Integrity - Adult  Goal: Skin integrity remains intact  Outcome: Progressing  Goal: Incisions, wounds, or drain sites healing without S/S of infection  Outcome: Progressing  Goal: Oral mucous membranes remain intact  Outcome: Progressing

## 2025-01-13 NOTE — PROCEDURES
PROCEDURE NOTE  Date: 2025   Name: Johny Nichols  YOB: 1935    Procedures:    CAROTID NEUROINTERVENTION PROCEDURE NOTE    PATIENT NAME: Johny Nichols  MRN: [id number]  : 1935  DATE OF PROCEDURE: 25    Stroke Metrics  NIHSS prior to procedure: 0  IV TPA Administered: [] Yes  [x]  No  Consent obtained: [x] Yes  []  No  by Dr. Alejandro   Pedal Pulses checked: +2 bilaterally pre-procedure    Vital signs per anesthesia    Neurointerventionalist: Williams Alejandro MD  1st assistant Martin Kearney      Time Event Device Notes/Location   0904 Access site puncture   Location: right radial       0930 stent retriever SpiderFX TICI Reperfusion grade: NA   Location: [facility]   0932 balloon angioplasty Angioplasty 10ATM for 12 seconds TICI Reperfusion grade:NA   Location: [facility]   0936 balloon angioplasty Angioplasty 8ATM for 11 Seconds TICI Reperfusion grade:NA  Location: [facility]   0941 stent   TICI Reperfusion grade: NA  Location: [facility]   0944 Angioplasty Angioplasty 9ATM for 8 Seconds Location: [facility]   0959 Access site closure Right radial     Inflated 8 CC Sheath pulled and  Vascband used to close arterial puncture. Puncture site cleansed and dry dressing applied. No bleeding, swelling or complications noted, no change in pulses.      IA tPA adminstered: [] Yes  [x]  No       Time Event Dose      Post-procedure NIHSS \"unable to assess due to anesthesia/sedation\"}    1000 Initial site assessment: Right Radial site WNL, no bleeding or hematoma noted, dressing dry and intact. +2 bilateral pedal pulses. Right Radial Pulse palpable      1013  Patient transported to sicu  and handoff report given to Bedside Rn    Family updated: [x] Yes  []  No    Blood pressure parameters: SBP <160    Antiplatelet Recommendations:  N/A per Dr. Alejandro    Any additional follow up scans:  N/A per Dr. Alejandro

## 2025-01-13 NOTE — PROGRESS NOTES
Pt diagnosed with cognitive communication deficits and history of past strokes. Orientation is questionable and different for each person assessing. When requesting wife to sign, she says she agrees to the procedure but does not want to sign the consent form stating that \"he signs his own stuff\".

## 2025-01-13 NOTE — CONSULTS
107   CO2 25   GLUCOSE 91   BUN 34*   CREATININE 2.1*   MG 1.6   ANIONGAP 11   LABGLOM 30*   CALCIUM 9.4   No results for input(s): \"LABALBU\", \"LABA1C\", \"O9JCJLZ\", \"FT4\", \"TSH\", \"AST\", \"ALT\", \"LDH\", \"GGT\", \"ALKPHOS\", \"BILITOT\", \"BILIDIR\", \"AMMONIA\", \"AMYLASE\", \"LIPASE\", \"LACTATE\", \"CHOL\", \"HDL\", \"CHOLHDLRATIO\", \"TRIG\", \"VLDL\", \"ASW60DU\", \"PHENYTOIN\", \"PHENYF\", \"URICACID\", \"POCGLU\" in the last 72 hours.    Invalid input(s): \"PROT\", \"N7EHKEB\", \"LABGGT\", \"LDLCHOLESTEROL\"  ABG:No results found for: \"POCPH\", \"PHART\", \"PH\", \"POCPCO2\", \"KMB4YHY\", \"PCO2\", \"POCPO2\", \"PO2ART\", \"PO2\", \"POCHCO3\", \"KED2BUL\", \"HCO3\", \"NBEA\", \"PBEA\", \"BEART\", \"BE\", \"THGBART\", \"THB\", \"KOG1YQM\", \"MPPJ1CMP\", \"U1YSVTUB\", \"O2SAT\", \"FIO2\"  No results found for: \"SPECIAL\"  Lab Results   Component Value Date/Time    CULTURE NO GROWTH 07/01/2024 01:09 PM       Radiology:  No results found.    Physical Examination:        General appearance:  alert, cooperative and no distress  Mental Status:  oriented to person, place and time and normal affect  Lungs:  clear to auscultation bilaterally, normal effort  Heart:  regular rate and rhythm, no murmur  Abdomen:  soft, nontender, nondistended, normal bowel sounds, no masses, hepatomegaly, splenomegaly  Extremities:  no edema, redness, tenderness in the calves  Skin:  no gross lesions, rashes, induration    Assessment:            Plan:        Right ICA stenosis-status post Cervical and cerebral angiogram with right ICA angioplasty and stenting.  Continue strict blood pressure control.  Patient on Eliquis can restart tomorrow.  Continue Plavix for 4 weeks and Eliquis plus aspirin lifelong.  PAF-continue Eliquis.  Continue beta-blocker.  Monitor telemetry.  BPH-continue Flomax  Type 2 diabetes-continue home insulin regimen.  Sliding scale..  Hyperlipidemia- continue statin    Greater than 35 minutes spent on physical exam, charting, assessment plan    Stella Leon MD  1/13/2025  12:14 PM

## 2025-01-13 NOTE — FLOWSHEET NOTE
4 Eyes Skin Assessment     NAME:  Johny Nichols  YOB: 1935  MEDICAL RECORD NUMBER:  [id number]    The patient is being assessed for  Post-Op Surgical    I agree that at least one RN has performed a thorough Head to Toe Skin Assessment on the patient. ALL assessment sites listed below have been assessed.      Areas assessed by both nurses:    Head, Face, Ears, Shoulders, Back, Chest, Arms, Elbows, Hands, Sacrum. Buttock, Coccyx, Ischium, Legs. Feet and Heels, and Under Medical Devices         Does the Patient have a Wound? No noted wound(s)     Dry and flaky skin to feet/ legs     Edema to left upper arm    Clark Prevention initiated by RN: Yes  Wound Care Orders initiated by RN: No    Pressure Injury (Stage 3,4, Unstageable, DTI, NWPT, and Complex wounds) if present, place Wound referral order by RN under : No    New Ostomies, if present place, Ostomy referral order under : No     Nurse 1 eSignature: Electronically signed by Halley Ontiveros RN on 1/13/25 at 11:28 AM EST    **SHARE this note so that the co-signing nurse can place an eSignature**    Nurse 2 eSignature: Electronically signed by Brittany Page RN on 1/13/25 at 4:48 PM EST

## 2025-01-13 NOTE — ANESTHESIA POSTPROCEDURE EVALUATION
Department of Anesthesiology  Postprocedure Note    Patient: Johny Nichols  MRN: [id number]  YOB: 1935  Date of evaluation: 1/13/2025    Procedure Summary       Date: 01/13/25 Room / Location: [facility] Special Procedures; [facility] Radiology    Anesthesia Start: 0823 Anesthesia Stop: 1027    Procedures:       IR FIDELIA CATH PLACE COM CAROTID INTRACRANIAL RIGHT W OR WO ANGIO      IR CAROTID STENT W PROTECTION Diagnosis:       Symptomatic stenosis of right carotid artery      Occlusion of right carotid artery      (Symptomatic right carotid stenosis)    Scheduled Providers: Williams Alejandro MD Responsible Provider: Ema Lamar MD    Anesthesia Type: MAC ASA Status: 4            Anesthesia Type: No value filed.    Radha Phase I:      Radha Phase II:      Anesthesia Post Evaluation    Patient location during evaluation: PACU  Patient participation: complete - patient participated  Level of consciousness: awake and alert  Airway patency: patent  Nausea & Vomiting: no nausea and no vomiting  Cardiovascular status: blood pressure returned to baseline and hemodynamically stable  Respiratory status: acceptable and spontaneous ventilation  Hydration status: euvolemic  Multimodal analgesia pain management approach  Pain management: adequate    No notable events documented.

## 2025-01-13 NOTE — BRIEF OP NOTE
Neurointerventional surgery brief post procedure note    Date of Service: 25    Patient Name: Johny Nichols   : 1935  Medical record number:  [id number]    Procedure: Cervical and cerebral angiogram with right ICA angioplasty and stenting.  Physician: Williams Alejandro MD.  Assistant: Martin Kearney RTR.   Preoperative diagnosis: Symptomatic severe right ICA stenosis.  Postoperative diagnosis: Same, status post angioplasty and stenting.  Access: Right radial artery.  Vessels injected: Right common carotid artery.  Hemostasis: VASc band with 7 cc of air.  Anesthesia: MAC sedation.  Specimens: None.  Blood loss: 50 cc.  Complications: None immediate.    Impression/Findings:   1.  Complex atherosclerotic plaque in the distal right common carotid artery extending onto the right carotid bulb with 85% nonflow limiting stenosis of the proximal right ICA by diameter criteria.  This is now status post stenting with deployment of a 8 mm x 40 mm Cordis precise stent across the right carotid bifurcation with adjunct pre and post stent angioplasty x 3 with no residual stenosis seen at the end of the procedure.  2.  Prominent right posterior communicating artery but the right PCA is not fetal in configuration.  3.  Significant contribution to the right ophthalmic artery by the right ECA collaterals but post right carotid stenting, there is no retrograde flow to the right ophthalmic artery.    Plan:  1. Neurovascular and vital signs checks: Q15 min x 4, Q30 min x 2, Q60 min x 2 and then per unit protocol.  2. Goal systolic blood pressure more than 90 mmHg pressure less than 160.  As needed phenylephrine infusion if the systolic blood pressure is consistently less than 90 mmHg and the patient is symptomatic.  3. Resume home dose of Eliquis from tomorrow.  Patient was given intraoperative heparin today.  Additionally, continue Plavix 75 mg daily.  4. Eliquis plus Plavix for 4 weeks and thereafter Eliquis plus aspirin

## 2025-01-14 VITALS
WEIGHT: 144.1 LBS | HEIGHT: 69 IN | RESPIRATION RATE: 21 BRPM | DIASTOLIC BLOOD PRESSURE: 57 MMHG | BODY MASS INDEX: 21.34 KG/M2 | TEMPERATURE: 98.2 F | SYSTOLIC BLOOD PRESSURE: 101 MMHG | OXYGEN SATURATION: 100 % | HEART RATE: 70 BPM

## 2025-01-14 LAB
ANION GAP SERPL CALCULATED.3IONS-SCNC: 10 MMOL/L (ref 7–16)
BASOPHILS # BLD: 0.01 K/UL (ref 0–0.2)
BASOPHILS NFR BLD: 0 % (ref 0–2)
BUN SERPL-MCNC: 40 MG/DL (ref 6–23)
CALCIUM SERPL-MCNC: 8.8 MG/DL (ref 8.6–10.2)
CHLORIDE SERPL-SCNC: 107 MMOL/L (ref 98–107)
CO2 SERPL-SCNC: 24 MMOL/L (ref 22–29)
CREAT SERPL-MCNC: 1.9 MG/DL (ref 0.7–1.2)
EOSINOPHIL # BLD: 0 K/UL (ref 0.05–0.5)
EOSINOPHILS RELATIVE PERCENT: 0 % (ref 0–6)
ERYTHROCYTE [DISTWIDTH] IN BLOOD BY AUTOMATED COUNT: 15.2 % (ref 11.5–15)
GFR, ESTIMATED: 33 ML/MIN/1.73M2
GLUCOSE BLD-MCNC: 203 MG/DL (ref 74–99)
GLUCOSE BLD-MCNC: 240 MG/DL (ref 74–99)
GLUCOSE SERPL-MCNC: 213 MG/DL (ref 74–99)
HCT VFR BLD AUTO: 29.9 % (ref 37–54)
HGB BLD-MCNC: 9.2 G/DL (ref 12.5–16.5)
IMM GRANULOCYTES # BLD AUTO: 0.04 K/UL (ref 0–0.58)
IMM GRANULOCYTES NFR BLD: 1 % (ref 0–5)
LYMPHOCYTES NFR BLD: 0.74 K/UL (ref 1.5–4)
LYMPHOCYTES RELATIVE PERCENT: 12 % (ref 20–42)
MCH RBC QN AUTO: 27 PG (ref 26–35)
MCHC RBC AUTO-ENTMCNC: 30.8 G/DL (ref 32–34.5)
MCV RBC AUTO: 87.7 FL (ref 80–99.9)
MICROORGANISM SPEC CULT: NORMAL
MONOCYTES NFR BLD: 0.32 K/UL (ref 0.1–0.95)
MONOCYTES NFR BLD: 5 % (ref 2–12)
NEUTROPHILS NFR BLD: 82 % (ref 43–80)
NEUTS SEG NFR BLD: 4.9 K/UL (ref 1.8–7.3)
PLATELET # BLD AUTO: 72 K/UL (ref 130–450)
PLATELET CONFIRMATION: NORMAL
PMV BLD AUTO: 12.2 FL (ref 7–12)
POTASSIUM SERPL-SCNC: 5.2 MMOL/L (ref 3.5–5)
RBC # BLD AUTO: 3.41 M/UL (ref 3.8–5.8)
SODIUM SERPL-SCNC: 141 MMOL/L (ref 132–146)
SPECIMEN DESCRIPTION: NORMAL
WBC OTHER # BLD: 6 K/UL (ref 4.5–11.5)

## 2025-01-14 PROCEDURE — 97530 THERAPEUTIC ACTIVITIES: CPT

## 2025-01-14 PROCEDURE — 82962 GLUCOSE BLOOD TEST: CPT

## 2025-01-14 PROCEDURE — 80048 BASIC METABOLIC PNL TOTAL CA: CPT

## 2025-01-14 PROCEDURE — 85025 COMPLETE CBC W/AUTO DIFF WBC: CPT

## 2025-01-14 PROCEDURE — 6370000000 HC RX 637 (ALT 250 FOR IP): Performed by: STUDENT IN AN ORGANIZED HEALTH CARE EDUCATION/TRAINING PROGRAM

## 2025-01-14 PROCEDURE — 97166 OT EVAL MOD COMPLEX 45 MIN: CPT

## 2025-01-14 PROCEDURE — 97162 PT EVAL MOD COMPLEX 30 MIN: CPT

## 2025-01-14 RX ADMIN — SODIUM BICARBONATE 650 MG: 650 TABLET ORAL at 08:23

## 2025-01-14 RX ADMIN — INSULIN LISPRO 1 UNITS: 100 INJECTION, SOLUTION INTRAVENOUS; SUBCUTANEOUS at 11:26

## 2025-01-14 RX ADMIN — METOPROLOL TARTRATE 50 MG: 50 TABLET, FILM COATED ORAL at 08:23

## 2025-01-14 RX ADMIN — CLOPIDOGREL BISULFATE 75 MG: 75 TABLET ORAL at 08:23

## 2025-01-14 RX ADMIN — RANOLAZINE 500 MG: 500 TABLET, FILM COATED, EXTENDED RELEASE ORAL at 08:23

## 2025-01-14 RX ADMIN — INSULIN LISPRO 1 UNITS: 100 INJECTION, SOLUTION INTRAVENOUS; SUBCUTANEOUS at 06:15

## 2025-01-14 RX ADMIN — APIXABAN 2.5 MG: 2.5 TABLET, FILM COATED ORAL at 08:23

## 2025-01-14 RX ADMIN — Medication 1000 UNITS: at 08:23

## 2025-01-14 ASSESSMENT — PAIN SCALES - GENERAL: PAINLEVEL_OUTOF10: 0

## 2025-01-14 NOTE — DISCHARGE SUMMARY
Neurointerventional Discharge Summary    Patient ID: Johny Nichols      Patient's PCP: Nicholas Beal MD    Admit Date: 1/13/2025     Discharge Date:   ***    Admitting Physician: Williams Alejandro MD     Discharge Physician: Williams Alejandro MD        Active Hospital Problems    Diagnosis Date Noted    Symptomatic stenosis of right carotid artery [I65.21] 01/13/2025       Discharge Diagnoses: ***    Procedure Performed: ***    The patient was seen and examined on day of discharge and this discharge summary is in conjunction with any daily progress note from day of discharge.    Hospital Course: ***      Exam:     /67   Pulse 76   Temp 98.2 °F (36.8 °C) (Temporal)   Resp 21   Ht 1.753 m (5' 9.02\")   Wt 65.4 kg (144 lb 1.6 oz)   SpO2 97%   BMI 21.27 kg/m²     Constitutional: Well developed, well nourished and in no acute distress.   Respiratory: Clear to auscultation bilaterally with no use of accessory muscles during respiration.   Cardiovascular:  No murmurs auscultated. Carotid arteries without bruits. Pedal pulses and radial pulses 2+ bilaterally. No edema in all four extremities.   Abdomen: Soft, non-tender, non-distended.  Neurological: ***   Arteriotomy site: ***    Consults:     IP CONSULT TO HOSPITALIST    Disposition:  ***     Discharge Instructions/Follow-up:  ***    Activity: activity as tolerated    Labs: For convenience and continuity at follow-up the following most recent labs are provided:      CBC:    Lab Results   Component Value Date/Time    WBC 6.0 01/14/2025 04:24 AM    HGB 9.2 01/14/2025 04:24 AM    HCT 29.9 01/14/2025 04:24 AM    PLT 72 01/14/2025 04:24 AM       Renal:    Lab Results   Component Value Date/Time     01/14/2025 04:24 AM    K 5.2 01/14/2025 04:24 AM    K 4.6 09/09/2022 09:37 AM     01/14/2025 04:24 AM    CO2 24 01/14/2025 04:24 AM    BUN 40 01/14/2025 04:24 AM    CREATININE 1.9 01/14/2025 04:24 AM    CALCIUM 8.8 01/14/2025 04:24 AM    PHOS 2.8 11/07/2024

## 2025-01-14 NOTE — CARE COORDINATION
supports the patient's individualized plan of care/goals and shares the quality data associated with the providers was provided to:     Patient Representative Name:       The Patient and/or Patient Representative Agree with the Discharge Plan?      ABBY SMITH RN  Case Management Department  Ph: [phone]

## 2025-01-14 NOTE — DISCHARGE INSTRUCTIONS
1.  Continue Eliquis 2.5 mg twice daily (for atrial fibrillation) and Plavix 75 mg daily (fresh right carotid stent) for next 1 month.  Thereafter, Eliquis 2.5 mg twice daily and aspirin 81 mg daily lifelong.  Please absolutely stop Plavix after 1 month from today.  2.  Please do not hold Plavix for next 30 days.  This is the minimum time required for the carotid stent to epithelialize.  If there are any questions/concerns with bleeding, please call my office right away.  3.  See me back in the clinic in 3 months with follow-up carotid duplex.  We will order that imaging and my office will call to schedule an appointment.  4.  Systolic blood pressure goal should be between 140 to 160 mmHg.  5.  He will benefit from aggressive physical and occupational therapy at the facility where he resides.  6.  For any other questions/concerns, please call my office.    Williams Alejandro M.D.  Vascular Neurology & Neurointerventional Surgery

## 2025-01-14 NOTE — PROGRESS NOTES
OCCUPATIONAL THERAPY INITIAL EVALUATION    [facility]  [address], [place]       Date:2025                                                               Patient Name: Johny Nichols  MRN: [id number]  : 1935  Room: [address] [place] [address]    Evaluating OT: Kaila Escalera, OTR/L [id number]    Referring Provider: Williams Alejandro MD    Specific Provider Orders/Date: OT eval and treat (25)        Diagnosis: Occlusion of right carotid artery [I65.21]        Surgery/Procedures:  Cervical and cerebral angiogram with right ICA angioplasty and stenting.      Pertinent Medical History:    Past Medical History:   Diagnosis Date    A-fib ([facility])     Cerebrovascular accident (CVA) ([facility]) 2013    Chronic kidney disease     Chronic renal impairment, stage 3 (moderate) ([facility]) 10/10/2016    Colitis, ulcerative chronic ([facility])     Diabetes mellitus ([facility])     DM (diabetes mellitus) ([facility]) 2013    Encounter for therapeutic drug monitoring 2016    Hyperlipidemia     Hyperlipidemia associated with type 2 diabetes mellitus ([facility]) 2016    Hypertension     Long term (current) use of anticoagulants 2016    Prostate enlargement     Stroke (cerebrum) ([facility])     Unspecified cerebral artery occlusion with cerebral infarction       *Precautions:  Fall Risk, alarms, L UE flexion painful contracture, L hemiparesis, cognition, SBP     Assessment of current deficits   [x] Functional mobility  [x]ADLs  [x] Strength               []Cognition   [x] Functional transfers   [x] IADLs         [x] Safety Awareness   [x]Endurance   [x] Fine Coordination        [x] ROM     [] Vision/perception   []Sensation    [x]Gross Motor Coordination [x] Balance   [] Delirium                  [x]Motor Control  [] Communication    OT PLAN OF CARE   OT POC based on physician orders, patient diagnosis and results of clinical assessment.       Frequency/Duration:

## 2025-01-14 NOTE — ACP (ADVANCE CARE PLANNING)
Advance Care Planning   Healthcare Decision Maker:    Primary Decision Maker: Tia Nichols - Ex-Spouse - [phone]    Click here to complete Healthcare Decision Makers including selection of the Healthcare Decision Maker Relationship (ie \"Primary\").

## 2025-01-14 NOTE — DISCHARGE INSTR - DIET

## 2025-01-14 NOTE — PROGRESS NOTES
Physical Therapy  Physical Therapy Initial Assessment     Name: Johny Nichols  : 1935  MRN: [id number]      Date of Service: 2025    Evaluating PT:  Hiro Oconnor PT, DPT [id number]    Room #:  4522/4522-A  Diagnosis:  Occlusion of right carotid artery [I65.21]  PMHx/PSHx:    Past Medical History:   Diagnosis Date    A-fib ([facility])     Cerebrovascular accident (CVA) ([facility]) 2013    Chronic kidney disease     Chronic renal impairment, stage 3 (moderate) ([facility]) 10/10/2016    Colitis, ulcerative chronic ([facility])     Diabetes mellitus ([facility])     DM (diabetes mellitus) ([facility]) 2013    Encounter for therapeutic drug monitoring 2016    Hyperlipidemia     Hyperlipidemia associated with type 2 diabetes mellitus ([facility]) 2016    Hypertension     Long term (current) use of anticoagulants 2016    Prostate enlargement     Stroke (cerebrum) ([facility])     Unspecified cerebral artery occlusion with cerebral infarction 1973     Procedure/Surgery:   Cervical and cerebral angiogram with right ICA angioplasty and stenting.   Precautions:  Falls, alarms, LUE flexion contracture with pain, L hemiparesis, SBP   Equipment Needs:  TBD    SUBJECTIVE:    Pt was admitted from [facility].    Pt used wheelchair for primary mode of mobility PTA.    OBJECTIVE:   Initial Evaluation  Date: 25 Treatment Short Term/ Long Term   Goals   AM-PAC 6 Clicks      Was pt agreeable to Eval/treatment? Yes     Does pt have pain? LUE pain with attempted ROM     Bed Mobility  Rolling: NT  Supine to sit: MaxA  Sit to supine: MaxA  Scooting: MaxA  Yusra   Transfers Sit to stand: MaxA x 2  Stand to sit: MaxA x 2  Stand pivot: NT  Yusra with AAD   Ambulation   NT     Stair negotiation: ascended and descended NA     ROM BUE:  Defer to OT note  BLE:  WFL     Strength BUE:  Defer to OT note  RLE:  4/5  LLE:  3 to 3+/5  Increase by 1/3 MMT grade   Balance Sitting EOB:  ModA  Dynamic Standing:  MaxA x 2  Sitting EOB:  SBA  Dynamic

## 2025-01-14 NOTE — PLAN OF CARE
Problem: Chronic Conditions and Co-morbidities  Goal: Patient's chronic conditions and co-morbidity symptoms are monitored and maintained or improved  1/14/2025 0942 by Justine Moss RN  Outcome: Progressing  Flowsheets (Taken 1/14/2025 0800)  Care Plan - Patient's Chronic Conditions and Co-Morbidity Symptoms are Monitored and Maintained or Improved:   Monitor and assess patient's chronic conditions and comorbid symptoms for stability, deterioration, or improvement   Collaborate with multidisciplinary team to address chronic and comorbid conditions and prevent exacerbation or deterioration   Update acute care plan with appropriate goals if chronic or comorbid symptoms are exacerbated and prevent overall improvement and discharge     Problem: Discharge Planning  Goal: Discharge to home or other facility with appropriate resources  1/14/2025 0942 by Justine Moss RN  Outcome: Progressing  Flowsheets (Taken 1/14/2025 0800)  Discharge to home or other facility with appropriate resources:   Identify barriers to discharge with patient and caregiver   Identify discharge learning needs (meds, wound care, etc)     Problem: Safety - Adult  Goal: Free from fall injury  1/14/2025 0942 by Justine Moss RN  Outcome: Progressing  Flowsheets (Taken 1/14/2025 0940)  Free From Fall Injury:   Instruct family/caregiver on patient safety   Based on caregiver fall risk screen, instruct family/caregiver to ask for assistance with transferring infant if caregiver noted to have fall risk factors

## 2025-01-14 NOTE — DISCHARGE INSTR - COC
Barium Swallow with Video (Video Swallowing Test): not done    Treatments at the Time of Hospital Discharge:   Respiratory Treatments: ***  Oxygen Therapy:  is not on home oxygen therapy.  Ventilator:    - No ventilator support    Rehab Therapies: Physical Therapy and Occupational Therapy  Weight Bearing Status/Restrictions: No weight bearing restrictions  Other Medical Equipment (for information only, NOT a DME order):  wheelchair  Other Treatments: ***    Patient's personal belongings (please select all that are sent with patient):  ***wheelchair     RN SIGNATURE:  Electronically signed by Shelia Moss RN on 1/14/25 at 12:06 PM EST    CASE MANAGEMENT/SOCIAL WORK SECTION    Inpatient Status Date: 1/13/25    Readmission Risk Assessment Score:  [facility] RISK OF UNPLANNED READMISSION 2.0             24.4 Total Score        Discharging to Facility/ Agency   Name: [facility]  Address: [address] [place] [address]  Phone: [phone]  Fax: [phone]    Dialysis Facility (if applicable)   Name:  Address:  Dialysis Schedule:  Phone:  Fax:    / signature: Electronically signed by ABBY SMITH RN on 1/14/25 at 11:32 AM EST    PHYSICIAN SECTION    Prognosis: Fair    Condition at Discharge: Stable    Rehab Potential (if transferring to Rehab): Good    Recommended Labs or Other Treatments After Discharge: BMP in 1 week to follow up on serum creatinine and send the results to my office.     Physician Certification: I certify the above information and transfer of Johny Nichols  is necessary for the continuing treatment of the diagnosis listed and that he requires Skilled Nursing Facility for greater 30 days.     Update Admission H&P: No change in H&P    PHYSICIAN SIGNATURE:  Electronically signed by Williams Alejandro MD on 1/14/25 at 1:24 PM EST

## 2025-01-26 ENCOUNTER — HOSPITAL ENCOUNTER (INPATIENT)
Age: 89
LOS: 2 days | Discharge: HOSPICE | DRG: 556 | End: 2025-01-28
Attending: EMERGENCY MEDICINE | Admitting: INTERNAL MEDICINE
Payer: MEDICARE

## 2025-01-26 ENCOUNTER — APPOINTMENT (OUTPATIENT)
Dept: CT IMAGING | Age: 89
DRG: 556 | End: 2025-01-26
Attending: EMERGENCY MEDICINE
Payer: MEDICARE

## 2025-01-26 ENCOUNTER — APPOINTMENT (OUTPATIENT)
Dept: GENERAL RADIOLOGY | Age: 89
DRG: 556 | End: 2025-01-26
Payer: MEDICARE

## 2025-01-26 DIAGNOSIS — R41.82 ALTERED MENTAL STATUS, UNSPECIFIED ALTERED MENTAL STATUS TYPE: ICD-10-CM

## 2025-01-26 DIAGNOSIS — R79.89 ELEVATED TROPONIN: Primary | ICD-10-CM

## 2025-01-26 LAB
ALBUMIN SERPL-MCNC: 3.3 G/DL (ref 3.5–5.2)
ALP SERPL-CCNC: 108 U/L (ref 40–129)
ALT SERPL-CCNC: 10 U/L (ref 0–40)
AMMONIA PLAS-SCNC: 15 UMOL/L (ref 16–60)
ANION GAP SERPL CALCULATED.3IONS-SCNC: 11 MMOL/L (ref 7–16)
AST SERPL-CCNC: 22 U/L (ref 0–39)
BASOPHILS # BLD: 0 K/UL (ref 0–0.2)
BASOPHILS NFR BLD: 0 % (ref 0–2)
BILIRUB SERPL-MCNC: 0.4 MG/DL (ref 0–1.2)
BILIRUB UR QL STRIP: NEGATIVE
BUN SERPL-MCNC: 35 MG/DL (ref 6–23)
CALCIUM SERPL-MCNC: 9.2 MG/DL (ref 8.6–10.2)
CHLORIDE SERPL-SCNC: 108 MMOL/L (ref 98–107)
CLARITY UR: CLEAR
CO2 SERPL-SCNC: 21 MMOL/L (ref 22–29)
COLOR UR: YELLOW
COMMENT: ABNORMAL
CREAT SERPL-MCNC: 2.2 MG/DL (ref 0.7–1.2)
EOSINOPHIL # BLD: 0 K/UL (ref 0.05–0.5)
EOSINOPHILS RELATIVE PERCENT: 0 % (ref 0–6)
ERYTHROCYTE [DISTWIDTH] IN BLOOD BY AUTOMATED COUNT: 15.5 % (ref 11.5–15)
GFR, ESTIMATED: 29 ML/MIN/1.73M2
GLUCOSE BLD-MCNC: 146 MG/DL (ref 74–99)
GLUCOSE SERPL-MCNC: 137 MG/DL (ref 74–99)
GLUCOSE UR STRIP-MCNC: NEGATIVE MG/DL
HCT VFR BLD AUTO: 32.5 % (ref 37–54)
HGB BLD-MCNC: 10.1 G/DL (ref 12.5–16.5)
HGB UR QL STRIP.AUTO: NEGATIVE
KETONES UR STRIP-MCNC: NEGATIVE MG/DL
LEUKOCYTE ESTERASE UR QL STRIP: NEGATIVE
LYMPHOCYTES NFR BLD: 0.22 K/UL (ref 1.5–4)
LYMPHOCYTES RELATIVE PERCENT: 4 % (ref 20–42)
MCH RBC QN AUTO: 26.7 PG (ref 26–35)
MCHC RBC AUTO-ENTMCNC: 31.1 G/DL (ref 32–34.5)
MCV RBC AUTO: 86 FL (ref 80–99.9)
MONOCYTES NFR BLD: 0.17 K/UL (ref 0.1–0.95)
MONOCYTES NFR BLD: 3 % (ref 2–12)
NEUTROPHILS NFR BLD: 94 % (ref 43–80)
NEUTS SEG NFR BLD: 6.01 K/UL (ref 1.8–7.3)
NITRITE UR QL STRIP: NEGATIVE
PH UR STRIP: 5.5 [PH] (ref 5–9)
PLATELET CONFIRMATION: NORMAL
PLATELET, FLUORESCENCE: 78 K/UL (ref 130–450)
PMV BLD AUTO: 11.5 FL (ref 7–12)
POTASSIUM SERPL-SCNC: 4.1 MMOL/L (ref 3.5–5)
PROT SERPL-MCNC: 6.6 G/DL (ref 6.4–8.3)
PROT UR STRIP-MCNC: NEGATIVE MG/DL
RBC # BLD AUTO: 3.78 M/UL (ref 3.8–5.8)
RBC # BLD: ABNORMAL 10*6/UL
SODIUM SERPL-SCNC: 140 MMOL/L (ref 132–146)
SP GR UR STRIP: >1.03 (ref 1–1.03)
TROPONIN I SERPL HS-MCNC: 193 NG/L (ref 0–11)
TROPONIN I SERPL HS-MCNC: 297 NG/L (ref 0–11)
UROBILINOGEN UR STRIP-ACNC: 0.2 EU/DL (ref 0–1)
WBC OTHER # BLD: 6.4 K/UL (ref 4.5–11.5)

## 2025-01-26 PROCEDURE — 70450 CT HEAD/BRAIN W/O DYE: CPT

## 2025-01-26 PROCEDURE — 2060000000 HC ICU INTERMEDIATE R&B

## 2025-01-26 PROCEDURE — 82962 GLUCOSE BLOOD TEST: CPT

## 2025-01-26 PROCEDURE — 74176 CT ABD & PELVIS W/O CONTRAST: CPT

## 2025-01-26 PROCEDURE — 99285 EMERGENCY DEPT VISIT HI MDM: CPT

## 2025-01-26 PROCEDURE — 6370000000 HC RX 637 (ALT 250 FOR IP): Performed by: INTERNAL MEDICINE

## 2025-01-26 PROCEDURE — 71045 X-RAY EXAM CHEST 1 VIEW: CPT

## 2025-01-26 RX ORDER — ONDANSETRON 2 MG/ML
4 INJECTION INTRAMUSCULAR; INTRAVENOUS EVERY 6 HOURS PRN
Status: DISCONTINUED | OUTPATIENT
Start: 2025-01-26 | End: 2025-01-28 | Stop reason: HOSPADM

## 2025-01-26 RX ORDER — INSULIN LISPRO 100 [IU]/ML
0-4 INJECTION, SOLUTION INTRAVENOUS; SUBCUTANEOUS
Status: DISCONTINUED | OUTPATIENT
Start: 2025-01-26 | End: 2025-01-28 | Stop reason: HOSPADM

## 2025-01-26 RX ORDER — ACETAMINOPHEN 325 MG/1
650 TABLET ORAL EVERY 6 HOURS PRN
Status: DISCONTINUED | OUTPATIENT
Start: 2025-01-26 | End: 2025-01-28 | Stop reason: HOSPADM

## 2025-01-26 RX ORDER — TAMSULOSIN HYDROCHLORIDE 0.4 MG/1
0.4 CAPSULE ORAL NIGHTLY
Status: DISCONTINUED | OUTPATIENT
Start: 2025-01-26 | End: 2025-01-28 | Stop reason: HOSPADM

## 2025-01-26 RX ORDER — LOPERAMIDE HYDROCHLORIDE 2 MG/1
2 CAPSULE ORAL 4 TIMES DAILY PRN
Status: DISCONTINUED | OUTPATIENT
Start: 2025-01-26 | End: 2025-01-28 | Stop reason: HOSPADM

## 2025-01-26 RX ORDER — SODIUM BICARBONATE 650 MG/1
650 TABLET ORAL 3 TIMES DAILY
Status: DISCONTINUED | OUTPATIENT
Start: 2025-01-26 | End: 2025-01-28 | Stop reason: HOSPADM

## 2025-01-26 RX ORDER — ENOXAPARIN SODIUM 100 MG/ML
30 INJECTION SUBCUTANEOUS DAILY
Status: CANCELLED | OUTPATIENT
Start: 2025-01-26

## 2025-01-26 RX ORDER — ONDANSETRON 4 MG/1
4 TABLET, ORALLY DISINTEGRATING ORAL EVERY 8 HOURS PRN
Status: DISCONTINUED | OUTPATIENT
Start: 2025-01-26 | End: 2025-01-28 | Stop reason: HOSPADM

## 2025-01-26 RX ORDER — SODIUM CHLORIDE 9 MG/ML
INJECTION, SOLUTION INTRAVENOUS PRN
Status: DISCONTINUED | OUTPATIENT
Start: 2025-01-26 | End: 2025-01-28 | Stop reason: HOSPADM

## 2025-01-26 RX ORDER — POLYETHYLENE GLYCOL 3350 17 G/17G
17 POWDER, FOR SOLUTION ORAL DAILY PRN
Status: DISCONTINUED | OUTPATIENT
Start: 2025-01-26 | End: 2025-01-28 | Stop reason: HOSPADM

## 2025-01-26 RX ORDER — INSULIN GLARGINE 100 [IU]/ML
8 INJECTION, SOLUTION SUBCUTANEOUS NIGHTLY
Status: DISCONTINUED | OUTPATIENT
Start: 2025-01-26 | End: 2025-01-28 | Stop reason: HOSPADM

## 2025-01-26 RX ORDER — ATORVASTATIN CALCIUM 80 MG/1
80 TABLET, FILM COATED ORAL NIGHTLY
Status: DISCONTINUED | OUTPATIENT
Start: 2025-01-26 | End: 2025-01-28 | Stop reason: HOSPADM

## 2025-01-26 RX ORDER — CLOPIDOGREL BISULFATE 75 MG/1
75 TABLET ORAL DAILY
Status: DISCONTINUED | OUTPATIENT
Start: 2025-01-26 | End: 2025-01-28 | Stop reason: HOSPADM

## 2025-01-26 RX ORDER — ACETAMINOPHEN 650 MG/1
650 SUPPOSITORY RECTAL EVERY 6 HOURS PRN
Status: DISCONTINUED | OUTPATIENT
Start: 2025-01-26 | End: 2025-01-28 | Stop reason: HOSPADM

## 2025-01-26 RX ORDER — METOPROLOL TARTRATE 50 MG
50 TABLET ORAL 2 TIMES DAILY WITH MEALS
Status: DISCONTINUED | OUTPATIENT
Start: 2025-01-26 | End: 2025-01-28 | Stop reason: HOSPADM

## 2025-01-26 RX ORDER — SODIUM CHLORIDE 0.9 % (FLUSH) 0.9 %
5-40 SYRINGE (ML) INJECTION PRN
Status: DISCONTINUED | OUTPATIENT
Start: 2025-01-26 | End: 2025-01-28 | Stop reason: HOSPADM

## 2025-01-26 RX ORDER — VITAMIN B COMPLEX
1000 TABLET ORAL DAILY
Status: DISCONTINUED | OUTPATIENT
Start: 2025-01-26 | End: 2025-01-28 | Stop reason: HOSPADM

## 2025-01-26 RX ORDER — SODIUM CHLORIDE 0.9 % (FLUSH) 0.9 %
5-40 SYRINGE (ML) INJECTION EVERY 12 HOURS SCHEDULED
Status: DISCONTINUED | OUTPATIENT
Start: 2025-01-26 | End: 2025-01-28 | Stop reason: HOSPADM

## 2025-01-26 RX ORDER — RANOLAZINE 500 MG/1
500 TABLET, EXTENDED RELEASE ORAL 2 TIMES DAILY
Status: DISCONTINUED | OUTPATIENT
Start: 2025-01-26 | End: 2025-01-28 | Stop reason: HOSPADM

## 2025-01-26 RX ADMIN — METOPROLOL TARTRATE 50 MG: 50 TABLET, FILM COATED ORAL at 18:51

## 2025-01-26 RX ADMIN — APIXABAN 2.5 MG: 2.5 TABLET, FILM COATED ORAL at 22:21

## 2025-01-26 RX ADMIN — ATORVASTATIN CALCIUM 80 MG: 80 TABLET, FILM COATED ORAL at 22:21

## 2025-01-26 RX ADMIN — TAMSULOSIN HYDROCHLORIDE 0.4 MG: 0.4 CAPSULE ORAL at 22:21

## 2025-01-26 RX ADMIN — SODIUM BICARBONATE 650 MG: 650 TABLET ORAL at 22:21

## 2025-01-26 RX ADMIN — CLOPIDOGREL BISULFATE 75 MG: 75 TABLET ORAL at 18:51

## 2025-01-26 RX ADMIN — INSULIN GLARGINE 8 UNITS: 100 INJECTION, SOLUTION SUBCUTANEOUS at 22:22

## 2025-01-26 RX ADMIN — Medication 1000 UNITS: at 18:51

## 2025-01-26 RX ADMIN — SODIUM BICARBONATE 650 MG: 650 TABLET ORAL at 18:51

## 2025-01-26 ASSESSMENT — PAIN - FUNCTIONAL ASSESSMENT: PAIN_FUNCTIONAL_ASSESSMENT: NONE - DENIES PAIN

## 2025-01-26 NOTE — ED PROVIDER NOTES
NEGATIVE NEGATIVE    Leukocyte Esterase, Urine NEGATIVE NEGATIVE    Comment       Microscopic exam not performed based on chemical results unless requested in original order.   Troponin Now and Q 1 Hour    Collection Time: 01/26/25  2:14 PM   Result Value Ref Range    Troponin, High Sensitivity 297 (H) 0 - 11 ng/L         ED Course as of 01/26/25 1814   Sun Jan 26, 2025   1553 Thrombocytopenic, cardiology consulted regarding aspirin and anticoagulation. He is already on plavix and Eliquis [CD]   1555 Tofil accepts for admission [CD]   1555 He denies chest pain [CD]      ED Course User Index  [CD] Brayan Hunt MD       Medical Decision Making  The patient is awake and alert and talking here.  He denies plaints other than he had some nausea earlier.  He denies chest pain or shortness of breath.  He is no longer moaning here.  His EKG does not show ST segment elevation but he does have a troponin that went from 193 to 297.  His baseline troponin is in the 30s.  He is already on Eliquis and aspirin and Plavix.  Unfortunately he is thrombocytopenic here.  Consult was placed to cardiology regarding additional anticoagulants or antiplatelets although again he is on this.  This troponin is new and uncertain of the exact cause of this as again he is anticoagulated making PE much less likely, CT scan of the abdomen pelvis was reassuring.  Head CT without intracranial hemorrhage.  I did speak with stroke neurology who just stented his carotid artery recently and said if he needs to be anticoagulated from their standpoint it is okay.    He no longer appears to be altered.  No signs of infection.  No signs of meningitis or encephalitis.  Head CT unchanged.    Amount and/or Complexity of Data Reviewed  External Data Reviewed: notes.  Labs: ordered. Decision-making details documented in ED Course.  Radiology: ordered and independent interpretation performed. Decision-making details documented in ED Course.     Details: The

## 2025-01-26 NOTE — CARE COORDINATION
Has trista at facility, recently underwent  cervical and cerebral angiogram with Rt ICA angioplasty with stenting 1/13. Supposed to have restart Eliquis. He was moaning/screaming at facility and sent to ER. He has CRI, but troponins increased, and +trend. EKG: no STEMI    HE IS THROMBOCYTOPENIC(H/O ETOHISM)    DNRCCA    Cardiol consulted, but likely Tx conservatively, at his age, and co-morbidities.     ? Use heparin/ASA-per cardiol    He has dementia- may need depakote?

## 2025-01-27 ENCOUNTER — APPOINTMENT (OUTPATIENT)
Dept: CT IMAGING | Age: 89
DRG: 556 | End: 2025-01-27
Attending: INTERNAL MEDICINE
Payer: MEDICARE

## 2025-01-27 PROBLEM — F10.11 HISTORY OF ALCOHOL ABUSE: Status: ACTIVE | Noted: 2025-01-27

## 2025-01-27 PROBLEM — D69.6 THROMBOCYTOPENIA, ACQUIRED (HCC): Status: ACTIVE | Noted: 2025-01-27

## 2025-01-27 LAB
ANION GAP SERPL CALCULATED.3IONS-SCNC: 10 MMOL/L (ref 7–16)
BASOPHILS # BLD: 0.03 K/UL (ref 0–0.2)
BASOPHILS NFR BLD: 1 % (ref 0–2)
BUN SERPL-MCNC: 33 MG/DL (ref 6–23)
CALCIUM SERPL-MCNC: 9.4 MG/DL (ref 8.6–10.2)
CHLORIDE SERPL-SCNC: 106 MMOL/L (ref 98–107)
CO2 SERPL-SCNC: 26 MMOL/L (ref 22–29)
CREAT SERPL-MCNC: 1.9 MG/DL (ref 0.7–1.2)
EOSINOPHIL # BLD: 0.11 K/UL (ref 0.05–0.5)
EOSINOPHILS RELATIVE PERCENT: 2 % (ref 0–6)
ERYTHROCYTE [DISTWIDTH] IN BLOOD BY AUTOMATED COUNT: 15.7 % (ref 11.5–15)
GFR, ESTIMATED: 33 ML/MIN/1.73M2
GLUCOSE BLD-MCNC: 109 MG/DL (ref 74–99)
GLUCOSE BLD-MCNC: 131 MG/DL (ref 74–99)
GLUCOSE BLD-MCNC: 163 MG/DL (ref 74–99)
GLUCOSE BLD-MCNC: 166 MG/DL (ref 74–99)
GLUCOSE SERPL-MCNC: 75 MG/DL (ref 74–99)
HCT VFR BLD AUTO: 30.6 % (ref 37–54)
HGB BLD-MCNC: 9.5 G/DL (ref 12.5–16.5)
IMM GRANULOCYTES # BLD AUTO: <0.03 K/UL (ref 0–0.58)
IMM GRANULOCYTES NFR BLD: 0 % (ref 0–5)
LYMPHOCYTES NFR BLD: 1.1 K/UL (ref 1.5–4)
LYMPHOCYTES RELATIVE PERCENT: 19 % (ref 20–42)
MCH RBC QN AUTO: 27.4 PG (ref 26–35)
MCHC RBC AUTO-ENTMCNC: 31 G/DL (ref 32–34.5)
MCV RBC AUTO: 88.2 FL (ref 80–99.9)
MICROORGANISM SPEC CULT: NO GROWTH
MONOCYTES NFR BLD: 0.37 K/UL (ref 0.1–0.95)
MONOCYTES NFR BLD: 7 % (ref 2–12)
NEUTROPHILS NFR BLD: 72 % (ref 43–80)
NEUTS SEG NFR BLD: 4.09 K/UL (ref 1.8–7.3)
PLATELET # BLD AUTO: 94 K/UL (ref 130–450)
PLATELET CONFIRMATION: NORMAL
PMV BLD AUTO: 11.7 FL (ref 7–12)
POTASSIUM SERPL-SCNC: 4.3 MMOL/L (ref 3.5–5)
RBC # BLD AUTO: 3.47 M/UL (ref 3.8–5.8)
SODIUM SERPL-SCNC: 142 MMOL/L (ref 132–146)
SPECIMEN DESCRIPTION: NORMAL
WBC OTHER # BLD: 5.7 K/UL (ref 4.5–11.5)

## 2025-01-27 PROCEDURE — 82962 GLUCOSE BLOOD TEST: CPT

## 2025-01-27 PROCEDURE — 6360000004 HC RX CONTRAST MEDICATION: Performed by: RADIOLOGY

## 2025-01-27 PROCEDURE — 36415 COLL VENOUS BLD VENIPUNCTURE: CPT

## 2025-01-27 PROCEDURE — 2060000000 HC ICU INTERMEDIATE R&B

## 2025-01-27 PROCEDURE — 80048 BASIC METABOLIC PNL TOTAL CA: CPT

## 2025-01-27 PROCEDURE — 71275 CT ANGIOGRAPHY CHEST: CPT

## 2025-01-27 PROCEDURE — 99222 1ST HOSP IP/OBS MODERATE 55: CPT

## 2025-01-27 PROCEDURE — 85025 COMPLETE CBC W/AUTO DIFF WBC: CPT

## 2025-01-27 PROCEDURE — 6370000000 HC RX 637 (ALT 250 FOR IP): Performed by: INTERNAL MEDICINE

## 2025-01-27 PROCEDURE — APPSS60 APP SPLIT SHARED TIME 46-60 MINUTES: Performed by: CLINICAL NURSE SPECIALIST

## 2025-01-27 PROCEDURE — 2500000003 HC RX 250 WO HCPCS: Performed by: INTERNAL MEDICINE

## 2025-01-27 PROCEDURE — 99223 1ST HOSP IP/OBS HIGH 75: CPT | Performed by: INTERNAL MEDICINE

## 2025-01-27 RX ORDER — INSULIN LISPRO 100 [IU]/ML
0-10 INJECTION, SOLUTION SUBCUTANEOUS
COMMUNITY

## 2025-01-27 RX ORDER — SODIUM CHLORIDE 0.9 % (FLUSH) 0.9 %
10 SYRINGE (ML) INJECTION PRN
Status: DISCONTINUED | OUTPATIENT
Start: 2025-01-27 | End: 2025-01-28 | Stop reason: HOSPADM

## 2025-01-27 RX ORDER — POLYETHYLENE GLYCOL 3350 17 G/17G
17 POWDER, FOR SOLUTION ORAL DAILY PRN
COMMUNITY

## 2025-01-27 RX ORDER — IOPAMIDOL 755 MG/ML
70 INJECTION, SOLUTION INTRAVASCULAR
Status: COMPLETED | OUTPATIENT
Start: 2025-01-27 | End: 2025-01-27

## 2025-01-27 RX ORDER — BISACODYL 10 MG
10 SUPPOSITORY, RECTAL RECTAL
COMMUNITY

## 2025-01-27 RX ADMIN — IOPAMIDOL 70 ML: 755 INJECTION, SOLUTION INTRAVENOUS at 04:31

## 2025-01-27 RX ADMIN — APIXABAN 2.5 MG: 2.5 TABLET, FILM COATED ORAL at 10:19

## 2025-01-27 RX ADMIN — METOPROLOL TARTRATE 50 MG: 50 TABLET, FILM COATED ORAL at 10:21

## 2025-01-27 RX ADMIN — SODIUM BICARBONATE 650 MG: 650 TABLET ORAL at 13:13

## 2025-01-27 RX ADMIN — METOPROLOL TARTRATE 50 MG: 50 TABLET, FILM COATED ORAL at 17:52

## 2025-01-27 RX ADMIN — Medication 1000 UNITS: at 13:13

## 2025-01-27 RX ADMIN — ATORVASTATIN CALCIUM 80 MG: 80 TABLET, FILM COATED ORAL at 21:01

## 2025-01-27 RX ADMIN — CLOPIDOGREL BISULFATE 75 MG: 75 TABLET ORAL at 10:20

## 2025-01-27 RX ADMIN — APIXABAN 2.5 MG: 2.5 TABLET, FILM COATED ORAL at 21:01

## 2025-01-27 RX ADMIN — RANOLAZINE 500 MG: 500 TABLET, FILM COATED, EXTENDED RELEASE ORAL at 21:01

## 2025-01-27 RX ADMIN — INSULIN GLARGINE 8 UNITS: 100 INJECTION, SOLUTION SUBCUTANEOUS at 20:45

## 2025-01-27 RX ADMIN — TAMSULOSIN HYDROCHLORIDE 0.4 MG: 0.4 CAPSULE ORAL at 21:01

## 2025-01-27 RX ADMIN — SODIUM CHLORIDE, PRESERVATIVE FREE 10 ML: 5 INJECTION INTRAVENOUS at 21:01

## 2025-01-27 RX ADMIN — RANOLAZINE 500 MG: 500 TABLET, FILM COATED, EXTENDED RELEASE ORAL at 10:18

## 2025-01-27 RX ADMIN — SODIUM BICARBONATE 650 MG: 650 TABLET ORAL at 21:01

## 2025-01-27 RX ADMIN — SODIUM CHLORIDE, PRESERVATIVE FREE 10 ML: 5 INJECTION INTRAVENOUS at 10:21

## 2025-01-27 NOTE — ED NOTES
0712- pt resting with eyes closed.chest rises and falls.   0739- wife at bedside. Hand off received from jacqui cazares. Wife asking why he is still here. Lab results and ct results were   0815-Pt alert and oriented to person and place and situation. Skin warm and dry. Respirations even and unlabored. Denying any pains or sob. No distress noted. Wife and PT was voicing concerns of dnr cca vs cc and states wants to be comfort care. Pt states would like to speak with dr with his wife at the same time. Dr holloway was perfect served. Pt remains in hallway bed ee waiting for admission bed. No admission beds available and administation aware. No call bell as pt is in hallway. Pt side rales  up times two. Bed locked and low.

## 2025-01-27 NOTE — CONSULTS
Inpatient Cardiology Consultation      Reason for Consult:  Elevated Troponin / thrombocytopenia ? Aspirin ? Heparin     Consulting Physician: Dr Steele    Requesting Physician:  Dr. Hunt     Date of Consultation: 1/27/2025    HISTORY OF PRESENT ILLNESS:   Johny Nichols  is a 89 y.o.  male known to Dr Arechiga  seen in office 3/19/24     Seen most recently 11/5/2024 inpatient by Dr Mccormick :  noncardiac CP with [facility] No angiographically significant stenosis 8/2023.  Troponin 32-33 -- chronically elevated - BUN 41 CR 2.8>> BUN 36 CR 2.5 K5.4 Lopressor was increased to 50mg and Cardiology signed off .  patient was discharged on 11/7/2024 On the following cardiac medications: Aspirin 81 daily, atorvastatin 20 daily, Imdur 30 mg daily, Toprol tartrate 50 mg twice daily, Ranexa 500 twice daily, Coumadin  PCP switched Coumadin to Eliquis ? Date   Admit 12/10/2024 - 12/16/2024 for strokelike symptoms  - symptomatic R ICA stenosis 70% with strokelike symptoms   1/13/2025 right ICA angioplasty and stenting. Dr perez       ED 1/26/2025 11:25 AM via EMS for AMS--was yelling at the nursing home so they sent him to the hospital by the time he arrived to the hospital he was calm and cooperative oriented to person only  Arrival vitals: /72  SpO2 100% on room air afebrile  Significant Labs: Troponin 193-297(chronic range 21-52) BUN 35 CR 2.2 (at baseline) K4.1 ammonia 15 albumin 3.3 LFT WNL WBC 6.4 Hgb 10.1(chronic) PLT 78 (chronic)  RAD: CT of the pulmonary with contrast: No PE.  Coronary calcification noted.  Minimal tree-in-bud type opacities and peripheral right upper lobe bronchiolitis could be considered.  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  Chronic microvascular disease.  Portable chest x-ray: No acute process  ED 
discuss hospice choice and discharge plan    Code status DNR-CC  Advanced Directives: no POA or living will in epic  Surrogate/Legal NOK:    Spiritual assessment: no spiritual distress identified  Bereavement and grief: to be determined  Referrals to: none today    Thank you for the opportunity to participate in the care of Johny Nichols.     FILIPPO Aden CNP  Palliative Medicine     SUBJECTIVE:     Details of Conversation:   Chart reviewed. Per patient and wife wishes, they had spoken with attending earlier today, and they had decided to pursue comfort measures, hospice has been consulted, and CODE STATUS has already been changed to DNR CC.    Patient seen in the emergency room, he was awake alert  to self, location, he tells me, plan is to pursue comfort measures, however he did not get much input about hospice at discharge.      Patient's case was discussed with case management, she tells me, spouse is currently working, and she will be coming to the hospital after work, to review hospice choice and discharge planning, possibly returning back to nursing home with hospice versus returning home with hospice.  We will continue to follow.      Prognosis: Poor    OBJECTIVE:     /60   Pulse 64   Temp 98.1 °F (36.7 °C) (Oral)   Resp 16   Wt 65.4 kg (144 lb 2.9 oz)   SpO2 95%   BMI 21.28 kg/m²     Physical Examination:  Gen: elderly, thin, NAD, awake, alert   Lungs: respirations easy   Heart: regular rate   Abdomen: soft, non-tender  Extremities: no clubbing, cyanosis or edema  Skin: warm, dry without rashes, lesions, bruising  Neuro: awake, follows command    Objective data reviewed: labs, images, records, medication use, vitals, and chart    Time/Communication  Greater than 50% of time spent, total 55 minutes in counseling and coordination of care at the bedside regarding goals of care.    Thank you for allowing Palliative Medicine to participate in the care of Johny Nichols.    Note: This report was

## 2025-01-27 NOTE — CARE COORDINATION
Social Work /Transition of Care:    JORJE spoke with pt's wife when she arrived to the hospital.  Pt's wife reports plan will be for pt to return home with hospice care and she and another friend \"Joshua\" will be caring for pt.  JORJE provided freedom of choice and pt's wife stated she would like referral to [facility].  Nicholas with [facility] reports they do not have availability until tomorrow evening.  JORJE discussed other options with pt's wife who states referral can be made to [facility].  JORJE spoke to Lillian with [facility] and made referral.   Lillian reports they will be able to accept pt and begin services this evening but pt's wife prefers equipment be ordered and delivered this evening and pt to transport home tomorrow morning.  Lillian with [facility] is aware.

## 2025-01-27 NOTE — ED NOTES
Radiology Procedure Waiver   Name: Johny Nichols  : 1935  MRN: [id number]    Date:  25    Time: 2:41 AM EST    Benefits of immediately proceeding with radiology exam(s) without pre-testing outweigh the risks or are not indicated as specified below and therefore the following is/are being waived:    [x] Benefits of immediate radiology exam(s) outweigh any risk.                                               OR    Pre-exam testing is not indicated for the following reason(s):  [] Pregnancy test   [] Patients LMP on-time and regular.   [] Patient had Tubal Ligation or has other Contraception Device.   [] Patient  is Menopausal or Premenarcheal.    [] Patient had Full or Partial Hysterectomy.    [] Protocol for CT contrast allegry   [] Patient has tolerated well previously   [] Patient does not have a true allergy    [] MRI Questionnaire     [x] BUN/Creatinine   [] Patient age w/no hx of renal dysfunction.   [] Patient on Dialysis.   [] Recent Normal Labs.  Electronically signed by Cristina Briscoe DO on 25 at 2:41 AM Cristina Dunlap DO  25 0241

## 2025-01-27 NOTE — PLAN OF CARE
Problem: Safety - Adult  Goal: Free from fall injury  Outcome: Progressing     Problem: Chronic Conditions and Co-morbidities  Goal: Patient's chronic conditions and co-morbidity symptoms are monitored and maintained or improved  Outcome: Progressing     Problem: Discharge Planning  Goal: Discharge to home or other facility with appropriate resources  Outcome: Progressing     Problem: ABCDS Injury Assessment  Goal: Absence of physical injury  Outcome: Progressing     Problem: Skin/Tissue Integrity  Goal: Skin integrity remains intact  Description: 1.  Monitor for areas of redness and/or skin breakdown  2.  Assess vascular access sites hourly  3.  Every 4-6 hours minimum:  Change oxygen saturation probe site  4.  Every 4-6 hours:  If on nasal continuous positive airway pressure, respiratory therapy assess nares and determine need for appliance change or resting period  Outcome: Progressing     Problem: Confusion  Goal: Confusion, delirium, dementia, or psychosis is improved or at baseline  Description: INTERVENTIONS:  1. Assess for possible contributors to thought disturbance, including medications, impaired vision or hearing, underlying metabolic abnormalities, dehydration, psychiatric diagnoses, and notify attending LIP  2. Angie high risk fall precautions, as indicated  3. Provide frequent short contacts to provide reality reorientation, refocusing and direction  4. Decrease environmental stimuli, including noise as appropriate  5. Monitor and intervene to maintain adequate nutrition, hydration, elimination, sleep and activity  6. If unable to ensure safety without constant attention obtain sitter and review sitter guidelines with assigned personnel  7. Initiate Psychosocial CNS and Spiritual Care consult, as indicated  Outcome: Progressing

## 2025-01-27 NOTE — CARE COORDINATION
Social Work /Transition of Care:    Pt presented to the ED on 1/26 secondary to altered mental status from [facility].  Pt has hx of CVA and is confused.  Pt's code status is DNRCCA.  Pt admitted inpatient with elevated troponin.  Pt has consults for cardiology, palliative care, and hospice.    Pt's PCP is Dr Beal.    SW spoke to pt's wife.  Pt's wife states they are still  but they have been living separately for several years.  Pt's wife reports she spoke with Dr Beal and pt is DNRCC and she would like pt to have hospice care.  SW discussed pt returning home vs returning to the facility with hospice.  Pt's wife reports pt would be unable to return home and would most likely return to the facility with hospice.  SW spoke to Sasha at [facility] who states they could have a billing person speak with pt's wife to assess pt's financial status.  Pt's wife initially stated she would be at the hospital after work but then called SW back and stated she is on her way to the hospital now.

## 2025-01-27 NOTE — ACP (ADVANCE CARE PLANNING)
DW EX WIFE SITA. SHE TELLS ME [facility]NEY WANTS TO BE HOSPICE.     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)    Primary hypertension    Hyperlipidemia    Primary osteoarthritis involving multiple joints    Benign non-nodular prostatic hyperplasia with lower urinary tract symptoms    Elevated PSA    Chronic anticoagulation    Encounter for therapeutic drug monitoring    Prostate cancer (HCC)    Neuropathy    TIA involving right internal carotid artery    LUE weakness    TIA (transient ischemic attack)    Atypical chest pain    Pancreatic cyst    Pure hypercholesterolemia    Coronary artery disease involving native coronary artery of native heart without angina pectoris    Chest pain    Nonrheumatic mitral valve regurgitation    Nonrheumatic aortic valve insufficiency    Left ventricular hypertrophy    Exertional dyspnea    History of cerebrovascular accident    Debility    MCI (mild cognitive impairment)    Acute renal failure superimposed on chronic kidney disease (HCC)    Stroke-like symptoms    Stroke-like symptom    Symptomatic stenosis of right carotid artery    Elevated troponin    Thrombocytopenia, acquired (HCC)    History of alcohol abuse       These active diagnoses are of sufficient risk that focused discussion on advanced care planning is indicated in order to allow the patient to thoughtfully consider personal goals of care; and, if situations arise that prevent the patient to personally give input, to ensure appropriate representation of their personal desire for different levels and levels of care.     Person(s) present in discussion:  Nicholas Beal MD, Family members: SITA

## 2025-01-27 NOTE — FLOWSHEET NOTE
01/27/25 1746   Belongings   Dental Appliances None   Vision - Corrective Lenses Sent home   Hearing Aid None   Clothing Sent home   Jewelry None   Electronic Devices None   Weapons (Notify Protective Services/Security) None   Home Medications None   Valuables Given To Patient   Provide Name(s) of Who Valuable(s) Were Given To No belongings     Patient admitted to unit with no personal belongings. Belongings previously sent home with wife.

## 2025-01-27 NOTE — H&P
Internal Medicine History & Physical     Name: Johny Nichols  : 1935  Chief Complaint: Altered Mental Status (Pt DNR [facility] yelling from NH)  Primary Care Physician: Nicholas Beal MD  Admission date: 2025  Date of service: 2025     History of Present Illness  Johny is a 89 y.o. year old male with a PMH as below who presented with a chief complaint of  left arm throbbing, moaning/screaming at facility. He recently had neuro interventional procedure, then went to rehab. He was moaning and screaming out in pain  and was sent to ER. Workup discovered elevated troponin compared to baseline, with upward trend. On  he says his left arm/elbow was throbbing/painful. Denies any discomfort at this time.        In the ED Trop: 193--297(baseline 30). CTA chest noted-- he has known IPMN    The patient was admitted for increased troponin            Past Medical History:   Diagnosis Date    A-fib (HCC)     Cerebrovascular accident (CVA) (HCC) 2013    Chronic kidney disease     Chronic renal impairment, stage 3 (moderate) ([facility]) 10/10/2016    Colitis, ulcerative chronic (HCC)     Diabetes mellitus (HCC)     DM (diabetes mellitus) ([facility]) 2013    Encounter for therapeutic drug monitoring 2016    Hyperlipidemia     Hyperlipidemia associated with type 2 diabetes mellitus ([facility]) 2016    Hypertension     Long term (current) use of anticoagulants 2016    Prostate enlargement     Stroke (cerebrum) ([facility])     Unspecified cerebral artery occlusion with cerebral infarction        Past Surgical History:   Procedure Laterality Date    ABDOMEN SURGERY          COLONOSCOPY      COLOSTOMY      CYSTOSCOPY  more than 5 yrs    ECHO COMPL W DOP COLOR FLOW  2013         ECHOCARDIOGRAM TRANSESOPHAGEAL  2013         ENDOSCOPY, COLON, DIAGNOSTIC  12/10/15    ileoscopy    IR CAROTID STENT W PROTECTION  2025    IR CAROTID STENT W PROTECTION 2025 Williams Alejandro MD [facility]

## 2025-01-27 NOTE — ED NOTES
Pt ostomy bag spilled all over him and chucks to pt being opened. Pt was cleaned, bag closed, and new chucks placed.

## 2025-01-28 VITALS
TEMPERATURE: 97.1 F | DIASTOLIC BLOOD PRESSURE: 64 MMHG | OXYGEN SATURATION: 97 % | WEIGHT: 144 LBS | HEIGHT: 69 IN | SYSTOLIC BLOOD PRESSURE: 135 MMHG | RESPIRATION RATE: 18 BRPM | HEART RATE: 73 BPM | BODY MASS INDEX: 21.33 KG/M2

## 2025-01-28 LAB
ANION GAP SERPL CALCULATED.3IONS-SCNC: 14 MMOL/L (ref 7–16)
BASOPHILS # BLD: 0.02 K/UL (ref 0–0.2)
BASOPHILS NFR BLD: 0 % (ref 0–2)
BUN SERPL-MCNC: 33 MG/DL (ref 6–23)
CALCIUM SERPL-MCNC: 9.7 MG/DL (ref 8.6–10.2)
CHLORIDE SERPL-SCNC: 108 MMOL/L (ref 98–107)
CO2 SERPL-SCNC: 22 MMOL/L (ref 22–29)
CREAT SERPL-MCNC: 1.9 MG/DL (ref 0.7–1.2)
EOSINOPHIL # BLD: 0.12 K/UL (ref 0.05–0.5)
EOSINOPHILS RELATIVE PERCENT: 3 % (ref 0–6)
ERYTHROCYTE [DISTWIDTH] IN BLOOD BY AUTOMATED COUNT: 15.5 % (ref 11.5–15)
GFR, ESTIMATED: 34 ML/MIN/1.73M2
GLUCOSE BLD-MCNC: 102 MG/DL (ref 74–99)
GLUCOSE BLD-MCNC: 125 MG/DL (ref 74–99)
GLUCOSE SERPL-MCNC: 90 MG/DL (ref 74–99)
HCT VFR BLD AUTO: 32.8 % (ref 37–54)
HGB BLD-MCNC: 10.3 G/DL (ref 12.5–16.5)
IMM GRANULOCYTES # BLD AUTO: <0.03 K/UL (ref 0–0.58)
IMM GRANULOCYTES NFR BLD: 0 % (ref 0–5)
LYMPHOCYTES NFR BLD: 1.06 K/UL (ref 1.5–4)
LYMPHOCYTES RELATIVE PERCENT: 23 % (ref 20–42)
MCH RBC QN AUTO: 27.2 PG (ref 26–35)
MCHC RBC AUTO-ENTMCNC: 31.4 G/DL (ref 32–34.5)
MCV RBC AUTO: 86.5 FL (ref 80–99.9)
MONOCYTES NFR BLD: 0.39 K/UL (ref 0.1–0.95)
MONOCYTES NFR BLD: 9 % (ref 2–12)
NEUTROPHILS NFR BLD: 65 % (ref 43–80)
NEUTS SEG NFR BLD: 2.99 K/UL (ref 1.8–7.3)
PLATELET CONFIRMATION: NORMAL
PLATELET, FLUORESCENCE: 82 K/UL (ref 130–450)
PMV BLD AUTO: 11.4 FL (ref 7–12)
POTASSIUM SERPL-SCNC: 4.4 MMOL/L (ref 3.5–5)
RBC # BLD AUTO: 3.79 M/UL (ref 3.8–5.8)
SODIUM SERPL-SCNC: 144 MMOL/L (ref 132–146)
WBC OTHER # BLD: 4.6 K/UL (ref 4.5–11.5)

## 2025-01-28 PROCEDURE — 6370000000 HC RX 637 (ALT 250 FOR IP): Performed by: INTERNAL MEDICINE

## 2025-01-28 PROCEDURE — 82962 GLUCOSE BLOOD TEST: CPT

## 2025-01-28 PROCEDURE — 2500000003 HC RX 250 WO HCPCS: Performed by: INTERNAL MEDICINE

## 2025-01-28 PROCEDURE — 80048 BASIC METABOLIC PNL TOTAL CA: CPT

## 2025-01-28 PROCEDURE — 85025 COMPLETE CBC W/AUTO DIFF WBC: CPT

## 2025-01-28 PROCEDURE — 36415 COLL VENOUS BLD VENIPUNCTURE: CPT

## 2025-01-28 RX ORDER — ATORVASTATIN CALCIUM 80 MG/1
80 TABLET, FILM COATED ORAL NIGHTLY
Qty: 30 TABLET | Refills: 3 | Status: SHIPPED | OUTPATIENT
Start: 2025-01-28

## 2025-01-28 RX ADMIN — SODIUM BICARBONATE 650 MG: 650 TABLET ORAL at 08:15

## 2025-01-28 RX ADMIN — Medication 1000 UNITS: at 08:15

## 2025-01-28 RX ADMIN — SODIUM CHLORIDE, PRESERVATIVE FREE 10 ML: 5 INJECTION INTRAVENOUS at 08:15

## 2025-01-28 RX ADMIN — METOPROLOL TARTRATE 50 MG: 50 TABLET, FILM COATED ORAL at 08:15

## 2025-01-28 RX ADMIN — RANOLAZINE 500 MG: 500 TABLET, FILM COATED, EXTENDED RELEASE ORAL at 08:15

## 2025-01-28 RX ADMIN — CLOPIDOGREL BISULFATE 75 MG: 75 TABLET ORAL at 08:15

## 2025-01-28 RX ADMIN — APIXABAN 2.5 MG: 2.5 TABLET, FILM COATED ORAL at 08:15

## 2025-01-28 NOTE — PROGRESS NOTES
4 Eyes Skin Assessment     NAME:  Johny Nichols  YOB: 1935  MEDICAL RECORD NUMBER:  [id number]    The patient is being assessed for  Admission    I agree that at least one RN has performed a thorough Head to Toe Skin Assessment on the patient. ALL assessment sites listed below have been assessed.      Areas assessed by both nurses:    Head, Face, Ears, Shoulders, Back, Chest, Arms, Elbows, Hands, Sacrum. Buttock, Coccyx, Ischium, and Legs. Feet and Heels        Does the Patient have a Wound? No noted wound(s)       Clark Prevention initiated by RN: Yes  Wound Care Orders initiated by RN: No    Pressure Injury (Stage 3,4, Unstageable, DTI, NWPT, and Complex wounds) if present, place Wound referral order by RN under : No    New Ostomies, if present place, Ostomy referral order under : No     Nurse 1 eSignature: Electronically signed by Andra Harrison RN on 1/27/25 at 6:39 PM EST    **SHARE this note so that the co-signing nurse can place an eSignature**    Nurse 2 eSignature: Electronically signed by Matilde Fair RN on 1/27/25 at 6:47 PM EST   
Pt gfr 29, needs waiver for CT contrast  
Spiritual Health History and Assessment/Progress Note  [facility]    (P) Initial Encounter, Spiritual/Emotional Needs, Palliative Care,  ,  ,      Name: Johny Nichols MRN: [id number]    Age: 89 y.o.     Sex: male   Language: English   Samaritan: Episcopal   Elevated troponin     Date: 1/28/2025                           Spiritual Assessment began in [facility]        Referral/Consult From: (P) Rounding, Palliative Care   Encounter Overview/Reason: (P) Initial Encounter, Spiritual/Emotional Needs, Palliative Care  Service Provided For: (P) Patient    Vy, Belief, Meaning:   Patient identifies as spiritual, is connected with a vy tradition or spiritual practice, has beliefs or practices that help with coping during difficult times, and Other: Mr. Nichols and I talked about his Druze beliefs and how he would call himself a Episcopal.   Family/Friends No family/friends present      Importance and Influence:  Patient has spiritual/personal beliefs that influence decisions regarding their health and Other: Johny told me about how his vy has helped him.  He told me how prayer has been helpful.   Family/Friends No family/friends present    Community:  Patient is connected with a spiritual community and feels well-supported. Support system includes: Spouse/Partner, Vy Community, and Friends  Family/Friends No family/friends present    Assessment and Plan of Care:     Patient Interventions include: Facilitated expression of thoughts and feelings, Explored spiritual coping/struggle/distress, Engaged in theological reflection, Affirmed coping skills/support systems, and Other: Johny talked with me about his wife.  He also told me about moving to the area in the 1950s from [place].  Family/Friends Interventions include: No family/friends present    Patient Plan of Care: Spiritual Care available upon further referral  Family/Friends Plan of Care: No family/friends present    Electronically signed by 
01/27/2025     01/27/2025    K 4.3 01/27/2025     01/27/2025    CREATININE 1.9 (H) 01/27/2025    BUN 33 (H) 01/27/2025    CO2 26 01/27/2025    GLUCOSE 75 01/27/2025    ALT 10 01/26/2025    AST 22 01/26/2025    INR 1.2 01/13/2025    APTT 35.1 01/13/2025    TSH 0.93 08/21/2024    LABA1C 6.8 (H) 12/10/2024       CTA PULMONARY W CONTRAST   Final Result   No acute pulmonary embolism identified.      Minimal tree-in-bud type opacities along the peripheral right upper lobe.   Bronchiolitis could be considered.      Prominence of the pancreatic duct with low attenuating pancreatic lesions   could reflect cystic neoplasm.  Could evaluate with MRI if clinically   warranted.         CT HEAD WO CONTRAST   Final Result   1. No acute intracranial abnormality.   2. Chronic microvascular disease.         CT ABDOMEN PELVIS WO CONTRAST Additional Contrast? None   Final Result   1. Redemonstration of postsurgical changes suggestive of colectomy.   2. No bowel obstruction, free air, or free fluid.   3. Diminutive size of the liver which may indicate chronic hepatocellular   disease.   4. Redemonstration of cystic lesions involving the pancreatic body with   prominent diameter of the pancreatic duct.  This finding may be related to   chronic pancreatitis or IPMN.  MRI follow-up may be helpful for further   evaluation.         XR CHEST PORTABLE   Final Result   No acute process.             Echocardiogram      Assessment   Active Hospital Problems    Diagnosis     Thrombocytopenia, acquired (HCC) [D69.6]     History of alcohol abuse [F10.11]     Elevated troponin [R79.89]     IPMN (intraductal papillary mucinous neoplasm) [D49.0]          CONSULTS:  IP CONSULT TO INTERNAL MEDICINE  IP CONSULT TO CARDIOLOGY  IP CONSULT TO PALLIATIVE CARE  IP CONSULT TO HOSPICE  Plan  Dc 1/28 if hospice can set up at home  Dw pt/wife on code status and hospice    PT AM-PAC--   OT AM- PAC--     Follow labs   DVT prophylaxis  Please see orders

## 2025-01-28 NOTE — DISCHARGE SUMMARY
discussed with the patient/healthcare power of  and the final decision was made to proceed with minimally invasive approach of carotid artery angioplasty and stenting. The patient was electively brought to the hospital to accomplish the same. The full consent can be found elsewhere within the patient's chart. VASCULAR ACCESS: Right radial artery. VESSELS INJECTED: Right common carotid artery. PROCEDURES AND ANGIOGRAMS PERFORMED: 1. Percutaneous transluminal angioplasty and stenting of right internal carotid artery with distal embolic protection. 2. Ultrasound-guided access into right radial artery. 3. Selective right radial artery arteriogram in AP projection x1. 4. Selective right common carotid artery cervical angiogram in biplane projection x7 for baseline, intraprocedural and final angiographic studies. 5. Selective right common carotid artery cerebral angiogram in biplane projection x2 for baseline and final angiographic studies. 6. Selective right common carotid artery cervical/cerebral angiogram in biplane projection x1 for final angiographic study. 7. Deployment of vascular band at the site of arteriotomy in right wrist. PROCEDURAL TECHNIQUE AND FINDINGS: The patient was transported to the angiography suite and laid supine on the operating table. A quick timeout was performed and all involved parties agreed to proceed. The patient was managed by the anesthesia team with MAC sedation. Lidocaine 2% was used to infiltrate the skin and subcutaneous tissues overlying the intended arteriotomy site in the right wrist region. An ultrasound was used to identify the proximal right radial artery and the micropuncture kit was used to gain intravascular access. The arteriotomy was then exchanged for a 6 Icelandic by 10 cm radial sheath and a quick dedicated arteriogram injection confirmed that there was normal blood supply in the right hand including the supply to the wrist. Intra-arterial verapamil and

## 2025-01-28 NOTE — PLAN OF CARE
Problem: Safety - Adult  Goal: Free from fall injury  Outcome: Progressing  Flowsheets (Taken 1/28/2025 0800)  Free From Fall Injury: Instruct family/caregiver on patient safety     Problem: Chronic Conditions and Co-morbidities  Goal: Patient's chronic conditions and co-morbidity symptoms are monitored and maintained or improved  Outcome: Progressing     Problem: Discharge Planning  Goal: Discharge to home or other facility with appropriate resources  Outcome: Progressing     Problem: ABCDS Injury Assessment  Goal: Absence of physical injury  Outcome: Progressing  Flowsheets (Taken 1/28/2025 0800)  Absence of Physical Injury: Implement safety measures based on patient assessment

## 2025-01-30 LAB
EKG ATRIAL RATE: 65 BPM
EKG Q-T INTERVAL: 356 MS
EKG QRS DURATION: 70 MS
EKG QTC CALCULATION (BAZETT): 430 MS
EKG R AXIS: 5 DEGREES
EKG T AXIS: 4 DEGREES
EKG VENTRICULAR RATE: 88 BPM

## 2025-02-25 PROBLEM — R79.89 ELEVATED TROPONIN: Status: RESOLVED | Noted: 2025-01-26 | Resolved: 2025-02-25

## 2025-03-11 ENCOUNTER — APPOINTMENT (OUTPATIENT)
Dept: GENERAL RADIOLOGY | Age: 89
End: 2025-03-11
Payer: MEDICARE

## 2025-03-11 ENCOUNTER — HOSPITAL ENCOUNTER (EMERGENCY)
Age: 89
Discharge: HOME | End: 2025-03-11
Attending: EMERGENCY MEDICINE
Payer: MEDICARE

## 2025-03-11 ENCOUNTER — APPOINTMENT (OUTPATIENT)
Dept: CT IMAGING | Age: 89
End: 2025-03-11
Payer: MEDICARE

## 2025-03-11 VITALS
HEART RATE: 92 BPM | BODY MASS INDEX: 21.33 KG/M2 | RESPIRATION RATE: 23 BRPM | TEMPERATURE: 97.4 F | DIASTOLIC BLOOD PRESSURE: 88 MMHG | OXYGEN SATURATION: 99 % | HEIGHT: 69 IN | SYSTOLIC BLOOD PRESSURE: 123 MMHG | WEIGHT: 144 LBS

## 2025-03-11 DIAGNOSIS — R41.82 ALTERED MENTAL STATUS, UNSPECIFIED ALTERED MENTAL STATUS TYPE: ICD-10-CM

## 2025-03-11 DIAGNOSIS — Z66 DNR (DO NOT RESUSCITATE): ICD-10-CM

## 2025-03-11 DIAGNOSIS — N30.01 ACUTE CYSTITIS WITH HEMATURIA: Primary | ICD-10-CM

## 2025-03-11 DIAGNOSIS — R73.9 HYPERGLYCEMIA: ICD-10-CM

## 2025-03-11 LAB
ALBUMIN SERPL-MCNC: 3.8 G/DL (ref 3.5–5.2)
ALP SERPL-CCNC: 162 U/L (ref 40–129)
ALT SERPL-CCNC: 8 U/L (ref 0–40)
ANION GAP SERPL CALCULATED.3IONS-SCNC: 14 MMOL/L (ref 7–16)
ANION GAP SERPL CALCULATED.3IONS-SCNC: 17 MMOL/L (ref 7–16)
AST SERPL-CCNC: 20 U/L (ref 0–39)
B-OH-BUTYR SERPL-MCNC: 0.53 MMOL/L (ref 0.02–0.27)
BACTERIA URNS QL MICRO: ABNORMAL
BASOPHILS # BLD: 0.04 K/UL (ref 0–0.2)
BASOPHILS NFR BLD: 1 % (ref 0–2)
BILIRUB SERPL-MCNC: 0.7 MG/DL (ref 0–1.2)
BILIRUB UR QL STRIP: NEGATIVE
BUN SERPL-MCNC: 32 MG/DL (ref 6–23)
BUN SERPL-MCNC: 37 MG/DL (ref 6–23)
CALCIUM SERPL-MCNC: 8.5 MG/DL (ref 8.6–10.2)
CALCIUM SERPL-MCNC: 9.7 MG/DL (ref 8.6–10.2)
CHLORIDE SERPL-SCNC: 102 MMOL/L (ref 98–107)
CHLORIDE SERPL-SCNC: 89 MMOL/L (ref 98–107)
CLARITY UR: CLEAR
CO2 SERPL-SCNC: 20 MMOL/L (ref 22–29)
CO2 SERPL-SCNC: 20 MMOL/L (ref 22–29)
COLOR UR: YELLOW
CREAT SERPL-MCNC: 1.7 MG/DL (ref 0.7–1.2)
CREAT SERPL-MCNC: 1.9 MG/DL (ref 0.7–1.2)
EKG ATRIAL RATE: 111 BPM
EKG Q-T INTERVAL: 316 MS
EKG QRS DURATION: 74 MS
EKG QTC CALCULATION (BAZETT): 435 MS
EKG R AXIS: 36 DEGREES
EKG T AXIS: 44 DEGREES
EKG VENTRICULAR RATE: 114 BPM
EOSINOPHIL # BLD: 0.08 K/UL (ref 0.05–0.5)
EOSINOPHILS RELATIVE PERCENT: 1 % (ref 0–6)
EPI CELLS #/AREA URNS HPF: ABNORMAL /HPF
ERYTHROCYTE [DISTWIDTH] IN BLOOD BY AUTOMATED COUNT: 14 % (ref 11.5–15)
FLUAV RNA RESP QL NAA+PROBE: NOT DETECTED
FLUBV RNA RESP QL NAA+PROBE: NOT DETECTED
GFR, ESTIMATED: 33 ML/MIN/1.73M2
GFR, ESTIMATED: 38 ML/MIN/1.73M2
GLUCOSE BLD-MCNC: 386 MG/DL (ref 74–99)
GLUCOSE BLD-MCNC: >500 MG/DL (ref 74–99)
GLUCOSE SERPL-MCNC: 192 MG/DL (ref 74–99)
GLUCOSE SERPL-MCNC: 536 MG/DL (ref 74–99)
GLUCOSE UR STRIP-MCNC: >=1000 MG/DL
HCT VFR BLD AUTO: 35.9 % (ref 37–54)
HGB BLD-MCNC: 11.6 G/DL (ref 12.5–16.5)
HGB UR QL STRIP.AUTO: ABNORMAL
IMM GRANULOCYTES # BLD AUTO: 0.05 K/UL (ref 0–0.58)
IMM GRANULOCYTES NFR BLD: 1 % (ref 0–5)
KETONES UR STRIP-MCNC: NEGATIVE MG/DL
LEUKOCYTE ESTERASE UR QL STRIP: NEGATIVE
LYMPHOCYTES NFR BLD: 1.23 K/UL (ref 1.5–4)
LYMPHOCYTES RELATIVE PERCENT: 14 % (ref 20–42)
MCH RBC QN AUTO: 27 PG (ref 26–35)
MCHC RBC AUTO-ENTMCNC: 32.3 G/DL (ref 32–34.5)
MCV RBC AUTO: 83.7 FL (ref 80–99.9)
MONOCYTES NFR BLD: 0.52 K/UL (ref 0.1–0.95)
MONOCYTES NFR BLD: 6 % (ref 2–12)
NEUTROPHILS NFR BLD: 78 % (ref 43–80)
NEUTS SEG NFR BLD: 6.71 K/UL (ref 1.8–7.3)
NITRITE UR QL STRIP: NEGATIVE
PH UR STRIP: 5.5 [PH] (ref 5–8)
PLATELET # BLD AUTO: 138 K/UL (ref 130–450)
PMV BLD AUTO: 11.9 FL (ref 7–12)
POTASSIUM SERPL-SCNC: 4.3 MMOL/L (ref 3.5–5)
POTASSIUM SERPL-SCNC: 5.4 MMOL/L (ref 3.5–5)
PROT SERPL-MCNC: 7.7 G/DL (ref 6.4–8.3)
PROT UR STRIP-MCNC: ABNORMAL MG/DL
RBC # BLD AUTO: 4.29 M/UL (ref 3.8–5.8)
RBC #/AREA URNS HPF: ABNORMAL /HPF
SARS-COV-2 RNA RESP QL NAA+PROBE: NOT DETECTED
SODIUM SERPL-SCNC: 126 MMOL/L (ref 132–146)
SODIUM SERPL-SCNC: 136 MMOL/L (ref 132–146)
SOURCE: NORMAL
SP GR UR STRIP: 1.01 (ref 1–1.03)
SPECIMEN DESCRIPTION: NORMAL
TROPONIN I SERPL HS-MCNC: 35 NG/L (ref 0–11)
TROPONIN I SERPL HS-MCNC: 37 NG/L (ref 0–11)
UROBILINOGEN UR STRIP-ACNC: 0.2 EU/DL (ref 0–1)
WBC #/AREA URNS HPF: ABNORMAL /HPF
WBC OTHER # BLD: 8.6 K/UL (ref 4.5–11.5)

## 2025-03-11 PROCEDURE — 85025 COMPLETE CBC W/AUTO DIFF WBC: CPT

## 2025-03-11 PROCEDURE — 2580000003 HC RX 258

## 2025-03-11 PROCEDURE — 2500000003 HC RX 250 WO HCPCS

## 2025-03-11 PROCEDURE — 71045 X-RAY EXAM CHEST 1 VIEW: CPT

## 2025-03-11 PROCEDURE — 84484 ASSAY OF TROPONIN QUANT: CPT

## 2025-03-11 PROCEDURE — 6360000002 HC RX W HCPCS

## 2025-03-11 PROCEDURE — 93010 ELECTROCARDIOGRAM REPORT: CPT | Performed by: INTERNAL MEDICINE

## 2025-03-11 PROCEDURE — 80053 COMPREHEN METABOLIC PANEL: CPT

## 2025-03-11 PROCEDURE — 82962 GLUCOSE BLOOD TEST: CPT

## 2025-03-11 PROCEDURE — 0202U NFCT DS 22 TRGT SARS-COV-2: CPT

## 2025-03-11 PROCEDURE — 87636 SARSCOV2 & INF A&B AMP PRB: CPT

## 2025-03-11 PROCEDURE — 70450 CT HEAD/BRAIN W/O DYE: CPT

## 2025-03-11 PROCEDURE — 87086 URINE CULTURE/COLONY COUNT: CPT

## 2025-03-11 PROCEDURE — 82010 KETONE BODYS QUAN: CPT

## 2025-03-11 PROCEDURE — 74176 CT ABD & PELVIS W/O CONTRAST: CPT

## 2025-03-11 PROCEDURE — 96374 THER/PROPH/DIAG INJ IV PUSH: CPT

## 2025-03-11 PROCEDURE — 99285 EMERGENCY DEPT VISIT HI MDM: CPT

## 2025-03-11 PROCEDURE — 80048 BASIC METABOLIC PNL TOTAL CA: CPT

## 2025-03-11 PROCEDURE — 71250 CT THORAX DX C-: CPT

## 2025-03-11 PROCEDURE — 93005 ELECTROCARDIOGRAM TRACING: CPT

## 2025-03-11 PROCEDURE — 81001 URINALYSIS AUTO W/SCOPE: CPT

## 2025-03-11 PROCEDURE — 96375 TX/PRO/DX INJ NEW DRUG ADDON: CPT

## 2025-03-11 PROCEDURE — 6370000000 HC RX 637 (ALT 250 FOR IP)

## 2025-03-11 RX ORDER — 0.9 % SODIUM CHLORIDE 0.9 %
1000 INTRAVENOUS SOLUTION INTRAVENOUS ONCE
Status: COMPLETED | OUTPATIENT
Start: 2025-03-11 | End: 2025-03-11

## 2025-03-11 RX ORDER — CEFDINIR 300 MG/1
300 CAPSULE ORAL DAILY
Qty: 7 CAPSULE | Refills: 0 | Status: SHIPPED | OUTPATIENT
Start: 2025-03-11 | End: 2025-03-18

## 2025-03-11 RX ADMIN — SODIUM CHLORIDE 1000 ML: 0.9 INJECTION, SOLUTION INTRAVENOUS at 09:49

## 2025-03-11 RX ADMIN — INSULIN HUMAN 8 UNITS: 100 INJECTION, SOLUTION PARENTERAL at 09:27

## 2025-03-11 RX ADMIN — SODIUM CHLORIDE 1000 ML: 0.9 INJECTION, SOLUTION INTRAVENOUS at 07:55

## 2025-03-11 RX ADMIN — WATER 1000 MG: 1 INJECTION INTRAMUSCULAR; INTRAVENOUS; SUBCUTANEOUS at 15:07

## 2025-03-11 NOTE — ED PROVIDER NOTES
[facility] EMERGENCY DEPARTMENT  EMERGENCY DEPARTMENT ENCOUNTER        Pt Name: Johny Nichols  MRN: [id number]  Birthdate 8/22/1935  Date of evaluation: 3/11/2025  Provider: Merry Garza MD  Attending Provider: Tadeo Diamond MD  PCP: Nicholas Beal MD  Note Started: 7:22 AM EDT 3/11/25    CHIEF COMPLAINT       Chief Complaint   Patient presents with    Altered Mental Status     (Patient altered per family, patient is in hospice, family wanted patient to be evaluated)       HISTORY OF PRESENT ILLNESS: 1 or more Elements   History From: the patient        Johny Nichols is a 89 y.o. male with a past medical history of type 2 diabetes, prior CVA, atrial fibrillation on Eliquis, prostate cancer, TIA, hypertension, hyperlipidemia presenting with altered mental status.  Patient is a hospice patient.  Patient has reportedly been more altered than normal.  His home health nurse checked his blood sugar was elevated.  They recommended that the patient come in for further evaluation.  Patient's family was agreeable with this plan.  On assessment, patient denies any complaints at this time.  He denies any chest pain or shortness of breath or nausea or vomiting or abdominal pain.        Nursing Notes were all reviewed and agreed with or any disagreements were addressed in the HPI.      REVIEW OF EXTERNAL NOTE :           PDMP Monitoring:    Last PDMP Kenji as Reviewed:  Review User Review Instant Review Result            Urine Drug Screenings (1 yr)    No resulted procedures found.       Medication Contract and Consent for Opioid Use Documents Filed        No documents found                      REVIEW OF SYSTEMS :           Positives and Pertinent negatives as per HPI.     SURGICAL HISTORY     Past Surgical History:   Procedure Laterality Date    ABDOMEN SURGERY      1978    COLONOSCOPY      COLOSTOMY  1978    CYSTOSCOPY  more than 5 yrs    ECHO COMPL W DOP COLOR FLOW  11/26/2013

## 2025-03-11 NOTE — CARE COORDINATION
Social Work /Transition of Care:    Pt presents to the ED secondary to altered mental status from home.  Pt is active with [facility] with diagnosis of senile degeneration of the brain.  Pt is a DNRCC.  SW to follow.

## 2025-03-11 NOTE — CARE COORDINATION
Social Work /Transition of Care:    Pt is discharged and will return home via [facility].  ETA 5pm.  Pt's wife aware and will be at home when ambulance arrives.  RN aware.

## 2025-03-11 NOTE — DISCHARGE INSTRUCTIONS
50 antibiotics as prescribed follow-up with your primary care physician.  Continue with healthy patient.

## 2025-03-12 LAB
B PARAP IS1001 DNA NPH QL NAA+NON-PROBE: NOT DETECTED
B PERT DNA SPEC QL NAA+PROBE: NOT DETECTED
C PNEUM DNA NPH QL NAA+NON-PROBE: NOT DETECTED
FLUAV RNA NPH QL NAA+NON-PROBE: NOT DETECTED
FLUBV RNA NPH QL NAA+NON-PROBE: NOT DETECTED
HADV DNA NPH QL NAA+NON-PROBE: NOT DETECTED
HCOV 229E RNA NPH QL NAA+NON-PROBE: NOT DETECTED
HCOV HKU1 RNA NPH QL NAA+NON-PROBE: NOT DETECTED
HCOV NL63 RNA NPH QL NAA+NON-PROBE: NOT DETECTED
HCOV OC43 RNA NPH QL NAA+NON-PROBE: NOT DETECTED
HMPV RNA NPH QL NAA+NON-PROBE: NOT DETECTED
HPIV1 RNA NPH QL NAA+NON-PROBE: NOT DETECTED
HPIV2 RNA NPH QL NAA+NON-PROBE: NOT DETECTED
HPIV3 RNA NPH QL NAA+NON-PROBE: NOT DETECTED
HPIV4 RNA NPH QL NAA+NON-PROBE: NOT DETECTED
M PNEUMO DNA NPH QL NAA+NON-PROBE: NOT DETECTED
MICROORGANISM SPEC CULT: NO GROWTH
RSV RNA NPH QL NAA+NON-PROBE: NOT DETECTED
RV+EV RNA NPH QL NAA+NON-PROBE: NOT DETECTED
SARS-COV-2 RNA NPH QL NAA+NON-PROBE: NOT DETECTED
SERVICE CMNT-IMP: NORMAL
SPECIMEN DESCRIPTION: NORMAL
SPECIMEN DESCRIPTION: NORMAL

## 2025-03-15 ENCOUNTER — HOSPITAL ENCOUNTER (EMERGENCY)
Age: 89
Discharge: NURSING, REHAB OR LONG-TERM CARE | End: 2025-03-15
Attending: EMERGENCY MEDICINE
Payer: MEDICARE

## 2025-03-15 ENCOUNTER — APPOINTMENT (OUTPATIENT)
Dept: GENERAL RADIOLOGY | Age: 89
End: 2025-03-15
Payer: MEDICARE

## 2025-03-15 VITALS
OXYGEN SATURATION: 100 % | TEMPERATURE: 97.8 F | HEART RATE: 90 BPM | WEIGHT: 144 LBS | BODY MASS INDEX: 21.33 KG/M2 | RESPIRATION RATE: 19 BRPM | DIASTOLIC BLOOD PRESSURE: 64 MMHG | HEIGHT: 69 IN | SYSTOLIC BLOOD PRESSURE: 118 MMHG

## 2025-03-15 DIAGNOSIS — M25.552 LEFT HIP PAIN: Primary | ICD-10-CM

## 2025-03-15 DIAGNOSIS — R73.9 HYPERGLYCEMIA: ICD-10-CM

## 2025-03-15 LAB
ALBUMIN SERPL-MCNC: 3.7 G/DL (ref 3.5–5.2)
ALP SERPL-CCNC: 141 U/L (ref 40–129)
ALT SERPL-CCNC: 10 U/L (ref 0–40)
ANION GAP SERPL CALCULATED.3IONS-SCNC: 14 MMOL/L (ref 7–16)
ANION GAP SERPL CALCULATED.3IONS-SCNC: 14 MMOL/L (ref 7–16)
AST SERPL-CCNC: 27 U/L (ref 0–39)
BACTERIA URNS QL MICRO: ABNORMAL
BASOPHILS # BLD: 0.03 K/UL (ref 0–0.2)
BASOPHILS NFR BLD: 0 % (ref 0–2)
BILIRUB SERPL-MCNC: 0.4 MG/DL (ref 0–1.2)
BILIRUB UR QL STRIP: NEGATIVE
BUN SERPL-MCNC: 31 MG/DL (ref 6–23)
BUN SERPL-MCNC: 32 MG/DL (ref 6–23)
CALCIUM SERPL-MCNC: 9.1 MG/DL (ref 8.6–10.2)
CALCIUM SERPL-MCNC: 9.6 MG/DL (ref 8.6–10.2)
CHLORIDE SERPL-SCNC: 95 MMOL/L (ref 98–107)
CHLORIDE SERPL-SCNC: 98 MMOL/L (ref 98–107)
CLARITY UR: CLEAR
CO2 SERPL-SCNC: 20 MMOL/L (ref 22–29)
CO2 SERPL-SCNC: 21 MMOL/L (ref 22–29)
COLOR UR: YELLOW
CREAT SERPL-MCNC: 1.6 MG/DL (ref 0.7–1.2)
CREAT SERPL-MCNC: 1.6 MG/DL (ref 0.7–1.2)
EKG ATRIAL RATE: 141 BPM
EKG Q-T INTERVAL: 274 MS
EKG QRS DURATION: 68 MS
EKG QTC CALCULATION (BAZETT): 385 MS
EKG R AXIS: 61 DEGREES
EKG T AXIS: 26 DEGREES
EKG VENTRICULAR RATE: 119 BPM
EOSINOPHIL # BLD: 0.05 K/UL (ref 0.05–0.5)
EOSINOPHILS RELATIVE PERCENT: 1 % (ref 0–6)
ERYTHROCYTE [DISTWIDTH] IN BLOOD BY AUTOMATED COUNT: 15.8 % (ref 11.5–15)
GFR, ESTIMATED: 40 ML/MIN/1.73M2
GFR, ESTIMATED: 42 ML/MIN/1.73M2
GLUCOSE BLD-MCNC: 235 MG/DL (ref 74–99)
GLUCOSE BLD-MCNC: 345 MG/DL (ref 74–99)
GLUCOSE SERPL-MCNC: 476 MG/DL (ref 74–99)
GLUCOSE SERPL-MCNC: 533 MG/DL (ref 74–99)
GLUCOSE UR STRIP-MCNC: >=1000 MG/DL
HCT VFR BLD AUTO: 35.1 % (ref 37–54)
HGB BLD-MCNC: 11.2 G/DL (ref 12.5–16.5)
HGB UR QL STRIP.AUTO: ABNORMAL
IMM GRANULOCYTES # BLD AUTO: 0.04 K/UL (ref 0–0.58)
IMM GRANULOCYTES NFR BLD: 1 % (ref 0–5)
KETONES UR STRIP-MCNC: NEGATIVE MG/DL
LACTATE BLDV-SCNC: 2.3 MMOL/L (ref 0.5–2.2)
LEUKOCYTE ESTERASE UR QL STRIP: NEGATIVE
LIPASE SERPL-CCNC: 52 U/L (ref 13–60)
LYMPHOCYTES NFR BLD: 0.64 K/UL (ref 1.5–4)
LYMPHOCYTES RELATIVE PERCENT: 9 % (ref 20–42)
MAGNESIUM SERPL-MCNC: 1.9 MG/DL (ref 1.6–2.6)
MCH RBC QN AUTO: 27 PG (ref 26–35)
MCHC RBC AUTO-ENTMCNC: 31.9 G/DL (ref 32–34.5)
MCV RBC AUTO: 84.6 FL (ref 80–99.9)
MONOCYTES NFR BLD: 0.4 K/UL (ref 0.1–0.95)
MONOCYTES NFR BLD: 6 % (ref 2–12)
NEUTROPHILS NFR BLD: 84 % (ref 43–80)
NEUTS SEG NFR BLD: 6.02 K/UL (ref 1.8–7.3)
NITRITE UR QL STRIP: NEGATIVE
PH UR STRIP: 6 [PH] (ref 5–8)
PLATELET, FLUORESCENCE: 129 K/UL (ref 130–450)
PMV BLD AUTO: ABNORMAL FL (ref 7–12)
POTASSIUM SERPL-SCNC: 4.7 MMOL/L (ref 3.5–5)
POTASSIUM SERPL-SCNC: 5.5 MMOL/L (ref 3.5–5)
PROT SERPL-MCNC: 7.5 G/DL (ref 6.4–8.3)
PROT UR STRIP-MCNC: NEGATIVE MG/DL
RBC # BLD AUTO: 4.15 M/UL (ref 3.8–5.8)
RBC #/AREA URNS HPF: ABNORMAL /HPF
SODIUM SERPL-SCNC: 130 MMOL/L (ref 132–146)
SODIUM SERPL-SCNC: 132 MMOL/L (ref 132–146)
SP GR UR STRIP: 1.02 (ref 1–1.03)
TROPONIN I SERPL HS-MCNC: 38 NG/L (ref 0–11)
TROPONIN I SERPL HS-MCNC: NORMAL NG/L (ref 0–22)
UROBILINOGEN UR STRIP-ACNC: 0.2 EU/DL (ref 0–1)
WBC #/AREA URNS HPF: ABNORMAL /HPF
WBC OTHER # BLD: 7.2 K/UL (ref 4.5–11.5)

## 2025-03-15 PROCEDURE — 96374 THER/PROPH/DIAG INJ IV PUSH: CPT

## 2025-03-15 PROCEDURE — 96361 HYDRATE IV INFUSION ADD-ON: CPT

## 2025-03-15 PROCEDURE — 84484 ASSAY OF TROPONIN QUANT: CPT

## 2025-03-15 PROCEDURE — 83690 ASSAY OF LIPASE: CPT

## 2025-03-15 PROCEDURE — 71045 X-RAY EXAM CHEST 1 VIEW: CPT

## 2025-03-15 PROCEDURE — 6370000000 HC RX 637 (ALT 250 FOR IP)

## 2025-03-15 PROCEDURE — 82962 GLUCOSE BLOOD TEST: CPT

## 2025-03-15 PROCEDURE — 2580000003 HC RX 258

## 2025-03-15 PROCEDURE — 87086 URINE CULTURE/COLONY COUNT: CPT

## 2025-03-15 PROCEDURE — 93005 ELECTROCARDIOGRAM TRACING: CPT

## 2025-03-15 PROCEDURE — 80053 COMPREHEN METABOLIC PANEL: CPT

## 2025-03-15 PROCEDURE — 80048 BASIC METABOLIC PNL TOTAL CA: CPT

## 2025-03-15 PROCEDURE — 83735 ASSAY OF MAGNESIUM: CPT

## 2025-03-15 PROCEDURE — 96375 TX/PRO/DX INJ NEW DRUG ADDON: CPT

## 2025-03-15 PROCEDURE — 83605 ASSAY OF LACTIC ACID: CPT

## 2025-03-15 PROCEDURE — 99285 EMERGENCY DEPT VISIT HI MDM: CPT

## 2025-03-15 PROCEDURE — 85025 COMPLETE CBC W/AUTO DIFF WBC: CPT

## 2025-03-15 PROCEDURE — 73502 X-RAY EXAM HIP UNI 2-3 VIEWS: CPT

## 2025-03-15 PROCEDURE — 81001 URINALYSIS AUTO W/SCOPE: CPT

## 2025-03-15 RX ORDER — 0.9 % SODIUM CHLORIDE 0.9 %
1000 INTRAVENOUS SOLUTION INTRAVENOUS ONCE
Status: COMPLETED | OUTPATIENT
Start: 2025-03-15 | End: 2025-03-15

## 2025-03-15 RX ADMIN — INSULIN HUMAN 5 UNITS: 100 INJECTION, SOLUTION PARENTERAL at 08:16

## 2025-03-15 RX ADMIN — SODIUM CHLORIDE 1000 ML: 9 INJECTION, SOLUTION INTRAVENOUS at 03:53

## 2025-03-15 RX ADMIN — SODIUM CHLORIDE 1000 ML: 9 INJECTION, SOLUTION INTRAVENOUS at 05:36

## 2025-03-15 RX ADMIN — INSULIN HUMAN 10 UNITS: 100 INJECTION, SOLUTION PARENTERAL at 06:44

## 2025-03-15 ASSESSMENT — PAIN SCALES - GENERAL: PAINLEVEL_OUTOF10: 2

## 2025-03-15 ASSESSMENT — PAIN - FUNCTIONAL ASSESSMENT: PAIN_FUNCTIONAL_ASSESSMENT: 0-10

## 2025-03-15 ASSESSMENT — PAIN DESCRIPTION - DESCRIPTORS: DESCRIPTORS: ACHING

## 2025-03-15 ASSESSMENT — PAIN DESCRIPTION - ORIENTATION: ORIENTATION: LEFT

## 2025-03-15 ASSESSMENT — PAIN DESCRIPTION - LOCATION: LOCATION: HIP

## 2025-03-15 NOTE — ED NOTES
Pt family member in the room and refusing to have pt give more blood work. Nurse and Doctor informed.

## 2025-03-15 NOTE — ED PROVIDER NOTES
Department of Emergency Medicine     Written by: Eliseo Carrington DO  Patient Name: Johny Nichols  Admit Date: 3/15/2025 12:23 AM  MRN: [id number]                   : 1935      CHIEF COMPLAINT     Chief Complaint   Patient presents with    Hip Pain     Patient complaining of left sided hip pain.  Patient disoriented.  States that his pain is a 2 out of 10 and that it is constantly hurting.     HPI     Johny Nichols is a 89 y.o. male who presents to the emergency department today complaining of elevated blood sugar and vomiting.  Per patient's wife, she received a call from the home hospice nurse today stating that the patient's blood sugar was very elevated over 700.  He apparently has been vomiting all day as well.  Patient's wife is agreeable with him receiving lab workup given he is DNR CC hospice care.  Patient is confused and unable to provide ROS or more history.    Nursing notes were all reviewed and agreed with or any disagreements were addressed in the HPI.    REVIEW OF SYSTEMS:    Pertinent positives and negatives mentioned in the HPI/MDM.     REVIEW OF OUTSIDE RECORDS: Chart reviewed from 2025 hospital discharge summary where patient was admitted for chest pain.    SOCIAL DETERMINANTS OF HEALTH: DNR CC on hospice care.    PAST HISTORY     I personally reviewed the following history:    Past Medical History:  has a past medical history of A-fib (HCC), Cerebrovascular accident (CVA) (HCC), Chronic kidney disease, Chronic renal impairment, stage 3 (moderate) (HCC), Colitis, ulcerative chronic (HCC), Diabetes mellitus (HCC), DM (diabetes mellitus) (HCC), Encounter for therapeutic drug monitoring, Hyperlipidemia, Hyperlipidemia associated with type 2 diabetes mellitus (HCC), Hypertension, Long term (current) use of anticoagulants, Prostate enlargement, Stroke (cerebrum) (HCC), and Unspecified cerebral artery occlusion with cerebral infarction.    Past Surgical History:  has a past surgical history  heterogeneous in appearance.  Low-attenuation focus identified the dome of the liver which is indeterminate given the lack of intravenous contrast.  Findings not well seen on the prior examination measuring approximately 1-1/2 cm in size.  Underlying lesion cannot be excluded.  Consider contrast enhanced examination per liver mass protocol for further evaluation.  Some nodularity identified of the border of the liver stable and unchanged when compared to the prior examination.  Gallbladder reveals no stones.  Pancreas is homogeneous in appearance.  The spleen is unremarkable.  Both adrenal glands are within normal limits.  Symmetric appearance of the kidneys.  No stones or distension seen in the renal collecting system. GI/Bowel: Stomach is unremarkable.  The small bowel is within normal limits. No mucosal abnormality.  Ostomy identified along the right anterior abdominal wall. Pelvis: The bladder is incompletely distended.  Mild wall thickening likely related to the under distension.  Correlation to urinalysis if clinical symptoms exist.  The prostate is mildly enlarged with some dystrophic calcification.  Surgical clips identified within the pelvis. Peritoneum/Retroperitoneum: No abdominal retroperitoneal lymphadenopathy.  No free fluid or free air.  Vascular calcifications seen diffusely throughout the abdominal aorta and iliac vessels.  No pelvic adenopathy. Bones/Soft Tissues: Bony structures reveal minimal multilevel degenerative changes seen within the spine.  No ventral abdominal wall mass or defect.     1. No acute intra-abdominal or pelvic pathology. 2. Heterogeneous appearance of the liver with a low-attenuation focus identified the dome of the liver which is indeterminate given the lack of intravenous contrast. Findings not well seen on the prior examination measuring approximately 1-1/2 cm in size. Underlying lesion cannot be excluded. Consider contrast enhanced examination per liver mass protocol for

## 2025-03-15 NOTE — ED NOTES
HPI:  3/15/25, Time: 12:16 AM EDT         Johny Nichols is a 89 y.o. male history of atrial fibrillation history of stroke history of chronic kidney disease history of diabetes history of hypertension history of prostate enlargement stroke presenting to the ED for hip pain abdominal pain as well as reportedly altered, beginning 1 day ago.  The complaint has been persistent, moderate in severity, and worsened by nothing.  EMS reports that family called because of pain as well as reportedly patient was altered.  Patient unable to give complete history patient oriented to person and place but not to time.  Patient's not sure why he is here.  Patient reportedly did vomit at home.  There is no history of fever.  Patient does have a colostomy bag.  Patient is currently on hospice.  And is a DNR CC.    ROS:   Pertinent positives and negatives are stated within HPI, all other systems reviewed and are negative.  --------------------------------------------- PAST HISTORY ---------------------------------------------  Past Medical History:  has a past medical history of A-fib (HCC), Cerebrovascular accident (CVA) (HCC), Chronic kidney disease, Chronic renal impairment, stage 3 (moderate) (HCC), Colitis, ulcerative chronic (HCC), Diabetes mellitus (HCC), DM (diabetes mellitus) (HCC), Encounter for therapeutic drug monitoring, Hyperlipidemia, Hyperlipidemia associated with type 2 diabetes mellitus (HCC), Hypertension, Long term (current) use of anticoagulants, Prostate enlargement, Stroke (cerebrum) (HCC), and Unspecified cerebral artery occlusion with cerebral infarction.    Past Surgical History:  has a past surgical history that includes ECHO Compl W Dop Color Flow (11/26/2013); ECHO Transesophageal (11/27/2013); colostomy (1978); Cystoscopy (more than 5 yrs); TURP (6/16/14); Colonoscopy; Abdomen surgery; Endoscopy, colon, diagnostic (12/10/15); Upper gastrointestinal endoscopy (N/A, 3/11/2022); and IR CAROTID STENT W  with low attenuating foci.  Ostomy and identified along the right anterior abdominal wall incomplete distention of bladder.  Re-Evaluations:             Patient while here in the emergency department was given IV fluids as well as insulin.  Patient observed here emerged part patient no distress vital signs stable.  Plan will be to discharge.      Consultations:                 Critical Care:         This patient's ED course included: a personal history and physicial eaxmination    This patient has been closely monitored during their ED course.    Counseling:   The emergency provider has spoken with the patient and discussed today’s results, in addition to providing specific details for the plan of care and counseling regarding the diagnosis and prognosis.  Questions are answered at this time and they are agreeable with the plan.       --------------------------------- IMPRESSION AND DISPOSITION ---------------------------------    IMPRESSION  1. Left hip pain    2. Hyperglycemia        DISPOSITION  Disposition: Discharge  Patient condition is stable        NOTE: This report was transcribed using voice recognition software. Every effort was made to ensure accuracy; however, inadvertent computerized transcription errors may be present         Biju Zepeda MD  03/15/25 0819       Biju Zepeda MD  03/15/25 0823

## 2025-03-16 LAB
MICROORGANISM SPEC CULT: NO GROWTH
SERVICE CMNT-IMP: NORMAL
SPECIMEN DESCRIPTION: NORMAL

## 2025-03-17 LAB
EKG ATRIAL RATE: 141 BPM
EKG Q-T INTERVAL: 274 MS
EKG QRS DURATION: 68 MS
EKG QTC CALCULATION (BAZETT): 385 MS
EKG R AXIS: 61 DEGREES
EKG T AXIS: 26 DEGREES
EKG VENTRICULAR RATE: 119 BPM

## (undated) DEVICE — BITEBLOCK 54FR W/ DENT RIM BLOX

## (undated) DEVICE — CANNULA NSL ORAL AD FOR CAPNOFLEX CO2 O2 AIRLFE

## (undated) DEVICE — GAUZE,SPONGE,POST-OP,4X3,STRL,LF: Brand: MEDLINE